# Patient Record
Sex: MALE | Race: WHITE | NOT HISPANIC OR LATINO | URBAN - METROPOLITAN AREA
[De-identification: names, ages, dates, MRNs, and addresses within clinical notes are randomized per-mention and may not be internally consistent; named-entity substitution may affect disease eponyms.]

---

## 2018-02-14 ENCOUNTER — EMERGENCY (EMERGENCY)
Facility: HOSPITAL | Age: 58
LOS: 1 days | Discharge: HOME | End: 2018-02-14
Attending: EMERGENCY MEDICINE
Payer: COMMERCIAL

## 2018-02-14 VITALS
OXYGEN SATURATION: 95 % | RESPIRATION RATE: 18 BRPM | TEMPERATURE: 99 F | SYSTOLIC BLOOD PRESSURE: 158 MMHG | DIASTOLIC BLOOD PRESSURE: 89 MMHG | HEART RATE: 99 BPM

## 2018-02-14 VITALS
TEMPERATURE: 98 F | OXYGEN SATURATION: 98 % | HEART RATE: 92 BPM | DIASTOLIC BLOOD PRESSURE: 83 MMHG | RESPIRATION RATE: 18 BRPM | SYSTOLIC BLOOD PRESSURE: 134 MMHG

## 2018-02-14 PROCEDURE — 99284 EMERGENCY DEPT VISIT MOD MDM: CPT | Mod: 25

## 2018-02-14 PROCEDURE — 73620 X-RAY EXAM OF FOOT: CPT | Mod: 26,RT

## 2018-02-14 PROCEDURE — 73610 X-RAY EXAM OF ANKLE: CPT

## 2018-02-14 PROCEDURE — 73610 X-RAY EXAM OF ANKLE: CPT | Mod: 26,RT

## 2018-02-14 PROCEDURE — 99284 EMERGENCY DEPT VISIT MOD MDM: CPT

## 2018-02-14 PROCEDURE — 73620 X-RAY EXAM OF FOOT: CPT

## 2018-02-14 RX ORDER — OXYCODONE HYDROCHLORIDE 5 MG/1
5 TABLET ORAL ONCE
Qty: 0 | Refills: 0 | Status: DISCONTINUED | OUTPATIENT
Start: 2018-02-14 | End: 2018-02-14

## 2018-02-14 RX ORDER — OXYCODONE HYDROCHLORIDE 5 MG/1
1 TABLET ORAL
Qty: 6 | Refills: 0 | OUTPATIENT
Start: 2018-02-14 | End: 2018-02-16

## 2018-02-14 RX ORDER — IBUPROFEN 200 MG
600 TABLET ORAL ONCE
Qty: 0 | Refills: 0 | Status: COMPLETED | OUTPATIENT
Start: 2018-02-14 | End: 2018-02-14

## 2018-02-14 RX ORDER — DIAZEPAM 5 MG
5 TABLET ORAL ONCE
Qty: 0 | Refills: 0 | Status: DISCONTINUED | OUTPATIENT
Start: 2018-02-14 | End: 2018-02-14

## 2018-02-14 RX ADMIN — Medication 600 MILLIGRAM(S): at 14:21

## 2018-02-14 RX ADMIN — Medication 5 MILLIGRAM(S): at 14:20

## 2018-02-14 RX ADMIN — Medication 600 MILLIGRAM(S): at 14:28

## 2018-02-14 RX ADMIN — OXYCODONE HYDROCHLORIDE 5 MILLIGRAM(S): 5 TABLET ORAL at 15:03

## 2018-02-14 RX ADMIN — Medication 20 MILLIGRAM(S): at 14:21

## 2018-02-14 RX ADMIN — OXYCODONE HYDROCHLORIDE 5 MILLIGRAM(S): 5 TABLET ORAL at 14:58

## 2018-02-14 NOTE — ED ADULT TRIAGE NOTE - CHIEF COMPLAINT QUOTE
while at work yesterday lifted a manhole cover and hurt back, lower back radiating to right leg, right foot pain, denies numbness/ tingling, took Aleve at 07am

## 2018-02-14 NOTE — ED PROVIDER NOTE - MEDICAL DECISION MAKING DETAILS
Xrays rule out right foot fx vs. cuboid subluxation as differential.  Right low back pain without sciatic symptoms, paraspinal, likely musculoskeletal Xrays noted. To treat for 5th metatarsal avulsion fx vs. cuboid subluxation as differential.  Cross wrap and post op shoe.  Podiatry referral. pain control. Right low back pain without sciatic symptoms, paraspinal, likely musculoskeletal. Xrays noted. To treat for 5th metatarsal avulsion fx vs. cuboid subluxation as differential.  Cross wrap and post op shoe.  Podiatry referral. pain control. Right low back pain without sciatic symptoms, paraspinal, likely musculoskeletal.       Attending note.  Right lower back pain acute on chronic, right foot pain possible cuboid syndrome. X-ray of the foot, muscle relaxants and analgesia. Followup with Ortho or  spine center.

## 2018-02-14 NOTE — ED ADULT NURSE NOTE - OBJECTIVE STATEMENT
57 yr old male with h/o back pain report to the ER for right lower back pain radiating down to the right foot. Pt reports that he was pulling up a man hole cover  when felt a pull in the right lower back yesterday. Pt report pain level of 8/10 on pain scale and aching in quality. Pt reports that he took Aleve 2 tabs which provided some relieve. Pt also report that he iced his back. Pt also report foot swelling. Pt is able to bear partial weight on the right foot. Denies any bladder or bowel issues.

## 2018-02-14 NOTE — ED PROVIDER NOTE - PHYSICAL EXAMINATION
Attending note. The patient is alert and in moderate distress. Examination of the back reveals no skin lesions or rashes. Patient has tenderness in the right lumbar and right SI joint. Straight leg raise is negative. There is no CVA tenderness. Abdomen is soft and nontender. Straight leg raise is negative. Examination of the patient's right foot reveals some swelling on the dorsum of the right foot. There is tenderness over the cuboid and mid foot. Sensation is intact and normal. Skin is otherwise normal. Patient has good pulses. Ankle is nontender.

## 2018-02-14 NOTE — ED PROVIDER NOTE - OBJECTIVE STATEMENT
57 year old male with hx of right sciatic pain who states that he was lifting a manhole cover yesterday evening and noticed a sharp right pain of his foot and his low back.  States that he went for a chiropractice manipulation this morning and notes no significant improvement.  Pain is with ambulation. Took aleve in the morning. 57 year old male with hx of right sciatic pain who states that he was lifting a manhole cover yesterday evening and noticed a sharp right pain of his foot and his low back.  States that he went for a chiropractice manipulation this morning and notes no significant improvement.  Pain is with ambulation. Took aleve in the morning.       Attending note. Patient was seen in fast track room #5. Patient is complaining of pain in the right lower back as well as pain in the dorsal lateral right foot.  Patient has a 10 year history of lower back pain in the right side but has not received MRI or other imaging. Patient was moving a manhole cover yesterday and felt acute pain in the right lower back as well as some pain in the foot. Patient states the pain he right foot is much more severe today and is exacerbated by weightbearing. Patient denies any numbness or paresthesia. Patient has no bowel or bladder dysfunction. Patient is a subtle anesthesia. Patient has no fever. Patient took Aleve this morning without relief 57 year old male with hx of right sciatic pain who states that he was lifting a manhole cover yesterday evening and noticed a sharp right pain of his foot and his low back.  States that he went for a chiropractic manipulation this morning and notes no significant improvement.  Pain is with ambulation. Took aleve in the morning.       Attending note. Patient was seen in fast track room #5. Patient is complaining of pain in the right lower back as well as pain in the dorsal lateral right foot.  Patient has a 10 year history of lower back pain in the right side but has not received MRI or other imaging. Patient was moving a manhole cover yesterday and felt acute pain in the right lower back as well as some pain in the foot. Patient states the pain he right foot is much more severe today and is exacerbated by weightbearing. Patient denies any numbness or paresthesia. Patient has no bowel or bladder dysfunction. Patient is a subtle anesthesia. Patient has no fever. Patient took Aleve this morning without relief

## 2018-02-21 ENCOUNTER — INPATIENT (INPATIENT)
Facility: HOSPITAL | Age: 58
LOS: 22 days | Discharge: HOME | DRG: 853 | End: 2018-03-16
Attending: INTERNAL MEDICINE | Admitting: HOSPITALIST
Payer: COMMERCIAL

## 2018-02-21 VITALS
SYSTOLIC BLOOD PRESSURE: 116 MMHG | HEART RATE: 122 BPM | DIASTOLIC BLOOD PRESSURE: 77 MMHG | OXYGEN SATURATION: 96 % | RESPIRATION RATE: 22 BRPM

## 2018-02-21 LAB
ALBUMIN SERPL ELPH-MCNC: 3.3 G/DL — SIGNIFICANT CHANGE UP (ref 3.3–5)
ALP SERPL-CCNC: 107 U/L — SIGNIFICANT CHANGE UP (ref 40–120)
ALT FLD-CCNC: 143 U/L RC — HIGH (ref 10–45)
ANION GAP SERPL CALC-SCNC: 15 MMOL/L — SIGNIFICANT CHANGE UP (ref 5–17)
APTT BLD: 31.6 SEC — SIGNIFICANT CHANGE UP (ref 27.5–37.4)
AST SERPL-CCNC: 156 U/L — HIGH (ref 10–40)
BASE EXCESS BLDV CALC-SCNC: 3.3 MMOL/L — HIGH (ref -2–2)
BILIRUB SERPL-MCNC: 0.5 MG/DL — SIGNIFICANT CHANGE UP (ref 0.2–1.2)
BUN SERPL-MCNC: 33 MG/DL — HIGH (ref 7–23)
CA-I SERPL-SCNC: 1.24 MMOL/L — SIGNIFICANT CHANGE UP (ref 1.12–1.3)
CALCIUM SERPL-MCNC: 9.8 MG/DL — SIGNIFICANT CHANGE UP (ref 8.4–10.5)
CHLORIDE BLDV-SCNC: 99 MMOL/L — SIGNIFICANT CHANGE UP (ref 96–108)
CHLORIDE SERPL-SCNC: 95 MMOL/L — LOW (ref 96–108)
CK MB BLD-MCNC: 1.2 % — SIGNIFICANT CHANGE UP (ref 0–3.5)
CK MB CFR SERPL CALC: 5.5 NG/ML — SIGNIFICANT CHANGE UP (ref 0–6.7)
CK SERPL-CCNC: 460 U/L — HIGH (ref 30–200)
CO2 BLDV-SCNC: 31 MMOL/L — HIGH (ref 22–30)
CO2 SERPL-SCNC: 28 MMOL/L — SIGNIFICANT CHANGE UP (ref 22–31)
CREAT SERPL-MCNC: 1.4 MG/DL — HIGH (ref 0.5–1.3)
ETHANOL SERPL-MCNC: SIGNIFICANT CHANGE UP MG/DL (ref 0–10)
GAS PNL BLDV: 136 MMOL/L — SIGNIFICANT CHANGE UP (ref 136–145)
GAS PNL BLDV: SIGNIFICANT CHANGE UP
GLUCOSE BLDV-MCNC: 143 MG/DL — HIGH (ref 70–99)
GLUCOSE SERPL-MCNC: 141 MG/DL — HIGH (ref 70–99)
HCO3 BLDV-SCNC: 29 MMOL/L — SIGNIFICANT CHANGE UP (ref 21–29)
HCT VFR BLD CALC: 40.8 % — SIGNIFICANT CHANGE UP (ref 39–50)
HCT VFR BLDA CALC: 43 % — SIGNIFICANT CHANGE UP (ref 39–50)
HGB BLD CALC-MCNC: 14.2 G/DL — SIGNIFICANT CHANGE UP (ref 13–17)
HGB BLD-MCNC: 13.9 G/DL — SIGNIFICANT CHANGE UP (ref 13–17)
INR BLD: 1.48 RATIO — HIGH (ref 0.88–1.16)
LACTATE BLDV-MCNC: 2.2 MMOL/L — HIGH (ref 0.7–2)
MCHC RBC-ENTMCNC: 34 GM/DL — SIGNIFICANT CHANGE UP (ref 32–36)
MCHC RBC-ENTMCNC: 34.2 PG — HIGH (ref 27–34)
MCV RBC AUTO: 101 FL — HIGH (ref 80–100)
PCO2 BLDV: 53 MMHG — HIGH (ref 35–50)
PH BLDV: 7.36 — SIGNIFICANT CHANGE UP (ref 7.35–7.45)
PLATELET # BLD AUTO: 280 K/UL — SIGNIFICANT CHANGE UP (ref 150–400)
PO2 BLDV: 76 MMHG — HIGH (ref 25–45)
POTASSIUM BLDV-SCNC: 3.8 MMOL/L — SIGNIFICANT CHANGE UP (ref 3.5–5)
POTASSIUM SERPL-MCNC: 3.9 MMOL/L — SIGNIFICANT CHANGE UP (ref 3.5–5.3)
POTASSIUM SERPL-SCNC: 3.9 MMOL/L — SIGNIFICANT CHANGE UP (ref 3.5–5.3)
PROT SERPL-MCNC: 7.6 G/DL — SIGNIFICANT CHANGE UP (ref 6–8.3)
PROTHROM AB SERPL-ACNC: 16.1 SEC — HIGH (ref 9.8–12.7)
RBC # BLD: 4.06 M/UL — LOW (ref 4.2–5.8)
RBC # FLD: 12.3 % — SIGNIFICANT CHANGE UP (ref 10.3–14.5)
SAO2 % BLDV: 94 % — HIGH (ref 67–88)
SODIUM SERPL-SCNC: 138 MMOL/L — SIGNIFICANT CHANGE UP (ref 135–145)
TROPONIN T SERPL-MCNC: <0.01 NG/ML — SIGNIFICANT CHANGE UP (ref 0–0.06)
WBC # BLD: 16.6 K/UL — HIGH (ref 3.8–10.5)
WBC # FLD AUTO: 16.6 K/UL — HIGH (ref 3.8–10.5)

## 2018-02-21 PROCEDURE — 93010 ELECTROCARDIOGRAM REPORT: CPT

## 2018-02-21 PROCEDURE — 99285 EMERGENCY DEPT VISIT HI MDM: CPT | Mod: 25

## 2018-02-21 RX ORDER — SODIUM CHLORIDE 9 MG/ML
1000 INJECTION INTRAMUSCULAR; INTRAVENOUS; SUBCUTANEOUS ONCE
Qty: 0 | Refills: 0 | Status: COMPLETED | OUTPATIENT
Start: 2018-02-21 | End: 2018-02-21

## 2018-02-21 RX ORDER — ASPIRIN/CALCIUM CARB/MAGNESIUM 324 MG
324 TABLET ORAL ONCE
Qty: 0 | Refills: 0 | Status: COMPLETED | OUTPATIENT
Start: 2018-02-21 | End: 2018-02-21

## 2018-02-21 RX ADMIN — Medication 324 MILLIGRAM(S): at 23:14

## 2018-02-21 RX ADMIN — SODIUM CHLORIDE 2000 MILLILITER(S): 9 INJECTION INTRAMUSCULAR; INTRAVENOUS; SUBCUTANEOUS at 23:38

## 2018-02-21 NOTE — ED PROVIDER NOTE - ATTENDING CONTRIBUTION TO CARE
****ATTENDING**** 56yo male hx HTN BIB family for chest pain. As per wife pt had a recent ankle sprain on pain meds that are not helping, has not had a drink in 10 days and normally drinks few drinks everyday, pw weakness, diaphoresis and chest pain.  Pt states he did not have cp, but wife felt he was short of breath at the time.   On exam, Patient is awake,alert,oriented x 3. diaphoretic. Patient's chest cta b/l, +s1s2 tachycardic. Abdomen is soft nd/nt +BS. RLE + swelling/ecchymosis, 2+DP, nml sensation.   EKG reviewed. Check Labs, CTA chest and Head.   Eval for PE, ACS, withdrawal.

## 2018-02-21 NOTE — ED ADULT NURSE NOTE - OBJECTIVE STATEMENT
Pt BIBA form home with familles at the bedside for diaphoresis and  confusion Pt on percocet for the right foot injury work related Pt has boot on and is swollen and bruised.  Pt toon one 10mg percocet at 1945 and one a t 2005.  Pt pupils are pinpoint at this time and placed on oxygen 2Lnc.  IVL placed and bloods sent as ordered.  Pt has ot had a drink since last Monday.  As per family pt c.o of right shoulder pain but resolved now as per pt with lower extremity swollen and will be ruled of for  PE pt is tachycardic 122 on arrival and 110 hunter [place]

## 2018-02-22 DIAGNOSIS — G93.40 ENCEPHALOPATHY, UNSPECIFIED: ICD-10-CM

## 2018-02-22 DIAGNOSIS — N17.9 ACUTE KIDNEY FAILURE, UNSPECIFIED: ICD-10-CM

## 2018-02-22 DIAGNOSIS — R91.8 OTHER NONSPECIFIC ABNORMAL FINDING OF LUNG FIELD: ICD-10-CM

## 2018-02-22 DIAGNOSIS — N28.9 DISORDER OF KIDNEY AND URETER, UNSPECIFIED: ICD-10-CM

## 2018-02-22 DIAGNOSIS — F10.239 ALCOHOL DEPENDENCE WITH WITHDRAWAL, UNSPECIFIED: ICD-10-CM

## 2018-02-22 DIAGNOSIS — J96.01 ACUTE RESPIRATORY FAILURE WITH HYPOXIA: ICD-10-CM

## 2018-02-22 DIAGNOSIS — R74.0 NONSPECIFIC ELEVATION OF LEVELS OF TRANSAMINASE AND LACTIC ACID DEHYDROGENASE [LDH]: ICD-10-CM

## 2018-02-22 DIAGNOSIS — R07.9 CHEST PAIN, UNSPECIFIED: ICD-10-CM

## 2018-02-22 DIAGNOSIS — Z29.9 ENCOUNTER FOR PROPHYLACTIC MEASURES, UNSPECIFIED: ICD-10-CM

## 2018-02-22 DIAGNOSIS — I10 ESSENTIAL (PRIMARY) HYPERTENSION: ICD-10-CM

## 2018-02-22 DIAGNOSIS — L03.115 CELLULITIS OF RIGHT LOWER LIMB: ICD-10-CM

## 2018-02-22 DIAGNOSIS — R07.1 CHEST PAIN ON BREATHING: ICD-10-CM

## 2018-02-22 LAB
ALBUMIN SERPL ELPH-MCNC: 3 G/DL — LOW (ref 3.3–5)
ALP SERPL-CCNC: 110 U/L — SIGNIFICANT CHANGE UP (ref 40–120)
ALT FLD-CCNC: 127 U/L — HIGH (ref 10–45)
ANION GAP SERPL CALC-SCNC: 12 MMOL/L — SIGNIFICANT CHANGE UP (ref 5–17)
ANION GAP SERPL CALC-SCNC: 13 MMOL/L — SIGNIFICANT CHANGE UP (ref 5–17)
APTT BLD: 29.6 SEC — SIGNIFICANT CHANGE UP (ref 27.5–37.4)
AST SERPL-CCNC: 103 U/L — HIGH (ref 10–40)
BASOPHILS # BLD AUTO: 0 K/UL — SIGNIFICANT CHANGE UP (ref 0–0.2)
BASOPHILS # BLD AUTO: 0.01 K/UL — SIGNIFICANT CHANGE UP (ref 0–0.2)
BASOPHILS NFR BLD AUTO: 0 % — SIGNIFICANT CHANGE UP (ref 0–2)
BASOPHILS NFR BLD AUTO: 0.1 % — SIGNIFICANT CHANGE UP (ref 0–2)
BILIRUB SERPL-MCNC: 0.4 MG/DL — SIGNIFICANT CHANGE UP (ref 0.2–1.2)
BUN SERPL-MCNC: 26 MG/DL — HIGH (ref 7–23)
BUN SERPL-MCNC: 27 MG/DL — HIGH (ref 7–23)
CALCIUM SERPL-MCNC: 9 MG/DL — SIGNIFICANT CHANGE UP (ref 8.4–10.5)
CALCIUM SERPL-MCNC: 9.4 MG/DL — SIGNIFICANT CHANGE UP (ref 8.4–10.5)
CHLORIDE SERPL-SCNC: 95 MMOL/L — LOW (ref 96–108)
CHLORIDE SERPL-SCNC: 96 MMOL/L — SIGNIFICANT CHANGE UP (ref 96–108)
CHOLEST SERPL-MCNC: 86 MG/DL — SIGNIFICANT CHANGE UP (ref 10–199)
CK MB BLD-MCNC: 1.6 % — SIGNIFICANT CHANGE UP (ref 0–3.5)
CK MB CFR SERPL CALC: 5.5 NG/ML — SIGNIFICANT CHANGE UP (ref 0–6.7)
CK MB CFR SERPL CALC: 5.7 NG/ML — SIGNIFICANT CHANGE UP (ref 0–6.7)
CK SERPL-CCNC: 362 U/L — HIGH (ref 30–200)
CK SERPL-CCNC: 366 U/L — HIGH (ref 30–200)
CO2 SERPL-SCNC: 28 MMOL/L — SIGNIFICANT CHANGE UP (ref 22–31)
CO2 SERPL-SCNC: 29 MMOL/L — SIGNIFICANT CHANGE UP (ref 22–31)
CREAT SERPL-MCNC: 1.07 MG/DL — SIGNIFICANT CHANGE UP (ref 0.5–1.3)
CREAT SERPL-MCNC: 1.14 MG/DL — SIGNIFICANT CHANGE UP (ref 0.5–1.3)
EOSINOPHIL # BLD AUTO: 0 K/UL — SIGNIFICANT CHANGE UP (ref 0–0.5)
EOSINOPHIL # BLD AUTO: 0 K/UL — SIGNIFICANT CHANGE UP (ref 0–0.5)
EOSINOPHIL NFR BLD AUTO: 0 % — SIGNIFICANT CHANGE UP (ref 0–6)
EOSINOPHIL NFR BLD AUTO: 0 % — SIGNIFICANT CHANGE UP (ref 0–6)
GLUCOSE SERPL-MCNC: 152 MG/DL — HIGH (ref 70–99)
GLUCOSE SERPL-MCNC: 156 MG/DL — HIGH (ref 70–99)
GRAM STN FLD: SIGNIFICANT CHANGE UP
HAV IGM SER-ACNC: SIGNIFICANT CHANGE UP
HBA1C BLD-MCNC: 5.5 % — SIGNIFICANT CHANGE UP (ref 4–5.6)
HBV CORE IGM SER-ACNC: SIGNIFICANT CHANGE UP
HBV SURFACE AG SER-ACNC: SIGNIFICANT CHANGE UP
HCT VFR BLD CALC: 38.1 % — LOW (ref 39–50)
HCT VFR BLD CALC: 38.8 % — LOW (ref 39–50)
HCV AB S/CO SERPL IA: 0.09 S/CO — SIGNIFICANT CHANGE UP
HCV AB SERPL-IMP: SIGNIFICANT CHANGE UP
HDLC SERPL-MCNC: 37 MG/DL — LOW (ref 40–125)
HGB BLD-MCNC: 12.9 G/DL — LOW (ref 13–17)
HGB BLD-MCNC: 13.1 G/DL — SIGNIFICANT CHANGE UP (ref 13–17)
HIV 1+2 AB+HIV1 P24 AG SERPL QL IA: SIGNIFICANT CHANGE UP
IMM GRANULOCYTES NFR BLD AUTO: 0.4 % — SIGNIFICANT CHANGE UP (ref 0–1.5)
LIPID PNL WITH DIRECT LDL SERPL: 32 MG/DL — SIGNIFICANT CHANGE UP
LYMPHOCYTES # BLD AUTO: 0.4 K/UL — LOW (ref 1–3.3)
LYMPHOCYTES # BLD AUTO: 0.51 K/UL — LOW (ref 1–3.3)
LYMPHOCYTES # BLD AUTO: 3.3 % — LOW (ref 13–44)
LYMPHOCYTES # BLD AUTO: 4 % — LOW (ref 13–44)
MCHC RBC-ENTMCNC: 32.1 PG — SIGNIFICANT CHANGE UP (ref 27–34)
MCHC RBC-ENTMCNC: 32.8 PG — SIGNIFICANT CHANGE UP (ref 27–34)
MCHC RBC-ENTMCNC: 33.2 GM/DL — SIGNIFICANT CHANGE UP (ref 32–36)
MCHC RBC-ENTMCNC: 34.4 GM/DL — SIGNIFICANT CHANGE UP (ref 32–36)
MCV RBC AUTO: 95.3 FL — SIGNIFICANT CHANGE UP (ref 80–100)
MCV RBC AUTO: 96.5 FL — SIGNIFICANT CHANGE UP (ref 80–100)
MONOCYTES # BLD AUTO: 1 K/UL — HIGH (ref 0–0.9)
MONOCYTES # BLD AUTO: 1.11 K/UL — HIGH (ref 0–0.9)
MONOCYTES NFR BLD AUTO: 5 % — SIGNIFICANT CHANGE UP (ref 2–14)
MONOCYTES NFR BLD AUTO: 7.1 % — SIGNIFICANT CHANGE UP (ref 2–14)
NEUTROPHILS # BLD AUTO: 13.84 K/UL — HIGH (ref 1.8–7.4)
NEUTROPHILS # BLD AUTO: 15.2 K/UL — HIGH (ref 1.8–7.4)
NEUTROPHILS NFR BLD AUTO: 87 % — HIGH (ref 43–77)
NEUTROPHILS NFR BLD AUTO: 89.1 % — HIGH (ref 43–77)
NEUTS BAND # BLD: 4 % — SIGNIFICANT CHANGE UP (ref 0–8)
PLAT MORPH BLD: NORMAL — SIGNIFICANT CHANGE UP
PLATELET # BLD AUTO: 239 K/UL — SIGNIFICANT CHANGE UP (ref 150–400)
PLATELET # BLD AUTO: 255 K/UL — SIGNIFICANT CHANGE UP (ref 150–400)
POTASSIUM SERPL-MCNC: 4.2 MMOL/L — SIGNIFICANT CHANGE UP (ref 3.5–5.3)
POTASSIUM SERPL-MCNC: 4.2 MMOL/L — SIGNIFICANT CHANGE UP (ref 3.5–5.3)
POTASSIUM SERPL-SCNC: 4.2 MMOL/L — SIGNIFICANT CHANGE UP (ref 3.5–5.3)
POTASSIUM SERPL-SCNC: 4.2 MMOL/L — SIGNIFICANT CHANGE UP (ref 3.5–5.3)
PROT SERPL-MCNC: 7.1 G/DL — SIGNIFICANT CHANGE UP (ref 6–8.3)
RBC # BLD: 4 M/UL — LOW (ref 4.2–5.8)
RBC # BLD: 4.02 M/UL — LOW (ref 4.2–5.8)
RBC # FLD: 13.6 % — SIGNIFICANT CHANGE UP (ref 10.3–14.5)
RBC # FLD: 13.7 % — SIGNIFICANT CHANGE UP (ref 10.3–14.5)
RBC BLD AUTO: SIGNIFICANT CHANGE UP
SODIUM SERPL-SCNC: 136 MMOL/L — SIGNIFICANT CHANGE UP (ref 135–145)
SODIUM SERPL-SCNC: 137 MMOL/L — SIGNIFICANT CHANGE UP (ref 135–145)
SPECIMEN SOURCE: SIGNIFICANT CHANGE UP
TOTAL CHOLESTEROL/HDL RATIO MEASUREMENT: 2.3 RATIO — LOW (ref 3.4–9.6)
TRIGL SERPL-MCNC: 87 MG/DL — SIGNIFICANT CHANGE UP (ref 10–149)
TROPONIN T SERPL-MCNC: <0.01 NG/ML — SIGNIFICANT CHANGE UP (ref 0–0.06)
TROPONIN T SERPL-MCNC: <0.01 NG/ML — SIGNIFICANT CHANGE UP (ref 0–0.06)
TSH SERPL-MCNC: 0.37 UIU/ML — SIGNIFICANT CHANGE UP (ref 0.27–4.2)
WBC # BLD: 15.53 K/UL — HIGH (ref 3.8–10.5)
WBC # BLD: 16.13 K/UL — HIGH (ref 3.8–10.5)
WBC # FLD AUTO: 15.53 K/UL — HIGH (ref 3.8–10.5)
WBC # FLD AUTO: 16.13 K/UL — HIGH (ref 3.8–10.5)

## 2018-02-22 PROCEDURE — 99223 1ST HOSP IP/OBS HIGH 75: CPT | Mod: AI

## 2018-02-22 PROCEDURE — 93970 EXTREMITY STUDY: CPT | Mod: 26

## 2018-02-22 PROCEDURE — 12345: CPT | Mod: GC,NC

## 2018-02-22 PROCEDURE — 93306 TTE W/DOPPLER COMPLETE: CPT | Mod: 26

## 2018-02-22 PROCEDURE — 99252 IP/OBS CONSLTJ NEW/EST SF 35: CPT

## 2018-02-22 PROCEDURE — 70450 CT HEAD/BRAIN W/O DYE: CPT | Mod: 26

## 2018-02-22 PROCEDURE — 73701 CT LOWER EXTREMITY W/DYE: CPT | Mod: 26,RT

## 2018-02-22 PROCEDURE — 71275 CT ANGIOGRAPHY CHEST: CPT | Mod: 26

## 2018-02-22 PROCEDURE — 74177 CT ABD & PELVIS W/CONTRAST: CPT | Mod: 26

## 2018-02-22 PROCEDURE — 99255 IP/OBS CONSLTJ NEW/EST HI 80: CPT | Mod: GC

## 2018-02-22 RX ORDER — LACTOBACILLUS ACIDOPHILUS 100MM CELL
1 CAPSULE ORAL
Qty: 0 | Refills: 0 | Status: DISCONTINUED | OUTPATIENT
Start: 2018-02-22 | End: 2018-02-25

## 2018-02-22 RX ORDER — PIPERACILLIN AND TAZOBACTAM 4; .5 G/20ML; G/20ML
3.38 INJECTION, POWDER, LYOPHILIZED, FOR SOLUTION INTRAVENOUS EVERY 8 HOURS
Qty: 0 | Refills: 0 | Status: DISCONTINUED | OUTPATIENT
Start: 2018-02-22 | End: 2018-02-23

## 2018-02-22 RX ORDER — KETOROLAC TROMETHAMINE 30 MG/ML
15 SYRINGE (ML) INJECTION EVERY 8 HOURS
Qty: 0 | Refills: 0 | Status: DISCONTINUED | OUTPATIENT
Start: 2018-02-22 | End: 2018-02-22

## 2018-02-22 RX ORDER — SODIUM CHLORIDE 9 MG/ML
1000 INJECTION INTRAMUSCULAR; INTRAVENOUS; SUBCUTANEOUS
Qty: 0 | Refills: 0 | Status: DISCONTINUED | OUTPATIENT
Start: 2018-02-22 | End: 2018-02-23

## 2018-02-22 RX ORDER — HEPARIN SODIUM 5000 [USP'U]/ML
INJECTION INTRAVENOUS; SUBCUTANEOUS
Qty: 25000 | Refills: 0 | Status: DISCONTINUED | OUTPATIENT
Start: 2018-02-22 | End: 2018-02-25

## 2018-02-22 RX ORDER — HEPARIN SODIUM 5000 [USP'U]/ML
5000 INJECTION INTRAVENOUS; SUBCUTANEOUS EVERY 8 HOURS
Qty: 0 | Refills: 0 | Status: DISCONTINUED | OUTPATIENT
Start: 2018-02-22 | End: 2018-02-22

## 2018-02-22 RX ORDER — KETOROLAC TROMETHAMINE 30 MG/ML
30 SYRINGE (ML) INJECTION ONCE
Qty: 0 | Refills: 0 | Status: DISCONTINUED | OUTPATIENT
Start: 2018-02-22 | End: 2018-02-22

## 2018-02-22 RX ORDER — ACETAMINOPHEN 500 MG
650 TABLET ORAL EVERY 8 HOURS
Qty: 0 | Refills: 0 | Status: DISCONTINUED | OUTPATIENT
Start: 2018-02-22 | End: 2018-02-24

## 2018-02-22 RX ORDER — THIAMINE MONONITRATE (VIT B1) 100 MG
100 TABLET ORAL DAILY
Qty: 0 | Refills: 0 | Status: DISCONTINUED | OUTPATIENT
Start: 2018-02-22 | End: 2018-02-25

## 2018-02-22 RX ORDER — VANCOMYCIN HCL 1 G
1000 VIAL (EA) INTRAVENOUS EVERY 12 HOURS
Qty: 0 | Refills: 0 | Status: DISCONTINUED | OUTPATIENT
Start: 2018-02-22 | End: 2018-02-23

## 2018-02-22 RX ORDER — PIPERACILLIN AND TAZOBACTAM 4; .5 G/20ML; G/20ML
3.38 INJECTION, POWDER, LYOPHILIZED, FOR SOLUTION INTRAVENOUS EVERY 8 HOURS
Qty: 0 | Refills: 0 | Status: DISCONTINUED | OUTPATIENT
Start: 2018-02-22 | End: 2018-02-22

## 2018-02-22 RX ORDER — ACETAMINOPHEN 500 MG
650 TABLET ORAL EVERY 6 HOURS
Qty: 0 | Refills: 0 | Status: DISCONTINUED | OUTPATIENT
Start: 2018-02-22 | End: 2018-02-25

## 2018-02-22 RX ORDER — FOLIC ACID 0.8 MG
1 TABLET ORAL DAILY
Qty: 0 | Refills: 0 | Status: DISCONTINUED | OUTPATIENT
Start: 2018-02-22 | End: 2018-02-25

## 2018-02-22 RX ORDER — HEPARIN SODIUM 5000 [USP'U]/ML
10000 INJECTION INTRAVENOUS; SUBCUTANEOUS EVERY 6 HOURS
Qty: 0 | Refills: 0 | Status: DISCONTINUED | OUTPATIENT
Start: 2018-02-22 | End: 2018-02-25

## 2018-02-22 RX ORDER — LIDOCAINE 4 G/100G
1 CREAM TOPICAL DAILY
Qty: 0 | Refills: 0 | Status: DISCONTINUED | OUTPATIENT
Start: 2018-02-22 | End: 2018-02-25

## 2018-02-22 RX ORDER — HEPARIN SODIUM 5000 [USP'U]/ML
10000 INJECTION INTRAVENOUS; SUBCUTANEOUS ONCE
Qty: 0 | Refills: 0 | Status: COMPLETED | OUTPATIENT
Start: 2018-02-22 | End: 2018-02-22

## 2018-02-22 RX ORDER — HEPARIN SODIUM 5000 [USP'U]/ML
5000 INJECTION INTRAVENOUS; SUBCUTANEOUS EVERY 6 HOURS
Qty: 0 | Refills: 0 | Status: DISCONTINUED | OUTPATIENT
Start: 2018-02-22 | End: 2018-02-25

## 2018-02-22 RX ORDER — LOSARTAN POTASSIUM 100 MG/1
0 TABLET, FILM COATED ORAL
Qty: 0 | Refills: 0 | COMMUNITY

## 2018-02-22 RX ADMIN — Medication 250 MILLIGRAM(S): at 10:44

## 2018-02-22 RX ADMIN — Medication 650 MILLIGRAM(S): at 20:11

## 2018-02-22 RX ADMIN — Medication 650 MILLIGRAM(S): at 21:30

## 2018-02-22 RX ADMIN — Medication 1 TABLET(S): at 12:43

## 2018-02-22 RX ADMIN — Medication 100 MILLIGRAM(S): at 12:43

## 2018-02-22 RX ADMIN — Medication 100 MILLIGRAM(S): at 22:08

## 2018-02-22 RX ADMIN — HEPARIN SODIUM 2200 UNIT(S)/HR: 5000 INJECTION INTRAVENOUS; SUBCUTANEOUS at 20:48

## 2018-02-22 RX ADMIN — Medication 100 MILLIGRAM(S): at 19:50

## 2018-02-22 RX ADMIN — Medication 250 MILLIGRAM(S): at 18:32

## 2018-02-22 RX ADMIN — Medication 30 MILLIGRAM(S): at 09:57

## 2018-02-22 RX ADMIN — SODIUM CHLORIDE 100 MILLILITER(S): 9 INJECTION INTRAMUSCULAR; INTRAVENOUS; SUBCUTANEOUS at 10:51

## 2018-02-22 RX ADMIN — HEPARIN SODIUM 5000 UNIT(S): 5000 INJECTION INTRAVENOUS; SUBCUTANEOUS at 05:28

## 2018-02-22 RX ADMIN — Medication 650 MILLIGRAM(S): at 10:52

## 2018-02-22 RX ADMIN — PIPERACILLIN AND TAZOBACTAM 25 GRAM(S): 4; .5 INJECTION, POWDER, LYOPHILIZED, FOR SOLUTION INTRAVENOUS at 22:08

## 2018-02-22 RX ADMIN — HEPARIN SODIUM 10000 UNIT(S): 5000 INJECTION INTRAVENOUS; SUBCUTANEOUS at 21:09

## 2018-02-22 RX ADMIN — Medication 30 MILLIGRAM(S): at 10:15

## 2018-02-22 RX ADMIN — LIDOCAINE 1 PATCH: 4 CREAM TOPICAL at 21:09

## 2018-02-22 RX ADMIN — Medication 1 MILLIGRAM(S): at 12:43

## 2018-02-22 NOTE — H&P ADULT - HISTORY OF PRESENT ILLNESS
56yo male hx HTN BIB family for chest pain. As per wife pt had a recent ankle sprain on pain meds that are not helping, has not had a drink in 10 days and normally drinks few drinks everyday, pw weakness, diaphoresis and chest pain.  Pt states he did not have cp, but wife felt he was short of breath at the time 57 M Hx HTN, daily EtOH use for many years BIB family for chest pain and confusion.  Patient injured his R ankle at work on 2/13/18, saw his chiropractor the next dayAs per wife pt had a recent ankle sprain on pain meds that are not helping, has not had a drink in 10 days and normally drinks few drinks everyday, pw weakness, diaphoresis and chest pain.  Pt states he did not have cp, but wife felt he was short of breath at the time 57 M Hx HTN, daily EtOH use for many years BIB family for chest pain and confusion.  Patient injured his R ankle at work on 2/13/18, saw his chiropractor the next day who told him to go to ED due to extensive swelling.  He was seen in ED, Xrays done, d/c home with ankle sprain on pain meds (oxycodone) and ordered ankle brace which he received recently.  Patient usually drinks 4-20 beers/day or 5-6 shots of Vodka/day "when I tried to loose weight", and last drink was 12/13/18 when his ankle was injured.  Wife noticed the past 3-4 days, patient was not being himself "mentation cloudy" per patient, "very disoriented" per daughter, hallucination (visual) associated with worsening R ankle redness/brusing->tracking redness from ankle to below R knee, "worsening pain" per patient, and wife noted diaphoresis, labored breathing and chest pain (which he did not remember he had it).  Currently no complaint except "cloudy", but when asked to breath deeply, reproduced R lateral and midsternum chest pain upon deep inspiration, O2 sat 88-89% RA at rest, 's.

## 2018-02-22 NOTE — DIETITIAN INITIAL EVALUATION ADULT. - OTHER INFO
Pt seen for BMI >40kg/m2 on 6TOW. Current BMI = 46.1kg/m2. Pt ordered for DASH diet. Pt states he has no appetite and has consumed 0% po intake of meals. Wife states pt has been drinking fluids such as water and milk. Pt denies difficulty chewing/swallowing, N+V, diarrhea, constipation. Last BM 2/14 per pt. Pt states he feels a BM coming on- RN aware and brought bed pan.

## 2018-02-22 NOTE — H&P ADULT - PROBLEM SELECTOR PLAN 1
likely multifactorial - active infection/celllulitis, possible alcohol withdraw delirium (although >7d from last drink) and hypoxia with lung masses  - unclear onset of AMS/confusion/disorientation/"cloudy" due to being on Oxycodone since R ankle injury  - will monitor clinically and treat underlying causes  - will hold Oxycodone  - will check HIV, acute hep panel

## 2018-02-22 NOTE — CONSULT NOTE ADULT - ATTENDING COMMENTS
Pt. was seen and examined. Agree with above. Plan d/w the team.  R peroneal DVT, would anticoagulate for 3m if no contraindication   no signs of compartment syndrome  Abx for cellulitis

## 2018-02-22 NOTE — DIETITIAN INITIAL EVALUATION ADULT. - ENERGY NEEDS
ht = 64 inches, wt = 267 pounds, BMI = 46.1kg/m2, IBW = 130 pounds, 205%IBW    Other Pertinent Information: Per chart, this is a 56 Y/O male with daily EtOH use, HTN, recent R ankle injury p/w acute worsening confusion/disorientation associated with pleuritic chest pain, diaphoresis and worsening redness/pain/swelling of R ankle a/w acute hypoxic respiratory failure, b/l lung masses on CTA chest, RLE cellulitis and metabolic encephalopathy suspects multifactorial.

## 2018-02-22 NOTE — CONSULT NOTE ADULT - ASSESSMENT
Pt is a 57M with PMHx daily EtOH use and recent ankle sprain 2/13 presenting from home 2/22 for chest pain and AMS x3-4 days with worsening right ankle swelling and redness admitted for sepsis 2/2 cellulitis and then found to have CT Chest findings concerning for b/l diffuse GGO concerning for infectious process vs. embolic event, less likely 2/2 malignancy.  -Would add GNR coverage  -Check TTE and f/u blood cultures  -Check sputum cx  -Will continue to follow

## 2018-02-22 NOTE — CONSULT NOTE ADULT - ATTENDING COMMENTS
Mr. Echavarria is a 57 year old male who presents with RLE cellulitis and CT Chest showing multiple bilateral nodular opacities consistent with septic emboli.  He is experiencing pleuritic chest pain as many of these nodules abut the pleura.  He is hemodynamically stable, afebrile, awake with mild confusion.  Doppler today shows peroneal clot in cellulitic leg and leg is increasingly tense.  Vascular surgery has been consulted to rule out a compartment syndrome and necrotizing fasciitis.  Differential for pulmonary process is septic emboli from infectious source, fungal infection and malignant process less likely.  Await culture results.  Would obtain echocardiogram. Continue antibiotics as per ID.  Would add bronchodilators for wheezing.  Will follow with you.  Will need repeat CT Scan in 4-6 weeks.

## 2018-02-22 NOTE — PROVIDER CONTACT NOTE (MEDICATION) - ACTION/TREATMENT ORDERED:
NP contacted and aware. Hold heparin gtt for now; awaiting results of CT abdomen and pelvis. NP to f/u; will continue to monitor.

## 2018-02-22 NOTE — DIETITIAN INITIAL EVALUATION ADULT. - PHYSICAL APPEARANCE
obese/BMI = 46.1kg/m2, 1+ edema right leg; 3+ edema right foot obese/BMI = 46.1kg/m2, 1+ edema right leg; 3+ edema right foot; NFPE deferred at this time as patient states he does not feel well; currently with fever. Pt, however, does not show visual signs of muscle/fat loss at this time.

## 2018-02-22 NOTE — PROGRESS NOTE ADULT - PROBLEM SELECTOR PLAN 10
- Heparin sc  Plan discussed with daughter Thu and wife Tabitha at bedside.  All questions answered and emotional support given. - Heparin sc  Plan discussed with wife Tabitha at bedside.  All questions answered and emotional support given.

## 2018-02-22 NOTE — H&P ADULT - ADDITIONAL PE
/87, HR , Tm 37.2, RR 20, O2 sat 98% 2L NC BP 99//87, HR , Tm 37.2, RR 20-22, O2 sat 88% RA, 98% 2L NC

## 2018-02-22 NOTE — CONSULT NOTE ADULT - SUBJECTIVE AND OBJECTIVE BOX
CHIEF COMPLAINT: AMS     HPI:  Pt is a 57M with PMHx daily EtOH use and recent ankle sprain 2/13 presenting from home 2/22 for chest pain and AMS x3-4 days with worsening right ankle swelling and redness.    VS on initial evaluation 98.4 (24 hr Tmax 101F), 122, 116/77, 22, 96%. Pt admitted for RLE cellulitis and multifactorial metabolic encephalopathy. He was started on Vancomycin IV and NS IVF as well as Ketorolac for pain prn. Pulmonary consulted for evaluation of pleuritic chest pain and abnormal CT Chest findings.     PAST MEDICAL & SURGICAL HISTORY:  Essential hypertension  No significant past surgical history      FAMILY HISTORY:  Family history of cirrhosis of liver (Father): Father  Family history of prostate cancer (Father): Father  Family history of hypertension (Father): Mother      SOCIAL HISTORY:  Smoking: [ ] Never Smoked [x ] Former Smoker (20 pack years) [ ] Current Smoker  (__ packs x ___ years)  Substance Use: [ ] Never Used [ ] Used ____  EtOH Use: Daily  Marital Status: [ ] Single [x ]  [ ]  [ ]   Sexual History: n/a  Occupation: [employer] worker  Recent Travel:  Country of Birth: [place]  Advance Directives:    Allergies    No Known Allergies    Intolerances        HOME MEDICATIONS:    REVIEW OF SYSTEMS:  Constitutional: [ ] negative [ ] fevers [ ] chills [ ] weight loss [ ] weight gain  HEENT: [ ] negative [ ] dry eyes [ ] eye irritation [ ] postnasal drip [ ] nasal congestion  CV: [ ] negative  [ ] chest pain [ ] orthopnea [ ] palpitations [ ] murmur  Resp: [ ] negative [ ] cough [ ] shortness of breath [ ] dyspnea [ ] wheezing [ ] sputum [ ] hemoptysis  GI: [ ] negative [ ] nausea [ ] vomiting [ ] diarrhea [ ] constipation [ ] abd pain [ ] dysphagia   : [ ] negative [ ] dysuria [ ] nocturia [ ] hematuria [ ] increased urinary frequency  Musculoskeletal: [ ] negative [ ] back pain [ ] myalgias [ ] arthralgias [ ] fracture  Skin: [ ] negative [ ] rash [ ] itch  Neurological: [ ] negative [ ] headache [ ] dizziness [ ] syncope [ ] weakness [ ] numbness  Psychiatric: [ ] negative [ ] anxiety [ ] depression  Endocrine: [ ] negative [ ] diabetes [ ] thyroid problem  Hematologic/Lymphatic: [ ] negative [ ] anemia [ ] bleeding problem  Allergic/Immunologic: [ ] negative [ ] itchy eyes [ ] nasal discharge [ ] hives [ ] angioedema  [ ] All other systems negative  [ ] Unable to assess ROS because ________    OBJECTIVE:  ICU Vital Signs Last 24 Hrs  T(C): 37.3 (22 Feb 2018 12:01), Max: 38.3 (22 Feb 2018 10:43)  T(F): 99.1 (22 Feb 2018 12:01), Max: 101 (22 Feb 2018 10:43)  HR: 113 (22 Feb 2018 12:01) (80 - 122)  BP: 142/82 (22 Feb 2018 12:01) (99/74 - 142/82)  BP(mean): --  ABP: --  ABP(mean): --  RR: 18 (22 Feb 2018 12:01) (18 - 22)  SpO2: 94% (22 Feb 2018 12:01) (93% - 98%)        02-21 @ 07:01  -  02-22 @ 07:00  --------------------------------------------------------  IN: 480 mL / OUT: 550 mL / NET: -70 mL      CAPILLARY BLOOD GLUCOSE      POCT Blood Glucose.: 126 mg/dL (21 Feb 2018 22:09)      PHYSICAL EXAM:  General:   HEENT:   Lymph Nodes:  Neck:   Respiratory:   Cardiovascular:   Abdomen:   Extremities:   Skin:   Neurological:  Psychiatry:    HOSPITAL MEDICATIONS:  heparin  Injectable 5000 Unit(s) SubCutaneous every 8 hours    vancomycin  IVPB 1000 milliGRAM(s) IV Intermittent every 12 hours          acetaminophen   Tablet 650 milliGRAM(s) Oral every 6 hours PRN  acetaminophen   Tablet. 650 milliGRAM(s) Oral every 8 hours PRN  ketorolac   Injectable 15 milliGRAM(s) IV Push every 8 hours PRN          folic acid 1 milliGRAM(s) Oral daily  multivitamin 1 Tablet(s) Oral daily  sodium chloride 0.9%. 1000 milliLiter(s) IV Continuous <Continuous>  thiamine 100 milliGRAM(s) Oral daily            LABS:                        12.9   16.13 )-----------( 239      ( 22 Feb 2018 07:32 )             38.8     Hgb Trend: 12.9<--, 13.9<--  02-22    137  |  96  |  26<H>  ----------------------------<  152<H>  4.2   |  28  |  1.14    Ca    9.0      22 Feb 2018 07:32    TPro  7.1  /  Alb  3.0<L>  /  TBili  0.4  /  DBili  x   /  AST  103<H>  /  ALT  127<H>  /  AlkPhos  110  02-22    Creatinine Trend: 1.14<--, 1.40<--  PT/INR - ( 21 Feb 2018 22:23 )   PT: 16.1 sec;   INR: 1.48 ratio         PTT - ( 21 Feb 2018 22:23 )  PTT:31.6 sec      Venous Blood Gas:  02-21 @ 22:23  7.36/53/76/29/94  VBG Lactate: 2.2      MICROBIOLOGY:     RADIOLOGY:  [x ] Reviewed and interpreted by me    EXAM: CT ANGIO CHEST (W)AW IC       PROCEDURE DATE: 02/22/2018           INTERPRETATION: CTA CHEST WITH CONTRAST (CT PULMONARY EMBOLUS)     INDICATION: Chest pain for 2 weeks. Tachycardia.     TECHNIQUE: Contrast-enhanced helical images were obtained of the chest.   Coronal and sagittal images were reconstructed. Images were obtained after   the uneventful administration of 90 cc of nonionic intravenous contrast   (Omnipaque 350). 10 cc of nonionic intravenous contrast was discarded.   Maximum intensity projection images were generated.     COMPARISON: None.     FINDINGS:     Pulmonary Artery: The main pulmonary artery measures is enlarged which can   occur in the clinical setting of pulmonary arterial hypertension. There is   no main, central, or lobar, pulmonary embolus. The segmental and   subsegmental vessels are not well evaluated secondary to artifact.     Tubes/Lines: None.     Lungs And Airways: Multiple bilateral groundglass and nodular opacities   measuring up to 2 cm with a peripheral and slightly lower lobe   distribution. There are groundglass opacities surrounding several of the   nodules. The airways are unremarkable.     Pleura: No pneumothorax. No pleural effusion.     Mediastinum: There are no enlarged chest lymph nodes. The visualized portion   of the thyroid gland is unremarkable. The esophagus is unremarkable.     Heart and Vasculature: The heart is normal in size. There are no   atherosclerotic calcifications of the aorta. There is no pericardial   effusion.     Upper Abdomen: Small calcified granuloma in the spleen.     Bones And Soft Tissues: Degenerative change of the spine.     IMPRESSION:     1. No main, or lobar pulmonary embolus. Evaluation of the segmental and   subsegmental branches is limited secondary to artifact.   2. Multiple groundglass and nodular opacities with a peripheral and lower   lobe distribution could be secondary to infection (? Either septic emboli or   fungal infection rather than a community acquired pneumonia) rather than   malignancy.                 RADHA ESCOBAR M.D., RADIOLOGY RESIDENT   This document has been electronically signed.   MARCIE MELENDEZ M.D., ATTENDING RADIOLOGIST   This document has been electronically signed. Feb 22 2018 10:47AM         PULMONARY FUNCTION TESTS: None    EKG: Reviewed

## 2018-02-22 NOTE — H&P ADULT - NSHPLABSRESULTS_GEN_ALL_CORE
Labs personally reviewed  CXR film personally reviewed  EKG tracing personally reviewed Labs personally reviewed  CTH and CTA (prelim) reviewed - b/l lung mass  EKG tracing personally reviewed - ST at 115

## 2018-02-22 NOTE — H&P ADULT - PROBLEM SELECTOR PLAN 3
- CTA prelim neg for PE, but b/l lung masses  - will f/u final CTA report and may need tissue biopsy r/o malignancy with Hx smoking  - will treat with O2 supplementation

## 2018-02-22 NOTE — H&P ADULT - PROBLEM SELECTOR PLAN 2
- recently tracking up from R ankle to calf in the past 2-3d, with diaphoresis, unclear fever because never measured temp at [facility], currently afebrile  - will treat with Vanco IV for now with monitoring of Trough leval and will adjust as needed   - will monitor clinical response

## 2018-02-22 NOTE — H&P ADULT - PROBLEM SELECTOR PLAN 10
- Heparin sc  Plan discussed with daughter Thu and wife Katherine at bedside.  All questions answered and emotional support given. - Heparin sc  Plan discussed with daughter Thu and wife Tabitha at bedside.  All questions answered and emotional support given.

## 2018-02-22 NOTE — CONSULT NOTE ADULT - ATTENDING COMMENTS
57 year old male with HTN and daily alcohol use presenting with worsening chest pain and confusion.     Patient sustained an injury to his right foot at work 2/13/18 and went to the ED - discharged home with pain meds. While home, developed worsening SOB, confusion and right foot pain over 2 weeks.     CT chest concerning for septic emboli. Right leg on exam with impressive swelling concerning for nec fasc vs compartment syndrome. Also, found to have a DVT.    Febrile to 101F today, leukocytosis with neutrophilic predominance, elevated transaminase improving. ADA improved.    Recommend:  -Continue vancomycin and zosyn.  -Monitor vanco trough prior to 4th dose.  -Add clindamycin 900 mg IV q 8 hours.  F/U blood cxs.  -Check CT RLE  -Surgical evaluation for concern for nec fasc vs compartment syndrome  -Will follow along with you.    Corey Salmeron MD  Pager [phone]  After 5pm/weekends call [phone] 57 year old male with HTN and daily alcohol use presenting with worsening SOB, chest pain, confusion and worsening right lower extremity pain and swelling and redness.    Patient sustained an injury to his right foot at work 2/13/18 and went to the ED - discharged home with pain meds. While home, developed worsening SOB, confusion and right foot pain over 2 weeks.     CT chest concerning for septic emboli. Right leg on exam with impressive swelling concerning for nec fasc vs compartment syndrome. Also, found to have a DVT.    Febrile to 101F today, leukocytosis with neutrophilic predominance, elevated transaminase improving. ADA improved.    Recommend:  -Continue vancomycin and zosyn.  -Monitor vanco trough prior to 4th dose.  -Add clindamycin 900 mg IV q 8 hours.  F/U blood cxs.  -Check CT RLE  -Surgical evaluation for concern for nec fasc vs compartment syndrome  -Will follow along with you.    Corey Salmeron MD  Pager [phone]  After 5pm/weekends call [phone]

## 2018-02-22 NOTE — H&P ADULT - PROBLEM SELECTOR PLAN 7
- unclear baseline, suspected ADA  - will monitor closely last stresst 1.5 yrs ago, routinely done w/o symptoms, reported negative  - will monitor serial cardiac enzymes r/o ACS  - possibly due to lung masses  - will check TTE to eval wall motion

## 2018-02-22 NOTE — DIETITIAN INITIAL EVALUATION ADULT. - NS AS NUTRI INTERV ED CONTENT
Provided pt with DASH/TLC medical nutrition therapy handout. Reviewed low sodium/low fat/low cholesterol food choices, foods high in sodium, fat, and cholesterol to avoid. Reviewed ways to reduce sodium intake, meal and snack options, tips for dining out. Pt verbalized understanding of nutrition education and accepted DASH/TLC medical nutrition therapy handout. RD remains available for diet education review and per follow-up protocol. Vidhi Maldonado MS, RDN, CDN Pager # [phone]

## 2018-02-22 NOTE — PROGRESS NOTE ADULT - PROBLEM SELECTOR PLAN 4
- will f/u final CTA report and may need tissue biopsy r/o malignancy with Hx smoking, O2 supplementation -  Multiple groundglass and nodular opacities with a peripheral and   lower lobe distribution could be secondary to infection  -Pulm and ID consulted, recs appreciated will monitor on CIWA, but no Ativan prn since last drink 9 days ago  may use Ativan if agitation to prevent harm

## 2018-02-22 NOTE — DIETITIAN INITIAL EVALUATION ADULT. - PROBLEM SELECTOR PLAN 2
- recently tracking up from R ankle to calf in the past 2-3d, with diaphoresis, unclear fever because never measured temp at [facility], currently afebrile  - will treat with Vanco IV for now with monitoring of Trough leval and will adjust as needed   - will monitor clinical response

## 2018-02-22 NOTE — DIETITIAN INITIAL EVALUATION ADULT. - PROBLEM SELECTOR PLAN 7
last stresst 1.5 yrs ago, routinely done w/o symptoms, reported negative  - will monitor serial cardiac enzymes r/o ACS  - possibly due to lung masses  - will check TTE to eval wall motion

## 2018-02-22 NOTE — CHART NOTE - NSCHARTNOTEFT_GEN_A_CORE
Pt is a 57M with PMHx daily EtOH use and recent ankle sprain 2/13 presenting from home 2/22 for chest pain and AMS x3-4 days with worsening right ankle swelling and redness admitted for sepsis 2/2 cellulitis and then found to have CT Chest findings concerning for b/l diffuse GGO concerning for infectious process vs. embolic event, less likely 2/2 malignancy.  /91  RR 20 Temp 98.4 F 02 Sat 96%     Problem/Plan - 1:  ·  Problem: Cellulitis of right leg ( right leg:  redness now to just below the knee, edematous and firm to touch, mild pain, pulses present)    > ID consult: continue Vancomycin and Zosyn     > added Clindamycin     > CT of right LE to r/o necrotising fascitis     > Surgical consult     > follow cultures         Problem/Plan - 2  DVT   >  Start Heparin drip    >  Seen by house vascular - (hold Heparin drip until results of CT Scan r/e possible OR)   >  Heparin Drip started at 2030     Problem/Plan - 3:  ·  Problem: Acute respiratory failure with hypoxia.     CTA neg for PE,  positive for multiple ground glass and nodular opacities with a peripheral and lower lobe distribution could be secondary to infection  > Pulmonary consult appreciated   > Sputum Cx   > follow up Blood cultures  > follow up echocardiogram:  Result EF 54%, Normal LV     All procedures and treatment was explained to family at length.  A CT of abdomen and pelvis was also done this pm   currently patient is afebrile, NPO x meds for now pending results of CT of right LE for possible surgical intervention.     Will continue to monitor   Report given to oncoming team    Deborah Crawford DNP- C  [phone]

## 2018-02-22 NOTE — PROGRESS NOTE ADULT - PROBLEM SELECTOR PLAN 5
- will monitor on CIWA, but no Ativan prn since last drink 9 days ago  - may use Ativan if agitation to prevent harm likely alcohol use, but will f/u HIV, acute hepatitis panel

## 2018-02-22 NOTE — DIETITIAN INITIAL EVALUATION ADULT. - PROBLEM SELECTOR PLAN 5
- will monitor on CIWA, but no Ativan prn since last drink 9 days ago  - may use Ativan if agitation to prevent harm

## 2018-02-22 NOTE — H&P ADULT - PROBLEM SELECTOR PLAN 9
- Heparin sc  Plan discussed with daughter Thu and wife Katherine at bedside.  All questions answered and emotional support given. - will hold ARB ab HCTZ due to ADA

## 2018-02-22 NOTE — PROGRESS NOTE ADULT - PROBLEM SELECTOR PLAN 8
- unclear baseline, suspected ADA  - will monitor closely - unclear baseline, suspected ADA, creatinine improved to 1.14  - will monitor closely -BP controlled, will hold ARB ab HCTZ due to ADA

## 2018-02-22 NOTE — CHART NOTE - NSCHARTNOTEFT_GEN_A_CORE
Upon Nutritional Assessment by the Registered Dietitian your patient was determined to meet criteria / has evidence of the following diagnosis/diagnoses:          [ ]  Mild Protein Calorie Malnutrition        [ ]  Moderate Protein Calorie Malnutrition        [X] Severe Protein Calorie Malnutrition        [ ] Unspecified Protein Calorie Malnutrition        [ ] Underweight / BMI <19        [X] Morbid Obesity / BMI > 40      Findings as based on:  [X] Comprehensive nutrition assessment   [ ] Nutrition Focused Physical Exam: deferred at this time as patient does not feel well; fever at present time  [X] Other: <50% nutrition needs >/= 5 days, 1+ and 3+ edema      Nutrition Plan/Recommendations:    1. Continue DASH/TLC diet as medically feasible  2. Recommend 3 Ensure Enlive to increase po intake (provides additional 1,050 calories, 60 grams protein).   3. Recommend MVI, Thiamine, and Folic acid due to ETOH abuse.  4. Provided heart healthy nutrition education        PROVIDER Section:     By signing this assessment you are acknowledging and agree with the diagnosis/diagnoses assigned by the Registered Dietitian    Comments: Upon Nutritional Assessment by the Registered Dietitian your patient was determined to meet criteria / has evidence of the following diagnosis/diagnoses:          [ ]  Mild Protein Calorie Malnutrition        [ ]  Moderate Protein Calorie Malnutrition        [X] Severe Protein Calorie Malnutrition        [ ] Unspecified Protein Calorie Malnutrition        [ ] Underweight / BMI <19        [X] Morbid Obesity / BMI > 40      Findings as based on:  [X] Comprehensive nutrition assessment   [ ] Nutrition Focused Physical Exam: deferred at this time as patient does not feel well; fever at present time  [X] Other: <50% nutrition needs >/= 5 days, 1+ and 3+ edema      Nutrition Plan/Recommendations:    1. Continue DASH/TLC diet as medically feasible  2. Recommend 3 Ensure Enlive to increase po intake (provides additional 1,050 calories, 60 grams protein).   3. Recommend MVI, Thiamine, and Folic acid due to ETOH abuse.  4. Provided heart healthy nutrition education  5. Consider bowel regimen as patient's last BM was on 2/14.      PROVIDER Section:     By signing this assessment you are acknowledging and agree with the diagnosis/diagnoses assigned by the Registered Dietitian    Comments:

## 2018-02-22 NOTE — CONSULT NOTE ADULT - ASSESSMENT
Assessment/Plan: 57y Male with RLE swelling and cellulitis after a sprained ankle, found to have a right peroneal DVT.     - Can continue heparin gtt. Repeat Duplex in 1 week.  - No clinical or radiologic evidence of necrotizing fasciitis (LRINEC score of 2). Extensive cellulitis on CT.   - No clinical evidence of compartment syndrome  - Continue with antibiotics per ID recommendations    Discussed with Vascular fellow, Dr. Gil  Pager [phone] Assessment/Plan: 57y Male with RLE swelling and cellulitis after a sprained ankle, found to have a right peroneal DVT.     - Can start new oral anticoagulants for DVT. No need for repeat Duplex  - No clinical or radiologic evidence of necrotizing fasciitis (LRINEC score of 2). Extensive cellulitis seen on CT.   - No clinical evidence of compartment syndrome  - Continue with antibiotics per ID recommendations    Discussed with Dr. Soto  Pager [phone]

## 2018-02-22 NOTE — CONSULT NOTE ADULT - SUBJECTIVE AND OBJECTIVE BOX
Patient is a 57y old  Male who presents with a chief complaint of Chest pain, confusion (22 Feb 2018 03:00)    HPI:  57 M Hx HTN, daily EtOH use for many years BIB family for chest pain and confusion.  Patient injured his R ankle at work on 2/13/18, saw his chiropractor the next day who told him to go to ED due to extensive swelling.  He was seen in ED, Xrays done, d/c home with ankle sprain on pain meds (oxycodone) and ordered ankle brace which he received recently.  Patient usually drinks 4-20 beers/day or 5-6 shots of Vodka/day "when I tried to loose weight", and last drink was 12/13/18 when his ankle was injured.  Wife noticed the past 3-4 days, patient was not being himself "mentation cloudy" per patient, "very disoriented" per daughter, hallucination (visual) associated with worsening R ankle redness/brusing->tracking redness from ankle to below R knee, "worsening pain" per patient, and wife noted diaphoresis, labored breathing and chest pain (which he did not remember he had it).  Currently no complaint except "cloudy", but when asked to breath deeply, reproduced R lateral and midsternum chest pain upon deep inspiration, O2 sat 88-89% RA at rest, 's. (22 Feb 2018 03:00)      PAST MEDICAL & SURGICAL HISTORY:  Essential hypertension  No significant past surgical history      Social history:  Alcohol abuse      FAMILY HISTORY:  Family history of cirrhosis of liver (Father): Father  Family history of prostate cancer (Father): Father  Family history of hypertension (Father): Mother    Allergies    No Known Allergies    Intolerances        Antimicrobials:    piperacillin/tazobactam IVPB. 3.375 Gram(s) IV Intermittent every 8 hours  vancomycin  IVPB 1000 milliGRAM(s) IV Intermittent every 12 hours        Vital Signs Last 24 Hrs  T(C): 37.3 (22 Feb 2018 12:01), Max: 38.3 (22 Feb 2018 10:43)  T(F): 99.1 (22 Feb 2018 12:01), Max: 101 (22 Feb 2018 10:43)  HR: 113 (22 Feb 2018 12:01) (80 - 122)  BP: 142/82 (22 Feb 2018 12:01) (99/74 - 142/82)  BP(mean): --  RR: 18 (22 Feb 2018 12:01) (18 - 22)  SpO2: 94% (22 Feb 2018 12:01) (93% - 98%)                              12.9   16.13 )-----------( 239      ( 22 Feb 2018 07:32 )             38.8         02-22    137  |  96  |  26<H>  ----------------------------<  152<H>  4.2   |  28  |  1.14    Ca    9.0      22 Feb 2018 07:32    TPro  7.1  /  Alb  3.0<L>  /  TBili  0.4  /  DBili  x   /  AST  103<H>  /  ALT  127<H>  /  AlkPhos  110  02-22      RECENT CULTURES:  Blood culture--IN lab    RADIOLOGY  CT Angio Chest w/ IV Cont (02.22.18 @ 00:25) >    1.  No main, or lobar pulmonary embolus. Evaluation of the segmental and   subsegmental branches is limited secondary to artifact.  2.  Multiple groundglass and nodular opacities with a peripheral and   lower lobe distribution could be secondary to infection (? Either septic   emboli or fungal infection rather than a community acquired pneumonia)   rather than malignancy. Patient is a 57y old  Male who presents with a chief complaint of Chest pain, confusion (22 Feb 2018 03:00)    HPI:  57 M Hx HTN, daily EtOH use for many years BIB family for chest pain and confusion.  Patient injured his R ankle at work on 2/13/18, saw his chiropractor the next day who told him to go to ED due to extensive swelling.  He was seen in ED, Xrays done, d/c home with ankle sprain on pain meds (oxycodone) and ordered ankle brace which he received recently.  Patient usually drinks 4-20 beers/day or 5-6 shots of Vodka/day "when I tried to loose weight", and last drink was 12/13/18 when his ankle was injured.  Wife noticed the past 3-4 days, patient was not being himself "mentation cloudy" per patient, "very disoriented" per daughter, hallucination (visual) associated with worsening R ankle redness/brusing->tracking redness from ankle to below R knee, "worsening pain" per patient, and wife noted diaphoresis, labored breathing and chest pain (which he did not remember he had it).  Currently no complaint except "cloudy", but when asked to breath deeply, reproduced R lateral and midsternum chest pain upon deep inspiration, O2 sat 88-89% RA at rest, 's. (22 Feb 2018 03:00)    ID--        PAST MEDICAL & SURGICAL HISTORY:  Essential hypertension  No significant past surgical history      Social history:  Alcohol abuse  Smoking+      FAMILY HISTORY:  Family history of cirrhosis of liver (Father): Father  Family history of prostate cancer (Father): Father  Family history of hypertension (Father): Mother    Allergies    No Known Allergies    Intolerances        Antimicrobials:    piperacillin/tazobactam IVPB. 3.375 Gram(s) IV Intermittent every 8 hours  vancomycin  IVPB 1000 milliGRAM(s) IV Intermittent every 12 hours        Vital Signs Last 24 Hrs  T(C): 37.3 (22 Feb 2018 12:01), Max: 38.3 (22 Feb 2018 10:43)  T(F): 99.1 (22 Feb 2018 12:01), Max: 101 (22 Feb 2018 10:43)  HR: 113 (22 Feb 2018 12:01) (80 - 122)  BP: 142/82 (22 Feb 2018 12:01) (99/74 - 142/82)  BP(mean): --  RR: 18 (22 Feb 2018 12:01) (18 - 22)  SpO2: 94% (22 Feb 2018 12:01) (93% - 98%)                              12.9   16.13 )-----------( 239      ( 22 Feb 2018 07:32 )             38.8         02-22    137  |  96  |  26<H>  ----------------------------<  152<H>  4.2   |  28  |  1.14    Ca    9.0      22 Feb 2018 07:32    TPro  7.1  /  Alb  3.0<L>  /  TBili  0.4  /  DBili  x   /  AST  103<H>  /  ALT  127<H>  /  AlkPhos  110  02-22      RECENT CULTURES:  Blood culture--IN lab    RADIOLOGY  CT Angio Chest w/ IV Cont (02.22.18 @ 00:25) >    1.  No main, or lobar pulmonary embolus. Evaluation of the segmental and   subsegmental branches is limited secondary to artifact.  2.  Multiple groundglass and nodular opacities with a peripheral and   lower lobe distribution could be secondary to infection (? Either septic   emboli or fungal infection rather than a community acquired pneumonia)   rather than malignancy. Patient is a 57y old  Male who presents with a chief complaint of Chest pain, confusion (22 Feb 2018 03:00)    HPI:  57 M Hx HTN, daily EtOH use for many years BIB family for chest pain and confusion.  Patient injured his R ankle at work on 2/13/18, saw his chiropractor the next day who told him to go to ED due to extensive swelling.  He was seen in ED, Xrays done, d/c home with ankle sprain on pain meds (oxycodone) and ordered ankle brace which he received recently.  Patient usually drinks 4-20 beers/day or 5-6 shots of Vodka/day "when I tried to loose weight", and last drink was 12/13/18 when his ankle was injured.  Wife noticed the past 3-4 days, patient was not being himself "mentation cloudy" per patient, "very disoriented" per daughter, hallucination (visual) associated with worsening R ankle redness/brusing->tracking redness from ankle to below R knee, "worsening pain" per patient, and wife noted diaphoresis, labored breathing and chest pain (which he did not remember he had it).  Currently no complaint except "cloudy", but when asked to breath deeply, reproduced R lateral and midsternum chest pain upon deep inspiration, O2 sat 88-89% RA at rest, 's. (22 Feb 2018 03:00)    ID--  Patient presented with worsening R foot pain, swelling and erythema after a fall on 2/13/18. family members noted that he was getting more confused. On admission had mild resp distress requiring O2, tachycardic and had leukocytosis but afebrile. Ct chest/abd/pelvis was done, which revealed multiple nodular opacities ? septic emboli.  Xray of the RLE was non focal, no evidence of gas. Getting evalauted by pulmonary, and vascular surgery with RLE with acute DVT and ? compartment syndrome vs nec fasc.        PAST MEDICAL & SURGICAL HISTORY:  Essential hypertension  No significant past surgical history      Social history:  Alcohol abuse  Smoking+ in past  Works as       FAMILY HISTORY:  Family history of cirrhosis of liver (Father): Father  Family history of prostate cancer (Father): Father  Family history of hypertension (Father): Mother    Allergies    No Known Allergies    Intolerances        Antimicrobials:    piperacillin/tazobactam IVPB. 3.375 Gram(s) IV Intermittent every 8 hours  vancomycin  IVPB 1000 milliGRAM(s) IV Intermittent every 12 hours        Vital Signs Last 24 Hrs  T(C): 37.3 (22 Feb 2018 12:01), Max: 38.3 (22 Feb 2018 10:43)  T(F): 99.1 (22 Feb 2018 12:01), Max: 101 (22 Feb 2018 10:43)  HR: 113 (22 Feb 2018 12:01) (80 - 122)  BP: 142/82 (22 Feb 2018 12:01) (99/74 - 142/82)  BP(mean): --  RR: 18 (22 Feb 2018 12:01) (18 - 22)  SpO2: 94% (22 Feb 2018 12:01) (93% - 98%)                              12.9   16.13 )-----------( 239      ( 22 Feb 2018 07:32 )             38.8         02-22    137  |  96  |  26<H>  ----------------------------<  152<H>  4.2   |  28  |  1.14    Ca    9.0      22 Feb 2018 07:32    TPro  7.1  /  Alb  3.0<L>  /  TBili  0.4  /  DBili  x   /  AST  103<H>  /  ALT  127<H>  /  AlkPhos  110  02-22      RECENT CULTURES:  Blood culture--IN lab    RADIOLOGY  CT Angio Chest w/ IV Cont (02.22.18 @ 00:25) >    1.  No main, or lobar pulmonary embolus. Evaluation of the segmental and   subsegmental branches is limited secondary to artifact.  2.  Multiple groundglass and nodular opacities with a peripheral and   lower lobe distribution could be secondary to infection (? Either septic   emboli or fungal infection rather than a community acquired pneumonia)   rather than malignancy.      Duplex RLE--with DVT Patient is a 57y old  Male who presents with a chief complaint of Chest pain, confusion (22 Feb 2018 03:00)    HPI:  57 M Hx HTN, daily EtOH use for many years BIB family for chest pain and confusion.  Patient injured his R ankle at work on 2/13/18, saw his chiropractor the next day who told him to go to ED due to extensive swelling.  He was seen in ED, Xrays done, d/c home with ankle sprain on pain meds (oxycodone) and ordered ankle brace which he received recently.  Patient usually drinks 4-20 beers/day or 5-6 shots of Vodka/day "when I tried to loose weight", and last drink was 12/13/18 when his ankle was injured.  Wife noticed the past 3-4 days, patient was not being himself "mentation cloudy" per patient, "very disoriented" per daughter, hallucination (visual) associated with worsening R ankle redness/brusing->tracking redness from ankle to below R knee, "worsening pain" per patient, and wife noted diaphoresis, labored breathing and chest pain (which he did not remember he had it).  Currently no complaint except "cloudy", but when asked to breath deeply, reproduced R lateral and midsternum chest pain upon deep inspiration, O2 sat 88-89% RA at rest, 's. (22 Feb 2018 03:00)    ID--  Patient presented with worsening R foot pain, swelling and erythema after a fall on 2/13/18. family members noted that he was getting more confused. On admission had mild resp distress requiring O2, tachycardic and had leukocytosis but afebrile. Ct chest/abd/pelvis was done, which revealed multiple nodular opacities ? septic emboli.  Xray of the RLE was non focal, no evidence of gas. Getting evalauted by pulmonary, and vascular surgery with RLE with acute DVT and ? compartment syndrome vs nec fasc.        PAST MEDICAL & SURGICAL HISTORY:  Essential hypertension  No significant past surgical history      Social history:  Alcohol abuse  Smoking+ in past  Works as       FAMILY HISTORY:  Family history of cirrhosis of liver (Father): Father  Family history of prostate cancer (Father): Father  Family history of hypertension (Father): Mother    Allergies    No Known Allergies    Intolerances        Antimicrobials:    piperacillin/tazobactam IVPB. 3.375 Gram(s) IV Intermittent every 8 hours  vancomycin  IVPB 1000 milliGRAM(s) IV Intermittent every 12 hours        Vital Signs Last 24 Hrs  T(C): 37.3 (22 Feb 2018 12:01), Max: 38.3 (22 Feb 2018 10:43)  T(F): 99.1 (22 Feb 2018 12:01), Max: 101 (22 Feb 2018 10:43)  HR: 113 (22 Feb 2018 12:01) (80 - 122)  BP: 142/82 (22 Feb 2018 12:01) (99/74 - 142/82)  BP(mean): --  RR: 18 (22 Feb 2018 12:01) (18 - 22)  SpO2: 94% (22 Feb 2018 12:01) (93% - 98%)                              12.9   16.13 )-----------( 239      ( 22 Feb 2018 07:32 )             38.8         02-22    137  |  96  |  26<H>  ----------------------------<  152<H>  4.2   |  28  |  1.14    Ca    9.0      22 Feb 2018 07:32    TPro  7.1  /  Alb  3.0<L>  /  TBili  0.4  /  DBili  x   /  AST  103<H>  /  ALT  127<H>  /  AlkPhos  110  02-22      RECENT CULTURES:  Blood culture--IN lab    RADIOLOGY  CT Angio Chest w/ IV Cont (02.22.18 @ 00:25) >    1.  No main, or lobar pulmonary embolus. Evaluation of the segmental and   subsegmental branches is limited secondary to artifact.  2.  Multiple groundglass and nodular opacities with a peripheral and   lower lobe distribution could be secondary to infection (? Either septic   emboli or fungal infection rather than a community acquired pneumonia)   rather than malignancy.      Duplex RLE--with DVT  IMPRESSION: Positive deep venous thrombosis involving the right calf at   the right peroneal vein. No evidence of left lower extremity deep venous thrombosis.

## 2018-02-22 NOTE — DIETITIAN INITIAL EVALUATION ADULT. - ORAL INTAKE PTA
Pt noted with ETOH abuse. Wife reports pt with poor po intake x 5 days. Diet Recall: sandwich, water, soup; NKFA. Pt takes Triflex (supplement for joint health) and MVI PTA.

## 2018-02-22 NOTE — PROGRESS NOTE ADULT - PROBLEM SELECTOR PLAN 3
- CTA prelim neg for PE, but b/l lung masses  - will f/u final CTA report and may need tissue biopsy r/o malignancy with Hx smoking  - will treat with O2 supplementation - CTA neg for PE,  positive for multiple groundglass and nodular opacities with a peripheral and lower lobe distribution could be secondary to infection  -Pulm and ID consulted  - will treat with O2 supplementation CTA neg for PE,  positive for multiple groundglass and nodular opacities with a peripheral and lower lobe distribution could be secondary to infection  Former smoker, works as /costruction   C/w O2 supplementation   Pulm follow up

## 2018-02-22 NOTE — H&P ADULT - PROBLEM SELECTOR PLAN 4
- will f/u final CTA report and may need tissue biopsy r/o malignancy with Hx smoking, O2 supplementation

## 2018-02-22 NOTE — DIETITIAN INITIAL EVALUATION ADULT. - PROBLEM SELECTOR PLAN 6
likely alcohol use, but will check HIV, acute hepatitis panel  - will check abd sono to eval cirrhosis or ascites

## 2018-02-22 NOTE — H&P ADULT - FAMILY HISTORY
Father  Still living? Unknown  Family history of hypertension, Age at diagnosis: Age Unknown  Family history of prostate cancer, Age at diagnosis: Age Unknown  Family history of cirrhosis of liver, Age at diagnosis: Age Unknown

## 2018-02-22 NOTE — H&P ADULT - PROBLEM SELECTOR PLAN 5
- will monitor on CIWA, but no Ativan prn since last drink 9 days ago  - may use Ativan if agitation to prevent harm  - transaminitis - likely alcohol use, but will check HIV, acute hepatitis panel - will monitor on CIWA, but no Ativan prn since last drink 9 days ago  - may use Ativan if agitation to prevent harm

## 2018-02-22 NOTE — PROGRESS NOTE ADULT - SUBJECTIVE AND OBJECTIVE BOX
JUWAN DOMÍNGUEZ  MRN-[id number]    Chief Complaint: Patient is a 57y old  Male who presents with a chief complaint of Chest pain, confusion (22 Feb 2018 03:00)      SUBJECTIVE / OVERNIGHT EVENTS: He c/o of rib pain in his back, back pain. Says he sees "lines" in his visual field, c/o SOB. Fever spike of 101 this morning.    Review of Systems:   14 point ROS negative in detail except for the above.    MEDICATIONS  (STANDING):  folic acid 1 milliGRAM(s) Oral daily  heparin  Injectable 5000 Unit(s) SubCutaneous every 8 hours  multivitamin 1 Tablet(s) Oral daily  sodium chloride 0.9%. 1000 milliLiter(s) (100 mL/Hr) IV Continuous <Continuous>  thiamine 100 milliGRAM(s) Oral daily  vancomycin  IVPB 1000 milliGRAM(s) IV Intermittent every 12 hours    MEDICATIONS  (PRN):  acetaminophen   Tablet 650 milliGRAM(s) Oral every 6 hours PRN For Temp greater than 38 C (100.4 F)  acetaminophen   Tablet. 650 milliGRAM(s) Oral every 8 hours PRN Moderate Pain (4 - 6)  ketorolac   Injectable 15 milliGRAM(s) IV Push every 8 hours PRN Severe Pain (7 - 10)      T(C): 38.3 (02-22-18 @ 10:43), Max: 38.3 (02-22-18 @ 10:43)  HR: 110 (02-22-18 @ 09:21) (80 - 122)  BP: 134/83 (02-22-18 @ 09:21) (99/74 - 135/85)  RR: 19 (02-22-18 @ 09:21) (18 - 22)  SpO2: 93% (02-22-18 @ 09:21) (93% - 98%)    T(C): 38.3 (02-22-18 @ 10:43), Max: 38.3 (02-22-18 @ 10:43)  HR: 110 (02-22-18 @ 09:21) (80 - 122)  BP: 134/83 (02-22-18 @ 09:21) (99/74 - 135/85)  RR: 19 (02-22-18 @ 09:21) (18 - 22)  SpO2: 93% (02-22-18 @ 09:21) (93% - 98%)    I&O's Summary    21 Feb 2018 07:01  -  22 Feb 2018 07:00  --------------------------------------------------------  IN: 480 mL / OUT: 550 mL / NET: -70 mL    Allergies    No Known Allergies    Intolerances    PHYSICAL EXAM:  GENERAL: Not in distress, well-developed, morbid obesity  HEAD:  Atraumatic, Normocephalic  EYES: EOMI, PERRLA, conjunctiva and sclera clear  NECK: Supple, No JVD  CHEST/LUNG: Slight expiratory wheeze b/l  HEART: Tachycardic and regular rhythm; No murmurs, rubs.  ABDOMEN: Obese, soft, Nontender, Nondistended; Bowel sounds present  EXTREMITIES: No joint effusions, good muscle tone and bulk. Right leg erythema from toes to just below the knee, non-tender to palpation, +1 pitting edema  PSYCH: Normal mood and affect, alert and oriented  NEUROLOGY: Grossly intact   SKIN: Erythema of right foot to upper lower leg. ecchymosis of dorsal aspect of right foot.    LABS:                        12.9   16.13 )-----------( 239      ( 22 Feb 2018 07:32 )             38.8     02-22    137  |  96  |  26<H>  ----------------------------<  152<H>  4.2   |  28  |  1.14    Ca    9.0      22 Feb 2018 07:32    TPro  7.1  /  Alb  3.0<L>  /  TBili  0.4  /  DBili  x   /  AST  103<H>  /  ALT  127<H>  /  AlkPhos  110  02-22    LIVER FUNCTIONS - ( 22 Feb 2018 07:32 )  Alb: 3.0 g/dL / Pro: 7.1 g/dL / ALK PHOS: 110 U/L / ALT: 127 U/L / AST: 103 U/L / GGT: x           PT/INR - ( 21 Feb 2018 22:23 )   PT: 16.1 sec;   INR: 1.48 ratio         PTT - ( 21 Feb 2018 22:23 )  PTT:31.6 sec  CARDIAC MARKERS ( 22 Feb 2018 07:32 )  x     / x     / 362 U/L / x     / x      CARDIAC MARKERS ( 22 Feb 2018 05:26 )  x     / <0.01 ng/mL / 366 U/L / x     / 5.5 ng/mL  CARDIAC MARKERS ( 21 Feb 2018 22:23 )  x     / <0.01 ng/mL / 460 U/L / x     / 5.5 ng/mL    Blood Cultures  Pending    RADIOLOGY & ADDITIONAL TESTS:    Imaging:  < from: CT Angio Chest w/ IV Cont (02.22.18 @ 00:25) >  IMPRESSION:     1.  No main, or lobar pulmonary embolus. Evaluation of the segmental and   subsegmental branches is limited secondary to artifact.  2.  Multiple groundglass and nodular opacities with a peripheral and   lower lobe distribution could be secondary to infection (? Either septic   emboli or fungal infection rather than a community acquired pneumonia)   rather than malignancy.    < from: CT Head No Cont (02.22.18 @ 00:24) >  IMPRESSION:  Normal non-contrast CT of the brain. Left parietal extra   calvarial soft tissue swelling. No acute stroke    EKG/Telemetry:    Consultant(s) Notes Reviewed:  Pulmonary and ID    Care Discussed with Consultants/Other Providers: Pulmonary and ID    The above recommendations were discussed with the patient/family. The patient/family had all questions answered and expressed understanding of the plan. JUWAN DOMÍNGUEZ  MRN-[id number]    Chief Complaint: Patient is a 57y old  Male who presents with a chief complaint of Chest pain, confusion (22 Feb 2018 03:00)      SUBJECTIVE / OVERNIGHT EVENTS: He c/o of rib pain in his back, back pain. Says he sees "lines" in his visual field, c/o SOB. Fever spike of 101 this morning.    Review of Systems:   14 point ROS negative in detail except for the above.    MEDICATIONS  (STANDING):  folic acid 1 milliGRAM(s) Oral daily  heparin  Injectable 5000 Unit(s) SubCutaneous every 8 hours  multivitamin 1 Tablet(s) Oral daily  sodium chloride 0.9%. 1000 milliLiter(s) (100 mL/Hr) IV Continuous <Continuous>  thiamine 100 milliGRAM(s) Oral daily  vancomycin  IVPB 1000 milliGRAM(s) IV Intermittent every 12 hours    MEDICATIONS  (PRN):  acetaminophen   Tablet 650 milliGRAM(s) Oral every 6 hours PRN For Temp greater than 38 C (100.4 F)  acetaminophen   Tablet. 650 milliGRAM(s) Oral every 8 hours PRN Moderate Pain (4 - 6)  ketorolac   Injectable 15 milliGRAM(s) IV Push every 8 hours PRN Severe Pain (7 - 10)      T(C): 38.3 (02-22-18 @ 10:43), Max: 38.3 (02-22-18 @ 10:43)  HR: 110 (02-22-18 @ 09:21) (80 - 122)  BP: 134/83 (02-22-18 @ 09:21) (99/74 - 135/85)  RR: 19 (02-22-18 @ 09:21) (18 - 22)  SpO2: 93% (02-22-18 @ 09:21) (93% - 98%)    T(C): 38.3 (02-22-18 @ 10:43), Max: 38.3 (02-22-18 @ 10:43)  HR: 110 (02-22-18 @ 09:21) (80 - 122)  BP: 134/83 (02-22-18 @ 09:21) (99/74 - 135/85)  RR: 19 (02-22-18 @ 09:21) (18 - 22)  SpO2: 93% (02-22-18 @ 09:21) (93% - 98%)    I&O's Summary    21 Feb 2018 07:01  -  22 Feb 2018 07:00  --------------------------------------------------------  IN: 480 mL / OUT: 550 mL / NET: -70 mL    Allergies    No Known Allergies    Intolerances    PHYSICAL EXAM:  GENERAL: Not in distress, well-developed, morbid obesity  HEAD:  Atraumatic, Normocephalic  EYES: EOMI, PERRLA, conjunctiva and sclera clear  NECK: Supple, No JVD  CHEST/LUNG: Slight expiratory wheeze b/l  HEART: Tachycardic and regular rhythm; No murmurs, rubs.  ABDOMEN: Obese, soft, Nontender, Nondistended; Bowel sounds present  EXTREMITIES: No joint effusions, good muscle tone and bulk. Right leg erythema from toes to just below the knee, non-tender to palpation, +1 pitting edema  PSYCH: Normal mood and affect, alert and oriented  NEUROLOGY: Grossly intact   SKIN: Erythema of right foot to upper lower leg. ecchymosis of dorsal aspect of right foot.    LABS:                        12.9   16.13 )-----------( 239      ( 22 Feb 2018 07:32 )             38.8     02-22    137  |  96  |  26<H>  ----------------------------<  152<H>  4.2   |  28  |  1.14    Ca    9.0      22 Feb 2018 07:32    TPro  7.1  /  Alb  3.0<L>  /  TBili  0.4  /  DBili  x   /  AST  103<H>  /  ALT  127<H>  /  AlkPhos  110  02-22    LIVER FUNCTIONS - ( 22 Feb 2018 07:32 )  Alb: 3.0 g/dL / Pro: 7.1 g/dL / ALK PHOS: 110 U/L / ALT: 127 U/L / AST: 103 U/L / GGT: x           PT/INR - ( 21 Feb 2018 22:23 )   PT: 16.1 sec;   INR: 1.48 ratio         PTT - ( 21 Feb 2018 22:23 )  PTT:31.6 sec  CARDIAC MARKERS ( 22 Feb 2018 07:32 )  x     / x     / 362 U/L / x     / x      CARDIAC MARKERS ( 22 Feb 2018 05:26 )  x     / <0.01 ng/mL / 366 U/L / x     / 5.5 ng/mL  CARDIAC MARKERS ( 21 Feb 2018 22:23 )  x     / <0.01 ng/mL / 460 U/L / x     / 5.5 ng/mL    Blood Cultures  Pending    RADIOLOGY & ADDITIONAL TESTS:    Imaging:  < from: CT Angio Chest w/ IV Cont (02.22.18 @ 00:25) >  IMPRESSION:     1.  No main, or lobar pulmonary embolus. Evaluation of the segmental and   subsegmental branches is limited secondary to artifact.  2.  Multiple groundglass and nodular opacities with a peripheral and   lower lobe distribution could be secondary to infection (? Either septic   emboli or fungal infection rather than a community acquired pneumonia)   rather than malignancy.    < from: CT Head No Cont (02.22.18 @ 00:24) >  IMPRESSION:  Normal non-contrast CT of the brain. Left parietal extra   calvarial soft tissue swelling. No acute stroke    EKG/Telemetry:    Consultant(s) Notes Reviewed:  Pulmonary and ID      The above recommendations were discussed with the patient/family. The patient/family had all questions answered and expressed understanding of the plan.

## 2018-02-22 NOTE — H&P ADULT - ASSESSMENT
57 M EtOH use, HTN p/w chest pain and confusion a/w 57 M daily EtOH use, HTN, recent R ankle injury p/w acute worsening confusion/disorientation associated with pleuritic chest pain, diaphoresis and worsening redness/pain/swelling of R ankle a/w acute hypoxic respiratory failure, b/l lung masses on CTA chest, RLE cellulitis and metabolic encephalopathy suspects multifactorial.

## 2018-02-22 NOTE — PROGRESS NOTE ADULT - ASSESSMENT
57 M daily EtOH use, HTN, recent R ankle injury p/w acute worsening confusion/disorientation associated with pleuritic chest pain, diaphoresis and worsening redness/pain/swelling of R ankle a/w acute hypoxic respiratory failure, b/l lung masses on CTA chest, RLE cellulitis and metabolic encephalopathy suspects multifactorial.

## 2018-02-22 NOTE — H&P ADULT - ATTENDING COMMENTS
addendum: severe pleuritic chest pain with shallow breathing, tachycardia and tachypnea - risks and benefits of Tylenol, NSAID (repeat LFT and renal function still pending) and opioids explained.  Patient and family understood, willing to use opioids for pain with monitoring of mental status. addendum: severe pleuritic chest pain with shallow breathing, tachycardia and tachypnea - risks and benefits of Tylenol, NSAID (repeat LFT and renal function still pending) and opioids explained.  Patient and family understood, willing to use opioids for pain with monitoring of mental status.    Addendum:  Repeat creatinine 1.1, improved from 1.4.  will give NSAID for pleuritic chest pain

## 2018-02-22 NOTE — PROGRESS NOTE ADULT - PROBLEM SELECTOR PLAN 6
likely alcohol use, but will check HIV, acute hepatitis panel  - will check abd sono to eval cirrhosis or ascites likely alcohol use, but will f/u HIV, acute hepatitis panel  - will obtain CT abd/pelvis Unclear baseline, c/w IVF  Monitor cr levels

## 2018-02-22 NOTE — DIETITIAN INITIAL EVALUATION ADULT. - NS AS NUTRI INTERV MEDICAL AND FOOD SUPPLEMENTS
Recommend 3 Ensure Enlive to increase po intake (provides additional 1,050 calories, 60 grams protein). Discussed with NP.

## 2018-02-22 NOTE — DIETITIAN INITIAL EVALUATION ADULT. - PROBLEM SELECTOR PLAN 10
- Heparin sc  Plan discussed with daughter Thu and wife Tabitha at bedside.  All questions answered and emotional support given.

## 2018-02-22 NOTE — H&P ADULT - PROBLEM SELECTOR PLAN 6
last stresst 1.5 yrs ago, routinely done w/o symptoms, reported negative  - will monitor serial cardiac enzymes r/o ACS  - possibly due to lung masses  - will check TTE to eval wall motion likely alcohol use, but will check HIV, acute hepatitis panel  - will check abd sono to eval cirrhosis or ascites

## 2018-02-22 NOTE — CONSULT NOTE ADULT - SUBJECTIVE AND OBJECTIVE BOX
Surgery Consult Note  Pager     HPI:  57 M Hx HTN, daily EtOH use for many years BIB family for chest pain and confusion.  Patient injured his R ankle at work on 2/13/18, saw his chiropractor the next day who told him to go to ED due to extensive swelling.  He was seen in ED, Xrays done, d/c home with ankle sprain on pain meds (oxycodone) and ordered ankle brace which he received recently.  Patient usually drinks 4-20 beers/day or 5-6 shots of Vodka/day "when I tried to loose weight", and last drink was 12/13/18 when his ankle was injured.  Wife noticed the past 3-4 days, patient was not being himself "mentation cloudy" per patient, "very disoriented" per daughter, hallucination (visual) associated with worsening R ankle redness/brusing->tracking redness from ankle to below R knee, "worsening pain" per patient, and wife noted diaphoresis, labored breathing and chest pain (which he did not remember he had it).  Currently no complaint except "cloudy", but when asked to breath deeply, reproduced R lateral and midsternum chest pain upon deep inspiration, O2 sat 88-89% RA at rest, 's. (22 Feb 2018 03:00)      PAST MEDICAL & SURGICAL HISTORY:  Sciatica of right side  Essential hypertension  No significant past surgical history      ALLERGIES:    HOME MEDICATIONS:    MEDICATIONS  (STANDING):  clindamycin IVPB      clindamycin IVPB 900 milliGRAM(s) IV Intermittent every 8 hours  folic acid 1 milliGRAM(s) Oral daily  heparin  Infusion.  Unit(s)/Hr (22 mL/Hr) IV Continuous <Continuous>  lactobacillus acidophilus 1 Tablet(s) Oral three times a day with meals  lidocaine   Patch 1 Patch Transdermal daily  multivitamin 1 Tablet(s) Oral daily  piperacillin/tazobactam IVPB. 3.375 Gram(s) IV Intermittent every 8 hours  sodium chloride 0.9%. 1000 milliLiter(s) (100 mL/Hr) IV Continuous <Continuous>  thiamine 100 milliGRAM(s) Oral daily  vancomycin  IVPB 1000 milliGRAM(s) IV Intermittent every 12 hours      SOCIAL HISTORY:    FAMILY HISTORY:    ___________________________________________  REVIEW OF SYSTEMS:    ___________________________________________  PHYSICAL EXAM:  Vital Signs Last 24 Hrs  T(C): 36.9 (23 Feb 2018 00:53), Max: 38.3 (22 Feb 2018 10:43)  T(F): 98.5 (23 Feb 2018 00:53), Max: 101 (22 Feb 2018 10:43)  HR: 105 (23 Feb 2018 00:53) (105 - 118)  BP: 150/87 (23 Feb 2018 00:53) (129/71 - 150/94)  BP(mean): --  RR: 18 (23 Feb 2018 00:53) (18 - 20)  SpO2: 95% (23 Feb 2018 00:53) (93% - 96%)CAPILLARY BLOOD GLUCOSE      POCT Blood Glucose.: 126 mg/dL (21 Feb 2018 22:09)    I&O's Detail    21 Feb 2018 07:01  -  22 Feb 2018 07:00  --------------------------------------------------------  IN:    Oral Fluid: 480 mL  Total IN: 480 mL    OUT:    Voided: 550 mL  Total OUT: 550 mL    Total NET: -70 mL      22 Feb 2018 07:01  -  23 Feb 2018 01:05  --------------------------------------------------------  IN:    Oral Fluid: 240 mL    sodium chloride 0.9%.: 800 mL    Solution: 500 mL  Total IN: 1540 mL    OUT:    Voided: 1200 mL  Total OUT: 1200 mL    Total NET: 340 mL          General: A&O x3, NAD  Neuro: CN II-XII intact, motor and sensory grossly intact with no focal deficits.  HEENT: Normocephalic, atraumatic, EOMI, anicteric sclerae.  Respiratory: Clear to auscultation bilaterally, breathing non-labored.  CVS: Regular rate and rhythm  Abdomen: Soft, nondistended, nontender. No rebound, no guarding, No palpable organomegaly or masses.  Extremities: Warm bilaterally with palpable pulses.  MSK: Intact ROM.  ____________________________________________  LABS:  CBC Full  -  ( 22 Feb 2018 07:32 )  WBC Count : 16.13 K/uL  Hemoglobin : 12.9 g/dL  Hematocrit : 38.8 %  Platelet Count - Automated : 239 K/uL  Mean Cell Volume : 96.5 fl  Mean Cell Hemoglobin : 32.1 pg  Mean Cell Hemoglobin Concentration : 33.2 gm/dL  Auto Neutrophil # : 13.84 K/uL  Auto Lymphocyte # : 0.51 K/uL  Auto Monocyte # : 1.11 K/uL  Auto Eosinophil # : 0.00 K/uL  Auto Basophil # : 0.01 K/uL  Auto Neutrophil % : 89.1 %  Auto Lymphocyte % : 3.3 %  Auto Monocyte % : 7.1 %  Auto Eosinophil % : 0.0 %  Auto Basophil % : 0.1 %    02-22    137  |  96  |  26<H>  ----------------------------<  152<H>  4.2   |  28  |  1.14    Ca    9.0      22 Feb 2018 07:32    TPro  7.1  /  Alb  3.0<L>  /  TBili  0.4  /  DBili  x   /  AST  103<H>  /  ALT  127<H>  /  AlkPhos  110  02-22    LIVER FUNCTIONS - ( 22 Feb 2018 07:32 )  Alb: 3.0 g/dL / Pro: 7.1 g/dL / ALK PHOS: 110 U/L / ALT: 127 U/L / AST: 103 U/L / GGT: x           PT/INR - ( 21 Feb 2018 22:23 )   PT: 16.1 sec;   INR: 1.48 ratio         PTT - ( 22 Feb 2018 16:57 )  PTT:29.6 sec    CARDIAC MARKERS ( 22 Feb 2018 15:02 )  x     / <0.01 ng/mL / 254 U/L / x     / 3.3 ng/mL  CARDIAC MARKERS ( 22 Feb 2018 07:32 )  x     / x     / 362 U/L / x     / x      CARDIAC MARKERS ( 22 Feb 2018 05:26 )  x     / <0.01 ng/mL / 366 U/L / x     / 5.5 ng/mL  CARDIAC MARKERS ( 21 Feb 2018 22:23 )  x     / <0.01 ng/mL / 460 U/L / x     / 5.5 ng/mL      ____________________________________________  RADIOLOGY: Vascular Surgery Consult Note  Pager [phone]    HPI:  57 M Hx HTN, daily EtOH use for many years BIB family for chest pain and confusion.  Vascular surgery consulted for his RLE swelling with concerns of compartment syndrome, and a new peroneal DVT. Patient injured his R ankle at work on 2/13/18, saw his chiropractor the next day who told him to go to ED due to extensive swelling.  He was seen in ED, Xrays done, d/c home with ankle sprain on pain meds (oxycodone) and ordered ankle brace which he received recently. Since his ankle sprain, he has had worsening erythema of the right leg extending from the foot to mid calf. Recent fever of 38.1 while inpatient. Denies loss of sensation in the right leg. Has difficulty ambulating due to the pain and swelling. At baseline, no rest pain or claudication.    Patient usually drinks 4-20 beers/day or 5-6 shots of Vodka/day "when I tried to lose weight", and last drink was 12/13/18 when his ankle was injured.  Wife noticed the past 3-4 days, patient was not being himself "mentation cloudy" per patient, "very disoriented" per daughter, hallucination (visual).      PAST MEDICAL & SURGICAL HISTORY:  Sciatica of right side  Essential hypertension  No significant past surgical history      ALLERGIES: NKA	    HOME MEDICATIONS:  Home Medications:  losartan-hydrochlorothiazide 100mg-12.5mg oral tablet: 1 tab(s) orally once a day (22 Feb 2018 07:03)  potassium chloride 10 mEq oral capsule, extended release: 1 cap(s) orally once a day (22 Feb 2018 07:04)    MEDICATIONS  (STANDING):  clindamycin IVPB      clindamycin IVPB 900 milliGRAM(s) IV Intermittent every 8 hours  folic acid 1 milliGRAM(s) Oral daily  heparin  Infusion.  Unit(s)/Hr (22 mL/Hr) IV Continuous <Continuous>  lactobacillus acidophilus 1 Tablet(s) Oral three times a day with meals  lidocaine   Patch 1 Patch Transdermal daily  multivitamin 1 Tablet(s) Oral daily  piperacillin/tazobactam IVPB. 3.375 Gram(s) IV Intermittent every 8 hours  sodium chloride 0.9%. 1000 milliLiter(s) (100 mL/Hr) IV Continuous <Continuous>  thiamine 100 milliGRAM(s) Oral daily  vancomycin  IVPB 1000 milliGRAM(s) IV Intermittent every 12 hours      SOCIAL HISTORY: ETOH use specified in HPI.	    FAMILY HISTORY: No pertinent history in first degree relatives.    ___________________________________________  REVIEW OF SYSTEMS:  Constitutional: No fevers, chills, no recent weight loss  ENMT: No changes in hearing, no changes in vision, no sore throat, no cough  Respiratory: No shortness of breath  Cardiovascular: No chest pain, palpitations  Gastrointestinal: No abdominal pain, no diarrhea/constipation  Genitourinary: No dysuria, frequency, or urgency    Extremities: See HPI  Neurological: No paresthesia  Skin: No rashes  ___________________________________________  PHYSICAL EXAM:  Vital Signs Last 24 Hrs  T(C): 36.9 (23 Feb 2018 00:53), Max: 38.3 (22 Feb 2018 10:43)  T(F): 98.5 (23 Feb 2018 00:53), Max: 101 (22 Feb 2018 10:43)  HR: 105 (23 Feb 2018 00:53) (105 - 118)  BP: 150/87 (23 Feb 2018 00:53) (129/71 - 150/94)  BP(mean): --  RR: 18 (23 Feb 2018 00:53) (18 - 20)  SpO2: 95% (23 Feb 2018 00:53) (93% - 96%)CAPILLARY BLOOD GLUCOSE      POCT Blood Glucose.: 126 mg/dL (21 Feb 2018 22:09)    I&O's Detail    21 Feb 2018 07:01  -  22 Feb 2018 07:00  --------------------------------------------------------  IN:    Oral Fluid: 480 mL  Total IN: 480 mL    OUT:    Voided: 550 mL  Total OUT: 550 mL    Total NET: -70 mL      22 Feb 2018 07:01  -  23 Feb 2018 01:05  --------------------------------------------------------  IN:    Oral Fluid: 240 mL    sodium chloride 0.9%.: 800 mL    Solution: 500 mL  Total IN: 1540 mL    OUT:    Voided: 1200 mL  Total OUT: 1200 mL    Total NET: 340 mL          General: A&O x3, NAD  Neuro: CN II-XII intact, motor and sensory grossly intact with no focal deficits.  HEENT: Normocephalic, atraumatic, EOMI, anicteric sclerae.  Respiratory: Clear to auscultation bilaterally, breathing non-labored.  CVS: Regular rate and rhythm  Abdomen: Soft, nondistended, nontender. No rebound, no guarding, No palpable organomegaly or masses.  Extremities: RLE - erythema and swelling extend from foot to mid calf. Large blister of lateral dorsum of foot. No crepitus. Nontender. Compartments soft. No palpable pulses due to swelling. But motor and sensation equal compared to non-affected foot. LLE - Palpable PT  MSK: Intact ROM.  ____________________________________________  LABS:  CBC Full  -  ( 22 Feb 2018 07:32 )  WBC Count : 16.13 K/uL  Hemoglobin : 12.9 g/dL  Hematocrit : 38.8 %  Platelet Count - Automated : 239 K/uL  Mean Cell Volume : 96.5 fl  Mean Cell Hemoglobin : 32.1 pg  Mean Cell Hemoglobin Concentration : 33.2 gm/dL  Auto Neutrophil # : 13.84 K/uL  Auto Lymphocyte # : 0.51 K/uL  Auto Monocyte # : 1.11 K/uL  Auto Eosinophil # : 0.00 K/uL  Auto Basophil # : 0.01 K/uL  Auto Neutrophil % : 89.1 %  Auto Lymphocyte % : 3.3 %  Auto Monocyte % : 7.1 %  Auto Eosinophil % : 0.0 %  Auto Basophil % : 0.1 %    02-22    137  |  96  |  26<H>  ----------------------------<  152<H>  4.2   |  28  |  1.14    Ca    9.0      22 Feb 2018 07:32    TPro  7.1  /  Alb  3.0<L>  /  TBili  0.4  /  DBili  x   /  AST  103<H>  /  ALT  127<H>  /  AlkPhos  110  02-22    LIVER FUNCTIONS - ( 22 Feb 2018 07:32 )  Alb: 3.0 g/dL / Pro: 7.1 g/dL / ALK PHOS: 110 U/L / ALT: 127 U/L / AST: 103 U/L / GGT: x           PT/INR - ( 21 Feb 2018 22:23 )   PT: 16.1 sec;   INR: 1.48 ratio         PTT - ( 22 Feb 2018 16:57 )  PTT:29.6 sec    CARDIAC MARKERS ( 22 Feb 2018 15:02 )  x     / <0.01 ng/mL / 254 U/L / x     / 3.3 ng/mL  CARDIAC MARKERS ( 22 Feb 2018 07:32 )  x     / x     / 362 U/L / x     / x      CARDIAC MARKERS ( 22 Feb 2018 05:26 )  x     / <0.01 ng/mL / 366 U/L / x     / 5.5 ng/mL  CARDIAC MARKERS ( 21 Feb 2018 22:23 )  x     / <0.01 ng/mL / 460 U/L / x     / 5.5 ng/mL      ____________________________________________  RADIOLOGY:  VA Duplex Lower Ext Vein Scan, Bilat (02.22.18 @ 16:01)   There is normal compressibility of the bilateral common femoral, femoral   and popliteal veins.     There is thrombus within the right peroneal veins. These vessels are   noncompressible. The left peroneal vein is patent. The right and left   posterior tibial, soleal and gastrocnemius veins are patent and   compressible.    Doppler examination shows normal spontaneous and phasic flow in the   patent venous segments.    IMPRESSION: Positive deep venous thrombosis involving the right calf at   the right peroneal vein.    No evidence of left lower extremity deep venous thrombosis.        CT Lower Extremity w/ IV Cont, Right (02.22.18 @ 21:56)   INTERPRETATION:  No subcutaneous gas to indicate presence of necrotizing   fasciitis. Vascular Surgery Consult Note  Pager [phone]    HPI:  57 M Hx HTN, daily EtOH use for many years BIB family for chest pain and confusion.  Vascular surgery consulted for his RLE swelling with concerns of compartment syndrome, and a new peroneal DVT. Patient injured his R ankle at work on 2/13/18, saw his chiropractor the next day who told him to go to ED due to extensive swelling.  He was seen in ED, Xrays done, d/c home with ankle sprain on pain meds (oxycodone) and ordered ankle brace which he received recently. Since his ankle sprain, he has had worsening erythema of the right leg extending from the foot to mid calf. Recent fever of 38.1 while inpatient. Denies loss of sensation in the right leg. Has difficulty ambulating due to the pain and swelling. At baseline, no rest pain or claudication.    Patient usually drinks 4-20 beers/day or 5-6 shots of Vodka/day "when I tried to lose weight", and last drink was 12/13/18 when his ankle was injured.  Wife noticed the past 3-4 days, patient was not being himself "mentation cloudy" per patient, "very disoriented" per daughter, hallucination (visual).      PAST MEDICAL & SURGICAL HISTORY:  Sciatica of right side  Essential hypertension  No significant past surgical history      ALLERGIES: NKA	    HOME MEDICATIONS:  Home Medications:  losartan-hydrochlorothiazide 100mg-12.5mg oral tablet: 1 tab(s) orally once a day (22 Feb 2018 07:03)  potassium chloride 10 mEq oral capsule, extended release: 1 cap(s) orally once a day (22 Feb 2018 07:04)    MEDICATIONS  (STANDING):  clindamycin IVPB      clindamycin IVPB 900 milliGRAM(s) IV Intermittent every 8 hours  folic acid 1 milliGRAM(s) Oral daily  heparin  Infusion.  Unit(s)/Hr (22 mL/Hr) IV Continuous <Continuous>  lactobacillus acidophilus 1 Tablet(s) Oral three times a day with meals  lidocaine   Patch 1 Patch Transdermal daily  multivitamin 1 Tablet(s) Oral daily  piperacillin/tazobactam IVPB. 3.375 Gram(s) IV Intermittent every 8 hours  sodium chloride 0.9%. 1000 milliLiter(s) (100 mL/Hr) IV Continuous <Continuous>  thiamine 100 milliGRAM(s) Oral daily  vancomycin  IVPB 1000 milliGRAM(s) IV Intermittent every 12 hours    REVIEW OF SYSTEMS:  CONSTITUTIONAL: No weakness, fevers or chills  EYES/ENT: No visual changes;  No vertigo or throat pain   NECK: No pain or stiffness  RESPIRATORY: no shortness of breath  CARDIOVASCULAR: No chest pain or palpitations at present  GASTROINTESTINAL: No abdominal or epigastric pain. No nausea, vomiting, or hematemesis; No diarrhea or constipation. No melena or hematochezia.  GENITOURINARY: No dysuria, frequency, foamy urine, urinary urgency, incontinence or hematuria  NEUROLOGICAL: No numbness or weakness  SKIN: see HPI  All other review of systems is negative unless indicated above.    SOCIAL HISTORY: ETOH use specified in HPI.	    FAMILY HISTORY: No pertinent history in first degree relatives.    ___________________________________________  REVIEW OF SYSTEMS:  Constitutional: No fevers, chills, no recent weight loss  ENMT: No changes in hearing, no changes in vision, no sore throat, no cough  Respiratory: No shortness of breath  Cardiovascular: No chest pain, palpitations  Gastrointestinal: No abdominal pain, no diarrhea/constipation  Genitourinary: No dysuria, frequency, or urgency    Extremities: See HPI  Neurological: No paresthesia  Skin: No rashes  ___________________________________________  PHYSICAL EXAM:  Vital Signs Last 24 Hrs  T(C): 36.9 (23 Feb 2018 00:53), Max: 38.3 (22 Feb 2018 10:43)  T(F): 98.5 (23 Feb 2018 00:53), Max: 101 (22 Feb 2018 10:43)  HR: 105 (23 Feb 2018 00:53) (105 - 118)  BP: 150/87 (23 Feb 2018 00:53) (129/71 - 150/94)  BP(mean): --  RR: 18 (23 Feb 2018 00:53) (18 - 20)  SpO2: 95% (23 Feb 2018 00:53) (93% - 96%)CAPILLARY BLOOD GLUCOSE      POCT Blood Glucose.: 126 mg/dL (21 Feb 2018 22:09)    I&O's Detail    21 Feb 2018 07:01  -  22 Feb 2018 07:00  --------------------------------------------------------  IN:    Oral Fluid: 480 mL  Total IN: 480 mL    OUT:    Voided: 550 mL  Total OUT: 550 mL    Total NET: -70 mL      22 Feb 2018 07:01  -  23 Feb 2018 01:05  --------------------------------------------------------  IN:    Oral Fluid: 240 mL    sodium chloride 0.9%.: 800 mL    Solution: 500 mL  Total IN: 1540 mL    OUT:    Voided: 1200 mL  Total OUT: 1200 mL    Total NET: 340 mL          General: A&O x3, NAD  Neuro: CN II-XII intact, motor and sensory grossly intact with no focal deficits.  HEENT: Normocephalic, atraumatic, EOMI, anicteric sclerae.  Respiratory: Clear to auscultation bilaterally, breathing non-labored.  CVS: Regular rate and rhythm  Abdomen: Soft, nondistended, nontender. No rebound, no guarding, No palpable organomegaly or masses.  Extremities: RLE - erythema and swelling extend from foot to mid calf. Large blister of lateral dorsum of foot. No crepitus. Nontender. Compartments soft. No palpable pulses due to swelling. But motor and sensation equal compared to non-affected foot. LLE - Palpable PT  MSK: Intact ROM.  ____________________________________________  LABS:  CBC Full  -  ( 22 Feb 2018 07:32 )  WBC Count : 16.13 K/uL  Hemoglobin : 12.9 g/dL  Hematocrit : 38.8 %  Platelet Count - Automated : 239 K/uL  Mean Cell Volume : 96.5 fl  Mean Cell Hemoglobin : 32.1 pg  Mean Cell Hemoglobin Concentration : 33.2 gm/dL  Auto Neutrophil # : 13.84 K/uL  Auto Lymphocyte # : 0.51 K/uL  Auto Monocyte # : 1.11 K/uL  Auto Eosinophil # : 0.00 K/uL  Auto Basophil # : 0.01 K/uL  Auto Neutrophil % : 89.1 %  Auto Lymphocyte % : 3.3 %  Auto Monocyte % : 7.1 %  Auto Eosinophil % : 0.0 %  Auto Basophil % : 0.1 %    02-22    137  |  96  |  26<H>  ----------------------------<  152<H>  4.2   |  28  |  1.14    Ca    9.0      22 Feb 2018 07:32    TPro  7.1  /  Alb  3.0<L>  /  TBili  0.4  /  DBili  x   /  AST  103<H>  /  ALT  127<H>  /  AlkPhos  110  02-22    LIVER FUNCTIONS - ( 22 Feb 2018 07:32 )  Alb: 3.0 g/dL / Pro: 7.1 g/dL / ALK PHOS: 110 U/L / ALT: 127 U/L / AST: 103 U/L / GGT: x           PT/INR - ( 21 Feb 2018 22:23 )   PT: 16.1 sec;   INR: 1.48 ratio         PTT - ( 22 Feb 2018 16:57 )  PTT:29.6 sec    CARDIAC MARKERS ( 22 Feb 2018 15:02 )  x     / <0.01 ng/mL / 254 U/L / x     / 3.3 ng/mL  CARDIAC MARKERS ( 22 Feb 2018 07:32 )  x     / x     / 362 U/L / x     / x      CARDIAC MARKERS ( 22 Feb 2018 05:26 )  x     / <0.01 ng/mL / 366 U/L / x     / 5.5 ng/mL  CARDIAC MARKERS ( 21 Feb 2018 22:23 )  x     / <0.01 ng/mL / 460 U/L / x     / 5.5 ng/mL      ____________________________________________  RADIOLOGY:  VA Duplex Lower Ext Vein Scan, Bilat (02.22.18 @ 16:01)   There is normal compressibility of the bilateral common femoral, femoral   and popliteal veins.     There is thrombus within the right peroneal veins. These vessels are   noncompressible. The left peroneal vein is patent. The right and left   posterior tibial, soleal and gastrocnemius veins are patent and   compressible.    Doppler examination shows normal spontaneous and phasic flow in the   patent venous segments.    IMPRESSION: Positive deep venous thrombosis involving the right calf at   the right peroneal vein.    No evidence of left lower extremity deep venous thrombosis.        CT Lower Extremity w/ IV Cont, Right (02.22.18 @ 21:56)   INTERPRETATION:  No subcutaneous gas to indicate presence of necrotizing   fasciitis.

## 2018-02-22 NOTE — CONSULT NOTE ADULT - ASSESSMENT
57 M Hx HTN, daily EtOH use for many years BIB family for chest pain, sob, confusion and worsening R foot pain, swelling and erythema after a fall on 2/13/18. Patient found to have tachycardia, tachypnea along with RLE cellulitis on initial assessment. Ct chest was suggestive of multiple nodular opacities concerning for septic pulmonary emboli. Duplex of RLE extremity with acute DVT, getting anticoagulation and vascular eval.   --Fever, leukocytosis and Ct chest with nodular opacities ? septic pulmonary emboli  --RLE cellulitis vs compartment syndrome vs nec fasc in the setting of acute DVT  --Encephalopathy--resolving, likely multifactorial( septic, metabolic, alcohol withdrawal)    Plan  --Fu blood cultures  --2D ECHO  --Duplex results--pending  --Obtain CT RLE  --Getting surgical eval for concerns of nec fasc vs compartment syndrome in the setting of acute DVT  --Continue with Iv Vancomycin 1g q12 and Zosyn 3.375 q8  --Monitor Vanco trough  --Add Iv Clindamycin 900 q6 57 M Hx HTN, daily EtOH use for many years BIB family for chest pain, sob, confusion and worsening R foot pain, swelling and erythema after a fall on 2/13/18. Patient found to have tachycardia, tachypnea along with RLE cellulitis on initial assessment. Ct chest was suggestive of multiple nodular opacities concerning for septic pulmonary emboli. Duplex of RLE extremity with acute DVT, getting anticoagulation and vascular eval.   --Fever, leukocytosis and Ct chest with nodular opacities ? septic pulmonary emboli  --RLE cellulitis vs compartment syndrome vs nec fasc in the setting of acute DVT  --Encephalopathy--resolving, likely multifactorial( septic, metabolic, alcohol withdrawal)    Plan  --Fu blood cultures  --2D ECHO  --Obtain CT RLE  --Getting surgical eval for concerns of nec fasc vs compartment syndrome in the setting of acute DVT  --Continue with Iv Vancomycin 1g q12 and Zosyn 3.375 q8  --Monitor Vanco trough  --Add Iv Clindamycin 900 q6

## 2018-02-22 NOTE — PROGRESS NOTE ADULT - PROBLEM SELECTOR PLAN 2
- recently tracking up from R ankle to calf in the past 2-3d, with diaphoresis, unclear fever because never measured temp at [facility], currently afebrile  - will treat with Vanco IV for now with monitoring of Trough leval and will adjust as needed   - will monitor clinical response - recently tracking up from R ankle to calf  -Febrile this morning  -ID consulted  - will treat with Vanco IV for now with monitoring of Trough leval and will adjust as needed   - will monitor clinical response Continue with Vanco IV   Monitor vanco trough   follow up blood cultures  trend wbc and temp curve Continue with Vanco IV   Monitor vanco trough   follow up blood cultures  trend wbc and temp curve  check dopplers r/o dvt

## 2018-02-22 NOTE — DIETITIAN INITIAL EVALUATION ADULT. - ADHERENCE
Pt reports following a regular diet at home with no dietary restrictions. Pt recently began following a low CHO diet to lose weight.

## 2018-02-22 NOTE — PROGRESS NOTE ADULT - PROBLEM SELECTOR PLAN 1
likely multifactorial - active infection/celllulitis, less likely alcohol withdraw delirium (although >7d from last drink) and hypoxia with lung masses  - Possibly secondary to being on Oxycodone since R ankle injury  - will hold Oxycodone  - will f/u HIV, acute hep panel  -ID consul Likely multifactorial - active infection/celllulitis, less likely alcohol withdraw delirium (although >7d from last drink) and hypoxia with lung masses  Possibly secondary to being on Oxycodone since R ankle injury  will hold Oxycodone  will f/u HIV, acute hep panel

## 2018-02-22 NOTE — DIETITIAN INITIAL EVALUATION ADULT. - NUTRITION INTERVENTION
Meals and Snack/Vitamin/Medical Food Supplements/Nutrition Education/Collaboration and Referral of Nutrition Care

## 2018-02-22 NOTE — PROGRESS NOTE ADULT - PROBLEM SELECTOR PLAN 7
last stresst 1.5 yrs ago, routinely done w/o symptoms, reported negative  - will monitor serial cardiac enzymes r/o ACS  - possibly due to lung masses  - will check TTE to eval wall motion last stresst 1.5 yrs ago, routinely done w/o symptoms, reported negative  -cardiac enzymes negative  - will check TTE to eval wall motion and EF last stresst 1.5 yrs ago, routinely done w/o symptoms, reported negative  cardiac enzymes negative  will check TTE to eval wall motion and EF

## 2018-02-23 DIAGNOSIS — R07.81 PLEURODYNIA: ICD-10-CM

## 2018-02-23 DIAGNOSIS — A41.9 SEPSIS, UNSPECIFIED ORGANISM: ICD-10-CM

## 2018-02-23 DIAGNOSIS — I82.431 ACUTE EMBOLISM AND THROMBOSIS OF RIGHT POPLITEAL VEIN: ICD-10-CM

## 2018-02-23 LAB
ALBUMIN SERPL ELPH-MCNC: 2.4 G/DL — LOW (ref 3.3–5)
ALP SERPL-CCNC: 96 U/L — SIGNIFICANT CHANGE UP (ref 40–120)
ALT FLD-CCNC: 84 U/L RC — HIGH (ref 10–45)
ANION GAP SERPL CALC-SCNC: 13 MMOL/L — SIGNIFICANT CHANGE UP (ref 5–17)
APTT BLD: 38.6 SEC — HIGH (ref 27.5–37.4)
APTT BLD: 42 SEC — HIGH (ref 27.5–37.4)
APTT BLD: 45.8 SEC — HIGH (ref 27.5–37.4)
APTT BLD: 46.1 SEC — HIGH (ref 27.5–37.4)
AST SERPL-CCNC: 66 U/L — HIGH (ref 10–40)
BILIRUB SERPL-MCNC: 0.7 MG/DL — SIGNIFICANT CHANGE UP (ref 0.2–1.2)
BUN SERPL-MCNC: 22 MG/DL — SIGNIFICANT CHANGE UP (ref 7–23)
CALCIUM SERPL-MCNC: 8.8 MG/DL — SIGNIFICANT CHANGE UP (ref 8.4–10.5)
CHLORIDE SERPL-SCNC: 99 MMOL/L — SIGNIFICANT CHANGE UP (ref 96–108)
CK SERPL-CCNC: 176 U/L — SIGNIFICANT CHANGE UP (ref 30–200)
CO2 SERPL-SCNC: 26 MMOL/L — SIGNIFICANT CHANGE UP (ref 22–31)
CREAT SERPL-MCNC: 1.03 MG/DL — SIGNIFICANT CHANGE UP (ref 0.5–1.3)
GAS PNL BLDA: SIGNIFICANT CHANGE UP
GLUCOSE SERPL-MCNC: 132 MG/DL — HIGH (ref 70–99)
GRAM STN FLD: SIGNIFICANT CHANGE UP
HCT VFR BLD CALC: 38.9 % — LOW (ref 39–50)
HCT VFR BLD CALC: 39.7 % — SIGNIFICANT CHANGE UP (ref 39–50)
HGB BLD-MCNC: 13.1 G/DL — SIGNIFICANT CHANGE UP (ref 13–17)
HGB BLD-MCNC: 13.2 G/DL — SIGNIFICANT CHANGE UP (ref 13–17)
MAGNESIUM SERPL-MCNC: 2.6 MG/DL — SIGNIFICANT CHANGE UP (ref 1.6–2.6)
MCHC RBC-ENTMCNC: 33.2 GM/DL — SIGNIFICANT CHANGE UP (ref 32–36)
MCHC RBC-ENTMCNC: 33.4 PG — SIGNIFICANT CHANGE UP (ref 27–34)
MCHC RBC-ENTMCNC: 33.7 GM/DL — SIGNIFICANT CHANGE UP (ref 32–36)
MCHC RBC-ENTMCNC: 34.2 PG — HIGH (ref 27–34)
MCV RBC AUTO: 101 FL — HIGH (ref 80–100)
MCV RBC AUTO: 101 FL — HIGH (ref 80–100)
METHOD TYPE: SIGNIFICANT CHANGE UP
MSSA DNA SPEC QL NAA+PROBE: SIGNIFICANT CHANGE UP
PHOSPHATE SERPL-MCNC: 3.4 MG/DL — SIGNIFICANT CHANGE UP (ref 2.5–4.5)
PLATELET # BLD AUTO: 260 K/UL — SIGNIFICANT CHANGE UP (ref 150–400)
PLATELET # BLD AUTO: 281 K/UL — SIGNIFICANT CHANGE UP (ref 150–400)
POTASSIUM SERPL-MCNC: 4.2 MMOL/L — SIGNIFICANT CHANGE UP (ref 3.5–5.3)
POTASSIUM SERPL-SCNC: 4.2 MMOL/L — SIGNIFICANT CHANGE UP (ref 3.5–5.3)
PROT SERPL-MCNC: 6.6 G/DL — SIGNIFICANT CHANGE UP (ref 6–8.3)
RAPID RVP RESULT: SIGNIFICANT CHANGE UP
RBC # BLD: 3.84 M/UL — LOW (ref 4.2–5.8)
RBC # BLD: 3.94 M/UL — LOW (ref 4.2–5.8)
RBC # FLD: 12.5 % — SIGNIFICANT CHANGE UP (ref 10.3–14.5)
RBC # FLD: 12.6 % — SIGNIFICANT CHANGE UP (ref 10.3–14.5)
SODIUM SERPL-SCNC: 138 MMOL/L — SIGNIFICANT CHANGE UP (ref 135–145)
SPECIMEN SOURCE: SIGNIFICANT CHANGE UP
TSH SERPL-MCNC: 0.28 UIU/ML — SIGNIFICANT CHANGE UP (ref 0.27–4.2)
WBC # BLD: 17.4 K/UL — HIGH (ref 3.8–10.5)
WBC # BLD: 17.8 K/UL — HIGH (ref 3.8–10.5)
WBC # FLD AUTO: 17.4 K/UL — HIGH (ref 3.8–10.5)
WBC # FLD AUTO: 17.8 K/UL — HIGH (ref 3.8–10.5)

## 2018-02-23 PROCEDURE — 99233 SBSQ HOSP IP/OBS HIGH 50: CPT

## 2018-02-23 PROCEDURE — 99233 SBSQ HOSP IP/OBS HIGH 50: CPT | Mod: GC

## 2018-02-23 RX ORDER — LOSARTAN POTASSIUM 100 MG/1
50 TABLET, FILM COATED ORAL DAILY
Qty: 0 | Refills: 0 | Status: DISCONTINUED | OUTPATIENT
Start: 2018-02-23 | End: 2018-02-25

## 2018-02-23 RX ORDER — VANCOMYCIN HCL 1 G
1000 VIAL (EA) INTRAVENOUS EVERY 12 HOURS
Qty: 0 | Refills: 0 | Status: DISCONTINUED | OUTPATIENT
Start: 2018-02-23 | End: 2018-02-24

## 2018-02-23 RX ORDER — CEFAZOLIN SODIUM 1 G
2000 VIAL (EA) INJECTION ONCE
Qty: 0 | Refills: 0 | Status: COMPLETED | OUTPATIENT
Start: 2018-02-23 | End: 2018-02-23

## 2018-02-23 RX ORDER — CEFAZOLIN SODIUM 1 G
2000 VIAL (EA) INJECTION EVERY 8 HOURS
Qty: 0 | Refills: 0 | Status: DISCONTINUED | OUTPATIENT
Start: 2018-02-23 | End: 2018-02-25

## 2018-02-23 RX ORDER — ALBUTEROL 90 UG/1
1 AEROSOL, METERED ORAL EVERY 4 HOURS
Qty: 0 | Refills: 0 | Status: DISCONTINUED | OUTPATIENT
Start: 2018-02-23 | End: 2018-02-23

## 2018-02-23 RX ORDER — LEVALBUTEROL 1.25 MG/.5ML
0.63 SOLUTION, CONCENTRATE RESPIRATORY (INHALATION) EVERY 6 HOURS
Qty: 0 | Refills: 0 | Status: DISCONTINUED | OUTPATIENT
Start: 2018-02-23 | End: 2018-02-25

## 2018-02-23 RX ORDER — CEFAZOLIN SODIUM 1 G
VIAL (EA) INJECTION
Qty: 0 | Refills: 0 | Status: DISCONTINUED | OUTPATIENT
Start: 2018-02-23 | End: 2018-02-25

## 2018-02-23 RX ORDER — IPRATROPIUM/ALBUTEROL SULFATE 18-103MCG
3 AEROSOL WITH ADAPTER (GRAM) INHALATION EVERY 6 HOURS
Qty: 0 | Refills: 0 | Status: DISCONTINUED | OUTPATIENT
Start: 2018-02-23 | End: 2018-02-23

## 2018-02-23 RX ORDER — KETOROLAC TROMETHAMINE 30 MG/ML
15 SYRINGE (ML) INJECTION ONCE
Qty: 0 | Refills: 0 | Status: DISCONTINUED | OUTPATIENT
Start: 2018-02-23 | End: 2018-02-23

## 2018-02-23 RX ORDER — TIOTROPIUM BROMIDE 18 UG/1
1 CAPSULE ORAL; RESPIRATORY (INHALATION) DAILY
Qty: 0 | Refills: 0 | Status: DISCONTINUED | OUTPATIENT
Start: 2018-02-23 | End: 2018-02-23

## 2018-02-23 RX ADMIN — Medication 3 MILLILITER(S): at 11:11

## 2018-02-23 RX ADMIN — Medication 650 MILLIGRAM(S): at 18:39

## 2018-02-23 RX ADMIN — HEPARIN SODIUM 5000 UNIT(S): 5000 INJECTION INTRAVENOUS; SUBCUTANEOUS at 14:08

## 2018-02-23 RX ADMIN — Medication 15 MILLIGRAM(S): at 17:18

## 2018-02-23 RX ADMIN — Medication 650 MILLIGRAM(S): at 09:41

## 2018-02-23 RX ADMIN — HEPARIN SODIUM 5000 UNIT(S): 5000 INJECTION INTRAVENOUS; SUBCUTANEOUS at 20:49

## 2018-02-23 RX ADMIN — Medication 1 TABLET(S): at 13:31

## 2018-02-23 RX ADMIN — Medication 1 MILLIGRAM(S): at 13:31

## 2018-02-23 RX ADMIN — PIPERACILLIN AND TAZOBACTAM 25 GRAM(S): 4; .5 INJECTION, POWDER, LYOPHILIZED, FOR SOLUTION INTRAVENOUS at 06:44

## 2018-02-23 RX ADMIN — HEPARIN SODIUM 5000 UNIT(S): 5000 INJECTION INTRAVENOUS; SUBCUTANEOUS at 03:56

## 2018-02-23 RX ADMIN — HEPARIN SODIUM 2500 UNIT(S)/HR: 5000 INJECTION INTRAVENOUS; SUBCUTANEOUS at 03:55

## 2018-02-23 RX ADMIN — HEPARIN SODIUM 3100 UNIT(S)/HR: 5000 INJECTION INTRAVENOUS; SUBCUTANEOUS at 20:47

## 2018-02-23 RX ADMIN — Medication 250 MILLIGRAM(S): at 05:24

## 2018-02-23 RX ADMIN — Medication 15 MILLIGRAM(S): at 17:02

## 2018-02-23 RX ADMIN — Medication 250 MILLIGRAM(S): at 21:33

## 2018-02-23 RX ADMIN — Medication 1 TABLET(S): at 09:40

## 2018-02-23 RX ADMIN — LIDOCAINE 1 PATCH: 4 CREAM TOPICAL at 13:30

## 2018-02-23 RX ADMIN — Medication 100 MILLIGRAM(S): at 22:47

## 2018-02-23 RX ADMIN — HEPARIN SODIUM 2800 UNIT(S)/HR: 5000 INJECTION INTRAVENOUS; SUBCUTANEOUS at 14:07

## 2018-02-23 RX ADMIN — Medication 100 MILLIGRAM(S): at 05:24

## 2018-02-23 RX ADMIN — LEVALBUTEROL 0.63 MILLIGRAM(S): 1.25 SOLUTION, CONCENTRATE RESPIRATORY (INHALATION) at 13:53

## 2018-02-23 RX ADMIN — Medication 100 MILLIGRAM(S): at 13:32

## 2018-02-23 RX ADMIN — Medication 100 MILLIGRAM(S): at 13:31

## 2018-02-23 RX ADMIN — LEVALBUTEROL 0.63 MILLIGRAM(S): 1.25 SOLUTION, CONCENTRATE RESPIRATORY (INHALATION) at 17:05

## 2018-02-23 NOTE — PROGRESS NOTE ADULT - PROBLEM SELECTOR PLAN 10
-Dupplex positive for right popliteal DVT.  -Con't with heparin drip and monitor apTT -Dupplex positive for right popliteal DVT.  -Con't with heparin drip and monitor apTT  -Plan of care discussed with family who understood and is in agreement. - Heparin drip

## 2018-02-23 NOTE — PROGRESS NOTE ADULT - PROBLEM SELECTOR PLAN 1
Likely multifactorial - active infection/celllulitis, less likely alcohol withdraw delirium (although >7d from last drink) and hypoxia with lung lesions  -HIV, acute hep panel negative  -Obtain ABG Likely multifactorial in the setting of sepsis, mssa bactermia, celllulitis, less likely alcohol withdraw delirium (although >7d from last drink) and hypoxia with lung lesions  HIV, acute hep panel negative  CT head without acute pathology  Check abg, ammonia level   Avoid sedatives, opiates

## 2018-02-23 NOTE — PROGRESS NOTE ADULT - PROBLEM SELECTOR PLAN 2
-Fever 101.3 this morning.  -CT scan of RLE negative for necrotizing fascitis or osteomyelitis, + cellulitis  -C/w Vanco, zosyn and clindamycin per ID  -Monitor vanco trough   -Prelim blood cultures growing gram positive cocci in clusters  -trend wbc and temp curve -Fever 101.3 this morning.  -CT scan of RLE negative for necrotizing fascitis or osteomyelitis, + cellulitis  -C/w Vanco, zosyn and clindamycin per ID  -Monitor vanco trough   -Prelim blood cultures growing gram positive cocci in clusters  -trend wbc and temp curve  -Plan of care discussed with family who understood and is in agreement. CT scan of RLE negative for necrotizing fascitis or osteomyelitis  Pt found to have mssa bacteremia  Continue with IV ancef  Follow up blood cultures  Trend wbc and temp curve  Appreciate ID recs

## 2018-02-23 NOTE — PHYSICAL THERAPY INITIAL EVALUATION ADULT - PRECAUTIONS/LIMITATIONS, REHAB EVAL
CT LE: Diffuse cellulitis throughout the right lower leg as outlined above with cutaneous bleb along the dorsal lateral aspect of the foot. There is no subcutaneous air to suggest necrotizing fasciitis.Ill-defined fluid attenuation within the region of the extensor digitorum brevis musculature and within the region of the abductor hallucis muscle which may be related to muscle strain or myositis. Developing fluid collections are also a consideration in the appropriate clinical context. No peripherally enhancing fluid collection is noted on the current examination.Intramuscular fullness and edema within the myofascial planes about the calf musculature likely related to patient's known right peroneal deep vein thrombosis.No CT evidence of osteomyelitis.Osteochondral lesion along the medial aspect of the talar dome.CT ABDOMEN/PELVIS: Redemonstration of bilateral pulmonary nodules/masses in the visualized lung bases. Small right pleural effusion with adjacent passive atelectasis.Right renal cysts. Otherwise, no acute intra-abdominal or intrapelvic process.CTH: Normal non-contrast CT of the brain. Left parietal extra calvarial soft tissue swelling. No acute stroke CT LE: Diffuse cellulitis throughout the right lower leg as outlined above with cutaneous bleb along the dorsal lateral aspect of the foot. There is no subcutaneous air to suggest necrotizing fasciitis.Ill-defined fluid attenuation within the region of the extensor digitorum brevis musculature and within the region of the abductor hallucis muscle which may be related to muscle strain or myositis. Developing fluid collections are also a consideration in the appropriate clinical context. No peripherally enhancing fluid collection is noted on the current examination.Intramuscular fullness and edema within the myofascial planes about the calf musculature likely related to patient's known right peroneal deep vein thrombosis.No CT evidence of osteomyelitis.Osteochondral lesion along the medial aspect of the talar dome.CT ABDOMEN/PELVIS: Redemonstration of bilateral pulmonary nodules/masses in the visualized lung bases. Small right pleural effusion with adjacent passive atelectasis.Right renal cysts. Otherwise, no acute intra-abdominal or intrapelvic process.CTH: Normal non-contrast CT of the brain. Left parietal extra calvarial soft tissue swelling. No acute stroke. MRI revealed OM right foot/necrotizing fasciitis

## 2018-02-23 NOTE — PROGRESS NOTE ADULT - SUBJECTIVE AND OBJECTIVE BOX
CHIEF COMPLAINT:    Interval Events:    REVIEW OF SYSTEMS:  Constitutional: [ ] negative [ ] fevers [ ] chills [ ] weight loss [ ] weight gain  HEENT: [ ] negative [ ] dry eyes [ ] eye irritation [ ] postnasal drip [ ] nasal congestion  CV: [ ] negative  [ ] chest pain [ ] orthopnea [ ] palpitations [ ] murmur  Resp: [ ] negative [ ] cough [ ] shortness of breath [ ] dyspnea [ ] wheezing [ ] sputum [ ] hemoptysis  GI: [ ] negative [ ] nausea [ ] vomiting [ ] diarrhea [ ] constipation [ ] abd pain [ ] dysphagia   : [ ] negative [ ] dysuria [ ] nocturia [ ] hematuria [ ] increased urinary frequency  Musculoskeletal: [ ] negative [ ] back pain [ ] myalgias [ ] arthralgias [ ] fracture  Skin: [ ] negative [ ] rash [ ] itch  Neurological: [ ] negative [ ] headache [ ] dizziness [ ] syncope [ ] weakness [ ] numbness  Psychiatric: [ ] negative [ ] anxiety [ ] depression  Endocrine: [ ] negative [ ] diabetes [ ] thyroid problem  Hematologic/Lymphatic: [ ] negative [ ] anemia [ ] bleeding problem  Allergic/Immunologic: [ ] negative [ ] itchy eyes [ ] nasal discharge [ ] hives [ ] angioedema  [ ] All other systems negative  [ ] Unable to assess ROS because ________    OBJECTIVE:  ICU Vital Signs Last 24 Hrs  T(C): 36.8 (23 Feb 2018 12:06), Max: 38.5 (23 Feb 2018 09:35)  T(F): 98.3 (23 Feb 2018 12:06), Max: 101.3 (23 Feb 2018 09:35)  HR: 94 (23 Feb 2018 12:06) (94 - 118)  BP: 118/79 (23 Feb 2018 12:06) (118/79 - 163/87)  BP(mean): --  ABP: --  ABP(mean): --  RR: 20 (23 Feb 2018 12:06) (18 - 20)  SpO2: 97% (23 Feb 2018 12:06) (94% - 97%)        02-22 @ 07:01  -  02-23 @ 07:00  --------------------------------------------------------  IN: 2090 mL / OUT: 1200 mL / NET: 890 mL    02-23 @ 07:01  - 02-23 @ 12:16  --------------------------------------------------------  IN: 245 mL / OUT: 200 mL / NET: 45 mL      CAPILLARY BLOOD GLUCOSE      POCT Blood Glucose.: 126 mg/dL (21 Feb 2018 22:09)      PHYSICAL EXAM:  General:   HEENT:   Lymph Nodes:  Neck:   Respiratory:   Cardiovascular:   Abdomen:   Extremities:   Skin:   Neurological:  Psychiatry:    HOSPITAL MEDICATIONS:  MEDICATIONS  (STANDING):  clindamycin IVPB      clindamycin IVPB 900 milliGRAM(s) IV Intermittent every 8 hours  folic acid 1 milliGRAM(s) Oral daily  heparin  Infusion.  Unit(s)/Hr (22 mL/Hr) IV Continuous <Continuous>  lactobacillus acidophilus 1 Tablet(s) Oral three times a day with meals  levalbuterol Inhalation 0.63 milliGRAM(s) Inhalation every 6 hours  lidocaine   Patch 1 Patch Transdermal daily  losartan 50 milliGRAM(s) Oral daily  multivitamin 1 Tablet(s) Oral daily  piperacillin/tazobactam IVPB. 3.375 Gram(s) IV Intermittent every 8 hours  thiamine 100 milliGRAM(s) Oral daily  vancomycin  IVPB 1000 milliGRAM(s) IV Intermittent every 12 hours    MEDICATIONS  (PRN):  acetaminophen   Tablet 650 milliGRAM(s) Oral every 6 hours PRN For Temp greater than 38 C (100.4 F)  acetaminophen   Tablet. 650 milliGRAM(s) Oral every 8 hours PRN Moderate Pain (4 - 6)  heparin  Injectable 24858 Unit(s) IV Push every 6 hours PRN For aPTT less than 40  heparin  Injectable 5000 Unit(s) IV Push every 6 hours PRN For aPTT between 40 - 57      LABS:                        13.2   17.8  )-----------( 281      ( 23 Feb 2018 03:34 )             39.7     Hgb Trend: 13.2<--, 13.1<--, 12.9<--, 13.9<--  02-23    138  |  99  |  22  ----------------------------<  132<H>  4.2   |  26  |  1.03    Ca    8.8      23 Feb 2018 02:38  Phos  3.4     02-23  Mg     2.6     02-23    TPro  6.6  /  Alb  2.4<L>  /  TBili  0.7  /  DBili  x   /  AST  66<H>  /  ALT  84<H>  /  AlkPhos  96  02-23    Creatinine Trend: 1.03<--, 1.14<--, 1.40<--  PT/INR - ( 21 Feb 2018 22:23 )   PT: 16.1 sec;   INR: 1.48 ratio         PTT - ( 23 Feb 2018 10:37 )  PTT:38.6 sec      Venous Blood Gas:  02-21 @ 22:23  7.36/53/76/29/94  VBG Lactate: 2.2      MICROBIOLOGY:     Culture - Blood (collected 22 Feb 2018 12:56)  Source: .Blood Blood-Peripheral  Gram Stain (23 Feb 2018 03:04):    Growth in aerobic bottle:    Gram Positive Cocci in Clusters    Growth in anaerobic bottle:    Gram Positive Cocci in Clusters  Preliminary Report (23 Feb 2018 03:04):    Growth in aerobic bottle:    Gram Positive Cocci in Clusters    Growth in anaerobic bottle:    Gram Positive Cocci in Clusters    Culture - Blood (collected 22 Feb 2018 12:56)  Source: .Blood Blood-Peripheral  Gram Stain (23 Feb 2018 01:57):    Growth in aerobic bottle: Gram Positive Cocci in Clusters    Growth in anaerobic bottle:    Gram Positive Cocci in Clusters  Preliminary Report (23 Feb 2018 01:58):    Growth in aerobic bottle: Gram Positive Cocci in Clusters    "Due to technical problems, Proteus sp. will Not be reported as part of    the BCID panel until further notice"    ***Blood Panel PCR results on this specimen are available    approximately 3 hours after the Gram stain result.***    Gram stain, PCR, and/or culture results may not always    correspond due to difference in methodologies.    ************************************************************    This PCR assay was performed using [employer].    The following targets are tested for: Enterococcus,    vancomycin resistant enterococci, Listeria monocytogenes,    coagulase negative staphylococci, S. aureus,    methicillin resistant S. aureus, Streptococcus agalactiae    (Group B), S. pneumoniae, S.pyogenes (Group A),    Acinetobacter baumannii, Enterobacter cloacae, E. coli,    Klebsiella oxytoca, K. pneumoniae, Proteus sp.,    Serratia marcescens, Haemophilus influenzae,    Neisseria meningitidis, Pseudomonas aeruginosa, Candida    albicans, C. glabrata, C krusei, C parapsilosis,    C. tropicalis and the KPC resistance gene.    Growth in anaerobic bottle:    Gram Positive Cocci in Clusters  Organism: Blood Culture PCR (23 Feb 2018 01:08)  Organism: Blood Culture PCR (23 Feb 2018 01:08)        RADIOLOGY:  [ ] Reviewed and interpreted by me    PULMONARY FUNCTION TESTS:    EKG: CHIEF COMPLAINT:    Interval Events:    REVIEW OF SYSTEMS:  Constitutional: [ ] negative [x ] fevers [ ] chills [ ] weight loss [ ] weight gain  HEENT: [ x] negative [ ] dry eyes [ ] eye irritation [ ] postnasal drip [ ] nasal congestion  CV: [ ] negative  [ ] chest pain [ ] orthopnea [ ] palpitations [ ] murmur  Resp: [ ] negative [x ] cough [ ] shortness of breath [x ] dyspnea [ x] wheezing [x ] sputum [ ] hemoptysis  GI: [x ] negative [ ] nausea [ ] vomiting [ ] diarrhea [ ] constipation [ ] abd pain [ ] dysphagia   : [x ] negative [ ] dysuria [ ] nocturia [ ] hematuria [ ] increased urinary frequency  Musculoskeletal: [ ] negative [x ] back pain [ ] myalgias [ ] arthralgias [ ] fracture  Skin: [x ] negative [ ] rash [ ] itch  Neurological: [x ] negative [ ] headache [ ] dizziness [ ] syncope [ ] weakness [ ] numbness  Psychiatric: [x ] negative [ ] anxiety [ ] depression  Endocrine: x ] negative [ ] diabetes [ ] thyroid problem  Hematologic/Lymphatic: [x ] negative [ ] anemia [ ] bleeding problem  Allergic/Immunologic: [x ] negative [ ] itchy eyes [ ] nasal discharge [ ] hives [ ] angioedema  [ ] All other systems negative  [ ] Unable to assess ROS because ________    OBJECTIVE:  ICU Vital Signs Last 24 Hrs  T(C): 36.8 (23 Feb 2018 12:06), Max: 38.5 (23 Feb 2018 09:35)  T(F): 98.3 (23 Feb 2018 12:06), Max: 101.3 (23 Feb 2018 09:35)  HR: 94 (23 Feb 2018 12:06) (94 - 118)  BP: 118/79 (23 Feb 2018 12:06) (118/79 - 163/87)  BP(mean): --  ABP: --  ABP(mean): --  RR: 20 (23 Feb 2018 12:06) (18 - 20)  SpO2: 97% (23 Feb 2018 12:06) (94% - 97%)        02-22 @ 07:01  -  02-23 @ 07:00  --------------------------------------------------------  IN: 2090 mL / OUT: 1200 mL / NET: 890 mL    02-23 @ 07:01  - 02-23 @ 12:16  --------------------------------------------------------  IN: 245 mL / OUT: 200 mL / NET: 45 mL      CAPILLARY BLOOD GLUCOSE      POCT Blood Glucose.: 126 mg/dL (21 Feb 2018 22:09)      PHYSICAL EXAM:  General: NAD   HEENT: NCAT, MOM/ ERIN/EOMI   Neck: Supple, large  Respiratory: Diffuse expiratory wheezing b/l throughout  Cardiovascular: RRR, Sinus tachycardic, (-) m/g/r  Abdomen: NT/ND  Extremities: RLE cellulitis appears unchanged or slightly improved from yesterday. Also with area of small fluctuance over dorsum of foot  Skin: As above  Neurological: Nonfocal  Psychiatry: Appropriate    HOSPITAL MEDICATIONS:  MEDICATIONS  (STANDING):  clindamycin IVPB      clindamycin IVPB 900 milliGRAM(s) IV Intermittent every 8 hours  folic acid 1 milliGRAM(s) Oral daily  heparin  Infusion.  Unit(s)/Hr (22 mL/Hr) IV Continuous <Continuous>  lactobacillus acidophilus 1 Tablet(s) Oral three times a day with meals  levalbuterol Inhalation 0.63 milliGRAM(s) Inhalation every 6 hours  lidocaine   Patch 1 Patch Transdermal daily  losartan 50 milliGRAM(s) Oral daily  multivitamin 1 Tablet(s) Oral daily  piperacillin/tazobactam IVPB. 3.375 Gram(s) IV Intermittent every 8 hours  thiamine 100 milliGRAM(s) Oral daily  vancomycin  IVPB 1000 milliGRAM(s) IV Intermittent every 12 hours    MEDICATIONS  (PRN):  acetaminophen   Tablet 650 milliGRAM(s) Oral every 6 hours PRN For Temp greater than 38 C (100.4 F)  acetaminophen   Tablet. 650 milliGRAM(s) Oral every 8 hours PRN Moderate Pain (4 - 6)  heparin  Injectable 12826 Unit(s) IV Push every 6 hours PRN For aPTT less than 40  heparin  Injectable 5000 Unit(s) IV Push every 6 hours PRN For aPTT between 40 - 57      LABS:                        13.2   17.8  )-----------( 281      ( 23 Feb 2018 03:34 )             39.7     Hgb Trend: 13.2<--, 13.1<--, 12.9<--, 13.9<--  02-23    138  |  99  |  22  ----------------------------<  132<H>  4.2   |  26  |  1.03    Ca    8.8      23 Feb 2018 02:38  Phos  3.4     02-23  Mg     2.6     02-23    TPro  6.6  /  Alb  2.4<L>  /  TBili  0.7  /  DBili  x   /  AST  66<H>  /  ALT  84<H>  /  AlkPhos  96  02-23    Creatinine Trend: 1.03<--, 1.14<--, 1.40<--  PT/INR - ( 21 Feb 2018 22:23 )   PT: 16.1 sec;   INR: 1.48 ratio         PTT - ( 23 Feb 2018 10:37 )  PTT:38.6 sec      Venous Blood Gas:  02-21 @ 22:23  7.36/53/76/29/94  VBG Lactate: 2.2      MICROBIOLOGY:     Culture - Blood (collected 22 Feb 2018 12:56)  Source: .Blood Blood-Peripheral  Gram Stain (23 Feb 2018 03:04):    Growth in aerobic bottle:    Gram Positive Cocci in Clusters    Growth in anaerobic bottle:    Gram Positive Cocci in Clusters  Preliminary Report (23 Feb 2018 03:04):    Growth in aerobic bottle:    Gram Positive Cocci in Clusters    Growth in anaerobic bottle:    Gram Positive Cocci in Clusters    Culture - Blood (collected 22 Feb 2018 12:56)  Source: .Blood Blood-Peripheral  Gram Stain (23 Feb 2018 01:57):    Growth in aerobic bottle: Gram Positive Cocci in Clusters    Growth in anaerobic bottle:    Gram Positive Cocci in Clusters  Preliminary Report (23 Feb 2018 01:58):    Growth in aerobic bottle: Gram Positive Cocci in Clusters    "Due to technical problems, Proteus sp. will Not be reported as part of    the BCID panel until further notice"    ***Blood Panel PCR results on this specimen are available    approximately 3 hours after the Gram stain result.***    Gram stain, PCR, and/or culture results may not always    correspond due to difference in methodologies.    ************************************************************    This PCR assay was performed using [employer].    The following targets are tested for: Enterococcus,    vancomycin resistant enterococci, Listeria monocytogenes,    coagulase negative staphylococci, S. aureus,    methicillin resistant S. aureus, Streptococcus agalactiae    (Group B), S. pneumoniae, S.pyogenes (Group A),    Acinetobacter baumannii, Enterobacter cloacae, E. coli,    Klebsiella oxytoca, K. pneumoniae, Proteus sp.,    Serratia marcescens, Haemophilus influenzae,    Neisseria meningitidis, Pseudomonas aeruginosa, Candida    albicans, C. glabrata, C krusei, C parapsilosis,    C. tropicalis and the KPC resistance gene.    Growth in anaerobic bottle:    Gram Positive Cocci in Clusters  Organism: Blood Culture PCR (23 Feb 2018 01:08)  Organism: Blood Culture PCR (23 Feb 2018 01:08)        RADIOLOGY:  [x ] Reviewed and interpreted by me    PULMONARY FUNCTION TESTS:    EKG: CHIEF COMPLAINT: AMS    Interval Events: Pt seen and examined at bedside. No acute events overnight. Spiked temperature this morning, has increased dyspnea and wheezing. Pain persistent in RLE, as well as left should and right side of his back. Overall pt feels a little better. As per family he is somewhat more alert and "himself" today.    REVIEW OF SYSTEMS:  Constitutional: [ ] negative [x ] fevers [ ] chills [ ] weight loss [ ] weight gain  HEENT: [ x] negative [ ] dry eyes [ ] eye irritation [ ] postnasal drip [ ] nasal congestion  CV: [ ] negative  [ ] chest pain [ ] orthopnea [ ] palpitations [ ] murmur  Resp: [ ] negative [x ] cough [ ] shortness of breath [x ] dyspnea [ x] wheezing [x ] sputum [ ] hemoptysis  GI: [x ] negative [ ] nausea [ ] vomiting [ ] diarrhea [ ] constipation [ ] abd pain [ ] dysphagia   : [x ] negative [ ] dysuria [ ] nocturia [ ] hematuria [ ] increased urinary frequency  Musculoskeletal: [ ] negative [x ] back pain [ ] myalgias [ ] arthralgias [ ] fracture  Skin: [x ] negative [ ] rash [ ] itch  Neurological: [x ] negative [ ] headache [ ] dizziness [ ] syncope [ ] weakness [ ] numbness  Psychiatric: [x ] negative [ ] anxiety [ ] depression  Endocrine: x ] negative [ ] diabetes [ ] thyroid problem  Hematologic/Lymphatic: [x ] negative [ ] anemia [ ] bleeding problem  Allergic/Immunologic: [x ] negative [ ] itchy eyes [ ] nasal discharge [ ] hives [ ] angioedema  [ ] All other systems negative  [ ] Unable to assess ROS because ________    OBJECTIVE:  ICU Vital Signs Last 24 Hrs  T(C): 36.8 (23 Feb 2018 12:06), Max: 38.5 (23 Feb 2018 09:35)  T(F): 98.3 (23 Feb 2018 12:06), Max: 101.3 (23 Feb 2018 09:35)  HR: 94 (23 Feb 2018 12:06) (94 - 118)  BP: 118/79 (23 Feb 2018 12:06) (118/79 - 163/87)  BP(mean): --  ABP: --  ABP(mean): --  RR: 20 (23 Feb 2018 12:06) (18 - 20)  SpO2: 97% (23 Feb 2018 12:06) (94% - 97%)        02-22 @ 07:01 - 02-23 @ 07:00  --------------------------------------------------------  IN: 2090 mL / OUT: 1200 mL / NET: 890 mL    02-23 @ 07:01 - 02-23 @ 12:16  --------------------------------------------------------  IN: 245 mL / OUT: 200 mL / NET: 45 mL      CAPILLARY BLOOD GLUCOSE      POCT Blood Glucose.: 126 mg/dL (21 Feb 2018 22:09)      PHYSICAL EXAM:  General: NAD   HEENT: NCAT, MOM/ ERIN/EOMI   Neck: Supple, large  Respiratory: Diffuse expiratory wheezing b/l throughout  Cardiovascular: RRR, Sinus tachycardic, (-) m/g/r  Abdomen: NT/ND  Extremities: RLE cellulitis appears unchanged or slightly improved from yesterday. Also with area of small fluctuance over dorsum of foot  Skin: As above  Neurological: Nonfocal  Psychiatry: Appropriate    HOSPITAL MEDICATIONS:  MEDICATIONS  (STANDING):  clindamycin IVPB      clindamycin IVPB 900 milliGRAM(s) IV Intermittent every 8 hours  folic acid 1 milliGRAM(s) Oral daily  heparin  Infusion.  Unit(s)/Hr (22 mL/Hr) IV Continuous <Continuous>  lactobacillus acidophilus 1 Tablet(s) Oral three times a day with meals  levalbuterol Inhalation 0.63 milliGRAM(s) Inhalation every 6 hours  lidocaine   Patch 1 Patch Transdermal daily  losartan 50 milliGRAM(s) Oral daily  multivitamin 1 Tablet(s) Oral daily  piperacillin/tazobactam IVPB. 3.375 Gram(s) IV Intermittent every 8 hours  thiamine 100 milliGRAM(s) Oral daily  vancomycin  IVPB 1000 milliGRAM(s) IV Intermittent every 12 hours    MEDICATIONS  (PRN):  acetaminophen   Tablet 650 milliGRAM(s) Oral every 6 hours PRN For Temp greater than 38 C (100.4 F)  acetaminophen   Tablet. 650 milliGRAM(s) Oral every 8 hours PRN Moderate Pain (4 - 6)  heparin  Injectable 21792 Unit(s) IV Push every 6 hours PRN For aPTT less than 40  heparin  Injectable 5000 Unit(s) IV Push every 6 hours PRN For aPTT between 40 - 57      LABS:                        13.2   17.8  )-----------( 281      ( 23 Feb 2018 03:34 )             39.7     Hgb Trend: 13.2<--, 13.1<--, 12.9<--, 13.9<--  02-23    138  |  99  |  22  ----------------------------<  132<H>  4.2   |  26  |  1.03    Ca    8.8      23 Feb 2018 02:38  Phos  3.4     02-23  Mg     2.6     02-23    TPro  6.6  /  Alb  2.4<L>  /  TBili  0.7  /  DBili  x   /  AST  66<H>  /  ALT  84<H>  /  AlkPhos  96  02-23    Creatinine Trend: 1.03<--, 1.14<--, 1.40<--  PT/INR - ( 21 Feb 2018 22:23 )   PT: 16.1 sec;   INR: 1.48 ratio         PTT - ( 23 Feb 2018 10:37 )  PTT:38.6 sec      Venous Blood Gas:  02-21 @ 22:23  7.36/53/76/29/94  VBG Lactate: 2.2      MICROBIOLOGY:     Culture - Blood (collected 22 Feb 2018 12:56)  Source: .Blood Blood-Peripheral  Gram Stain (23 Feb 2018 03:04):    Growth in aerobic bottle:    Gram Positive Cocci in Clusters    Growth in anaerobic bottle:    Gram Positive Cocci in Clusters  Preliminary Report (23 Feb 2018 03:04):    Growth in aerobic bottle:    Gram Positive Cocci in Clusters    Growth in anaerobic bottle:    Gram Positive Cocci in Clusters    Culture - Blood (collected 22 Feb 2018 12:56)  Source: .Blood Blood-Peripheral  Gram Stain (23 Feb 2018 01:57):    Growth in aerobic bottle: Gram Positive Cocci in Clusters    Growth in anaerobic bottle:    Gram Positive Cocci in Clusters  Preliminary Report (23 Feb 2018 01:58):    Growth in aerobic bottle: Gram Positive Cocci in Clusters    "Due to technical problems, Proteus sp. will Not be reported as part of    the BCID panel until further notice"    ***Blood Panel PCR results on this specimen are available    approximately 3 hours after the Gram stain result.***    Gram stain, PCR, and/or culture results may not always    correspond due to difference in methodologies.    ************************************************************    This PCR assay was performed using [employer].    The following targets are tested for: Enterococcus,    vancomycin resistant enterococci, Listeria monocytogenes,    coagulase negative staphylococci, S. aureus,    methicillin resistant S. aureus, Streptococcus agalactiae    (Group B), S. pneumoniae, S.pyogenes (Group A),    Acinetobacter baumannii, Enterobacter cloacae, E. coli,    Klebsiella oxytoca, K. pneumoniae, Proteus sp.,    Serratia marcescens, Haemophilus influenzae,    Neisseria meningitidis, Pseudomonas aeruginosa, Candida    albicans, C. glabrata, C krusei, C parapsilosis,    C. tropicalis and the KPC resistance gene.    Growth in anaerobic bottle:    Gram Positive Cocci in Clusters  Organism: Blood Culture PCR (23 Feb 2018 01:08)  Organism: Blood Culture PCR (23 Feb 2018 01:08)        RADIOLOGY:  [x ] Reviewed and interpreted by me    PULMONARY FUNCTION TESTS:    EKG:

## 2018-02-23 NOTE — PROGRESS NOTE ADULT - ASSESSMENT
57 year old male with HTN and daily alcohol use presenting with worsening SOB, chest pain, confusion and worsening right lower extremity pain and swelling and redness.    Patient sustained an injury to his right foot at work 2/13/18 and went to the ED - discharged home with pain meds. While home, developed worsening SOB, confusion and right foot pain over 2 weeks.     CT chest concerning for septic emboli. Right leg on exam with impressive swelling concerning for nec fasc vs compartment syndrome. Also, found to have a DVT.    Febrile to 101.3F today, remains with leukocytosis, ADA improved.  CT RLE no air to suggest nec fasc.  Blood cx with MSSA.    Recommend:  -Discontinue clinda, vanco, and zosyn.  -Start cefazolin 2 grams IV q 8 hours.  -Repeat blood cxs x 2 sets.  -Will need KELVIN to r/o endocarditis  -Will follow along with you.    ID service available on the weekend. For questions, please call [phone].      Corey Salmeron MD  Pager [phone]  After 5pm/weekends call [phone]

## 2018-02-23 NOTE — PROGRESS NOTE ADULT - PROBLEM SELECTOR PLAN 8
-BP controlled, c/w losartan Down trending, likely 2/2 sepsis vs etoh use  HIV, acute hepatitis panel negative

## 2018-02-23 NOTE — PROGRESS NOTE ADULT - ASSESSMENT
Pt is a 57M with PMHx daily EtOH use and recent ankle sprain 2/13 presenting from home 2/22 for chest pain and AMS x3-4 days with worsening right ankle swelling and redness admitted for sepsis 2/2 cellulitis and then found to have CT Chest findings concerning for b/l diffuse GGO concerning for infectious process vs. embolic event, less likely 2/2 malignancy and blood cx today (+) Staph aureus bacteremia likely 2/2 RLE cellulitis with embolization to lung.  -c/w Vanc+Zosyn+Clindamycin. Appreciate ID recs.   -TTE performed. Pt will eventually need KELVIN, but is not optimized for procedure yet  -Check sputum cx  -Start DuoNebs q6hr prn for wheezing  -Pt has hx of SHIELA with CPAP at home, but is not compliant. Will need O/P sleep study with titration after hospital discharge

## 2018-02-23 NOTE — PROGRESS NOTE ADULT - PROBLEM SELECTOR PROBLEM 5
After Visit Summary   2/13/2017    Enoch Delgado    MRN: [id number]           Patient Information     Date Of Birth          2002        Visit Information        Provider Department      2/13/2017 9:30 AM Vivek Manrique MD [facility]        Today's Diagnoses     Discoid lateral meniscus, left    -  1       Follow-ups after your visit        Your next 10 appointments already scheduled     Feb 20, 2017 12:00 PM CST   Return Visit with Eduarda Johnson MD   Peds Hematology Oncology ([facility])    [facility]  9th Floor  [address] [id number] [phone]              Who to contact     Please call your clinic at [phone] to:    Ask questions about your health    Make or cancel appointments    Discuss your medicines    Learn about your test results    Speak to your doctor   If you have compliments or concerns about an experience at your clinic, or if you wish to file a complaint, please contact [facility] Patient Relations at [phone] or email us at [email or web address]         Additional Information About Your Visit        MyChart Information     [employer] gives you secure access to your electronic health record. If you see a primary care provider, you can also send messages to your care team and make appointments. If you have questions, please call your primary care clinic.  If you do not have a primary care provider, please call [phone] and they will assist you.      [employer] is an electronic gateway that provides easy, online access to your medical records. With [employer], you can request a clinic appointment, read your test results, renew a prescription or communicate with your care team.     To access your existing account, please contact your [facility] or call [phone] for assistance.        Care EveryWhere ID     This is  your Care EveryWhere ID. This could be used by other organizations to access your [facility] medical records  [id number]        Your Vitals Were     Last Period                   01/24/2017 (Exact Date)            Blood Pressure from Last 3 Encounters:   02/03/17 113/59   01/26/17 123/73   12/26/16 112/74    Weight from Last 3 Encounters:   02/03/17 83.4 kg (183 lb 13.8 oz) (97 %)*   01/26/17 83.4 kg (183 lb 13.8 oz) (97 %)*   12/26/16 84.4 kg (186 lb 1.1 oz) (97 %)*     * Growth percentiles are based on [facility] 2-20 Years data.              Today, you had the following     No orders found for display       Primary Care Provider Office Phone # Fax #    Jasiel Dutton PA-C [phone]       [facility] [address]  [facility] [address]        Thank you!     Thank you for choosing [facility]  for your care. Our goal is always to provide you with excellent care. Hearing back from our patients is one way we can continue to improve our services. Please take a few minutes to complete the written survey that you may receive in the mail after your visit with us. Thank you!             Your Updated Medication List - Protect others around you: Learn how to safely use, store and throw away your medicines at www.disposemymeds.org.          This list is accurate as of: 2/13/17 11:59 PM.  Always use your most recent med list.                   Brand Name Dispense Instructions for use    cetirizine 10 MG tablet    zyrTEC     Take 10 mg by mouth daily as needed for allergies       hydrOXYzine 25 MG tablet    ATARAX    30 tablet    Take 1 tablet (25 mg) by mouth every 6 hours as needed for itching (and nausea)       levonorgestrel-ethinyl estradiol 0.1-20 MG-MCG per tablet    BEAN ZHU LESSINA     Take 1 tablet by mouth daily       LEXAPRO PO      Take 10 mg by mouth daily       loratadine 10 MG tablet    CLARITIN    60 tablet    Take 1 tablet (10 mg) by mouth daily          Transaminitis Acute deep vein thrombosis (DVT) of popliteal vein of right lower extremity

## 2018-02-23 NOTE — PROGRESS NOTE ADULT - PROBLEM SELECTOR PLAN 7
last stresst 1.5 yrs ago, routinely done w/o symptoms, reported negative  cardiac enzymes negative  -TTE technically difficult but normal  -C/w lidocaine patch last stresst 1.5 yrs ago, routinely done w/o symptoms, reported negative  cardiac enzymes negative  -TTE technically difficult but otherwise normal  -C/w lidocaine patch Troponins negative  Echo results noted  C/w tele monitoring  Resolved today

## 2018-02-23 NOTE — PROGRESS NOTE ADULT - PROBLEM SELECTOR PLAN 3
CTA neg for PE,  positive for multiple groundglass and nodular opacities with a peripheral and lower lobe distribution could be secondary to infection  Former smoker, works as /construction, h/o sleep apnea   C/w O2 supplementation   Pulm follow up  -Obtain ABG, lactate  -Check RVP  -Start xopenex CTA neg for PE,  positive for multiple ground-glass and nodular opacities with a peripheral and lower lobe distribution could be secondary to infection  Former smoker, works as /construction, h/o sleep apnea   C/w O2 supplementation   Pulm follow up  -Obtain ABG, lactate  -Check RVP  -Start xopenex In the setting of RLE cellulitis, mssa bacteremia, septic embolic   Echo results notes  Check gauri r/o endocarditis

## 2018-02-23 NOTE — CHART NOTE - NSCHARTNOTEFT_GEN_A_CORE
Malnutrition Follow-up   Chart reviewed, events noted.   Source: Patient [x]    Family [x]     other [x] Comprehensive review of medical records     Diet : DASH, Ensure Enlive x3/day      No N+V. patient reports having a BM today. Pt reports improving po intake. Pt reports having 2 Ensure so far today. RD observed minimal intake of lunch. RD obtained pt's food preferences for dinner and ordered for pt and also requested Chocolate Ensure for pt to try as he has been drinking Vanilla. Also requested an additional menu for tomorrow as pt and wife were unaware of what happened to his menu.     PO intake:  [x] < 50%         Enteral /Parenteral Nutrition: n/a       Current Weight: Weight (kg): 122.1 (02-22 @ 02:35)= 269.1 pounds   % Weight Change    Pertinent Medications: MEDICATIONS  (STANDING):  ceFAZolin   IVPB      ceFAZolin   IVPB 2000 milliGRAM(s) IV Intermittent every 8 hours  folic acid 1 milliGRAM(s) Oral daily  heparin  Infusion.  Unit(s)/Hr (22 mL/Hr) IV Continuous <Continuous>  lactobacillus acidophilus 1 Tablet(s) Oral three times a day with meals  levalbuterol Inhalation 0.63 milliGRAM(s) Inhalation every 6 hours  lidocaine   Patch 1 Patch Transdermal daily  losartan 50 milliGRAM(s) Oral daily  multivitamin 1 Tablet(s) Oral daily  thiamine 100 milliGRAM(s) Oral daily    MEDICATIONS  (PRN):  acetaminophen   Tablet 650 milliGRAM(s) Oral every 6 hours PRN For Temp greater than 38 C (100.4 F)  acetaminophen   Tablet. 650 milliGRAM(s) Oral every 8 hours PRN Moderate Pain (4 - 6)  heparin  Injectable 25346 Unit(s) IV Push every 6 hours PRN For aPTT less than 40  heparin  Injectable 5000 Unit(s) IV Push every 6 hours PRN For aPTT between 40 - 57    Pertinent Labs:  02-23 Na138 mmol/L Glu 132 mg/dL<H> K+ 4.2 mmol/L Cr  1.03 mg/dL BUN 22 mg/dL Phos 3.4 mg/dL Alb 2.4 g/dL<L> PAB n/a         Skin: 1+ right leg, 3+ right foot     Estimated Needs:   [x] no change since previous assessment         Previous Nutrition Diagnosis:      [x] Malnutrition (severe)          Nutrition Diagnosis is [x] ongoing- being addressed with Ensure Enlive x3/day, RD also obtained pt's food preferences and dinner for this evening.        New Nutrition Diagnosis: [x] not applicable        Interventions: Continue Ensure Enlive x3/day. Continue MVI, Thiamine and folic acid for hx of ETOH abuse. Food preferences obtained. Encouraged pt to consume small frequent meals and nutrient rich foods.       Recommend  1) Interventions as listed.   2) Encourage po intake with nutrient dense foods.   3) Provide food preferences as able.   4) Monitor weight, lab values, skin, po intake and GI tolerance.        Monitoring and Evaluation:   follow up per protocol    RD to remain available for further nutritional interventions as indicated.   Yenni Phillip, MS, RD, CDN #[phone]

## 2018-02-23 NOTE — PROGRESS NOTE ADULT - PROBLEM SELECTOR PLAN 5
-Down trending, likely 2/2 alcohol use,   -HIV, acute hepatitis panel negative Dupplex positive for right popliteal DVT.  Con't with heparin drip and monitor apTT

## 2018-02-23 NOTE — PROGRESS NOTE ADULT - SUBJECTIVE AND OBJECTIVE BOX
JUWAN DOMÍNGUEZ  MRN-[id number]    Chief Complaint: Patient is a 57y old  Male who presents with a chief complaint of Chest pain, confusion (22 Feb 2018 03:00)      SUBJECTIVE / OVERNIGHT EVENTS: Fever of 101 this morning. C/o left shoulder pain, right leg pain and posterior rib pain b/l. C/o SOB. Lethargic and fading in and out to sleep during interview. Son Juwan at bedside says patient still not back to baseline mental status.     Review of Systems:   Unable to evaluate ROS due to: cognitive deficits / altered mental status    MEDICATIONS  (STANDING):  ALBUTerol    90 MICROgram(s) HFA Inhaler 1 Puff(s) Inhalation every 4 hours  clindamycin IVPB      clindamycin IVPB 900 milliGRAM(s) IV Intermittent every 8 hours  folic acid 1 milliGRAM(s) Oral daily  heparin  Infusion.  Unit(s)/Hr (22 mL/Hr) IV Continuous <Continuous>  lactobacillus acidophilus 1 Tablet(s) Oral three times a day with meals  lidocaine   Patch 1 Patch Transdermal daily  multivitamin 1 Tablet(s) Oral daily  piperacillin/tazobactam IVPB. 3.375 Gram(s) IV Intermittent every 8 hours  sodium chloride 0.9%. 1000 milliLiter(s) (100 mL/Hr) IV Continuous <Continuous>  thiamine 100 milliGRAM(s) Oral daily  tiotropium 18 MICROgram(s) Capsule 1 Capsule(s) Inhalation daily  vancomycin  IVPB 1000 milliGRAM(s) IV Intermittent every 12 hours    MEDICATIONS  (PRN):  acetaminophen   Tablet 650 milliGRAM(s) Oral every 6 hours PRN For Temp greater than 38 C (100.4 F)  acetaminophen   Tablet. 650 milliGRAM(s) Oral every 8 hours PRN Moderate Pain (4 - 6)  heparin  Injectable 80504 Unit(s) IV Push every 6 hours PRN For aPTT less than 40  heparin  Injectable 5000 Unit(s) IV Push every 6 hours PRN For aPTT between 40 - 57      T(C): 38.5 (02-23-18 @ 09:35), Max: 38.5 (02-23-18 @ 09:35)  HR: 108 (02-23-18 @ 09:35) (105 - 118)  BP: 127/80 (02-23-18 @ 09:35) (127/80 - 163/87)  RR: 20 (02-23-18 @ 09:35) (18 - 20)  SpO2: 95% (02-23-18 @ 09:35) (94% - 96%)    T(C): 38.5 (02-23-18 @ 09:35), Max: 38.5 (02-23-18 @ 09:35)  HR: 108 (02-23-18 @ 09:35) (105 - 118)  BP: 127/80 (02-23-18 @ 09:35) (127/80 - 163/87)  RR: 20 (02-23-18 @ 09:35) (18 - 20)  SpO2: 95% (02-23-18 @ 09:35) (94% - 96%)    I&O's Summary    22 Feb 2018 07:01  -  23 Feb 2018 07:00  --------------------------------------------------------  IN: 2090 mL / OUT: 1200 mL / NET: 890 mL    23 Feb 2018 07:01  -  23 Feb 2018 11:27  --------------------------------------------------------  IN: 245 mL / OUT: 200 mL / NET: 45 mL    Allergies    No Known Allergies    Intolerances    PHYSICAL EXAM:  GENERAL: Not in distress, well-developed, morbidly obese, lethargic but arousable to verbal and tactile stimuli  HEAD:  Atraumatic, Normocephalic  EYES: EOMI, PERRLA, conjunctiva and sclera clear  NECK: Supple, No JVD  CHEST/LUNG: Expiratory wheezes b/l  HEART: Regular rhythm, tachycardic; No murmurs, rubs, or edema  ABDOMEN: Soft, Nontender, Nondistended; Bowel sounds present  EXTREMITIES: No joint effusions, good muscle tone and bulk, 1+ pitting edema of right leg and foot  PSYCH: Normal mood and affect, alert and oriented  NEUROLOGY: Grossly intact   SKIN: Right leg is warm/tender to touch, erythema from toes to just below the knee, blister on dorsal lateral aspect of foot with ecchymosis.    LABS:                        13.2   17.8  )-----------( 281      ( 23 Feb 2018 03:34 )             39.7     02-23    138  |  99  |  22  ----------------------------<  132<H>  4.2   |  26  |  1.03    Ca    8.8      23 Feb 2018 02:38  Phos  3.4     02-23  Mg     2.6     02-23    TPro  6.6  /  Alb  2.4<L>  /  TBili  0.7  /  DBili  x   /  AST  66<H>  /  ALT  84<H>  /  AlkPhos  96  02-23    LIVER FUNCTIONS - ( 23 Feb 2018 02:38 )  Alb: 2.4 g/dL / Pro: 6.6 g/dL / ALK PHOS: 96 U/L / ALT: 84 U/L RC / AST: 66 U/L / GGT: x           PT/INR - ( 21 Feb 2018 22:23 )   PT: 16.1 sec;   INR: 1.48 ratio         PTT - ( 23 Feb 2018 10:37 )  PTT:38.6 sec  CARDIAC MARKERS ( 23 Feb 2018 02:38 )  x     / x     / 176 U/L / x     / x      CARDIAC MARKERS ( 22 Feb 2018 15:02 )  x     / <0.01 ng/mL / 254 U/L / x     / 3.3 ng/mL  CARDIAC MARKERS ( 22 Feb 2018 07:32 )  x     / x     / 362 U/L / x     / x      CARDIAC MARKERS ( 22 Feb 2018 05:26 )  x     / <0.01 ng/mL / 366 U/L / x     / 5.5 ng/mL  CARDIAC MARKERS ( 21 Feb 2018 22:23 )  x     / <0.01 ng/mL / 460 U/L / x     / 5.5 ng/mL      Blood Cultures    Blood Culture--   02-22 @ 12:56    Results  Growth in aerobic bottle: Gram Positive Cocci in Clusters  "Due to technical problems, Proteus sp. will Not be reported as part of  the BCID panel until further notice"  ***Blood Panel PCR results on this specimen are available  approximately 3 hours after the Gram stain result.***  Gram stain, PCR, and/or culture results may not always  correspond due to difference in methodologies.  ************************************************************  This PCR assay was performed using [employer].  The following targets are tested for: Enterococcus,  vancomycin resistant enterococci, Listeria monocytogenes,  coagulase negative staphylococci, S. aureus,  methicillin resistant S. aureus, Streptococcus agalactiae  (Group B), S. pneumoniae, S.pyogenes (Group A),  Acinetobacter baumannii, Enterobacter cloacae, E. coli,  Klebsiella oxytoca, K. pneumoniae, Proteus sp.,  Serratia marcescens, Haemophilus influenzae,  Neisseria meningitidis, Pseudomonas aeruginosa, Candida  albicans, C. glabrata, C krusei, C parapsilosis,  C. tropicalis and the KPC resistance gene.  Growth in anaerobic bottle:  Gram Positive Cocci in Clusters    OrganismBlood Culture PCR    Organism IDBlood Culture PCR    Urine Culture   02-22 @ 12:56    --       Results  Growth in aerobic bottle: Gram Positive Cocci in Clusters  "Due to technical problems, Proteus sp. will Not be reported as part of  the BCID panel until further notice"  ***Blood Panel PCR results on this specimen are available  approximately 3 hours after the Gram stain result.***  Gram stain, PCR, and/or culture results may not always  correspond due to difference in methodologies.  ************************************************************  This PCR assay was performed using [employer].  The following targets are tested for: Enterococcus,  vancomycin resistant enterococci, Listeria monocytogenes,  coagulase negative staphylococci, S. aureus,  methicillin resistant S. aureus, Streptococcus agalactiae  (Group B), S. pneumoniae, S.pyogenes (Group A),  Acinetobacter baumannii, Enterobacter cloacae, E. coli,  Klebsiella oxytoca, K. pneumoniae, Proteus sp.,  Serratia marcescens, Haemophilus influenzae,  Neisseria meningitidis, Pseudomonas aeruginosa, Candida  albicans, C. glabrata, C krusei, C parapsilosis,  C. tropicalis and the KPC resistance gene.  Growth in anaerobic bottle:  Gram Positive Cocci in Clusters    OrganismBlood Culture PCR    Organism IDBlood Culture PCR      RADIOLOGY & ADDITIONAL TESTS:    Imaging:  < from: CT Lower Extremity w/ IV Cont, Right (02.22.18 @ 21:56) >  Impression:    Diffuse cellulitis throughout the right lower leg as outlined above with   cutaneous bleb along the dorsal lateral aspect of the foot. There is no   subcutaneous air to suggest necrotizing fasciitis.    Ill-defined fluid attenuation within the region of the extensor digitorum   brevis musculature and within the region of the abductor hallucis muscle   which may be related to muscle strain or myositis. Developing fluid   collections are also a consideration in the appropriate clinical context.   No peripherally enhancing fluid collection is noted on the current   examination.    Intramuscular fullness and edema within the myofascial planes about the   calf musculature likely related to patient's known right peronealdeep   vein thrombosis.    No CT evidence of osteomyelitis.    Osteochondral lesion along the medial aspect of the talar dome.    < from: CT Angio Chest w/ IV Cont (02.22.18 @ 00:25) >  IMPRESSION:     1.  No main, or lobar pulmonary embolus. Evaluation of the segmental and   subsegmental branches is limited secondary to artifact.  2.  Multiple groundglass and nodular opacities with a peripheral and   lower lobe distribution could be secondary to infection (? Either septic   emboli or fungal infection rather than a community acquired pneumonia)   rather than malignancy.    < from: CT Abdomen and Pelvis w/ IV Cont (02.22.18 @ 19:01) >  IMPRESSION:     No abdominal or pelvic mass.    Bilateral groundglass and more solid pulmonary nodules again seen.    < from: [facility] Duplex Lower Ext Vein Scan, Bilat (02.22.18 @ 16:01) >  IMPRESSION: Positive deep venous thrombosis involving the right calf at   the right peroneal vein.    No evidence of left lower extremity deep venous thrombosis.    < from: TTE with Doppler (w/Cont) (02.22.18 @ 17:08) >  Conclusions:  1. Normal left ventricular internal dimensions and wall  thicknesses.  2. Endocardial visualization enhanced with intravenous  injection of echo contrast (Definity). Endocardium not well  visualized despite use of echo contrast. Grossly normal  left ventricular function.  3. Normal diastolic function  4. The right ventricle is not well visualized; grossly  normal right ventricular systolic function.    EKG/Telemetry:  Sinus tachy 100-120's    Consultant(s) Notes Reviewed:  ID, Pulm, vascular surgery    Care Discussed with Consultants/Other Providers: ID, Pulm, vascular surgery    The above recommendations were discussed with the patient/family. The patient/family had all questions answered and expressed understanding of the plan. JUWAN DOMÍNGUEZ  MRN-[id number]    Chief Complaint: Patient is a 57y old  Male who presents with a chief complaint of Chest pain, confusion (22 Feb 2018 03:00)      SUBJECTIVE / OVERNIGHT EVENTS: Fever of 101 this morning. C/o left shoulder pain, right leg pain and posterior rib pain b/l. C/o SOB. Sinus 100-120on tele  Review of Systems:   Unable to evaluate ROS due to: cognitive deficits / altered mental status    MEDICATIONS  (STANDING):  ALBUTerol    90 MICROgram(s) HFA Inhaler 1 Puff(s) Inhalation every 4 hours  clindamycin IVPB      clindamycin IVPB 900 milliGRAM(s) IV Intermittent every 8 hours  folic acid 1 milliGRAM(s) Oral daily  heparin  Infusion.  Unit(s)/Hr (22 mL/Hr) IV Continuous <Continuous>  lactobacillus acidophilus 1 Tablet(s) Oral three times a day with meals  lidocaine   Patch 1 Patch Transdermal daily  multivitamin 1 Tablet(s) Oral daily  piperacillin/tazobactam IVPB. 3.375 Gram(s) IV Intermittent every 8 hours  sodium chloride 0.9%. 1000 milliLiter(s) (100 mL/Hr) IV Continuous <Continuous>  thiamine 100 milliGRAM(s) Oral daily  tiotropium 18 MICROgram(s) Capsule 1 Capsule(s) Inhalation daily  vancomycin  IVPB 1000 milliGRAM(s) IV Intermittent every 12 hours    MEDICATIONS  (PRN):  acetaminophen   Tablet 650 milliGRAM(s) Oral every 6 hours PRN For Temp greater than 38 C (100.4 F)  acetaminophen   Tablet. 650 milliGRAM(s) Oral every 8 hours PRN Moderate Pain (4 - 6)  heparin  Injectable 10060 Unit(s) IV Push every 6 hours PRN For aPTT less than 40  heparin  Injectable 5000 Unit(s) IV Push every 6 hours PRN For aPTT between 40 - 57      T(C): 38.5 (02-23-18 @ 09:35), Max: 38.5 (02-23-18 @ 09:35)  HR: 108 (02-23-18 @ 09:35) (105 - 118)  BP: 127/80 (02-23-18 @ 09:35) (127/80 - 163/87)  RR: 20 (02-23-18 @ 09:35) (18 - 20)  SpO2: 95% (02-23-18 @ 09:35) (94% - 96%)    T(C): 38.5 (02-23-18 @ 09:35), Max: 38.5 (02-23-18 @ 09:35)  HR: 108 (02-23-18 @ 09:35) (105 - 118)  BP: 127/80 (02-23-18 @ 09:35) (127/80 - 163/87)  RR: 20 (02-23-18 @ 09:35) (18 - 20)  SpO2: 95% (02-23-18 @ 09:35) (94% - 96%)    I&O's Summary    22 Feb 2018 07:01  -  23 Feb 2018 07:00  --------------------------------------------------------  IN: 2090 mL / OUT: 1200 mL / NET: 890 mL    23 Feb 2018 07:01  -  23 Feb 2018 11:27  --------------------------------------------------------  IN: 245 mL / OUT: 200 mL / NET: 45 mL    Allergies    No Known Allergies    Intolerances    PHYSICAL EXAM:  GENERAL: Not in distress, well-developed, morbidly obese, lethargic but arousable to verbal and tactile stimuli  HEAD:  Atraumatic, Normocephalic  EYES: EOMI, PERRLA, conjunctiva and sclera clear  NECK: Supple, No JVD  CHEST/LUNG: Expiratory wheezes b/l  HEART: Regular rhythm, tachycardic; No murmurs, rubs, or edema  ABDOMEN: Soft, Nontender, Nondistended; Bowel sounds present  EXTREMITIES: 1+ pitting edema of right leg and foot  PSYCH: Normal mood and affect, alert and oriented  NEUROLOGY: Grossly intact   SKIN: Right leg is warm/tender to touch, erythema from toes to just below the knee, blister on dorsal lateral aspect of foot with ecchymosis.    LABS:                        13.2   17.8  )-----------( 281      ( 23 Feb 2018 03:34 )             39.7     02-23    138  |  99  |  22  ----------------------------<  132<H>  4.2   |  26  |  1.03    Ca    8.8      23 Feb 2018 02:38  Phos  3.4     02-23  Mg     2.6     02-23    TPro  6.6  /  Alb  2.4<L>  /  TBili  0.7  /  DBili  x   /  AST  66<H>  /  ALT  84<H>  /  AlkPhos  96  02-23    LIVER FUNCTIONS - ( 23 Feb 2018 02:38 )  Alb: 2.4 g/dL / Pro: 6.6 g/dL / ALK PHOS: 96 U/L / ALT: 84 U/L RC / AST: 66 U/L / GGT: x           PT/INR - ( 21 Feb 2018 22:23 )   PT: 16.1 sec;   INR: 1.48 ratio         PTT - ( 23 Feb 2018 10:37 )  PTT:38.6 sec  CARDIAC MARKERS ( 23 Feb 2018 02:38 )  x     / x     / 176 U/L / x     / x      CARDIAC MARKERS ( 22 Feb 2018 15:02 )  x     / <0.01 ng/mL / 254 U/L / x     / 3.3 ng/mL  CARDIAC MARKERS ( 22 Feb 2018 07:32 )  x     / x     / 362 U/L / x     / x      CARDIAC MARKERS ( 22 Feb 2018 05:26 )  x     / <0.01 ng/mL / 366 U/L / x     / 5.5 ng/mL  CARDIAC MARKERS ( 21 Feb 2018 22:23 )  x     / <0.01 ng/mL / 460 U/L / x     / 5.5 ng/mL      Blood Cultures    Blood Culture--   02-22 @ 12:56    Results  Growth in aerobic bottle: Gram Positive Cocci in Clusters  "Due to technical problems, Proteus sp. will Not be reported as part of  the BCID panel until further notice"  ***Blood Panel PCR results on this specimen are available  approximately 3 hours after the Gram stain result.***  Gram stain, PCR, and/or culture results may not always  correspond due to difference in methodologies.  ************************************************************  This PCR assay was performed using [employer].  The following targets are tested for: Enterococcus,  vancomycin resistant enterococci, Listeria monocytogenes,  coagulase negative staphylococci, S. aureus,  methicillin resistant S. aureus, Streptococcus agalactiae  (Group B), S. pneumoniae, S.pyogenes (Group A),  Acinetobacter baumannii, Enterobacter cloacae, E. coli,  Klebsiella oxytoca, K. pneumoniae, Proteus sp.,  Serratia marcescens, Haemophilus influenzae,  Neisseria meningitidis, Pseudomonas aeruginosa, Candida  albicans, C. glabrata, C krusei, C parapsilosis,  C. tropicalis and the KPC resistance gene.  Growth in anaerobic bottle:  Gram Positive Cocci in Clusters    OrganismBlood Culture PCR    Organism IDBlood Culture PCR    Urine Culture   02-22 @ 12:56    --       Results  Growth in aerobic bottle: Gram Positive Cocci in Clusters  "Due to technical problems, Proteus sp. will Not be reported as part of  the BCID panel until further notice"  ***Blood Panel PCR results on this specimen are available  approximately 3 hours after the Gram stain result.***  Gram stain, PCR, and/or culture results may not always  correspond due to difference in methodologies.  ************************************************************  This PCR assay was performed using [employer].  The following targets are tested for: Enterococcus,  vancomycin resistant enterococci, Listeria monocytogenes,  coagulase negative staphylococci, S. aureus,  methicillin resistant S. aureus, Streptococcus agalactiae  (Group B), S. pneumoniae, S.pyogenes (Group A),  Acinetobacter baumannii, Enterobacter cloacae, E. coli,  Klebsiella oxytoca, K. pneumoniae, Proteus sp.,  Serratia marcescens, Haemophilus influenzae,  Neisseria meningitidis, Pseudomonas aeruginosa, Candida  albicans, C. glabrata, C krusei, C parapsilosis,  C. tropicalis and the KPC resistance gene.  Growth in anaerobic bottle:  Gram Positive Cocci in Clusters    OrganismBlood Culture PCR    Organism IDBlood Culture PCR      RADIOLOGY & ADDITIONAL TESTS:    Imaging:  < from: CT Lower Extremity w/ IV Cont, Right (02.22.18 @ 21:56) >  Impression:    Diffuse cellulitis throughout the right lower leg as outlined above with   cutaneous bleb along the dorsal lateral aspect of the foot. There is no   subcutaneous air to suggest necrotizing fasciitis.    Ill-defined fluid attenuation within the region of the extensor digitorum   brevis musculature and within the region of the abductor hallucis muscle   which may be related to muscle strain or myositis. Developing fluid   collections are also a consideration in the appropriate clinical context.   No peripherally enhancing fluid collection is noted on the current   examination.    Intramuscular fullness and edema within the myofascial planes about the   calf musculature likely related to patient's known right peronealdeep   vein thrombosis.    No CT evidence of osteomyelitis.    Osteochondral lesion along the medial aspect of the talar dome.    < from: CT Angio Chest w/ IV Cont (02.22.18 @ 00:25) >  IMPRESSION:     1.  No main, or lobar pulmonary embolus. Evaluation of the segmental and   subsegmental branches is limited secondary to artifact.  2.  Multiple groundglass and nodular opacities with a peripheral and   lower lobe distribution could be secondary to infection (? Either septic   emboli or fungal infection rather than a community acquired pneumonia)   rather than malignancy.    < from: CT Abdomen and Pelvis w/ IV Cont (02.22.18 @ 19:01) >  IMPRESSION:     No abdominal or pelvic mass.    Bilateral groundglass and more solid pulmonary nodules again seen.    < from: [facility] Duplex Lower Ext Vein Scan, Bilat (02.22.18 @ 16:01) >  IMPRESSION: Positive deep venous thrombosis involving the right calf at   the right peroneal vein.    No evidence of left lower extremity deep venous thrombosis.    < from: TTE with Doppler (w/Cont) (02.22.18 @ 17:08) >  Conclusions:  1. Normal left ventricular internal dimensions and wall  thicknesses.  2. Endocardial visualization enhanced with intravenous  injection of echo contrast (Definity). Endocardium not well  visualized despite use of echo contrast. Grossly normal  left ventricular function.  3. Normal diastolic function  4. The right ventricle is not well visualized; grossly  normal right ventricular systolic function.    EKG/Telemetry:  Sinus tachy 100-120's    Consultant(s) Notes Reviewed:  ID, Pulm, vascular surgery      The above recommendations were discussed with the patient/family. The patient/family had all questions answered and expressed understanding of the plan.

## 2018-02-23 NOTE — PHYSICAL THERAPY INITIAL EVALUATION ADULT - PERTINENT HX OF CURRENT PROBLEM, REHAB EVAL
Pt is 57M admitted 2/22/18 PMHx HTN, daily EtOH xmany years BIB family for CP & confusion. Pt injured R ankle at work on 2/13/18, saw chiropractor the next day who told him to go to ED due to extensive swelling. Pt seen in ED, Xrays done, d/c home w/ankle sprain on pain meds (oxycodone) & ordered ankle brace. 57M with PMHx daily EtOH use, HTN, and recent ankle sprain 2/13 (sent home with pain meds and ankle brace) presenting from home 2/22 for chest pain and AMS x3-4 days with worsening right ankle swelling and redness admitted for sepsis 2/2 MSSA bacteremia/ cellulitis and then found to have CT Chest findings concerning for embolization to lungs. Also found to have acute DVT RLE.

## 2018-02-23 NOTE — PROGRESS NOTE ADULT - ASSESSMENT
57 M daily EtOH use, HTN, recent R ankle injury p/w acute worsening confusion/disorientation associated with pleuritic chest pain, diaphoresis and worsening redness/pain/swelling of R ankle a/w acute hypoxic respiratory failure, b/l lung masses on CTA chest, RLE cellulitis and metabolic encephalopathy suspects multifactorial. 57M with PMHx daily EtOH use, HTN, and recent ankle sprain 2/13 presenting from home 2/22 for chest pain and AMS x3-4 days with worsening right ankle swelling and redness admitted for sepsis 2/2 MSSA bacteremia/ cellulitis and then found to have CT Chest findings concerning for embolization to lungs. Also found to have acute DVT RLE

## 2018-02-23 NOTE — PROGRESS NOTE ADULT - SUBJECTIVE AND OBJECTIVE BOX
CC: F/U RLE cellulitis, bacteremia    Interval History/ROS: Patient states feeling SOB, fevers, diaphoretic. Has pain in RLE.     Allergies  No Known Allergies    ANTIMICROBIALS:  clindamycin IVPB    clindamycin IVPB 900 every 8 hours  piperacillin/tazobactam IVPB. 3.375 every 8 hours  vancomycin  IVPB 1000 every 12 hours    PE:    Vital Signs Last 24 Hrs  T(C): 36.8 (23 Feb 2018 12:06), Max: 38.5 (23 Feb 2018 09:35)  T(F): 98.3 (23 Feb 2018 12:06), Max: 101.3 (23 Feb 2018 09:35)  HR: 94 (23 Feb 2018 12:06) (94 - 118)  BP: 118/79 (23 Feb 2018 12:06) (118/79 - 163/87)  BP(mean): --  RR: 20 (23 Feb 2018 12:06) (18 - 20)  SpO2: 97% (23 Feb 2018 12:06) (94% - 97%)    Gen: AOx3, NAD, diaphoretic  CV: S1+S2 normal, no murmurs  Resp: Expiratory wheezing  Abd: Soft, nontender, +BS  Ext: Right lower extremity swelling, warmth, erythema  : No Sosa  IV/Skin: No thrombophlebitis  Neuro: no focal deficits    LABS:                          13.2   17.8  )-----------( 281      ( 23 Feb 2018 03:34 )             39.7       02-23    138  |  99  |  22  ----------------------------<  132<H>  4.2   |  26  |  1.03    Ca    8.8      23 Feb 2018 02:38  Phos  3.4     02-23  Mg     2.6     02-23    TPro  6.6  /  Alb  2.4<L>  /  TBili  0.7  /  DBili  x   /  AST  66<H>  /  ALT  84<H>  /  AlkPhos  96  02-23      MICROBIOLOGY:  v  .Blood Blood-Peripheral  02-22-18   Growth in aerobic bottle: Gram Positive Cocci in Clusters  "Due to technical problems, Proteus sp. will Not be reported as part of  the BCID panel until further notice"  ***Blood Panel PCR results on this specimen are available  approximately 3 hours after the Gram stain result.***  Gram stain, PCR, and/or culture results may not always  correspond due to difference in methodologies.  ************************************************************  This PCR assay was performed using [employer].  The following targets are tested for: Enterococcus,  vancomycin resistant enterococci, Listeria monocytogenes,  coagulase negative staphylococci, S. aureus,  methicillin resistant S. aureus, Streptococcus agalactiae  (Group B), S. pneumoniae, S.pyogenes (Group A),  Acinetobacter baumannii, Enterobacter cloacae, E. coli,  Klebsiella oxytoca, K. pneumoniae, Proteus sp.,  Serratia marcescens, Haemophilus influenzae,  Neisseria meningitidis, Pseudomonas aeruginosa, Candida  albicans, C. glabrata, C krusei, C parapsilosis,  C. tropicalis and the KPC resistance gene.  Growth in anaerobic bottle:  Gram Positive Cocci in Clusters  --  Blood Culture PCR    RADIOLOGY:    < from: CT Lower Extremity w/ IV Cont, Right (02.22.18 @ 21:56) >  Impression:    Diffuse cellulitis throughout the right lower leg as outlined above with   cutaneous bleb along the dorsal lateral aspect of the foot. There is no   subcutaneous air to suggest necrotizing fasciitis.    Ill-defined fluid attenuation within the region of the extensor digitorum   brevis musculature and within the region of the abductor hallucis muscle   which may be related to muscle strain or myositis. Developing fluid   collections are also a consideration in the appropriate clinical context.   No peripherally enhancing fluid collection is noted on the current   examination.    Intramuscular fullness and edema within the myofascial planes about the   calf musculature likely related to patient's known right peronealdeep   vein thrombosis.    No CT evidence of osteomyelitis.    Osteochondral lesion along the medial aspect of the talar dome.    < end of copied text >

## 2018-02-23 NOTE — PROGRESS NOTE ADULT - PROBLEM SELECTOR PLAN 4
Unlikely. Will monitor on CIWA, but no Ativan needed since last drink 10 days ago  may use Ativan if agitation occurs CTA neg for PE,  positive for multiple ground-glass and nodular opacities with a peripheral and lower lobe distribution could be secondary to infection  Former smoker, works as /construction, h/o sleep apnea   C/w O2 supplementation   Check abg  Check rvp  Start xopenex  Pulm follow up

## 2018-02-23 NOTE — PROGRESS NOTE ADULT - PROBLEM SELECTOR PLAN 6
-Creatinine normalized to 1.03 today  -Monitor chemistry Creatinine normalized to 1.03 today  Monitor bmp

## 2018-02-23 NOTE — PHYSICAL THERAPY INITIAL EVALUATION ADULT - DISCHARGE DISPOSITION, PT EVAL
TBD pending further functional evaluation TBD pending further functional evaluation. Pt planned for RTOR 2/29/18

## 2018-02-23 NOTE — PROVIDER CONTACT NOTE (OTHER) - SITUATION
PTT PTT 38.6 Patient on heparin gtt infusing at 25ml/hour. Heparin gtt was increased to 25ml/hour at 3 PTT PTT 38.6 Patient on heparin gtt infusing at 25ml/hour. Heparin gtt was increased to 25ml/hour at 3:55am. Previous PTT was 45.8

## 2018-02-24 LAB
-  AMPICILLIN/SULBACTAM: SIGNIFICANT CHANGE UP
-  CEFAZOLIN: SIGNIFICANT CHANGE UP
-  CIPROFLOXACIN: SIGNIFICANT CHANGE UP
-  CLINDAMYCIN: SIGNIFICANT CHANGE UP
-  ERYTHROMYCIN: SIGNIFICANT CHANGE UP
-  GENTAMICIN: SIGNIFICANT CHANGE UP
-  LEVOFLOXACIN: SIGNIFICANT CHANGE UP
-  MOXIFLOXACIN(AEROBIC): SIGNIFICANT CHANGE UP
-  OXACILLIN: SIGNIFICANT CHANGE UP
-  PENICILLIN: SIGNIFICANT CHANGE UP
-  RIFAMPIN: SIGNIFICANT CHANGE UP
-  TETRACYCLINE: SIGNIFICANT CHANGE UP
-  TRIMETHOPRIM/SULFAMETHOXAZOLE: SIGNIFICANT CHANGE UP
-  VANCOMYCIN: SIGNIFICANT CHANGE UP
ALBUMIN SERPL ELPH-MCNC: 2.3 G/DL — LOW (ref 3.3–5)
ALP SERPL-CCNC: 106 U/L — SIGNIFICANT CHANGE UP (ref 40–120)
ALT FLD-CCNC: 91 U/L RC — HIGH (ref 10–45)
ANION GAP SERPL CALC-SCNC: 13 MMOL/L — SIGNIFICANT CHANGE UP (ref 5–17)
APTT BLD: 53.1 SEC — HIGH (ref 27.5–37.4)
APTT BLD: 56.1 SEC — HIGH (ref 27.5–37.4)
APTT BLD: 68.6 SEC — HIGH (ref 27.5–37.4)
AST SERPL-CCNC: 90 U/L — HIGH (ref 10–40)
BILIRUB SERPL-MCNC: 0.5 MG/DL — SIGNIFICANT CHANGE UP (ref 0.2–1.2)
BLD GP AB SCN SERPL QL: NEGATIVE — SIGNIFICANT CHANGE UP
BUN SERPL-MCNC: 23 MG/DL — SIGNIFICANT CHANGE UP (ref 7–23)
CALCIUM SERPL-MCNC: 8.6 MG/DL — SIGNIFICANT CHANGE UP (ref 8.4–10.5)
CHLORIDE SERPL-SCNC: 96 MMOL/L — SIGNIFICANT CHANGE UP (ref 96–108)
CO2 SERPL-SCNC: 28 MMOL/L — SIGNIFICANT CHANGE UP (ref 22–31)
CREAT SERPL-MCNC: 1.09 MG/DL — SIGNIFICANT CHANGE UP (ref 0.5–1.3)
CULTURE RESULTS: SIGNIFICANT CHANGE UP
CULTURE RESULTS: SIGNIFICANT CHANGE UP
GLUCOSE SERPL-MCNC: 137 MG/DL — HIGH (ref 70–99)
GRAM STN FLD: SIGNIFICANT CHANGE UP
HCT VFR BLD CALC: 39.1 % — SIGNIFICANT CHANGE UP (ref 39–50)
HCT VFR BLD CALC: 40.6 % — SIGNIFICANT CHANGE UP (ref 39–50)
HGB BLD-MCNC: 12.8 G/DL — LOW (ref 13–17)
HGB BLD-MCNC: 13 G/DL — SIGNIFICANT CHANGE UP (ref 13–17)
INR BLD: 1.58 RATIO — HIGH (ref 0.88–1.16)
MAGNESIUM SERPL-MCNC: 2.8 MG/DL — HIGH (ref 1.6–2.6)
MCHC RBC-ENTMCNC: 32 GM/DL — SIGNIFICANT CHANGE UP (ref 32–36)
MCHC RBC-ENTMCNC: 32.4 PG — SIGNIFICANT CHANGE UP (ref 27–34)
MCHC RBC-ENTMCNC: 32.6 GM/DL — SIGNIFICANT CHANGE UP (ref 32–36)
MCHC RBC-ENTMCNC: 32.9 PG — SIGNIFICANT CHANGE UP (ref 27–34)
MCV RBC AUTO: 101 FL — HIGH (ref 80–100)
MCV RBC AUTO: 101 FL — HIGH (ref 80–100)
METHOD TYPE: SIGNIFICANT CHANGE UP
ORGANISM # SPEC MICROSCOPIC CNT: SIGNIFICANT CHANGE UP
PHOSPHATE SERPL-MCNC: 3.2 MG/DL — SIGNIFICANT CHANGE UP (ref 2.5–4.5)
PLATELET # BLD AUTO: 300 K/UL — SIGNIFICANT CHANGE UP (ref 150–400)
PLATELET # BLD AUTO: 300 K/UL — SIGNIFICANT CHANGE UP (ref 150–400)
POTASSIUM SERPL-MCNC: 3.8 MMOL/L — SIGNIFICANT CHANGE UP (ref 3.5–5.3)
POTASSIUM SERPL-SCNC: 3.8 MMOL/L — SIGNIFICANT CHANGE UP (ref 3.5–5.3)
PROT SERPL-MCNC: 6.6 G/DL — SIGNIFICANT CHANGE UP (ref 6–8.3)
PROTHROM AB SERPL-ACNC: 17.2 SEC — HIGH (ref 9.8–12.7)
RBC # BLD: 3.88 M/UL — LOW (ref 4.2–5.8)
RBC # BLD: 4.01 M/UL — LOW (ref 4.2–5.8)
RBC # FLD: 12.9 % — SIGNIFICANT CHANGE UP (ref 10.3–14.5)
RBC # FLD: 12.9 % — SIGNIFICANT CHANGE UP (ref 10.3–14.5)
RH IG SCN BLD-IMP: POSITIVE — SIGNIFICANT CHANGE UP
SODIUM SERPL-SCNC: 137 MMOL/L — SIGNIFICANT CHANGE UP (ref 135–145)
SPECIMEN SOURCE: SIGNIFICANT CHANGE UP
VANCOMYCIN TROUGH SERPL-MCNC: <4 UG/ML — LOW (ref 10–20)
WBC # BLD: 18.6 K/UL — HIGH (ref 3.8–10.5)
WBC # BLD: 18.9 K/UL — HIGH (ref 3.8–10.5)
WBC # FLD AUTO: 18.6 K/UL — HIGH (ref 3.8–10.5)
WBC # FLD AUTO: 18.9 K/UL — HIGH (ref 3.8–10.5)

## 2018-02-24 PROCEDURE — 99232 SBSQ HOSP IP/OBS MODERATE 35: CPT

## 2018-02-24 PROCEDURE — 99233 SBSQ HOSP IP/OBS HIGH 50: CPT | Mod: GC

## 2018-02-24 PROCEDURE — 71045 X-RAY EXAM CHEST 1 VIEW: CPT | Mod: 26

## 2018-02-24 RX ORDER — LEVALBUTEROL 1.25 MG/.5ML
0.63 SOLUTION, CONCENTRATE RESPIRATORY (INHALATION) ONCE
Qty: 0 | Refills: 0 | Status: COMPLETED | OUTPATIENT
Start: 2018-02-24 | End: 2018-02-24

## 2018-02-24 RX ORDER — FUROSEMIDE 40 MG
20 TABLET ORAL DAILY
Qty: 0 | Refills: 0 | Status: DISCONTINUED | OUTPATIENT
Start: 2018-02-25 | End: 2018-02-25

## 2018-02-24 RX ORDER — VANCOMYCIN HCL 1 G
1000 VIAL (EA) INTRAVENOUS EVERY 12 HOURS
Qty: 0 | Refills: 0 | Status: DISCONTINUED | OUTPATIENT
Start: 2018-02-24 | End: 2018-02-24

## 2018-02-24 RX ORDER — FUROSEMIDE 40 MG
40 TABLET ORAL ONCE
Qty: 0 | Refills: 0 | Status: COMPLETED | OUTPATIENT
Start: 2018-02-24 | End: 2018-02-24

## 2018-02-24 RX ORDER — HYDROMORPHONE HYDROCHLORIDE 2 MG/ML
0.5 INJECTION INTRAMUSCULAR; INTRAVENOUS; SUBCUTANEOUS ONCE
Qty: 0 | Refills: 0 | Status: DISCONTINUED | OUTPATIENT
Start: 2018-02-24 | End: 2018-02-24

## 2018-02-24 RX ADMIN — Medication 1 MILLIGRAM(S): at 11:47

## 2018-02-24 RX ADMIN — Medication 100 MILLIGRAM(S): at 05:35

## 2018-02-24 RX ADMIN — Medication 1 TABLET(S): at 11:47

## 2018-02-24 RX ADMIN — LEVALBUTEROL 0.63 MILLIGRAM(S): 1.25 SOLUTION, CONCENTRATE RESPIRATORY (INHALATION) at 00:22

## 2018-02-24 RX ADMIN — HEPARIN SODIUM 5000 UNIT(S): 5000 INJECTION INTRAVENOUS; SUBCUTANEOUS at 03:31

## 2018-02-24 RX ADMIN — Medication 1 TABLET(S): at 09:24

## 2018-02-24 RX ADMIN — HEPARIN SODIUM 3700 UNIT(S)/HR: 5000 INJECTION INTRAVENOUS; SUBCUTANEOUS at 18:38

## 2018-02-24 RX ADMIN — LIDOCAINE 1 PATCH: 4 CREAM TOPICAL at 00:25

## 2018-02-24 RX ADMIN — HEPARIN SODIUM 3400 UNIT(S)/HR: 5000 INJECTION INTRAVENOUS; SUBCUTANEOUS at 03:29

## 2018-02-24 RX ADMIN — LIDOCAINE 1 PATCH: 4 CREAM TOPICAL at 22:50

## 2018-02-24 RX ADMIN — Medication 1 TABLET(S): at 18:33

## 2018-02-24 RX ADMIN — Medication 650 MILLIGRAM(S): at 11:46

## 2018-02-24 RX ADMIN — LEVALBUTEROL 0.63 MILLIGRAM(S): 1.25 SOLUTION, CONCENTRATE RESPIRATORY (INHALATION) at 11:46

## 2018-02-24 RX ADMIN — LIDOCAINE 1 PATCH: 4 CREAM TOPICAL at 11:46

## 2018-02-24 RX ADMIN — Medication 100 MILLIGRAM(S): at 22:51

## 2018-02-24 RX ADMIN — LEVALBUTEROL 0.63 MILLIGRAM(S): 1.25 SOLUTION, CONCENTRATE RESPIRATORY (INHALATION) at 22:51

## 2018-02-24 RX ADMIN — LOSARTAN POTASSIUM 50 MILLIGRAM(S): 100 TABLET, FILM COATED ORAL at 05:36

## 2018-02-24 RX ADMIN — LEVALBUTEROL 0.63 MILLIGRAM(S): 1.25 SOLUTION, CONCENTRATE RESPIRATORY (INHALATION) at 05:36

## 2018-02-24 RX ADMIN — Medication 250 MILLIGRAM(S): at 12:11

## 2018-02-24 RX ADMIN — Medication 40 MILLIGRAM(S): at 14:32

## 2018-02-24 RX ADMIN — LEVALBUTEROL 0.63 MILLIGRAM(S): 1.25 SOLUTION, CONCENTRATE RESPIRATORY (INHALATION) at 18:33

## 2018-02-24 RX ADMIN — LEVALBUTEROL 0.63 MILLIGRAM(S): 1.25 SOLUTION, CONCENTRATE RESPIRATORY (INHALATION) at 13:50

## 2018-02-24 RX ADMIN — HEPARIN SODIUM 3700 UNIT(S)/HR: 5000 INJECTION INTRAVENOUS; SUBCUTANEOUS at 11:47

## 2018-02-24 RX ADMIN — Medication 100 MILLIGRAM(S): at 14:36

## 2018-02-24 RX ADMIN — Medication 100 MILLIGRAM(S): at 11:47

## 2018-02-24 NOTE — PROGRESS NOTE ADULT - SUBJECTIVE AND OBJECTIVE BOX
MEDICINE, PROGRESS NOTE [phone]    JUWAN DOMÍNGUEZ 57y [id number]    Patient seen and examined.  Patient is a 57y old  Male who presents with a chief complaint of Chest pain, confusion (22 Feb 2018 03:00)  Pt feels better today, pt alert and oriented. Pt c/o diarrhea for several days, pt c/o occasional hemoptysis    PAST MEDICAL & SURGICAL HISTORY:  Sciatica of right side  Essential hypertension  No significant past surgical history    MEDICATIONS  (STANDING):  ceFAZolin   IVPB      ceFAZolin   IVPB 2000 milliGRAM(s) IV Intermittent every 8 hours  folic acid 1 milliGRAM(s) Oral daily  heparin  Infusion.  Unit(s)/Hr (22 mL/Hr) IV Continuous <Continuous>  lactobacillus acidophilus 1 Tablet(s) Oral three times a day with meals  levalbuterol Inhalation 0.63 milliGRAM(s) Inhalation every 6 hours  lidocaine   Patch 1 Patch Transdermal daily  losartan 50 milliGRAM(s) Oral daily  multivitamin 1 Tablet(s) Oral daily  thiamine 100 milliGRAM(s) Oral daily    MEDICATIONS  (PRN):  acetaminophen   Tablet 650 milliGRAM(s) Oral every 6 hours PRN For Temp greater than 38 C (100.4 F)  acetaminophen   Tablet. 650 milliGRAM(s) Oral every 8 hours PRN Moderate Pain (4 - 6)  heparin  Injectable 85331 Unit(s) IV Push every 6 hours PRN For aPTT less than 40  heparin  Injectable 5000 Unit(s) IV Push every 6 hours PRN For aPTT between 40 - 57    Allergies    No Known Allergies    Intolerances        PHYSICAL EXAM:  Constitutional: NAD  HEENT: Normocephalic, EOMI  Neck:  No JVD  Respiratory: CTA B/L, No wheezes, dec bs at bases  Cardiovascular: S1, S2, tachy, + systolic murmur  Gastrointestinal: BS+, soft, NT/ND, obese  Extremities: dec peripheral edema right leg - calf is compressible and foot has ulcerated but is still   Neurological: AAOX3, no focal deficits  Psychiatric: Normal mood, normal affect  : No Sosa    Vital Signs Last 24 Hrs  T(C): 37.6 (24 Feb 2018 12:10), Max: 38.3 (23 Feb 2018 17:49)  T(F): 99.6 (24 Feb 2018 12:10), Max: 100.9 (23 Feb 2018 17:49)  HR: 102 (24 Feb 2018 12:10) (102 - 110)  BP: 157/89 (24 Feb 2018 12:10) (116/78 - 157/89)  BP(mean): --  RR: 20 (24 Feb 2018 12:10) (18 - 20)  SpO2: 98% (24 Feb 2018 12:10) (93% - 98%)  I&O's Summary    23 Feb 2018 07:01  -  24 Feb 2018 07:00  --------------------------------------------------------  IN: 1787 mL / OUT: 700 mL / NET: 1087 mL    24 Feb 2018 07:01  -  24 Feb 2018 15:47  --------------------------------------------------------  IN: 600 mL / OUT: 1000 mL / NET: -400 mL        LABS:                        12.8   18.9  )-----------( 300      ( 24 Feb 2018 12:39 )             39.1     02-24    137  |  96  |  23  ----------------------------<  137<H>  3.8   |  28  |  1.09    Ca    8.6      24 Feb 2018 03:02  Phos  3.2     02-24  Mg     2.8     02-24    TPro  6.6  /  Alb  2.3<L>  /  TBili  0.5  /  DBili  x   /  AST  90<H>  /  ALT  91<H>  /  AlkPhos  106  02-24    CARDIAC MARKERS ( 23 Feb 2018 02:38 )  x     / x     / 176 U/L / x     / x          Magnesium, Serum: 2.8 mg/dL (02-24 @ 03:02)

## 2018-02-24 NOTE — PROVIDER CONTACT NOTE (OTHER) - SITUATION
Elevated BP. 167/96 electronically, 154/80 manually. Pt BP trending up throughout day. Pt has no BP medications until 6am tomorrow

## 2018-02-24 NOTE — PROGRESS NOTE ADULT - SUBJECTIVE AND OBJECTIVE BOX
CC: F/U MSSA Bacteremia    Saw/spoke to patient. Still febrile within past 24 hours. Patient appears short of breath, but non-toxic. Slightly uncomfortable appearing.    Allergies  No Known Allergies    ANTIMICROBIALS:  ceFAZolin   IVPB    ceFAZolin   IVPB 2000 every 8 hours  vancomycin  IVPB 1000 every 12 hours    PE:    Vital Signs Last 24 Hrs  T(C): 37.4 (24 Feb 2018 08:12), Max: 38.3 (23 Feb 2018 17:49)  T(F): 99.3 (24 Feb 2018 08:12), Max: 100.9 (23 Feb 2018 17:49)  HR: 108 (24 Feb 2018 04:39) (94 - 110)  BP: 147/88 (24 Feb 2018 04:39) (116/78 - 147/88)  BP(mean): --  RR: 18 (24 Feb 2018 04:39) (18 - 20)  SpO2: 95% (24 Feb 2018 04:39) (93% - 97%)    Gen: AOx3, NAD, anxious  CV: S1+S2 normal, no murmurs,tachycardic  Resp: Clear bilat, no resp distress, no crackles/wheezes  Abd: Soft, nontender, +BS  Ext: RLE swelling area of cellulitis--improving based on past descriptions    LABS:                        13.0   18.6  )-----------( 300      ( 24 Feb 2018 03:02 )             40.6     02-24    137  |  96  |  23  ----------------------------<  137<H>  3.8   |  28  |  1.09    Ca    8.6      24 Feb 2018 03:02  Phos  3.2     02-24  Mg     2.8     02-24    TPro  6.6  /  Alb  2.3<L>  /  TBili  0.5  /  DBili  x   /  AST  90<H>  /  ALT  91<H>  /  AlkPhos  106  02-24    MICROBIOLOGY:    .Blood Blood-Peripheral  02-22-18   Growth in aerobic and anaerobic bottles: Staphylococcus aureus--PCR MSSA    Rapid RVP Result: Zena (02-23 @ 13:23)    Repeat BCXs pending    RADIOLOGY:    2/22 TTE:  Conclusions:  1. Normal left ventricular internal dimensions and wall  thicknesses.  2. Endocardial visualization enhanced with intravenous  injection of echo contrast (Definity). Endocardium not well  visualized despite use of echo contrast. Grossly normal  left ventricular function.  3. Normal diastolic function  4. The right ventricle is not well visualized; grossly  normal right ventricular systolic function.  *** No previous Echo exam.    2/22 CT RLE:    Impression:    Diffuse cellulitis throughout the right lower leg as outlined above with   cutaneous bleb along the dorsal lateral aspect of the foot. There is no   subcutaneous air to suggest necrotizing fasciitis.    Ill-defined fluid attenuation within the region of the extensor digitorum   brevis musculature and within the region of the abductor hallucis muscle   which may be related to muscle strain or myositis. Developing fluid   collections are also a consideration in the appropriate clinical context.   No peripherally enhancing fluid collection is noted on the current   examination.    Intramuscular fullness and edema within the myofascial planes about the   calf musculature likely related to patient's known right peroneal deep   vein thrombosis.    No CT evidence of osteomyelitis.    Osteochondral lesion along the medial aspect of the talar dome.

## 2018-02-24 NOTE — PROGRESS NOTE ADULT - ASSESSMENT
Pt is a 57M with PMHx daily EtOH use and recent ankle sprain 2/13 presenting from home 2/22 for chest pain and AMS x3-4 days with worsening right ankle swelling and redness admitted for sepsis 2/2 cellulitis and then found to have CT Chest findings concerning for b/l diffuse GGO concerning for infectious process vs. embolic event, less likely 2/2 malignancy and blood cx today (+) Staph aureus bacteremia likely 2/2 RLE cellulitis with embolization to lung.   -c/w  Ancef IV. Appreciate ID recs.   -Would give pt BIPAP for now for tachypnea, rosa when sleeping. 12/6 at 40% FiO2.  -Would give trial of Lasix IV x1  -Given pt's persistent and increased back pain, would recommend MRI of spine to r/o collection. Pt's neurological exam was nonfocal but exam limited 2/2 body habitus and weakness   -TTE performed. Pt will eventually need KELVIN, but is not optimized for procedure yet  -c/w DuoNebs q6hr prn for wheezing  -Pt has hx of SHIELA with CPAP at home, but is not compliant. Will need O/P sleep study with titration after hospital discharge  -Will follow closely over weekend. If pt decompensates would not hesitate to call ICU consult

## 2018-02-24 NOTE — PROGRESS NOTE ADULT - ASSESSMENT
recent ankle sprain which got infected resulting in MSSA sepsis/cellulitis with lumg embolization  hemoptysis - minimal - possibly secondary to being on hep drip with pulm septic emboli  ADA - resolved  Transaminitis - non specific  Coagulopathy on admission   Right peroneal vein dvt  Pulm artery enlargement  Morbid obesity  SHIELA  Diarrhea - r/o c diff    lasix given, pt placed on bipap  monitor labs daily  f/u final culture results  d/c vancomycin  heme eval  Podiatry eval  await KELVIN on monday  check fungitell  duonebs as needed  daily lasix  non-weight bearing right leg until podiatry sees  bedside camode  RLE leg elevation  check c diff pcr  discussed with pt and family at bedside per pts request and via phone with pts wife per her request, answered all questions to the best of my ability.

## 2018-02-24 NOTE — PROGRESS NOTE ADULT - ASSESSMENT
57 year old male with HTN and daily alcohol use presenting with worsening SOB, chest pain, confusion and worsening right lower extremity pain and swelling and redness. Patient with RLE cellulitis, with MSSA in blood (by PCR). Repeat BCX pending. Leukocytosis, fever. CT Chest with ? septic emboli. CTH no evidence of stroke. Patient appears short of breath, somewhat ill appearing. Vancomycin readded this AM by primary team. Overall, MSSA bacteremia, septic emboli, RLE cellulitis.  - Cefazolin 2g q 8  - Would DC vancomycin  - F/U repeat BCXs  - KELVIN on Monday if BCX remain positive  - Monitor for new symptoms for new foci infection (presently no back pain/abd pain)  - Low threshold for MICU considering tachypnea    Lee Engel MD  Pager [phone]  After 5pm and on weekends call [phone]

## 2018-02-24 NOTE — PROGRESS NOTE ADULT - SUBJECTIVE AND OBJECTIVE BOX
CHIEF COMPLAINT: AMS     Interval Events: Pt seen and examined at bedside. No acute events overnight, still febrile. Pt says he feels a little better. Family continues to express concern about his abdominal breathing, especially with sleeping, but say that his mental status is somewhat improved. Pt continues to have back pain, now b/l and left shoulder pain.     REVIEW OF SYSTEMS:  Constitutional: [ ] negative [x ] fevers [ ] chills [ ] weight loss [ ] weight gain  HEENT: [ x] negative [ ] dry eyes [ ] eye irritation [ ] postnasal drip [ ] nasal congestion  CV: [ ] negative  [ ] chest pain [ ] orthopnea [ ] palpitations [ ] murmur  Resp: [ ] negative [x ] cough [ ] shortness of breath [x ] dyspnea [ x] wheezing [x ] sputum [ ] hemoptysis  GI: [x ] negative [ ] nausea [ ] vomiting [ ] diarrhea [ ] constipation [ ] abd pain [ ] dysphagia   : [x ] negative [ ] dysuria [ ] nocturia [ ] hematuria [ ] increased urinary frequency  Musculoskeletal: [ ] negative [x ] back pain [ ] myalgias [ ] arthralgias [ ] fracture  Skin: [x ] negative [ ] rash [ ] itch  Neurological: [x ] negative [ ] headache [ ] dizziness [ ] syncope [ ] weakness [ ] numbness  Psychiatric: [x ] negative [ ] anxiety [ ] depression  Endocrine: x ] negative [ ] diabetes [ ] thyroid problem  Hematologic/Lymphatic: [x ] negative [ ] anemia [ ] bleeding problem  Allergic/Immunologic: [x ] negative [ ] itchy eyes [ ] nasal discharge [ ] hives [ ] angioedema  [ ] All other systems negative  [ ] Unable to assess ROS because ________      OBJECTIVE:  ICU Vital Signs Last 24 Hrs  T(C): 37.6 (24 Feb 2018 12:10), Max: 38.3 (23 Feb 2018 17:49)  T(F): 99.6 (24 Feb 2018 12:10), Max: 100.9 (23 Feb 2018 17:49)  HR: 102 (24 Feb 2018 12:10) (102 - 110)  BP: 157/89 (24 Feb 2018 12:10) (116/78 - 157/89)  BP(mean): --  ABP: --  ABP(mean): --  RR: 20 (24 Feb 2018 12:10) (18 - 20)  SpO2: 98% (24 Feb 2018 12:10) (93% - 98%)        02-23 @ 07:01 - 02-24 @ 07:00  --------------------------------------------------------  IN: 1787 mL / OUT: 700 mL / NET: 1087 mL    02-24 @ 07:01 - 02-24 @ 14:55  --------------------------------------------------------  IN: 600 mL / OUT: 1000 mL / NET: -400 mL      CAPILLARY BLOOD GLUCOSE      PHYSICAL EXAM:  General: NAD   HEENT: NCAT, MOM/ ERIN/EOMI   Neck: Supple, large  Respiratory: Diffuse expiratory wheezing b/l throughout improved  Cardiovascular: RRR, Sinus tachycardic, (-) m/g/r  Abdomen: NT/ND  Extremities: RLE cellulitis appears slightly improved from yesterday. Also with area of small fluctuance over dorsum of foot  Skin: As above  Neurological: Nonfocal  Psychiatry: Appropriate    HOSPITAL MEDICATIONS:  MEDICATIONS  (STANDING):  ceFAZolin   IVPB      ceFAZolin   IVPB 2000 milliGRAM(s) IV Intermittent every 8 hours  folic acid 1 milliGRAM(s) Oral daily  heparin  Infusion.  Unit(s)/Hr (22 mL/Hr) IV Continuous <Continuous>  lactobacillus acidophilus 1 Tablet(s) Oral three times a day with meals  levalbuterol Inhalation 0.63 milliGRAM(s) Inhalation every 6 hours  lidocaine   Patch 1 Patch Transdermal daily  losartan 50 milliGRAM(s) Oral daily  multivitamin 1 Tablet(s) Oral daily  thiamine 100 milliGRAM(s) Oral daily    MEDICATIONS  (PRN):  acetaminophen   Tablet 650 milliGRAM(s) Oral every 6 hours PRN For Temp greater than 38 C (100.4 F)  acetaminophen   Tablet. 650 milliGRAM(s) Oral every 8 hours PRN Moderate Pain (4 - 6)  heparin  Injectable 32674 Unit(s) IV Push every 6 hours PRN For aPTT less than 40  heparin  Injectable 5000 Unit(s) IV Push every 6 hours PRN For aPTT between 40 - 57      LABS:                        12.8   18.9  )-----------( 300      ( 24 Feb 2018 12:39 )             39.1     Hgb Trend: 12.8<--, 13.0<--, 13.2<--, 13.1<--, 12.9<--  02-24    137  |  96  |  23  ----------------------------<  137<H>  3.8   |  28  |  1.09    Ca    8.6      24 Feb 2018 03:02  Phos  3.2     02-24  Mg     2.8     02-24    TPro  6.6  /  Alb  2.3<L>  /  TBili  0.5  /  DBili  x   /  AST  90<H>  /  ALT  91<H>  /  AlkPhos  106  02-24    Creatinine Trend: 1.09<--, 1.03<--, 1.14<--, 1.40<--  PT/INR - ( 24 Feb 2018 03:02 )   PT: 17.2 sec;   INR: 1.58 ratio         PTT - ( 24 Feb 2018 09:58 )  PTT:53.1 sec    Arterial Blood Gas:  02-23 @ 14:05  7.45/41/81/28/96/4.0  ABG lactate: --        MICROBIOLOGY:     Culture - Blood (collected 22 Feb 2018 12:56)  Source: .Blood Blood-Peripheral  Gram Stain (23 Feb 2018 03:04):    Growth in aerobic bottle:    Gram Positive Cocci in Clusters    Growth in anaerobic bottle:    Gram Positive Cocci in Clusters  Preliminary Report (23 Feb 2018 18:01):    Growth in aerobic and anaerobic bottles: Staphylococcus aureus    See previous culture  # [id number]    Culture - Blood (collected 22 Feb 2018 12:56)  Source: .Blood Blood-Peripheral  Gram Stain (23 Feb 2018 01:57):    Growth in aerobic bottle: Gram Positive Cocci in Clusters    Growth in anaerobic bottle:    Gram Positive Cocci in Clusters  Preliminary Report (23 Feb 2018 17:56):    Growth in aerobic and anaerobic bottles: Staphylococcus aureus    Susceptibility to follow.    "Due to technical problems, Proteus sp. will Not be reported as part of    the BCID panel until further notice"    ***Blood Panel PCR results on this specimen are available    approximately 3 hours after the Gram stain result.***    Gram stain, PCR, and/or culture results may not always    correspond due to difference in methodologies.    ************************************************************    This PCR assay was performed using [employer].    The following targets are tested for: Enterococcus,    vancomycin resistant enterococci, Listeria monocytogenes,    coagulase negative staphylococci, S. aureus,    methicillin resistant S. aureus, Streptococcus agalactiae    (Group B), S. pneumoniae, S. pyogenes (Group A),    Acinetobacter baumannii, Enterobacter cloacae, E. coli,    Klebsiella oxytoca, K. pneumoniae, Proteus sp.,    Serratia marcescens, Haemophilus influenzae,    Neisseria meningitidis, Pseudomonas aeruginosa, Candida    albicans, C. glabrata, C krusei, C parapsilosis,    C. tropicalis and the KPC resistance gene.  Organism: Blood Culture PCR (23 Feb 2018 01:08)  Organism: Blood Culture PCR (23 Feb 2018 01:08)        RADIOLOGY:  [x ] Reviewed and interpreted by me    PULMONARY FUNCTION TESTS:    EKG:

## 2018-02-25 LAB
ALBUMIN SERPL ELPH-MCNC: 2.5 G/DL — LOW (ref 3.3–5)
ALP SERPL-CCNC: 132 U/L — HIGH (ref 40–120)
ALT FLD-CCNC: 81 U/L RC — HIGH (ref 10–45)
ANION GAP SERPL CALC-SCNC: 14 MMOL/L — SIGNIFICANT CHANGE UP (ref 5–17)
APTT BLD: 30.3 SEC — SIGNIFICANT CHANGE UP (ref 27.5–37.4)
APTT BLD: 82 SEC — HIGH (ref 27.5–37.4)
AST SERPL-CCNC: 104 U/L — HIGH (ref 10–40)
BILIRUB DIRECT SERPL-MCNC: 0.1 MG/DL — SIGNIFICANT CHANGE UP (ref 0–0.2)
BILIRUB INDIRECT FLD-MCNC: 0.4 MG/DL — SIGNIFICANT CHANGE UP (ref 0.2–1)
BILIRUB SERPL-MCNC: 0.5 MG/DL — SIGNIFICANT CHANGE UP (ref 0.2–1.2)
BLD GP AB SCN SERPL QL: NEGATIVE — SIGNIFICANT CHANGE UP
BUN SERPL-MCNC: 24 MG/DL — HIGH (ref 7–23)
C DIFF GDH STL QL: NEGATIVE — SIGNIFICANT CHANGE UP
C DIFF GDH STL QL: SIGNIFICANT CHANGE UP
CALCIUM SERPL-MCNC: 8.4 MG/DL — SIGNIFICANT CHANGE UP (ref 8.4–10.5)
CHLORIDE SERPL-SCNC: 95 MMOL/L — LOW (ref 96–108)
CO2 SERPL-SCNC: 30 MMOL/L — SIGNIFICANT CHANGE UP (ref 22–31)
CREAT SERPL-MCNC: 1.11 MG/DL — SIGNIFICANT CHANGE UP (ref 0.5–1.3)
CULTURE RESULTS: SIGNIFICANT CHANGE UP
GLUCOSE BLDC GLUCOMTR-MCNC: 113 MG/DL — HIGH (ref 70–99)
GLUCOSE BLDC GLUCOMTR-MCNC: 137 MG/DL — HIGH (ref 70–99)
GLUCOSE SERPL-MCNC: 138 MG/DL — HIGH (ref 70–99)
GRAM STN FLD: SIGNIFICANT CHANGE UP
HBA1C BLD-MCNC: 5.6 % — SIGNIFICANT CHANGE UP (ref 4–5.6)
HCT VFR BLD CALC: 39.9 % — SIGNIFICANT CHANGE UP (ref 39–50)
HGB BLD-MCNC: 13.4 G/DL — SIGNIFICANT CHANGE UP (ref 13–17)
INR BLD: 1.66 RATIO — HIGH (ref 0.88–1.16)
MCHC RBC-ENTMCNC: 33.4 PG — SIGNIFICANT CHANGE UP (ref 27–34)
MCHC RBC-ENTMCNC: 33.5 GM/DL — SIGNIFICANT CHANGE UP (ref 32–36)
MCV RBC AUTO: 99.7 FL — SIGNIFICANT CHANGE UP (ref 80–100)
OB PNL STL: POSITIVE
PLATELET # BLD AUTO: 341 K/UL — SIGNIFICANT CHANGE UP (ref 150–400)
POTASSIUM SERPL-MCNC: 3.9 MMOL/L — SIGNIFICANT CHANGE UP (ref 3.5–5.3)
POTASSIUM SERPL-SCNC: 3.9 MMOL/L — SIGNIFICANT CHANGE UP (ref 3.5–5.3)
PROT SERPL-MCNC: 7 G/DL — SIGNIFICANT CHANGE UP (ref 6–8.3)
PROTHROM AB SERPL-ACNC: 18.1 SEC — HIGH (ref 9.8–12.7)
RBC # BLD: 4.01 M/UL — LOW (ref 4.2–5.8)
RBC # FLD: 12.8 % — SIGNIFICANT CHANGE UP (ref 10.3–14.5)
RH IG SCN BLD-IMP: POSITIVE — SIGNIFICANT CHANGE UP
SODIUM SERPL-SCNC: 139 MMOL/L — SIGNIFICANT CHANGE UP (ref 135–145)
SPECIMEN SOURCE: SIGNIFICANT CHANGE UP
WBC # BLD: 19 K/UL — HIGH (ref 3.8–10.5)
WBC # FLD AUTO: 19 K/UL — HIGH (ref 3.8–10.5)

## 2018-02-25 PROCEDURE — 99233 SBSQ HOSP IP/OBS HIGH 50: CPT | Mod: GC

## 2018-02-25 PROCEDURE — 73630 X-RAY EXAM OF FOOT: CPT | Mod: 26,RT

## 2018-02-25 PROCEDURE — 99233 SBSQ HOSP IP/OBS HIGH 50: CPT

## 2018-02-25 RX ORDER — LIDOCAINE HCL 20 MG/ML
10 VIAL (ML) INJECTION ONCE
Qty: 0 | Refills: 0 | Status: DISCONTINUED | OUTPATIENT
Start: 2018-02-25 | End: 2018-02-25

## 2018-02-25 RX ORDER — LIDOCAINE 4 G/100G
1 CREAM TOPICAL DAILY
Qty: 0 | Refills: 0 | Status: DISCONTINUED | OUTPATIENT
Start: 2018-02-25 | End: 2018-03-01

## 2018-02-25 RX ORDER — FUROSEMIDE 40 MG
40 TABLET ORAL ONCE
Qty: 0 | Refills: 0 | Status: COMPLETED | OUTPATIENT
Start: 2018-02-25 | End: 2018-02-25

## 2018-02-25 RX ORDER — HYDROMORPHONE HYDROCHLORIDE 2 MG/ML
0.5 INJECTION INTRAMUSCULAR; INTRAVENOUS; SUBCUTANEOUS ONCE
Qty: 0 | Refills: 0 | Status: DISCONTINUED | OUTPATIENT
Start: 2018-02-25 | End: 2018-02-25

## 2018-02-25 RX ORDER — IPRATROPIUM/ALBUTEROL SULFATE 18-103MCG
3 AEROSOL WITH ADAPTER (GRAM) INHALATION EVERY 6 HOURS
Qty: 0 | Refills: 0 | Status: DISCONTINUED | OUTPATIENT
Start: 2018-02-25 | End: 2018-03-01

## 2018-02-25 RX ORDER — OXYCODONE AND ACETAMINOPHEN 5; 325 MG/1; MG/1
1 TABLET ORAL EVERY 4 HOURS
Qty: 0 | Refills: 0 | Status: DISCONTINUED | OUTPATIENT
Start: 2018-02-25 | End: 2018-03-01

## 2018-02-25 RX ORDER — ACETAMINOPHEN 500 MG
650 TABLET ORAL EVERY 6 HOURS
Qty: 0 | Refills: 0 | Status: DISCONTINUED | OUTPATIENT
Start: 2018-02-25 | End: 2018-03-01

## 2018-02-25 RX ORDER — FUROSEMIDE 40 MG
20 TABLET ORAL DAILY
Qty: 0 | Refills: 0 | Status: DISCONTINUED | OUTPATIENT
Start: 2018-02-25 | End: 2018-03-01

## 2018-02-25 RX ORDER — LIDOCAINE HCL 20 MG/ML
VIAL (ML) INJECTION
Qty: 0 | Refills: 0 | Status: DISCONTINUED | OUTPATIENT
Start: 2018-02-25 | End: 2018-02-25

## 2018-02-25 RX ORDER — ACETAMINOPHEN 500 MG
650 TABLET ORAL EVERY 6 HOURS
Qty: 0 | Refills: 0 | Status: DISCONTINUED | OUTPATIENT
Start: 2018-02-25 | End: 2018-02-25

## 2018-02-25 RX ORDER — ONDANSETRON 8 MG/1
4 TABLET, FILM COATED ORAL ONCE
Qty: 0 | Refills: 0 | Status: DISCONTINUED | OUTPATIENT
Start: 2018-02-25 | End: 2018-02-25

## 2018-02-25 RX ORDER — THIAMINE MONONITRATE (VIT B1) 100 MG
100 TABLET ORAL DAILY
Qty: 0 | Refills: 0 | Status: DISCONTINUED | OUTPATIENT
Start: 2018-02-25 | End: 2018-03-01

## 2018-02-25 RX ORDER — CEFAZOLIN SODIUM 1 G
2000 VIAL (EA) INJECTION EVERY 8 HOURS
Qty: 0 | Refills: 0 | Status: DISCONTINUED | OUTPATIENT
Start: 2018-02-25 | End: 2018-03-01

## 2018-02-25 RX ORDER — FUROSEMIDE 40 MG
20 TABLET ORAL ONCE
Qty: 0 | Refills: 0 | Status: COMPLETED | OUTPATIENT
Start: 2018-02-25 | End: 2018-02-25

## 2018-02-25 RX ORDER — LACTOBACILLUS ACIDOPHILUS 100MM CELL
1 CAPSULE ORAL
Qty: 0 | Refills: 0 | Status: DISCONTINUED | OUTPATIENT
Start: 2018-02-25 | End: 2018-03-01

## 2018-02-25 RX ORDER — HYDROMORPHONE HYDROCHLORIDE 2 MG/ML
0.5 INJECTION INTRAMUSCULAR; INTRAVENOUS; SUBCUTANEOUS AT BEDTIME
Qty: 0 | Refills: 0 | Status: DISCONTINUED | OUTPATIENT
Start: 2018-02-25 | End: 2018-02-25

## 2018-02-25 RX ORDER — LOSARTAN POTASSIUM 100 MG/1
50 TABLET, FILM COATED ORAL DAILY
Qty: 0 | Refills: 0 | Status: DISCONTINUED | OUTPATIENT
Start: 2018-02-25 | End: 2018-03-01

## 2018-02-25 RX ORDER — FOLIC ACID 0.8 MG
1 TABLET ORAL DAILY
Qty: 0 | Refills: 0 | Status: DISCONTINUED | OUTPATIENT
Start: 2018-02-25 | End: 2018-03-01

## 2018-02-25 RX ORDER — HYDROMORPHONE HYDROCHLORIDE 2 MG/ML
1 INJECTION INTRAMUSCULAR; INTRAVENOUS; SUBCUTANEOUS EVERY 6 HOURS
Qty: 0 | Refills: 0 | Status: DISCONTINUED | OUTPATIENT
Start: 2018-02-25 | End: 2018-03-01

## 2018-02-25 RX ADMIN — Medication 20 MILLIGRAM(S): at 04:48

## 2018-02-25 RX ADMIN — HYDROMORPHONE HYDROCHLORIDE 0.5 MILLIGRAM(S): 2 INJECTION INTRAMUSCULAR; INTRAVENOUS; SUBCUTANEOUS at 00:07

## 2018-02-25 RX ADMIN — LOSARTAN POTASSIUM 50 MILLIGRAM(S): 100 TABLET, FILM COATED ORAL at 04:49

## 2018-02-25 RX ADMIN — HYDROMORPHONE HYDROCHLORIDE 0.5 MILLIGRAM(S): 2 INJECTION INTRAMUSCULAR; INTRAVENOUS; SUBCUTANEOUS at 09:05

## 2018-02-25 RX ADMIN — Medication 1 MILLIGRAM(S): at 13:40

## 2018-02-25 RX ADMIN — Medication 1 TABLET(S): at 13:40

## 2018-02-25 RX ADMIN — LOSARTAN POTASSIUM 50 MILLIGRAM(S): 100 TABLET, FILM COATED ORAL at 13:40

## 2018-02-25 RX ADMIN — Medication 1 TABLET(S): at 17:27

## 2018-02-25 RX ADMIN — Medication 100 MILLIGRAM(S): at 18:00

## 2018-02-25 RX ADMIN — HYDROMORPHONE HYDROCHLORIDE 1 MILLIGRAM(S): 2 INJECTION INTRAMUSCULAR; INTRAVENOUS; SUBCUTANEOUS at 14:10

## 2018-02-25 RX ADMIN — Medication 100 MILLIGRAM(S): at 10:30

## 2018-02-25 RX ADMIN — HYDROMORPHONE HYDROCHLORIDE 0.5 MILLIGRAM(S): 2 INJECTION INTRAMUSCULAR; INTRAVENOUS; SUBCUTANEOUS at 07:50

## 2018-02-25 RX ADMIN — HYDROMORPHONE HYDROCHLORIDE 1 MILLIGRAM(S): 2 INJECTION INTRAMUSCULAR; INTRAVENOUS; SUBCUTANEOUS at 23:00

## 2018-02-25 RX ADMIN — Medication 100 MILLIGRAM(S): at 09:30

## 2018-02-25 RX ADMIN — LEVALBUTEROL 0.63 MILLIGRAM(S): 1.25 SOLUTION, CONCENTRATE RESPIRATORY (INHALATION) at 04:48

## 2018-02-25 RX ADMIN — Medication 100 MILLIGRAM(S): at 04:48

## 2018-02-25 RX ADMIN — Medication 100 MILLIGRAM(S): at 17:27

## 2018-02-25 RX ADMIN — Medication 20 MILLIGRAM(S): at 13:38

## 2018-02-25 RX ADMIN — HEPARIN SODIUM 3700 UNIT(S)/HR: 5000 INJECTION INTRAVENOUS; SUBCUTANEOUS at 01:29

## 2018-02-25 RX ADMIN — HYDROMORPHONE HYDROCHLORIDE 0.5 MILLIGRAM(S): 2 INJECTION INTRAMUSCULAR; INTRAVENOUS; SUBCUTANEOUS at 09:15

## 2018-02-25 RX ADMIN — Medication 3 MILLILITER(S): at 17:26

## 2018-02-25 RX ADMIN — HYDROMORPHONE HYDROCHLORIDE 1 MILLIGRAM(S): 2 INJECTION INTRAMUSCULAR; INTRAVENOUS; SUBCUTANEOUS at 21:18

## 2018-02-25 RX ADMIN — HYDROMORPHONE HYDROCHLORIDE 1 MILLIGRAM(S): 2 INJECTION INTRAMUSCULAR; INTRAVENOUS; SUBCUTANEOUS at 13:38

## 2018-02-25 RX ADMIN — HYDROMORPHONE HYDROCHLORIDE 0.5 MILLIGRAM(S): 2 INJECTION INTRAMUSCULAR; INTRAVENOUS; SUBCUTANEOUS at 07:53

## 2018-02-25 RX ADMIN — Medication 40 MILLIGRAM(S): at 09:53

## 2018-02-25 RX ADMIN — HYDROMORPHONE HYDROCHLORIDE 0.5 MILLIGRAM(S): 2 INJECTION INTRAMUSCULAR; INTRAVENOUS; SUBCUTANEOUS at 02:13

## 2018-02-25 RX ADMIN — LIDOCAINE 1 PATCH: 4 CREAM TOPICAL at 13:39

## 2018-02-25 NOTE — CONSULT NOTE ADULT - ASSESSMENT
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 Miranda and Dr. Westbrook who are in agreement with the plan for bedside I&D as source control before OR tomorrow.  - Consent obtained and an 18 gauge needled was used to attempt to drain the intact blisters with very little success   - Decision was made to perform a bedside incision and drainage, and pt agreed. 10 cc of 1% lidocaine plain used in an ankle block. Using a sterile, disposable #15 blade incisions were made on the dorsal 4th metatarsal and 2nd metatarsal to the level of subcutaneous tissue, manual compression was used and serosangunous fluid was expressed from the foot. No purulence, no malodor noted.   - Wound culture taken of dorsal foot  - Incision flushed w/ sterile saline  - Bedside procedure performed initially for source control because pt had eaten dinner around 6:30 pm, and had been drinking water up until an hour before bedside procedure  - Wounds packed w/ 1/4" iodoform packing, and dry sterile dressing  - Pt added on to the OR schedule for right foot incision and drainage of gas gangrene for sherita morning 2/25.  - D/w attending

## 2018-02-25 NOTE — CHART NOTE - NSCHARTNOTEFT_GEN_A_CORE
Per Podiatry resident patient for OR in AM, for I&D of blister of LE. heparin gtt to be held 4-6 hours prior to procedure.     - Planned for 7AM procedure, per Podiatry resident hold heparin gtt at 3AM.         Romie CONDON  Department of Medicine Per Podiatry resident patient for OR in AM, for I&D of LE with presumed gas gangrene of right foot. heparin gtt to be held 4-6 hours prior to procedure.     - Planned for 7AM procedure, per Podiatry resident hold heparin gtt at 3AM.   - Attending aware   - will continue to monitor         Romie CONDON  Department of Medicine

## 2018-02-25 NOTE — PROGRESS NOTE ADULT - SUBJECTIVE AND OBJECTIVE BOX
MEDICINE, PROGRESS NOTE [phone]    JUWAN DOMÍNGUEZ 57y [id number]    Patient seen and examined.  Patient is a 57y old  Male who presents with a chief complaint of Chest pain, confusion (22 Feb 2018 03:00)  Pt has a lot of pain in right foot area.    PAST MEDICAL & SURGICAL HISTORY:  Sciatica of right side  Essential hypertension  No significant past surgical history    MEDICATIONS  (STANDING):  ALBUTerol/ipratropium for Nebulization 3 milliLiter(s) Nebulizer every 6 hours  ceFAZolin   IVPB 2000 milliGRAM(s) IV Intermittent every 8 hours  clindamycin IVPB      clindamycin IVPB 900 milliGRAM(s) IV Intermittent every 8 hours  folic acid 1 milliGRAM(s) Oral daily  furosemide   Injectable 20 milliGRAM(s) IV Push daily  furosemide   Injectable 20 milliGRAM(s) IV Push once  lactobacillus acidophilus 1 Tablet(s) Oral three times a day with meals  lidocaine   Patch 1 Patch Transdermal daily  losartan 50 milliGRAM(s) Oral daily  multivitamin 1 Tablet(s) Oral daily  thiamine 100 milliGRAM(s) Oral daily    MEDICATIONS  (PRN):  acetaminophen   Tablet 650 milliGRAM(s) Oral every 6 hours PRN For Temp greater than 38 C (100.4 F)  HYDROmorphone  Injectable 1 milliGRAM(s) IV Push every 6 hours PRN Moderate Pain (4 - 6)  oxyCODONE    5 mG/acetaminophen 325 mG 1 Tablet(s) Oral every 4 hours PRN Severe Pain (7 - 10)    Allergies    No Known Allergies    Intolerances        PHYSICAL EXAM:  Constitutional: NAD  HEENT: Normocephalic, EOMI  Neck:  No JVD  Respiratory: CTA B/L, No wheezes  Cardiovascular: S1, S2, RRR, + systolic murmur  Gastrointestinal: BS+, soft, NT/ND  Extremities: dec edema in rle, podiatry dressing in place - c/d/i  Neurological: AAOX3, no focal deficits  Psychiatric: Normal mood, normal affect  : No Sosa    Vital Signs Last 24 Hrs  T(C): 36.6 (25 Feb 2018 12:20), Max: 37.6 (24 Feb 2018 20:27)  T(F): 97.9 (25 Feb 2018 12:20), Max: 99.7 (24 Feb 2018 20:27)  HR: 92 (25 Feb 2018 12:20) (78 - 112)  BP: 132/79 (25 Feb 2018 12:20) (114/71 - 177/84)  BP(mean): 119 (25 Feb 2018 09:00) (86 - 119)  RR: 22 (25 Feb 2018 12:20) (14 - 26)  SpO2: 97% (25 Feb 2018 12:20) (93% - 100%)  I&O's Summary    24 Feb 2018 07:01  -  25 Feb 2018 07:00  --------------------------------------------------------  IN: 1180 mL / OUT: 2430 mL / NET: -1250 mL    25 Feb 2018 07:01  -  25 Feb 2018 13:29  --------------------------------------------------------  IN: 0 mL / OUT: 550 mL / NET: -550 mL        LABS:                        13.4   19.0  )-----------( 341      ( 25 Feb 2018 10:35 )             39.9     02-25    139  |  95<L>  |  24<H>  ----------------------------<  138<H>  3.9   |  30  |  1.11    Ca    8.4      25 Feb 2018 10:35  Phos  3.2     02-24  Mg     2.8     02-24    TPro  6.6  /  Alb  2.3<L>  /  TBili  0.5  /  DBili  x   /  AST  90<H>  /  ALT  91<H>  /  AlkPhos  106  02-24    FOBT - positive

## 2018-02-25 NOTE — PROGRESS NOTE ADULT - SUBJECTIVE AND OBJECTIVE BOX
CC: F/U for MSSA    Saw/spoke to patient. Patient in PACU s/p OR. Patient with pain in RLE. Yesterday had podiatry eval with concern for ? gas gangrene. Now taken to OR for debridement and podiatry had high concern for Nec Fasc/necrosis.    Allergies  No Known Allergies    ANTIMICROBIALS:  ceFAZolin   IVPB 2000 every 8 hours  clindamycin IVPB 900 once  clindamycin IVPB    clindamycin IVPB 900 every 8 hours    PE:    Vital Signs Last 24 Hrs  T(C): 37.1 (25 Feb 2018 08:30), Max: 37.9 (24 Feb 2018 10:21)  T(F): 98.8 (25 Feb 2018 08:30), Max: 100.3 (24 Feb 2018 10:21)  HR: 95 (25 Feb 2018 09:00) (78 - 105)  BP: 159/90 (25 Feb 2018 09:00) (114/71 - 167/96)  BP(mean): 119 (25 Feb 2018 09:00) (86 - 119)  RR: 16 (25 Feb 2018 09:00) (14 - 20)  SpO2: 97% (25 Feb 2018 09:00) (93% - 100%)    Gen: AOx3, NAD, non-toxic, pleasant  CV: S1+S2 normal, no murmurs, nontachycardic  Resp: Clear bilat, no resp distress, no crackles/wheezes  Abd: Soft, nontender, +BS  Ext: RLE bandaged wound s/p debridement    LABS:                        12.8   18.9  )-----------( 300      ( 24 Feb 2018 12:39 )             39.1     02-24    137  |  96  |  23  ----------------------------<  137<H>  3.8   |  28  |  1.09    Ca    8.6      24 Feb 2018 03:02  Phos  3.2     02-24  Mg     2.8     02-24    TPro  6.6  /  Alb  2.3<L>  /  TBili  0.5  /  DBili  x   /  AST  90<H>  /  ALT  91<H>  /  AlkPhos  106  02-24    MICROBIOLOGY:    .Blood Blood  02-24-18   No growth to date.      .Blood Blood  02-24-18   Growth in aerobic bottle: Gram Positive Cocci in Clusters   Growth in aerobic bottle: Gram Positive Cocci in Clusters    .Blood Blood-Peripheral  02-22-18   Growth in aerobic and anaerobic bottles: Staphylococcus aureus    Rapid RVP Result: NotDetec (02-23 @ 13:23)    Clostridium difficile GDH Toxins A&amp;B, EIA:   Negative (02-24-18 @ 22:41)    RADIOLOGY:    No new available

## 2018-02-25 NOTE — CONSULT NOTE ADULT - SUBJECTIVE AND OBJECTIVE BOX
57 white male seen in consult poli prolonged/elevated inr admitted 2/21/18 for confusion/chest pain    HPI--Patient seen in PACU AFTER debridement of RLE/GANGRENE(2/25/18)           patient is poor historian           Denies prior h/o clotting or bleeding dyscrasia-except mild epistaxis since childhood           specifically  no h/o dvt/PE           NO PRIOR SURGERIES            never had a transfusion            Hospital course  CT RLE  DIFFUSE CELLULITIS/NO OSTEO/NO SC AIR                                    CT abdomen-neg except B ground glass opacities/some solid                                       doppler  right calf/peroneal dvt--heparin                                     CTA chest-multiple opacities in both lungs--favor infection over malignancy//no effusions or adenopathy                                     CT head-neg                                      ECHO-NORMAL           PMH-Essential HTN         psh-neg           NKDA          meds reviewed          SH--ALCOHOL ABUSE--last drink several days prior to admit              two PPD CIGARRETTES--stopped 14 years ago           FH-PROSTATE CANCER// unaware of any clotting/bleeding dyscrasias           ROS-never had EGD/COLONOSCOPY                   MORBID OBESITY           lABS 2/21/18  INR 1.48/ptt-31.6                                wbc-16.6/hgb-13.9/plt-280                                 CMP-  creatinine-1.4/bun33/sgot-156/sgpt-143/normal bili                                 Hepatitis-neg                                 C. diff-neg                                blood cultures + MSSA                                STOOL OCCULT BLOOD +        Impression/plan:  elevation in INR only od admit--I spoke with Dr. Miranda yesterday/ WOULD NOT GIVE vITAMIN k //FFP ONLY IF SIGNIFICANT BLEEDING                                 do not need to do 50:50 mix// just order factor seven level                                 elevation could be dietary in nature giVen his substance abuse                                dvt--would repeat dopplers//? gauri                                 chest ct will eventually need to be repeated secondary to smoking history.                                 continue present management of RLE

## 2018-02-25 NOTE — PROGRESS NOTE ADULT - ASSESSMENT
57 year old male with HTN and daily alcohol use presenting with worsening SOB, chest pain, confusion and worsening right lower extremity pain and swelling and redness. Patient with RLE cellulitis, with MSSA in blood (by PCR). Repeat BCX pending. Leukocytosis, fever. CT Chest with ? septic emboli. CTH no evidence of stroke. Taken to OR for concern of gas gangrene/nec fasc. On discussion with podiatry concern for necrosis. Based on BCXs would expect this is a monomicrobial (Type 2) Nec Fasc; agree with clindamycin for present time. Appreciate podiatry input. Overall, new diagnosis Nec Fasc, MSSA bacteremia, fever, leukocytosis, sepsis.  - Cefazolin 2g q 8  - Clindamycin 900 q 8  - KELVIN on Monday if BCX remain positive and patient stable  - Further debridement as per podiatry  - If any signs worsening or decompensation, consider change Cefazolin to Vanco/Imipenem (but appears less likely this would be a polymicrobial process with GNs/anaerobes)  - F/U BCXs    Lee Engel MD  Pager [phone]  After 5pm and on weekends call [phone]

## 2018-02-25 NOTE — CONSULT NOTE ADULT - SUBJECTIVE AND OBJECTIVE BOX
Patient is a 57y old  Male who presents with a chief complaint of Chest pain, confusion (22 Feb 2018 03:00)      HPI:   57 M Hx HTN, daily EtOH use for many years BIB family for chest pain and confusion.  Patient injured his R ankle at work on 2/13/18, saw his chiropractor the next day who told him to go to ED due to extensive swelling.  Pt was seen in the ED given a CAM boot and sent home with pain medication. Pt relates that his foot and ankle has been swollen since the ankle sprain, but the redness began a couple of days before coming the ED for the 2nd time. Pt relates that he is unsure when the blisters started but believes it has been over the past couple of days. Pt, wife and daughter all agree the blisters have been present for a couple of days, but they are unable to specify how many days more clearly.       PAST MEDICAL & SURGICAL HISTORY:  Sciatica of right side  Essential hypertension  No significant past surgical history      MEDICATIONS  (STANDING):  ceFAZolin   IVPB      ceFAZolin   IVPB 2000 milliGRAM(s) IV Intermittent every 8 hours  folic acid 1 milliGRAM(s) Oral daily  furosemide   Injectable 20 milliGRAM(s) IV Push daily  heparin  Infusion.  Unit(s)/Hr (22 mL/Hr) IV Continuous <Continuous>  lactobacillus acidophilus 1 Tablet(s) Oral three times a day with meals  levalbuterol Inhalation 0.63 milliGRAM(s) Inhalation every 6 hours  lidocaine   Patch 1 Patch Transdermal daily  lidocaine 1% (Preservative-free) Injectable 10 milliLiter(s) Local Injection once  lidocaine 1% (Preservative-free) Injectable 10 milliLiter(s) Local Injection once  lidocaine 1% (Preservative-free) Injectable      losartan 50 milliGRAM(s) Oral daily  multivitamin 1 Tablet(s) Oral daily  thiamine 100 milliGRAM(s) Oral daily    MEDICATIONS  (PRN):  acetaminophen   Tablet 650 milliGRAM(s) Oral every 6 hours PRN For Temp greater than 38 C (100.4 F)  heparin  Injectable 51563 Unit(s) IV Push every 6 hours PRN For aPTT less than 40  heparin  Injectable 5000 Unit(s) IV Push every 6 hours PRN For aPTT between 40 - 57      Allergies    No Known Allergies    Intolerances        VITALS:    Vital Signs Last 24 Hrs  T(C): 37.6 (24 Feb 2018 20:27), Max: 37.9 (24 Feb 2018 10:21)  T(F): 99.7 (24 Feb 2018 20:27), Max: 100.3 (24 Feb 2018 10:21)  HR: 102 (25 Feb 2018 00:18) (78 - 108)  BP: 150/87 (25 Feb 2018 00:18) (147/88 - 167/96)  BP(mean): --  RR: 19 (24 Feb 2018 20:27) (18 - 20)  SpO2: 95% (24 Feb 2018 23:18) (93% - 100%)    LABS:                          12.8   18.9  )-----------( 300      ( 24 Feb 2018 12:39 )             39.1       02-24    137  |  96  |  23  ----------------------------<  137<H>  3.8   |  28  |  1.09    Ca    8.6      24 Feb 2018 03:02  Phos  3.2     02-24  Mg     2.8     02-24    TPro  6.6  /  Alb  2.3<L>  /  TBili  0.5  /  DBili  x   /  AST  90<H>  /  ALT  91<H>  /  AlkPhos  106  02-24      CAPILLARY BLOOD GLUCOSE          PT/INR - ( 24 Feb 2018 03:02 )   PT: 17.2 sec;   INR: 1.58 ratio         PTT - ( 24 Feb 2018 17:52 )  PTT:68.6 sec    LOWER EXTREMITY PHYSICAL EXAM:    Vascular: DP/PT _/4, B/L, CFT <_ seconds B/L, Temperature gradient _, B/L.   Neuro: Epicritic sensation _ to the level of _, B/L.  Musculoskeletal/Ortho:  Skin:  Wound #1:   Location:  Size:  Depth:  Wound bed:   Drainage:   Odor:   Periwound:  Etiology:     RADIOLOGY & ADDITIONAL STUDIES: Patient is a 57y old  Male who presents with a chief complaint of Chest pain, confusion (22 Feb 2018 03:00)      HPI:   57 M Hx HTN, daily EtOH use for many years BIB family for chest pain and confusion.  Patient injured his R ankle at work on 2/13/18, saw his chiropractor the next day who told him to go to ED due to extensive swelling.  Pt was seen in the ED given a CAM boot and sent home with pain medication. Pt relates that his foot and ankle has been swollen since the ankle sprain, but the redness began a couple of days before coming the ED for the 2nd time. Pt relates that he is unsure when the blisters started but believes it has been over the past couple of days. Pt, wife and daughter all agree the blisters have been present for a couple of days, but they are unable to specify how many days. Pt reports 9/10 pain to right foot, and has been unable to take pain medication due to liver function issues.  Pt states that he last ate around 6 pm, but has been drinking water all evening up until about 11 pm. Pt denies nausea, vomitting, or chills, but has been feverish.       PAST MEDICAL & SURGICAL HISTORY:  Sciatica of right side  Essential hypertension  No significant past surgical history      MEDICATIONS  (STANDING):  ceFAZolin   IVPB      ceFAZolin   IVPB 2000 milliGRAM(s) IV Intermittent every 8 hours  folic acid 1 milliGRAM(s) Oral daily  furosemide   Injectable 20 milliGRAM(s) IV Push daily  heparin  Infusion.  Unit(s)/Hr (22 mL/Hr) IV Continuous <Continuous>  lactobacillus acidophilus 1 Tablet(s) Oral three times a day with meals  levalbuterol Inhalation 0.63 milliGRAM(s) Inhalation every 6 hours  lidocaine   Patch 1 Patch Transdermal daily  lidocaine 1% (Preservative-free) Injectable 10 milliLiter(s) Local Injection once  lidocaine 1% (Preservative-free) Injectable 10 milliLiter(s) Local Injection once  lidocaine 1% (Preservative-free) Injectable      losartan 50 milliGRAM(s) Oral daily  multivitamin 1 Tablet(s) Oral daily  thiamine 100 milliGRAM(s) Oral daily    MEDICATIONS  (PRN):  acetaminophen   Tablet 650 milliGRAM(s) Oral every 6 hours PRN For Temp greater than 38 C (100.4 F)  heparin  Injectable 13369 Unit(s) IV Push every 6 hours PRN For aPTT less than 40  heparin  Injectable 5000 Unit(s) IV Push every 6 hours PRN For aPTT between 40 - 57      Allergies    No Known Allergies    Intolerances        VITALS:    Vital Signs Last 24 Hrs  T(C): 37.6 (24 Feb 2018 20:27), Max: 37.9 (24 Feb 2018 10:21)  T(F): 99.7 (24 Feb 2018 20:27), Max: 100.3 (24 Feb 2018 10:21)  HR: 102 (25 Feb 2018 00:18) (78 - 108)  BP: 150/87 (25 Feb 2018 00:18) (147/88 - 167/96)  BP(mean): --  RR: 19 (24 Feb 2018 20:27) (18 - 20)  SpO2: 95% (24 Feb 2018 23:18) (93% - 100%)    LABS:                          12.8   18.9  )-----------( 300      ( 24 Feb 2018 12:39 )             39.1       02-24    137  |  96  |  23  ----------------------------<  137<H>  3.8   |  28  |  1.09    Ca    8.6      24 Feb 2018 03:02  Phos  3.2     02-24  Mg     2.8     02-24    TPro  6.6  /  Alb  2.3<L>  /  TBili  0.5  /  DBili  x   /  AST  90<H>  /  ALT  91<H>  /  AlkPhos  106  02-24      CAPILLARY BLOOD GLUCOSE          PT/INR - ( 24 Feb 2018 03:02 )   PT: 17.2 sec;   INR: 1.58 ratio         PTT - ( 24 Feb 2018 17:52 )  PTT:68.6 sec    LOWER EXTREMITY PHYSICAL EXAM:    Vascular: DP/PT 2/4 LF, DP/PT 1/4 right foot, secondary to swelling, B/L, CFT <3 seconds B/L, Temperature gradient R hot, L warm. +2 non pitting edema of right foot, +1 pitting edema of right lower leg, erythema appears to have receded from previously outlined borders  Neuro: Epicritic sensation intact to b/l feet  Musculoskeletal/Ortho: No gross deformities  Skin: right foot dorsal skin is taut w/ loss of skin lines and bullae formation along the dorsolateral foot, and along the dorsal metatarsal heads. some bullae are ruptured, with excess skin present. some serous drainage noted from the ruptured bullae, dorsal skin of foot is mottled, and ecchymotic w/ blanchable erythema extending from the base of digits 1-5 up to the lower leg

## 2018-02-25 NOTE — PROGRESS NOTE ADULT - ASSESSMENT
recent ankle sprain which got infected resulting in MSSA sepsis/cellulitis with lung embolization  hemoptysis - minimal - possibly secondary to being on hep drip with pulm septic emboli  ADA - resolved  Transaminitis - non specific  Coagulopathy on admission   Right peroneal vein dvt  Pulm artery enlargement  Morbid obesity  SHIELA  Diarrhea   respiratory distress once on floor from PACU  LBP - resolved    continue current care  daily weights  strict I/o's  stop heparin drip for now given hemoptysis/FOBT positive/recent OR  check urgent LE doppler to reassess dvt  eventually will need repeat imaging of lungs and right foot  Duo nebs q 6  Tylenol only for fevers  dilaudid iv for pain  if has recurrent back pain with check MRI LS spine  appreciate podiatry/ heme input recent ankle sprain which got infected resulting in MSSA sepsis/cellulitis with lung embolization  hemoptysis - minimal - possibly secondary to being on hep drip with pulm septic emboli  ADA - resolved  Transaminitis - non specific  Coagulopathy on admission   Right peroneal vein dvt  Pulm artery enlargement  Morbid obesity  SHIELA  Diarrhea   respiratory distress once on floor from PACU  LBP - resolved    continue current care  daily weights  strict I/o's  stop heparin drip for now given hemoptysis/FOBT positive/recent OR  check urgent LE doppler to reassess dvt  eventually will need repeat imaging of lungs and right foot  Duo nebs q 6  Tylenol only for fevers  dilaudid iv for pain  if has recurrent back pain with check MRI LS spine  appreciate podiatry/ heme input  GI eval

## 2018-02-25 NOTE — CHART NOTE - NSCHARTNOTEFT_GEN_A_CORE
MEDICINE NP     JUWAN DOMÍNGUEZ  Patient is a 57y old  Male who presents with a chief complaint of Chest pain, confusion (22 Feb 2018 03:00)       Event Summary:  Received report for Podiatry  Team, patient s/p debridement, of right ankle wound with finding of a large amount of pus drainage, wound was irrigated by pulsatile lavage, until clean with a dressing in place.  Dressing to be  removed by Podiatry in the am.   Patient returned to the unit from PACU at 10:30a . Call by RN that patient is wheezing and is SOB.  Patient seen and examined at the bedside               Vital Signs Last 24 Hrs  T(C): 37.1 (25 Feb 2018 08:30), Max: 37.6 (24 Feb 2018 12:10)  T(F): 98.8 (25 Feb 2018 08:30), Max: 99.7 (24 Feb 2018 20:27)  HR: 95 (25 Feb 2018 09:00) (78 - 105)  BP: 159/90 (25 Feb 2018 09:00) (114/71 - 167/96)  BP(mean): 119 (25 Feb 2018 09:00) (86 - 119)  RR: 16 (25 Feb 2018 09:00) (14 - 20)  SpO2: 97% (25 Feb 2018 09:00) (93% - 100%)        Plan:              Deborah Crawford DNP-C  Medicine Department MEDICINE NP     JUWAN DOMÍNGUEZ  Patient is a 57y old  Male who presents with a chief complaint of Chest pain, confusion (22 Feb 2018 03:00)       Event Summary:  Received report for Podiatry  Team, patient s/p debridement, of right ankle wound with finding of a large amount of pus drainage, wound was irrigated by pulsatile lavage, until clean with a dressing in place.  Dressing to be removed by Podiatry in the am.   Patient returned to the unit from PACU at 10:30a . Call by RN that patient is wheezing and is SOB.  Patient seen and examined at the bedside  found to be in respiratory distress   /84  RR 26 02 sat 94%   Pulmonary :  rale 1/4 bilateral  CV  S1S2 2+PP    GI  soft, NT, ND + BS  Extremities:  2+ edema to left LE, right LE with DSD in place with ACE wrap, toes mobile nail bed with good capillary refill    Plan:  > Lasix 40 mg IVP x1  > Stat Duoneb treatment x 1  > place patient on BiPAP    Discuss with attending, Dr. Miranda patient's current status instructed to start Cleocin 900 mg IVPB q 6 hours   ID team also called     Deborah Crawford DNP-C  Medicine Department

## 2018-02-25 NOTE — PROGRESS NOTE ADULT - SUBJECTIVE AND OBJECTIVE BOX
CHIEF COMPLAINT:    Interval Events:     REVIEW OF SYSTEMS:  Constitutional: [ ] negative [x ] fevers [ ] chills [ ] weight loss [ ] weight gain  HEENT: [ x] negative [ ] dry eyes [ ] eye irritation [ ] postnasal drip [ ] nasal congestion  CV: [ ] negative  [ ] chest pain [ ] orthopnea [ ] palpitations [ ] murmur  Resp: [ ] negative [x ] cough [ ] shortness of breath [x ] dyspnea [ x] wheezing [x ] sputum [ ] hemoptysis  GI: [x ] negative [ ] nausea [ ] vomiting [ ] diarrhea [ ] constipation [ ] abd pain [ ] dysphagia   : [x ] negative [ ] dysuria [ ] nocturia [ ] hematuria [ ] increased urinary frequency  Musculoskeletal: [ ] negative [x ] back pain [ ] myalgias [ ] arthralgias [ ] fracture  Skin: [x ] negative [ ] rash [ ] itch  Neurological: [x ] negative [ ] headache [ ] dizziness [ ] syncope [ ] weakness [ ] numbness  Psychiatric: [x ] negative [ ] anxiety [ ] depression  Endocrine: x ] negative [ ] diabetes [ ] thyroid problem  Hematologic/Lymphatic: [x ] negative [ ] anemia [ ] bleeding problem  Allergic/Immunologic: [x ] negative [ ] itchy eyes [ ] nasal discharge [ ] hives [ ] angioedema  [ ] All other systems negative  [ ] Unable to assess ROS because ________      OBJECTIVE:  ICU Vital Signs Last 24 Hrs  T(C): 37.1 (25 Feb 2018 08:30), Max: 37.6 (24 Feb 2018 12:10)  T(F): 98.8 (25 Feb 2018 08:30), Max: 99.7 (24 Feb 2018 20:27)  HR: 95 (25 Feb 2018 09:00) (78 - 105)  BP: 159/90 (25 Feb 2018 09:00) (114/71 - 167/96)  BP(mean): 119 (25 Feb 2018 09:00) (86 - 119)  ABP: --  ABP(mean): --  RR: 16 (25 Feb 2018 09:00) (14 - 20)  SpO2: 97% (25 Feb 2018 09:00) (93% - 100%)        02-24 @ 07:01  -  02-25 @ 07:00  --------------------------------------------------------  IN: 1180 mL / OUT: 2430 mL / NET: -1250 mL      CAPILLARY BLOOD GLUCOSE      POCT Blood Glucose.: 113 mg/dL (25 Feb 2018 09:38)      PHYSICAL EXAM:  General: NAD   HEENT: NCAT, MOM/ ERIN/EOMI   Neck: Supple, large  Respiratory: Diffuse expiratory wheezing b/l throughout improved  Cardiovascular: RRR, Sinus tachycardic, (-) m/g/r  Abdomen: NT/ND  Extremities: RLE cellulitis appears slightly improved from yesterday. Also with area of small fluctuance over dorsum of foot  Skin: As above  Neurological: Nonfocal  Psychiatry: Appropriate    HOSPITAL MEDICATIONS:  MEDICATIONS  (STANDING):  ceFAZolin   IVPB 2000 milliGRAM(s) IV Intermittent every 8 hours  clindamycin IVPB      clindamycin IVPB 900 milliGRAM(s) IV Intermittent every 8 hours  folic acid 1 milliGRAM(s) Oral daily  furosemide   Injectable 20 milliGRAM(s) IV Push daily  lactobacillus acidophilus 1 Tablet(s) Oral three times a day with meals  lidocaine   Patch 1 Patch Transdermal daily  losartan 50 milliGRAM(s) Oral daily  multivitamin 1 Tablet(s) Oral daily  thiamine 100 milliGRAM(s) Oral daily    MEDICATIONS  (PRN):  acetaminophen   Tablet 650 milliGRAM(s) Oral every 6 hours PRN For Temp greater than 38 C (100.4 F)  acetaminophen   Tablet. 650 milliGRAM(s) Oral every 6 hours PRN Moderate Pain (4 - 6)  oxyCODONE    5 mG/acetaminophen 325 mG 1 Tablet(s) Oral every 4 hours PRN Severe Pain (7 - 10)      LABS:                        13.4   19.0  )-----------( 341      ( 25 Feb 2018 10:35 )             39.9     Hgb Trend: 13.4<--, 12.8<--, 13.0<--, 13.2<--, 13.1<--  02-25    139  |  95<L>  |  24<H>  ----------------------------<  138<H>  3.9   |  30  |  1.11    Ca    8.4      25 Feb 2018 10:35  Phos  3.2     02-24  Mg     2.8     02-24    TPro  6.6  /  Alb  2.3<L>  /  TBili  0.5  /  DBili  x   /  AST  90<H>  /  ALT  91<H>  /  AlkPhos  106  02-24    Creatinine Trend: 1.11<--, 1.09<--, 1.03<--, 1.14<--, 1.40<--  PT/INR - ( 25 Feb 2018 10:35 )   PT: 18.1 sec;   INR: 1.66 ratio         PTT - ( 25 Feb 2018 10:35 )  PTT:30.3 sec    Arterial Blood Gas:  02-23 @ 14:05  7.45/41/81/28/96/4.0  ABG lactate: --        MICROBIOLOGY:     Culture - Blood (collected 24 Feb 2018 05:15)  Source: .Blood Blood  Preliminary Report (25 Feb 2018 06:01):    No growth to date.    Culture - Blood (collected 24 Feb 2018 00:59)  Source: .Blood Blood  Gram Stain (25 Feb 2018 02:08):    Growth in anaerobic bottle: Gram Positive Cocci in Clusters    Growth in aerobic bottle: Gram Positive Cocci in Clusters  Preliminary Report (25 Feb 2018 02:08):    Growth in anaerobic bottle: Gram Positive Cocci in Clusters    Growth in aerobic bottle: Gram Positive Cocci in Clusters    Culture - Blood (collected 24 Feb 2018 00:59)  Source: .Blood Blood  Gram Stain (25 Feb 2018 10:47):    Growth in aerobic bottle: Gram Positive Cocci in Clusters    Growth in anaerobic bottle: Gram Positive Cocci in Clusters  Preliminary Report (25 Feb 2018 10:47):    Growth in aerobic bottle: Gram Positive Cocci in Clusters    Growth in anaerobic bottle: Gram Positive Cocci in Clusters    Culture - Blood (collected 22 Feb 2018 12:56)  Source: .Blood Blood-Peripheral  Gram Stain (23 Feb 2018 03:04):    Growth in aerobic bottle:    Gram Positive Cocci in Clusters    Growth in anaerobic bottle:    Gram Positive Cocci in Clusters  Final Report (24 Feb 2018 19:22):    Growth in aerobic and anaerobic bottles: Staphylococcus aureus    See previous culture  # [id number]    Culture - Blood (collected 22 Feb 2018 12:56)  Source: .Blood Blood-Peripheral  Gram Stain (23 Feb 2018 01:57):    Growth in aerobic bottle: Gram Positive Cocci in Clusters    Growth in anaerobic bottle:    Gram Positive Cocci in Clusters  Final Report (24 Feb 2018 19:21):    Growth in aerobic and anaerobic bottles: Staphylococcus aureus    "Due to technical problems, Proteus sp. will Not be reported as part of    the BCID panel until further notice"    ***Blood Panel PCR results on this specimen are available    approximately 3 hours after the Gram stain result.***    Gram stain, PCR, and/or culture results may not always    correspond due to difference in methodologies.    ************************************************************    This PCR assay was performed using [employer].    The following targets are tested for: Enterococcus,    vancomycin resistant enterococci, Listeria monocytogenes,    coagulase negative staphylococci, S. aureus,    methicillin resistant S. aureus, Streptococcus agalactiae    (Group B), S. pneumoniae, S. pyogenes (Group A),    Acinetobacter baumannii, Enterobacter cloacae, E. coli,    Klebsiella oxytoca, K. pneumoniae, Proteus sp.,    Serratia marcescens, Haemophilus influenzae,    Neisseria meningitidis, Pseudomonas aeruginosa, Candida    albicans, C. glabrata, C krusei, C parapsilosis,    C. tropicalis and the KPC resistance gene.  Organism: Blood Culture PCR  Staphylococcus aureus (24 Feb 2018 19:21)  Organism: Staphylococcus aureus (24 Feb 2018 19:21)  Organism: Blood Culture PCR (24 Feb 2018 19:21)        RADIOLOGY:  [ ] Reviewed and interpreted by me    PULMONARY FUNCTION TESTS:    EKG: CHIEF COMPLAINT: AMS    Interval Events: Pt seen and examined at bedside. Pt s/p surgical debridement of right foot wound. Now feeling well, breathing is much more comfortable. Pt's family says he is cognitively much improved.     REVIEW OF SYSTEMS:  Constitutional: [ ] negative [x ] fevers [ ] chills [ ] weight loss [ ] weight gain  HEENT: [ x] negative [ ] dry eyes [ ] eye irritation [ ] postnasal drip [ ] nasal congestion  CV: [ ] negative  [ ] chest pain [ ] orthopnea [ ] palpitations [ ] murmur  Resp: [ ] negative [x ] cough [ ] shortness of breath [x ] dyspnea [ x] wheezing [x ] sputum [ ] hemoptysis  GI: [x ] negative [ ] nausea [ ] vomiting [ ] diarrhea [ ] constipation [ ] abd pain [ ] dysphagia   : [x ] negative [ ] dysuria [ ] nocturia [ ] hematuria [ ] increased urinary frequency  Musculoskeletal: [ ] negative [x ] back pain [ ] myalgias [ ] arthralgias [ ] fracture  Skin: [x ] negative [ ] rash [ ] itch  Neurological: [x ] negative [ ] headache [ ] dizziness [ ] syncope [ ] weakness [ ] numbness  Psychiatric: [x ] negative [ ] anxiety [ ] depression  Endocrine: x ] negative [ ] diabetes [ ] thyroid problem  Hematologic/Lymphatic: [x ] negative [ ] anemia [ ] bleeding problem  Allergic/Immunologic: [x ] negative [ ] itchy eyes [ ] nasal discharge [ ] hives [ ] angioedema  [ ] All other systems negative  [ ] Unable to assess ROS because ________      OBJECTIVE:  ICU Vital Signs Last 24 Hrs  T(C): 37.1 (25 Feb 2018 08:30), Max: 37.6 (24 Feb 2018 12:10)  T(F): 98.8 (25 Feb 2018 08:30), Max: 99.7 (24 Feb 2018 20:27)  HR: 95 (25 Feb 2018 09:00) (78 - 105)  BP: 159/90 (25 Feb 2018 09:00) (114/71 - 167/96)  BP(mean): 119 (25 Feb 2018 09:00) (86 - 119)  ABP: --  ABP(mean): --  RR: 16 (25 Feb 2018 09:00) (14 - 20)  SpO2: 97% (25 Feb 2018 09:00) (93% - 100%)        02-24 @ 07:01  -  02-25 @ 07:00  --------------------------------------------------------  IN: 1180 mL / OUT: 2430 mL / NET: -1250 mL      CAPILLARY BLOOD GLUCOSE      POCT Blood Glucose.: 113 mg/dL (25 Feb 2018 09:38)      PHYSICAL EXAM:  General: NAD   HEENT: NCAT, MOM/ ERIN/EOMI   Neck: Supple, large  Respiratory: Diffuse expiratory wheezing b/l throughout improved  Cardiovascular: RRR, Sinus tachycardic, (-) m/g/r  Abdomen: NT/ND  Extremities: RLE in cast, raised on pillows  Skin: As above  Neurological: Nonfocal  Psychiatry: Appropriate    HOSPITAL MEDICATIONS:  MEDICATIONS  (STANDING):  ceFAZolin   IVPB 2000 milliGRAM(s) IV Intermittent every 8 hours  clindamycin IVPB      clindamycin IVPB 900 milliGRAM(s) IV Intermittent every 8 hours  folic acid 1 milliGRAM(s) Oral daily  furosemide   Injectable 20 milliGRAM(s) IV Push daily  lactobacillus acidophilus 1 Tablet(s) Oral three times a day with meals  lidocaine   Patch 1 Patch Transdermal daily  losartan 50 milliGRAM(s) Oral daily  multivitamin 1 Tablet(s) Oral daily  thiamine 100 milliGRAM(s) Oral daily    MEDICATIONS  (PRN):  acetaminophen   Tablet 650 milliGRAM(s) Oral every 6 hours PRN For Temp greater than 38 C (100.4 F)  acetaminophen   Tablet. 650 milliGRAM(s) Oral every 6 hours PRN Moderate Pain (4 - 6)  oxyCODONE    5 mG/acetaminophen 325 mG 1 Tablet(s) Oral every 4 hours PRN Severe Pain (7 - 10)      LABS:                        13.4   19.0  )-----------( 341      ( 25 Feb 2018 10:35 )             39.9     Hgb Trend: 13.4<--, 12.8<--, 13.0<--, 13.2<--, 13.1<--  02-25    139  |  95<L>  |  24<H>  ----------------------------<  138<H>  3.9   |  30  |  1.11    Ca    8.4      25 Feb 2018 10:35  Phos  3.2     02-24  Mg     2.8     02-24    TPro  6.6  /  Alb  2.3<L>  /  TBili  0.5  /  DBili  x   /  AST  90<H>  /  ALT  91<H>  /  AlkPhos  106  02-24    Creatinine Trend: 1.11<--, 1.09<--, 1.03<--, 1.14<--, 1.40<--  PT/INR - ( 25 Feb 2018 10:35 )   PT: 18.1 sec;   INR: 1.66 ratio         PTT - ( 25 Feb 2018 10:35 )  PTT:30.3 sec    Arterial Blood Gas:  02-23 @ 14:05  7.45/41/81/28/96/4.0  ABG lactate: --        MICROBIOLOGY:     Culture - Blood (collected 24 Feb 2018 05:15)  Source: .Blood Blood  Preliminary Report (25 Feb 2018 06:01):    No growth to date.    Culture - Blood (collected 24 Feb 2018 00:59)  Source: .Blood Blood  Gram Stain (25 Feb 2018 02:08):    Growth in anaerobic bottle: Gram Positive Cocci in Clusters    Growth in aerobic bottle: Gram Positive Cocci in Clusters  Preliminary Report (25 Feb 2018 02:08):    Growth in anaerobic bottle: Gram Positive Cocci in Clusters    Growth in aerobic bottle: Gram Positive Cocci in Clusters    Culture - Blood (collected 24 Feb 2018 00:59)  Source: .Blood Blood  Gram Stain (25 Feb 2018 10:47):    Growth in aerobic bottle: Gram Positive Cocci in Clusters    Growth in anaerobic bottle: Gram Positive Cocci in Clusters  Preliminary Report (25 Feb 2018 10:47):    Growth in aerobic bottle: Gram Positive Cocci in Clusters    Growth in anaerobic bottle: Gram Positive Cocci in Clusters    Culture - Blood (collected 22 Feb 2018 12:56)  Source: .Blood Blood-Peripheral  Gram Stain (23 Feb 2018 03:04):    Growth in aerobic bottle:    Gram Positive Cocci in Clusters    Growth in anaerobic bottle:    Gram Positive Cocci in Clusters  Final Report (24 Feb 2018 19:22):    Growth in aerobic and anaerobic bottles: Staphylococcus aureus    See previous culture  # [id number]    Culture - Blood (collected 22 Feb 2018 12:56)  Source: .Blood Blood-Peripheral  Gram Stain (23 Feb 2018 01:57):    Growth in aerobic bottle: Gram Positive Cocci in Clusters    Growth in anaerobic bottle:    Gram Positive Cocci in Clusters  Final Report (24 Feb 2018 19:21):    Growth in aerobic and anaerobic bottles: Staphylococcus aureus    "Due to technical problems, Proteus sp. will Not be reported as part of    the BCID panel until further notice"    ***Blood Panel PCR results on this specimen are available    approximately 3 hours after the Gram stain result.***    Gram stain, PCR, and/or culture results may not always    correspond due to difference in methodologies.    ************************************************************    This PCR assay was performed using [employer].    The following targets are tested for: Enterococcus,    vancomycin resistant enterococci, Listeria monocytogenes,    coagulase negative staphylococci, S. aureus,    methicillin resistant S. aureus, Streptococcus agalactiae    (Group B), S. pneumoniae, S. pyogenes (Group A),    Acinetobacter baumannii, Enterobacter cloacae, E. coli,    Klebsiella oxytoca, K. pneumoniae, Proteus sp.,    Serratia marcescens, Haemophilus influenzae,    Neisseria meningitidis, Pseudomonas aeruginosa, Candida    albicans, C. glabrata, C krusei, C parapsilosis,    C. tropicalis and the KPC resistance gene.  Organism: Blood Culture PCR  Staphylococcus aureus (24 Feb 2018 19:21)  Organism: Staphylococcus aureus (24 Feb 2018 19:21)  Organism: Blood Culture PCR (24 Feb 2018 19:21)        RADIOLOGY:  [ x] Reviewed and interpreted by me    PULMONARY FUNCTION TESTS: None    EKG: Reviewed

## 2018-02-25 NOTE — PROGRESS NOTE ADULT - ASSESSMENT
Pt is a 57M with PMHx daily EtOH use and recent ankle sprain 2/13 presenting from home 2/22 for chest pain and AMS x3-4 days with worsening right ankle swelling and redness admitted for sepsis 2/2 cellulitis and then found to have CT Chest findings concerning for b/l diffuse GGO concerning for infectious process vs. embolic event, less likely 2/2 malignancy and blood cx today (+) Staph aureus bacteremia likely 2/2 RLE cellulitis with embolization to lung. s/p surgical debridement today by podiatry with soft tissue necrosis visualized.    -Abx broadended as per ID recs.   -Would give pt BIPAP prn tachypnea, rosa when sleeping. 12/6 at 40% FiO2.  -Given pt's persistent and increased back pain, would recommend MRI of spine to r/o collection. Pt's neurological exam was nonfocal but exam limited 2/2 body habitus and weakness   -TTE performed. Pt will eventually need KELVIN, hopefully tomorrow   -c/w DuoNebs q6hr prn for wheezing  -Pt has hx of SHIELA with CPAP at home, but is not compliant. Will need O/P sleep study with titration after hospital discharge  -Will follow closely over weekend. If pt decompensates would not hesitate to call ICU consult Pt is a 57M with PMHx daily EtOH use and recent ankle sprain 2/13 presenting from home 2/22 for chest pain and AMS x3-4 days with worsening right ankle swelling and redness admitted for sepsis 2/2 cellulitis and then found to have CT Chest findings concerning for b/l diffuse GGO concerning for infectious process vs. embolic event, less likely 2/2 malignancy and blood cx today (+) Staph aureus bacteremia likely 2/2 RLE cellulitis with embolization to lung. s/p surgical debridement today by podiatry with soft tissue necrosis visualized.    -Abx broadened as per ID recs.   -Would give pt BIPAP prn tachypnea, rosa when sleeping. 12/6 at 40% FiO2.  -TTE performed. Pt will eventually need KELVIN, hopefully tomorrow   -c/w DuoNebs q6hr prn for wheezing  -Pt has hx of SHIELA with CPAP at home, but is not compliant. Will need O/P sleep study with titration after hospital discharge  -Will continue to follow

## 2018-02-26 ENCOUNTER — TRANSCRIPTION ENCOUNTER (OUTPATIENT)
Age: 58
End: 2018-02-26

## 2018-02-26 LAB
ALBUMIN SERPL ELPH-MCNC: 2.4 G/DL — LOW (ref 3.3–5)
ALP SERPL-CCNC: 151 U/L — HIGH (ref 40–120)
ALT FLD-CCNC: 81 U/L — HIGH (ref 10–45)
ANION GAP SERPL CALC-SCNC: 15 MMOL/L — SIGNIFICANT CHANGE UP (ref 5–17)
APTT BLD: 30.5 SEC — SIGNIFICANT CHANGE UP (ref 27.5–37.4)
AST SERPL-CCNC: 99 U/L — HIGH (ref 10–40)
BASOPHILS # BLD AUTO: 0 K/UL — SIGNIFICANT CHANGE UP (ref 0–0.2)
BASOPHILS NFR BLD AUTO: 0 % — SIGNIFICANT CHANGE UP (ref 0–2)
BILIRUB DIRECT SERPL-MCNC: 0.2 MG/DL — SIGNIFICANT CHANGE UP (ref 0–0.2)
BILIRUB INDIRECT FLD-MCNC: 0.3 MG/DL — SIGNIFICANT CHANGE UP (ref 0.2–1)
BILIRUB SERPL-MCNC: 0.5 MG/DL — SIGNIFICANT CHANGE UP (ref 0.2–1.2)
BUN SERPL-MCNC: 24 MG/DL — HIGH (ref 7–23)
CALCIUM SERPL-MCNC: 8.7 MG/DL — SIGNIFICANT CHANGE UP (ref 8.4–10.5)
CHLORIDE SERPL-SCNC: 95 MMOL/L — LOW (ref 96–108)
CO2 SERPL-SCNC: 30 MMOL/L — SIGNIFICANT CHANGE UP (ref 22–31)
CREAT SERPL-MCNC: 1.04 MG/DL — SIGNIFICANT CHANGE UP (ref 0.5–1.3)
CULTURE RESULTS: SIGNIFICANT CHANGE UP
EOSINOPHIL # BLD AUTO: 0.2 K/UL — SIGNIFICANT CHANGE UP (ref 0–0.5)
EOSINOPHIL NFR BLD AUTO: 1 % — SIGNIFICANT CHANGE UP (ref 0–6)
FACT VII ACT/NOR PPP: 40 % — LOW (ref 50–165)
GAS PNL BLDA: SIGNIFICANT CHANGE UP
GLUCOSE SERPL-MCNC: 113 MG/DL — HIGH (ref 70–99)
GRAM STN FLD: SIGNIFICANT CHANGE UP
GRAM STN FLD: SIGNIFICANT CHANGE UP
HCT VFR BLD CALC: 36.5 % — LOW (ref 39–50)
HCT VFR BLD CALC: 38.1 % — LOW (ref 39–50)
HGB BLD-MCNC: 12.1 G/DL — LOW (ref 13–17)
HGB BLD-MCNC: 12.4 G/DL — LOW (ref 13–17)
LYMPHOCYTES # BLD AUTO: 10 % — LOW (ref 13–44)
LYMPHOCYTES # BLD AUTO: 2.02 K/UL — SIGNIFICANT CHANGE UP (ref 1–3.3)
MAGNESIUM SERPL-MCNC: 2.9 MG/DL — HIGH (ref 1.6–2.6)
MANUAL SMEAR VERIFICATION: SIGNIFICANT CHANGE UP
MCHC RBC-ENTMCNC: 32 PG — SIGNIFICANT CHANGE UP (ref 27–34)
MCHC RBC-ENTMCNC: 32.1 PG — SIGNIFICANT CHANGE UP (ref 27–34)
MCHC RBC-ENTMCNC: 32.5 GM/DL — SIGNIFICANT CHANGE UP (ref 32–36)
MCHC RBC-ENTMCNC: 33.2 GM/DL — SIGNIFICANT CHANGE UP (ref 32–36)
MCV RBC AUTO: 96.8 FL — SIGNIFICANT CHANGE UP (ref 80–100)
MCV RBC AUTO: 98.4 FL — SIGNIFICANT CHANGE UP (ref 80–100)
MONOCYTES # BLD AUTO: 0.81 K/UL — SIGNIFICANT CHANGE UP (ref 0–0.9)
MONOCYTES NFR BLD AUTO: 4 % — SIGNIFICANT CHANGE UP (ref 2–14)
MYELOCYTES NFR BLD: 2 — SIGNIFICANT CHANGE UP
NEUTROPHILS # BLD AUTO: 16.6 K/UL — HIGH (ref 1.8–7.4)
NEUTROPHILS NFR BLD AUTO: 82 % — HIGH (ref 43–77)
PHOSPHATE SERPL-MCNC: 3.5 MG/DL — SIGNIFICANT CHANGE UP (ref 2.5–4.5)
PLAT MORPH BLD: NORMAL — SIGNIFICANT CHANGE UP
PLATELET # BLD AUTO: 318 K/UL — SIGNIFICANT CHANGE UP (ref 150–400)
PLATELET # BLD AUTO: 365 K/UL — SIGNIFICANT CHANGE UP (ref 150–400)
PLATELET CLUMP BLD QL SMEAR: SLIGHT
POTASSIUM SERPL-MCNC: 4.5 MMOL/L — SIGNIFICANT CHANGE UP (ref 3.5–5.3)
POTASSIUM SERPL-SCNC: 4.5 MMOL/L — SIGNIFICANT CHANGE UP (ref 3.5–5.3)
PROT SERPL-MCNC: 6.7 G/DL — SIGNIFICANT CHANGE UP (ref 6–8.3)
RBC # BLD: 3.77 M/UL — LOW (ref 4.2–5.8)
RBC # BLD: 3.87 M/UL — LOW (ref 4.2–5.8)
RBC # FLD: 14.2 % — SIGNIFICANT CHANGE UP (ref 10.3–14.5)
RBC # FLD: 14.2 % — SIGNIFICANT CHANGE UP (ref 10.3–14.5)
RBC BLD AUTO: NORMAL — SIGNIFICANT CHANGE UP
SODIUM SERPL-SCNC: 140 MMOL/L — SIGNIFICANT CHANGE UP (ref 135–145)
SPECIMEN SOURCE: SIGNIFICANT CHANGE UP
TOXIC GRANULES BLD QL SMEAR: PRESENT — SIGNIFICANT CHANGE UP
VARIANT LYMPHS # BLD: 1 % — SIGNIFICANT CHANGE UP (ref 0–6)
WBC # BLD: 19.6 K/UL — HIGH (ref 3.8–10.5)
WBC # BLD: 20.24 K/UL — HIGH (ref 3.8–10.5)
WBC # FLD AUTO: 19.6 K/UL — HIGH (ref 3.8–10.5)
WBC # FLD AUTO: 20.24 K/UL — HIGH (ref 3.8–10.5)

## 2018-02-26 PROCEDURE — 99233 SBSQ HOSP IP/OBS HIGH 50: CPT

## 2018-02-26 PROCEDURE — 93320 DOPPLER ECHO COMPLETE: CPT | Mod: 26

## 2018-02-26 PROCEDURE — 93325 DOPPLER ECHO COLOR FLOW MAPG: CPT | Mod: 26

## 2018-02-26 PROCEDURE — 93312 ECHO TRANSESOPHAGEAL: CPT | Mod: 26

## 2018-02-26 RX ORDER — HYDROMORPHONE HYDROCHLORIDE 2 MG/ML
1 INJECTION INTRAMUSCULAR; INTRAVENOUS; SUBCUTANEOUS ONCE
Qty: 0 | Refills: 0 | Status: DISCONTINUED | OUTPATIENT
Start: 2018-02-26 | End: 2018-02-26

## 2018-02-26 RX ADMIN — Medication 100 MILLIGRAM(S): at 20:21

## 2018-02-26 RX ADMIN — HYDROMORPHONE HYDROCHLORIDE 1 MILLIGRAM(S): 2 INJECTION INTRAMUSCULAR; INTRAVENOUS; SUBCUTANEOUS at 09:05

## 2018-02-26 RX ADMIN — Medication 3 MILLILITER(S): at 12:03

## 2018-02-26 RX ADMIN — Medication 100 MILLIGRAM(S): at 01:43

## 2018-02-26 RX ADMIN — Medication 100 MILLIGRAM(S): at 10:41

## 2018-02-26 RX ADMIN — Medication 3 MILLILITER(S): at 05:16

## 2018-02-26 RX ADMIN — Medication 100 MILLIGRAM(S): at 12:01

## 2018-02-26 RX ADMIN — Medication 1 MILLIGRAM(S): at 12:01

## 2018-02-26 RX ADMIN — Medication 100 MILLIGRAM(S): at 11:54

## 2018-02-26 RX ADMIN — Medication 1 TABLET(S): at 12:01

## 2018-02-26 RX ADMIN — Medication 3 MILLILITER(S): at 00:20

## 2018-02-26 RX ADMIN — LOSARTAN POTASSIUM 50 MILLIGRAM(S): 100 TABLET, FILM COATED ORAL at 06:44

## 2018-02-26 RX ADMIN — Medication 100 MILLIGRAM(S): at 22:00

## 2018-02-26 RX ADMIN — HYDROMORPHONE HYDROCHLORIDE 1 MILLIGRAM(S): 2 INJECTION INTRAMUSCULAR; INTRAVENOUS; SUBCUTANEOUS at 14:15

## 2018-02-26 RX ADMIN — Medication 3 MILLILITER(S): at 23:52

## 2018-02-26 RX ADMIN — HYDROMORPHONE HYDROCHLORIDE 1 MILLIGRAM(S): 2 INJECTION INTRAMUSCULAR; INTRAVENOUS; SUBCUTANEOUS at 06:44

## 2018-02-26 RX ADMIN — Medication 20 MILLIGRAM(S): at 05:16

## 2018-02-26 RX ADMIN — LIDOCAINE 1 PATCH: 4 CREAM TOPICAL at 12:01

## 2018-02-26 RX ADMIN — HYDROMORPHONE HYDROCHLORIDE 1 MILLIGRAM(S): 2 INJECTION INTRAMUSCULAR; INTRAVENOUS; SUBCUTANEOUS at 13:56

## 2018-02-26 RX ADMIN — Medication 100 MILLIGRAM(S): at 00:20

## 2018-02-26 RX ADMIN — LIDOCAINE 1 PATCH: 4 CREAM TOPICAL at 00:26

## 2018-02-26 RX ADMIN — HYDROMORPHONE HYDROCHLORIDE 1 MILLIGRAM(S): 2 INJECTION INTRAMUSCULAR; INTRAVENOUS; SUBCUTANEOUS at 08:49

## 2018-02-26 NOTE — PROGRESS NOTE ADULT - SUBJECTIVE AND OBJECTIVE BOX
Chief Complaint: fu    History of Present Illness: feels much improved; no pain, no cp/dyspnea, sl cough, no n/v/abd pain, no bleeding      MEDICATIONS  (STANDING):  ALBUTerol/ipratropium for Nebulization 3 milliLiter(s) Nebulizer every 6 hours  ceFAZolin   IVPB 2000 milliGRAM(s) IV Intermittent every 8 hours  clindamycin IVPB      clindamycin IVPB 900 milliGRAM(s) IV Intermittent every 8 hours  folic acid 1 milliGRAM(s) Oral daily  furosemide   Injectable 20 milliGRAM(s) IV Push daily  lactobacillus acidophilus 1 Tablet(s) Oral three times a day with meals  lidocaine   Patch 1 Patch Transdermal daily  losartan 50 milliGRAM(s) Oral daily  multivitamin 1 Tablet(s) Oral daily  thiamine 100 milliGRAM(s) Oral daily    MEDICATIONS  (PRN):  acetaminophen   Tablet 650 milliGRAM(s) Oral every 6 hours PRN For Temp greater than 38 C (100.4 F)  HYDROmorphone  Injectable 1 milliGRAM(s) IV Push every 6 hours PRN Moderate Pain (4 - 6)  oxyCODONE    5 mG/acetaminophen 325 mG 1 Tablet(s) Oral every 4 hours PRN Severe Pain (7 - 10)      Allergies    No Known Allergies    Intolerances        Vital Signs Last 24 Hrs  T(C): 36.7 (26 Feb 2018 12:11), Max: 37.5 (26 Feb 2018 04:30)  T(F): 98.1 (26 Feb 2018 12:11), Max: 99.5 (26 Feb 2018 04:30)  HR: 90 (26 Feb 2018 13:53) (82 - 95)  BP: 125/79 (26 Feb 2018 13:53) (120/79 - 136/78)  BP(mean): --  RR: 118 (26 Feb 2018 13:53) (18 - 118)  SpO2: 95% (26 Feb 2018 13:53) (92% - 96%)    PHYSICAL EXAM  General: adult in NAD  HEENT: clear oropharynx, anicteric sclera, pink conjunctiva  Neck: supple  CV: normal S1/S2 with no murmur rubs or gallops  Lungs: clear to auscultation, no wheezes, no rales  Abdomen: soft, obese, non-tender non-distended, positive bowel sounds  Ext: R foot bandaged    LABS:                          12.1   20.24 )-----------( 318      ( 26 Feb 2018 07:20 )             36.5         Mean Cell Volume : 96.8 fl  Mean Cell Hemoglobin : 32.1 pg  Mean Cell Hemoglobin Concentration : 33.2 gm/dL  Auto Neutrophil # : 16.60 K/uL  Auto Lymphocyte # : 2.02 K/uL  Auto Monocyte # : 0.81 K/uL  Auto Eosinophil # : 0.20 K/uL  Auto Basophil # : 0.00 K/uL  Auto Neutrophil % : 82.0 %  Auto Lymphocyte % : 10.0 %  Auto Monocyte % : 4.0 %  Auto Eosinophil % : 1.0 %  Auto Basophil % : 0.0 %      Serial CBC's  02-26 @ 07:20  Hct-36.5 / Hgb-12.1 / Plat-318 / RBC-3.77 / WBC-20.24  Serial CBC's  02-25 @ 10:35  Hct-39.9 / Hgb-13.4 / Plat-341 / RBC-4.01 / WBC-19.0  Serial CBC's  02-24 @ 12:39  Hct-39.1 / Hgb-12.8 / Plat-300 / RBC-3.88 / WBC-18.9  Serial CBC's  02-24 @ 03:02  Hct-40.6 / Hgb-13.0 / Plat-300 / RBC-4.01 / WBC-18.6  Serial CBC's  02-23 @ 03:34  Hct-39.7 / Hgb-13.2 / Plat-281 / RBC-3.94 / WBC-17.8  Serial CBC's  02-23 @ 02:33  Hct-38.9 / Hgb-13.1 / Plat-260 / RBC-3.84 / WBC-17.4      02-26    140  |  95<L>  |  24<H>  ----------------------------<  113<H>  4.5   |  30  |  1.04    Ca    8.7      26 Feb 2018 09:24  Phos  3.5     02-26  Mg     2.9     02-26    TPro  6.7  /  Alb  2.4<L>  /  TBili  0.5  /  DBili  0.2  /  AST  99<H>  /  ALT  81<H>  /  AlkPhos  151<H>  02-26      PT/INR - ( 25 Feb 2018 10:35 )   PT: 18.1 sec;   INR: 1.66 ratio         PTT - ( 26 Feb 2018 09:09 )  PTT:30.5 sec          Radiology:        < from: [facility] Duplex Lower Ext Vein Scan, Bilat (02.22.18 @ 16:01) >  IMPRESSION: Positive deep venous thrombosis involving the right calf at   the right peroneal vein.    No evidence of left lower extremity deep venous thrombosis.

## 2018-02-26 NOTE — PROGRESS NOTE ADULT - SUBJECTIVE AND OBJECTIVE BOX
MEDICINE, PROGRESS NOTE [phone]    LEE DOMÍNGUEZ 57y [id number]    Patient seen and examined.  Patient is a 57y old  Male who presents with a chief complaint of Chest pain, confusion (22 Feb 2018 03:00)  Pt feels better today.    PAST MEDICAL & SURGICAL HISTORY:  Sciatica of right side  Essential hypertension  No significant past surgical history    MEDICATIONS  (STANDING):  ALBUTerol/ipratropium for Nebulization 3 milliLiter(s) Nebulizer every 6 hours  ceFAZolin   IVPB 2000 milliGRAM(s) IV Intermittent every 8 hours  clindamycin IVPB      clindamycin IVPB 900 milliGRAM(s) IV Intermittent every 8 hours  folic acid 1 milliGRAM(s) Oral daily  furosemide   Injectable 20 milliGRAM(s) IV Push daily  lactobacillus acidophilus 1 Tablet(s) Oral three times a day with meals  lidocaine   Patch 1 Patch Transdermal daily  losartan 50 milliGRAM(s) Oral daily  multivitamin 1 Tablet(s) Oral daily  thiamine 100 milliGRAM(s) Oral daily    MEDICATIONS  (PRN):  acetaminophen   Tablet 650 milliGRAM(s) Oral every 6 hours PRN For Temp greater than 38 C (100.4 F)  HYDROmorphone  Injectable 1 milliGRAM(s) IV Push every 6 hours PRN Moderate Pain (4 - 6)  oxyCODONE    5 mG/acetaminophen 325 mG 1 Tablet(s) Oral every 4 hours PRN Severe Pain (7 - 10)    Allergies    No Known Allergies    Intolerances        PHYSICAL EXAM:  Constitutional: NAD  HEENT: Normocephalic, EOMI  Neck:  No JVD  Respiratory: CTA B/L, No wheezes  Cardiovascular: S1, S2, RRR, + systolic murmur  Gastrointestinal: BS+, soft, NT/ND  Extremities: dec peripheral edema RLE, dressing - c/d/i  Neurological: AAOX3, no focal deficits  Psychiatric: Normal mood, normal affect  : No Sosa    Vital Signs Last 24 Hrs  T(C): 37.3 (26 Feb 2018 19:45), Max: 37.5 (26 Feb 2018 04:30)  T(F): 99.2 (26 Feb 2018 19:45), Max: 99.5 (26 Feb 2018 04:30)  HR: 91 (26 Feb 2018 19:45) (82 - 95)  BP: 135/82 (26 Feb 2018 19:45) (120/79 - 136/78)  BP(mean): --  RR: 18 (26 Feb 2018 19:45) (18 - 118)  SpO2: 92% (26 Feb 2018 19:45) (92% - 96%)  I&O's Summary    25 Feb 2018 07:01  -  26 Feb 2018 07:00  --------------------------------------------------------  IN: 780 mL / OUT: 2530 mL / NET: -1750 mL    26 Feb 2018 07:01  -  26 Feb 2018 20:12  --------------------------------------------------------  IN: 100 mL / OUT: 800 mL / NET: -700 mL        LABS:                        12.1   20.24 )-----------( 318      ( 26 Feb 2018 07:20 )             36.5     02-26    140  |  95<L>  |  24<H>  ----------------------------<  113<H>  4.5   |  30  |  1.04    Ca    8.7      26 Feb 2018 09:24  Phos  3.5     02-26  Mg     2.9     02-26    TPro  6.7  /  Alb  2.4<L>  /  TBili  0.5  /  DBili  0.2  /  AST  99<H>  /  ALT  81<H>  /  AlkPhos  151<H>  02-26  Magnesium, Serum: 2.9 mg/dL (02-26 @ 09:24)    < from: Transesophageal Echocardiogram w/o TTE (02.26.18 @ 18:04) >  Patient name: LEE DOMÍNGUEZ  YOB: 1960   Age: 57 (M)   MR#: [id number]  Study Date: 2/26/2018  Location: [address] [place], [place] [id number]: Lee Licona M.D.  Study quality: Technically good  Referring Physician: Naomy Martino MD  Blood Pressure: 133/75 mmHg  Height: 163 cm  Weight: 122 kg  BSA: 2.2 m2  Heart Rate: 92 mmHg  ------------------------------------------------------------------------  PROCEDURE: Transesophageal (KELVIN) was performed.  Informed  consent was first obtained for KELVIN. The patient was sedated  - see anesthesia record.  The procedure was monitored with  automatic blood pressure monitoring, ECG tracings and pulse  oximetry. The transesophageal probe was placed in the  esophagus posterior to the heart without complications.  Patient was injected with 10 cc's of aerosolized saline.  Patient was injected with 10 cc's of aerosolized saline.  INDICATION: Bacteremia (R78.81)  ------------------------------------------------------------------------  Dimensions:    Normal Values:  LA:            2.0 - 4.0 cm  Ao:     3.7    2.0 - 3.8 cm  SEPTUM:        0.6 - 1.2 cm  PWT:           0.6 - 1.1 cm  LVIDd:         3.0 - 5.6 cm  LVIDs:         1.8 - 4.0 cm  ------------------------------------------------------------------------  Observations:  Mitral Valve: Normal mitral valve. Minimal mitral  regurgitation.  Aortic Valve/Aorta: Mildly thickened aortic valve with  nromal opening. Mild aortic regurgitation.  Normal size aortic root, arch, and descending thoracic  aorta. Mild atheroma.  Left Atrium: No left atrial or left atrial appendage  thrombus. Normal left atrial appendage function  (velocity>40 cm/s).  Left Ventricle: Normal left ventricular systolic function.  Grossly normal left ventricular size.  Right Heart: Normal right atrium. Normal right ventricular  size and function. Normal tricuspid valve. Mild tricuspid  regurgitation. Normal pulmonic valve.  Pericardium/Pleura: Normal pericardium with no pericardial  effusion.  Hemodynamic: Mobile interatrial septum. Agitated saline  injection and color flow doppler demonstrate no evidence of  a patent foramen ovale.  ------------------------------------------------------------------------  Conclusions:  1. Normal mitral valve. Minimal mitral regurgitation.  2. Mildly thickened aortic valve with nromal opening. Mild  aortic regurgitation.  3. No left atrial or left atrial appendage thrombus. Normal  left atrial appendage function (velocity>40 cm/s).  4. Normal left ventricular systolic function.  5. Normal right ventricular size and function.  6. Mobile interatrial septum. Agitated saline injection and  color flow doppler demonstrate no evidence of a patent  foramen ovale.  No vegetations seen. If clinical suspicion for endocardits  remains high,consider interval follow-up KELVIN at a later  date.  ------------------------------------------------------------------------  Confirmed on  2/26/2018 - 18:59:55 by Mary Lawson M.D.  ------------------------------------------------------------------------

## 2018-02-26 NOTE — PROGRESS NOTE ADULT - ASSESSMENT
57 year old male with HTN and daily alcohol use presenting with worsening SOB, chest pain, confusion and worsening right lower extremity pain and swelling and redness.    Patient sustained an injury to his right foot at work 2/13/18 and went to the ED - discharged home with pain meds. While home, developed worsening SOB, confusion and right foot pain over 2 weeks.     CT chest concerning for septic emboli. Found to have a DVT. Patient taken to OR yesterday for I&D for necrosis of right foot.     Now afebrile, remains with leukocytosis, ADA improved.  Blood cx with MSSA.  Wound cx with S. aureus  Clinically looks better today    Recommend:  -Continue cefazolin 2 grams IV q 8 hours and clindamycin 900 mg IV q 8 hours.  -Repeat blood cxs x 2 sets (ordered)  -Will need KELVIN to r/o endocarditis  -F/U Podiatry   -Will follow along with you.    Corey Salmeron MD  Pager [phone]  After 5pm/weekends call [phone]

## 2018-02-26 NOTE — PROGRESS NOTE ADULT - SUBJECTIVE AND OBJECTIVE BOX
CHIEF COMPLAINT:    Interval Events:    Pt seen and examined at bedside. Planned for possible KELVIN today.    REVIEW OF SYSTEMS:  Constitutional: [ ] negative [x ] fevers [ ] chills [ ] weight loss [ ] weight gain  HEENT: [ x] negative [ ] dry eyes [ ] eye irritation [ ] postnasal drip [ ] nasal congestion  CV: [ ] negative  [ ] chest pain [ ] orthopnea [ ] palpitations [ ] murmur  Resp: [ ] negative [x ] cough [ ] shortness of breath [x ] dyspnea [ x] wheezing [x ] sputum [ ] hemoptysis  GI: [x ] negative [ ] nausea [ ] vomiting [ ] diarrhea [ ] constipation [ ] abd pain [ ] dysphagia   : [x ] negative [ ] dysuria [ ] nocturia [ ] hematuria [ ] increased urinary frequency  Musculoskeletal: [ ] negative [x ] back pain [ ] myalgias [ ] arthralgias [ ] fracture  Skin: [x ] negative [ ] rash [ ] itch  Neurological: [x ] negative [ ] headache [ ] dizziness [ ] syncope [ ] weakness [ ] numbness  Psychiatric: [x ] negative [ ] anxiety [ ] depression  Endocrine: x ] negative [ ] diabetes [ ] thyroid problem  Hematologic/Lymphatic: [x ] negative [ ] anemia [ ] bleeding problem  Allergic/Immunologic: [x ] negative [ ] itchy eyes [ ] nasal discharge [ ] hives [ ] angioedema  [ ] All other systems negative  [ ] Unable to assess ROS because ________      OBJECTIVE:  ICU Vital Signs Last 24 Hrs  T(C): 37.3 (26 Feb 2018 08:31), Max: 37.5 (26 Feb 2018 04:30)  T(F): 99.1 (26 Feb 2018 08:31), Max: 99.5 (26 Feb 2018 04:30)  HR: 95 (26 Feb 2018 09:45) (82 - 101)  BP: 130/83 (26 Feb 2018 08:31) (120/79 - 150/82)  BP(mean): --  ABP: --  ABP(mean): --  RR: 21 (26 Feb 2018 09:45) (18 - 22)  SpO2: 95% (26 Feb 2018 09:45) (92% - 97%)        02-25 @ 07:01  -  02-26 @ 07:00  --------------------------------------------------------  IN: 780 mL / OUT: 2530 mL / NET: -1750 mL    02-26 @ 07:01  - 02-26 @ 10:02  --------------------------------------------------------  IN: 0 mL / OUT: 300 mL / NET: -300 mL      CAPILLARY BLOOD GLUCOSE      POCT Blood Glucose.: 137 mg/dL (25 Feb 2018 11:41)      PHYSICAL EXAM:  General: NAD   HEENT: NCAT, MOM/ ERIN/EOMI   Neck: Supple, large  Respiratory: Diffuse expiratory wheezing b/l throughout improved  Cardiovascular: RRR, Sinus tachycardic, (-) m/g/r  Abdomen: NT/ND  Extremities: RLE in cast, raised on pillows  Skin: As above  Neurological: Nonfocal  Psychiatry: Appropriate      HOSPITAL MEDICATIONS:  MEDICATIONS  (STANDING):  ALBUTerol/ipratropium for Nebulization 3 milliLiter(s) Nebulizer every 6 hours  ceFAZolin   IVPB 2000 milliGRAM(s) IV Intermittent every 8 hours  clindamycin IVPB      clindamycin IVPB 900 milliGRAM(s) IV Intermittent every 8 hours  folic acid 1 milliGRAM(s) Oral daily  furosemide   Injectable 20 milliGRAM(s) IV Push daily  lactobacillus acidophilus 1 Tablet(s) Oral three times a day with meals  lidocaine   Patch 1 Patch Transdermal daily  losartan 50 milliGRAM(s) Oral daily  multivitamin 1 Tablet(s) Oral daily  thiamine 100 milliGRAM(s) Oral daily    MEDICATIONS  (PRN):  acetaminophen   Tablet 650 milliGRAM(s) Oral every 6 hours PRN For Temp greater than 38 C (100.4 F)  HYDROmorphone  Injectable 1 milliGRAM(s) IV Push every 6 hours PRN Moderate Pain (4 - 6)  oxyCODONE    5 mG/acetaminophen 325 mG 1 Tablet(s) Oral every 4 hours PRN Severe Pain (7 - 10)      LABS:                        12.1   20.24 )-----------( 318      ( 26 Feb 2018 07:20 )             36.5     Hgb Trend: 12.1<--, 13.4<--, 12.8<--, 13.0<--, 13.2<--  02-25    139  |  95<L>  |  24<H>  ----------------------------<  138<H>  3.9   |  30  |  1.11    Ca    8.4      25 Feb 2018 10:35    TPro  7.0  /  Alb  2.5<L>  /  TBili  0.5  /  DBili  0.1  /  AST  104<H>  /  ALT  81<H>  /  AlkPhos  132<H>  02-25    Creatinine Trend: 1.11<--, 1.09<--, 1.03<--, 1.14<--, 1.40<--  PT/INR - ( 25 Feb 2018 10:35 )   PT: 18.1 sec;   INR: 1.66 ratio         PTT - ( 25 Feb 2018 10:35 )  PTT:30.3 sec          MICROBIOLOGY:     Culture - Abscess with Gram Stain (collected 25 Feb 2018 08:39)  Source: .Abscess right foot  Preliminary Report (26 Feb 2018 08:50):    Few Staphylococcus aureus    Culture - Stool (collected 25 Feb 2018 02:18)  Source: .Stool Feces  Preliminary Report (25 Feb 2018 20:54):    No enteric pathogens to date: Final culture pending    Culture - Blood (collected 24 Feb 2018 12:54)  Source: .Blood Blood  Gram Stain (26 Feb 2018 01:16):    Growth in anaerobic bottle: Gram Positive Cocci in Clusters    Growth in aerobic bottle:    Gram Positive Cocci in Clusters  Preliminary Report (26 Feb 2018 01:16):    Growth in anaerobic bottle: Gram Positive Cocci in Clusters    Growth in aerobic bottle:    Gram Positive Cocci in Clusters    Culture - Blood (collected 24 Feb 2018 05:15)  Source: .Blood Blood  Gram Stain (25 Feb 2018 20:49):    Growth in anaerobic bottle: Gram Positive Cocci in Clusters    Growth in aerobic bottle: Gram Positive Cocci in Clusters  Preliminary Report (25 Feb 2018 20:49):    Growth in anaerobic bottle: Gram Positive Cocci in Clusters    Growth in aerobic bottle: Gram Positive Cocci in Clusters    Culture - Blood (collected 24 Feb 2018 00:59)  Source: .Blood Blood  Gram Stain (25 Feb 2018 02:08):    Growth in anaerobic bottle: Gram Positive Cocci in Clusters    Growth in aerobic bottle: Gram Positive Cocci in Clusters  Final Report (25 Feb 2018 17:06):    Growth in aerobic and anaerobic bottles: Staphylococcus aureus    See previous culture [id number]    Culture - Blood (collected 24 Feb 2018 00:59)  Source: .Blood Blood  Gram Stain (25 Feb 2018 10:47):    Growth in aerobic bottle: Gram Positive Cocci in Clusters    Growth in anaerobic bottle: Gram Positive Cocci in Clusters  Preliminary Report (25 Feb 2018 10:47):    Growth in aerobic bottle: Gram Positive Cocci in Clusters    Growth in anaerobic bottle: Gram Positive Cocci in Clusters        RADIOLOGY:  [ ] Reviewed and interpreted by me    PULMONARY FUNCTION TESTS:    EKG: CHIEF COMPLAINT: AMS    Interval Events:     Pt seen and examined at bedside. Feeling much better today. Planned for possible KELVIN today.    REVIEW OF SYSTEMS:  Constitutional: [ ] negative [x ] fevers [ ] chills [ ] weight loss [ ] weight gain  HEENT: [ x] negative [ ] dry eyes [ ] eye irritation [ ] postnasal drip [ ] nasal congestion  CV: [ ] negative  [ ] chest pain [ ] orthopnea [ ] palpitations [ ] murmur  Resp: [ ] negative [x ] cough [ ] shortness of breath [x ] dyspnea [ x] wheezing [x ] sputum [ ] hemoptysis  GI: [x ] negative [ ] nausea [ ] vomiting [ ] diarrhea [ ] constipation [ ] abd pain [ ] dysphagia   : [x ] negative [ ] dysuria [ ] nocturia [ ] hematuria [ ] increased urinary frequency  Musculoskeletal: [ ] negative [x ] back pain [ ] myalgias [ ] arthralgias [ ] fracture  Skin: [x ] negative [ ] rash [ ] itch  Neurological: [x ] negative [ ] headache [ ] dizziness [ ] syncope [ ] weakness [ ] numbness  Psychiatric: [x ] negative [ ] anxiety [ ] depression  Endocrine: x ] negative [ ] diabetes [ ] thyroid problem  Hematologic/Lymphatic: [x ] negative [ ] anemia [ ] bleeding problem  Allergic/Immunologic: [x ] negative [ ] itchy eyes [ ] nasal discharge [ ] hives [ ] angioedema  [ ] All other systems negative  [ ] Unable to assess ROS because ________      OBJECTIVE:  ICU Vital Signs Last 24 Hrs  T(C): 37.3 (26 Feb 2018 08:31), Max: 37.5 (26 Feb 2018 04:30)  T(F): 99.1 (26 Feb 2018 08:31), Max: 99.5 (26 Feb 2018 04:30)  HR: 95 (26 Feb 2018 09:45) (82 - 101)  BP: 130/83 (26 Feb 2018 08:31) (120/79 - 150/82)  BP(mean): --  ABP: --  ABP(mean): --  RR: 21 (26 Feb 2018 09:45) (18 - 22)  SpO2: 95% (26 Feb 2018 09:45) (92% - 97%)        02-25 @ 07:01  -  02-26 @ 07:00  --------------------------------------------------------  IN: 780 mL / OUT: 2530 mL / NET: -1750 mL    02-26 @ 07:01  - 02-26 @ 10:02  --------------------------------------------------------  IN: 0 mL / OUT: 300 mL / NET: -300 mL      CAPILLARY BLOOD GLUCOSE      POCT Blood Glucose.: 137 mg/dL (25 Feb 2018 11:41)      PHYSICAL EXAM:  General: NAD   HEENT: NCAT, MOM/ ERIN/EOMI   Neck: Supple, large  Respiratory: CTA B/L  Cardiovascular: RRR, (-) m/g/r  Abdomen: NT/ND  Extremities: RLE in cast, raised on pillows  Skin: As above  Neurological: Nonfocal  Psychiatry: Appropriate      HOSPITAL MEDICATIONS:  MEDICATIONS  (STANDING):  ALBUTerol/ipratropium for Nebulization 3 milliLiter(s) Nebulizer every 6 hours  ceFAZolin   IVPB 2000 milliGRAM(s) IV Intermittent every 8 hours  clindamycin IVPB      clindamycin IVPB 900 milliGRAM(s) IV Intermittent every 8 hours  folic acid 1 milliGRAM(s) Oral daily  furosemide   Injectable 20 milliGRAM(s) IV Push daily  lactobacillus acidophilus 1 Tablet(s) Oral three times a day with meals  lidocaine   Patch 1 Patch Transdermal daily  losartan 50 milliGRAM(s) Oral daily  multivitamin 1 Tablet(s) Oral daily  thiamine 100 milliGRAM(s) Oral daily    MEDICATIONS  (PRN):  acetaminophen   Tablet 650 milliGRAM(s) Oral every 6 hours PRN For Temp greater than 38 C (100.4 F)  HYDROmorphone  Injectable 1 milliGRAM(s) IV Push every 6 hours PRN Moderate Pain (4 - 6)  oxyCODONE    5 mG/acetaminophen 325 mG 1 Tablet(s) Oral every 4 hours PRN Severe Pain (7 - 10)      LABS:                        12.1   20.24 )-----------( 318      ( 26 Feb 2018 07:20 )             36.5     Hgb Trend: 12.1<--, 13.4<--, 12.8<--, 13.0<--, 13.2<--  02-25    139  |  95<L>  |  24<H>  ----------------------------<  138<H>  3.9   |  30  |  1.11    Ca    8.4      25 Feb 2018 10:35    TPro  7.0  /  Alb  2.5<L>  /  TBili  0.5  /  DBili  0.1  /  AST  104<H>  /  ALT  81<H>  /  AlkPhos  132<H>  02-25    Creatinine Trend: 1.11<--, 1.09<--, 1.03<--, 1.14<--, 1.40<--  PT/INR - ( 25 Feb 2018 10:35 )   PT: 18.1 sec;   INR: 1.66 ratio         PTT - ( 25 Feb 2018 10:35 )  PTT:30.3 sec          MICROBIOLOGY:     Culture - Abscess with Gram Stain (collected 25 Feb 2018 08:39)  Source: .Abscess right foot  Preliminary Report (26 Feb 2018 08:50):    Few Staphylococcus aureus    Culture - Stool (collected 25 Feb 2018 02:18)  Source: .Stool Feces  Preliminary Report (25 Feb 2018 20:54):    No enteric pathogens to date: Final culture pending    Culture - Blood (collected 24 Feb 2018 12:54)  Source: .Blood Blood  Gram Stain (26 Feb 2018 01:16):    Growth in anaerobic bottle: Gram Positive Cocci in Clusters    Growth in aerobic bottle:    Gram Positive Cocci in Clusters  Preliminary Report (26 Feb 2018 01:16):    Growth in anaerobic bottle: Gram Positive Cocci in Clusters    Growth in aerobic bottle:    Gram Positive Cocci in Clusters    Culture - Blood (collected 24 Feb 2018 05:15)  Source: .Blood Blood  Gram Stain (25 Feb 2018 20:49):    Growth in anaerobic bottle: Gram Positive Cocci in Clusters    Growth in aerobic bottle: Gram Positive Cocci in Clusters  Preliminary Report (25 Feb 2018 20:49):    Growth in anaerobic bottle: Gram Positive Cocci in Clusters    Growth in aerobic bottle: Gram Positive Cocci in Clusters    Culture - Blood (collected 24 Feb 2018 00:59)  Source: .Blood Blood  Gram Stain (25 Feb 2018 02:08):    Growth in anaerobic bottle: Gram Positive Cocci in Clusters    Growth in aerobic bottle: Gram Positive Cocci in Clusters  Final Report (25 Feb 2018 17:06):    Growth in aerobic and anaerobic bottles: Staphylococcus aureus    See previous culture [id number]    Culture - Blood (collected 24 Feb 2018 00:59)  Source: .Blood Blood  Gram Stain (25 Feb 2018 10:47):    Growth in aerobic bottle: Gram Positive Cocci in Clusters    Growth in anaerobic bottle: Gram Positive Cocci in Clusters  Preliminary Report (25 Feb 2018 10:47):    Growth in aerobic bottle: Gram Positive Cocci in Clusters    Growth in anaerobic bottle: Gram Positive Cocci in Clusters        RADIOLOGY:  [x ] Reviewed and interpreted by me    PULMONARY FUNCTION TESTS: None    EKG: Reviewed

## 2018-02-26 NOTE — PROGRESS NOTE ADULT - ASSESSMENT
Pt is a 57M with PMHx daily EtOH use and recent ankle sprain 2/13 presenting from home 2/22 for chest pain and AMS x3-4 days with worsening right ankle swelling and redness admitted for sepsis 2/2 cellulitis and then found to have CT Chest findings concerning for b/l diffuse GGO concerning for infectious process vs. embolic event, less likely 2/2 malignancy and blood cx today (+) Staph aureus bacteremia likely 2/2 RLE cellulitis with embolization to lung. s/p surgical debridement today by podiatry with soft tissue necrosis visualized.    -Abx as per ID recs.   -Would give pt BIPAP prn tachypnea, rosa when sleeping. 12/6 at 40% FiO2.  -TTE performed. Pt will need KELVIN at some point  -c/w DuoNebs q6hr prn for wheezing  -Pt has hx of SHIELA with CPAP at home, but is not compliant. Will need O/P sleep study with titration after hospital discharge  -Will continue to follow Pt is a 57M with PMHx SHIELA (on CPAP qhs, non-compliant), obesity, and daily EtOH use with recent ankle sprain 2/13 presenting from home 2/22 for chest pain and AMS x3-4 days with worsening right ankle swelling and redness admitted for sepsis 2/2 cellulitis and then found to have CT Chest findings concerning for septic embolization from MSSA bacteremia likely 2/2 RLE cellulitis with embolization to lung now s/p surgical debridement yesterday by podiatry with soft tissue necrosis visualized. Pt clinically improved.   -Abx as per ID recs.   -TTE performed. Pt will need KELVNI at some point. Pt optimized for procedure from pulmonary perspective   -c/w DuoNebs q6hr prn for wheezing  -Pt has hx of SHIELA with CPAP at home, but is not compliant. Will need O/P sleep study with titration after hospital discharge  -Will continue to follow

## 2018-02-26 NOTE — PROGRESS NOTE ADULT - SUBJECTIVE AND OBJECTIVE BOX
Patient is a 57y old  Male who presents with a chief complaint of Chest pain, confusion (22 Feb 2018 03:00)       INTERVAL HPI/OVERNIGHT EVENTS:  Patient seen and evaluated at bedside.  Pt is resting comfortable in NAD. Denies N/V/F/C.  Pain rated at X/10    Allergies    No Known Allergies    Intolerances        Vital Signs Last 24 Hrs  T(C): 37.3 (26 Feb 2018 08:31), Max: 37.5 (26 Feb 2018 04:30)  T(F): 99.1 (26 Feb 2018 08:31), Max: 99.5 (26 Feb 2018 04:30)  HR: 82 (26 Feb 2018 09:05) (82 - 101)  BP: 130/83 (26 Feb 2018 08:31) (120/79 - 150/82)  BP(mean): --  RR: 22 (26 Feb 2018 09:05) (18 - 22)  SpO2: 92% (26 Feb 2018 09:05) (92% - 97%)    LABS:                        12.1   20.24 )-----------( 318      ( 26 Feb 2018 07:20 )             36.5     02-25    139  |  95<L>  |  24<H>  ----------------------------<  138<H>  3.9   |  30  |  1.11    Ca    8.4      25 Feb 2018 10:35    TPro  7.0  /  Alb  2.5<L>  /  TBili  0.5  /  DBili  0.1  /  AST  104<H>  /  ALT  81<H>  /  AlkPhos  132<H>  02-25    PT/INR - ( 25 Feb 2018 10:35 )   PT: 18.1 sec;   INR: 1.66 ratio         PTT - ( 25 Feb 2018 10:35 )  PTT:30.3 sec    CAPILLARY BLOOD GLUCOSE      POCT Blood Glucose.: 137 mg/dL (25 Feb 2018 11:41)      Patient is a 57y old  Male who presents with a chief complaint of Chest pain, confusion (22 Feb 2018 03:00)       INTERVAL HPI/OVERNIGHT EVENTS:  Patient seen and evaluated at bedside.  Pt is resting comfortable in NAD. Denies N/V/F/C.  Pain rated at X/10    Allergies    No Known Allergies    Intolerances        Vital Signs Last 24 Hrs  T(C): 37.3 (26 Feb 2018 08:31), Max: 37.5 (26 Feb 2018 04:30)  T(F): 99.1 (26 Feb 2018 08:31), Max: 99.5 (26 Feb 2018 04:30)  HR: 82 (26 Feb 2018 09:05) (82 - 101)  BP: 130/83 (26 Feb 2018 08:31) (120/79 - 150/82)  BP(mean): --  RR: 22 (26 Feb 2018 09:05) (18 - 22)  SpO2: 92% (26 Feb 2018 09:05) (92% - 97%)    LABS:                        12.1   20.24 )-----------( 318      ( 26 Feb 2018 07:20 )             36.5     02-25    139  |  95<L>  |  24<H>  ----------------------------<  138<H>  3.9   |  30  |  1.11    Ca    8.4      25 Feb 2018 10:35    TPro  7.0  /  Alb  2.5<L>  /  TBili  0.5  /  DBili  0.1  /  AST  104<H>  /  ALT  81<H>  /  AlkPhos  132<H>  02-25    PT/INR - ( 25 Feb 2018 10:35 )   PT: 18.1 sec;   INR: 1.66 ratio         PTT - ( 25 Feb 2018 10:35 )  PTT:30.3 sec    CAPILLARY BLOOD GLUCOSE      POCT Blood Glucose.: 137 mg/dL (25 Feb 2018 11:41)        RADIOLOGY & ADDITIONAL TESTS:  LOWER EXTREMITY PHYSICAL EXAM:    Vascular: DP/PT 2/4 LF, DP/PT 1/4 right foot, secondary to swelling, B/L, CFT <3 seconds B/L, Musculoskeletal/Ortho: No gross deformities  Post op day #1  Decreased pain, decreased edema leg and foot right. Decreased erythema  Packing removed ar both I and D sites right forefoot.  Dorsal lateral wound: approx 15 cm x 4 cm to peroneal region with sinus tract clean and void of exudate and purulence.   Underlying tissue, muscle and tendon are guarded at this time.:  Central I and d incision: is 5 x 3 cm deep tissue viability also guarded to bone and tendon  Dorsal skin bridge viability is mottled and guarded viability at this time.   Packed with saline soaked gauze at both sites  Decrease calf tenderness right noted.  Increase WBC noted due to immediate postop liberation of bacteria.                RADIOLOGY & ADDITIONAL TESTS:

## 2018-02-26 NOTE — PROGRESS NOTE ADULT - SUBJECTIVE AND OBJECTIVE BOX
CC: F/U MSSA bacteremia    Interval History/ROS: Patient s/p OR for I&D of right foot for necrosis. Denies fever and chills. States feeling better.    Allergies  No Known Allergies    ANTIMICROBIALS:  ceFAZolin   IVPB 2000 every 8 hours  clindamycin IVPB    clindamycin IVPB 900 every 8 hours      PE:    Vital Signs Last 24 Hrs  T(C): 36.7 (26 Feb 2018 12:11), Max: 37.5 (26 Feb 2018 04:30)  T(F): 98.1 (26 Feb 2018 12:11), Max: 99.5 (26 Feb 2018 04:30)  HR: 85 (26 Feb 2018 12:11) (82 - 95)  BP: 136/78 (26 Feb 2018 12:11) (120/79 - 136/78)  BP(mean): --  RR: 20 (26 Feb 2018 12:11) (18 - 22)  SpO2: 96% (26 Feb 2018 12:11) (92% - 96%)    Gen: AOx3, NAD, non-toxic, pleasant, on NC 4L  CV: S1+S2 normal, no murmurs  Resp: Clear bilat, no resp distress  Abd: Soft, nontender, +BS  Ext: right leg dressed, swelling of right foot.  : No Sosa  IV/Skin: No thrombophlebitis  Neuro: no focal deficits    LABS:                          12.1   20.24 )-----------( 318      ( 26 Feb 2018 07:20 )             36.5       02-26    140  |  95<L>  |  24<H>  ----------------------------<  113<H>  4.5   |  30  |  1.04    Ca    8.7      26 Feb 2018 09:24  Phos  3.5     02-26  Mg     2.9     02-26    TPro  6.7  /  Alb  2.4<L>  /  TBili  0.5  /  DBili  0.2  /  AST  99<H>  /  ALT  81<H>  /  AlkPhos  151<H>  02-26          MICROBIOLOGY:  v  .Abscess right foot  02-25-18   Few Staphylococcus aureus  --  --      .Stool Feces  02-25-18   No enteric pathogens to date: Final culture pending  --  --      .Blood Blood  02-24-18   Growth in anaerobic bottle: Staphylococcus aureus  See previous culture [id number]  Growth in aerobic bottle:  Gram Positive Cocci in Clusters  --    Growth in anaerobic bottle: Gram Positive Cocci in Clusters  Growth in aerobic bottle:  Gram Positive Cocci in Clusters      .Blood Blood  02-24-18   Growth in anaerobic bottle: Gram Positive Cocci in Clusters  Growth in aerobic bottle: Gram Positive Cocci in Clusters  --    Growth in anaerobic bottle: Gram Positive Cocci in Clusters  Growth in aerobic bottle: Gram Positive Cocci in Clusters      .Blood Blood  02-24-18   Growth in aerobic and anaerobic bottles: Staphylococcus aureus  See previous culture [id number]  --    Growth in aerobic bottle: Gram Positive Cocci in Clusters  Growth in anaerobic bottle: Gram Positive Cocci in Clusters      .Blood Blood-Peripheral  02-22-18   Growth in aerobic and anaerobic bottles: Staphylococcus aureus  "Due to technical problems, Proteus sp. will Not be reported as part of  the BCID panel until further notice"  ***Blood Panel PCR results on this specimen are available  approximately 3 hours after the Gram stain result.***  Gram stain, PCR, and/or culture results may not always  correspond due to difference in methodologies.  ************************************************************  This PCR assay was performed using [employer].  The following targets are tested for: Enterococcus,  vancomycin resistant enterococci, Listeria monocytogenes,  coagulase negative staphylococci, S. aureus,  methicillin resistant S. aureus, Streptococcus agalactiae  (Group B), S. pneumoniae, S. pyogenes (Group A),  Acinetobacter baumannii, Enterobacter cloacae, E. coli,  Klebsiella oxytoca, K. pneumoniae, Proteus sp.,  Serratia marcescens, Haemophilus influenzae,  Neisseria meningitidis, Pseudomonas aeruginosa, Candida  albicans, C. glabrata, C krusei, C parapsilosis,  C. tropicalis and the KPC resistance gene.  --  Blood Culture PCR  Staphylococcus aureus      Rapid RVP Result: NotDetec (02-23 @ 13:23)    Clostridium difficile GDH Toxins A&amp;B, EIA:   Negative (02-24-18 @ 22:41)  Clostridium difficile GDH Interpretation: Negative for toxigenic C. Difficile.  This specimen is negative for C.  Difficile glutamate dehydrogenase (GDH) antigen and negative for C.  Difficile Toxins A & B, by EIA.  GDH is a highly sensitive screening  marker for C. Difficile that is produced in large amounts by all C.  Difficile strains, both toxigenic and nontoxigenic.  This assay has not  been validated as a test of cure.  Repeat testing during the same episode  of diarrhea is of limited value and is discouraged.  The results of this  assay should always be interpreted in conjunction with pateint's clinical  history. (02-24-18 @ 22:41)      RADIOLOGY:    < from: Xray Foot AP + Lateral + Oblique, Right (02.25.18 @ 00:51) >  Impression:    Diffuse subcutaneous soft tissue edema overlying the right foot and   ankle. No radiographic findings of osteomyelitis.    < end of copied text >

## 2018-02-26 NOTE — PROGRESS NOTE ADULT - ASSESSMENT
recent ankle sprain which got infected resulting in MSSA sepsis/cellulitis with lung embolization  hemoptysis - minimal - possibly secondary to being on hep drip with pulm septic emboli  ADA - resolved  Transaminitis - non specific  Coagulopathy on admission   Right peroneal vein dvt  Pulm artery enlargement  Morbid obesity  SHIELA  Diarrhea - resolved  respiratory distress once on floor from PACU  LBP - resolved    continue current care  continue abx  GI eval for liver and FOB  appreciate heme input  still awaiting repeat lower extrem dopplers  will wait to repeat blood cultures several days after pt returns to OR for repeat washout  wound vac  non-weightbearing RLE until repeat mri can be done to assess ankle  monitor labs daily

## 2018-02-26 NOTE — PROGRESS NOTE ADULT - ASSESSMENT
POD #1 markedly improved presentation. STAGED PROCEDURE  Second debridement may be needed and prognosis still guarded for tissue viabilty and further necrosis.

## 2018-02-26 NOTE — PROGRESS NOTE ADULT - ASSESSMENT
57M with obesity, ETOH, SHIELA w/ R ankle sprain, developed MSSA cellulitis/ bacteremia, pulmonary septic emboli, R distal DVT now s/p OR, with coagulopathy    #coagulopathy- suspect secondary to decreased PO intake over past week with acute illness; Factor VII level sl low  - will eval further with PT mixing study  - INR sl elevated; no increased bleeding; no prior history of bleeding issues  - hold on FFP/vitamin K unless develops significant bleeding    #R peroneal DVT- no prior history of VTE; provoked with recent injury and decreased mobility  - follow with repeat US to re evaluate  - w/ septic emboli; was on heparin gtt, now off with +FOB    # R ankle sprain/wound- now s/p OR    #ID- MSSA bacteremia, for possible KELVIN; on Abx per ID

## 2018-02-27 LAB
-  AMPICILLIN/SULBACTAM: SIGNIFICANT CHANGE UP
-  CEFAZOLIN: SIGNIFICANT CHANGE UP
-  CIPROFLOXACIN: SIGNIFICANT CHANGE UP
-  CLINDAMYCIN: SIGNIFICANT CHANGE UP
-  ERYTHROMYCIN: SIGNIFICANT CHANGE UP
-  GENTAMICIN: SIGNIFICANT CHANGE UP
-  LEVOFLOXACIN: SIGNIFICANT CHANGE UP
-  MOXIFLOXACIN(AEROBIC): SIGNIFICANT CHANGE UP
-  OXACILLIN: SIGNIFICANT CHANGE UP
-  PENICILLIN: SIGNIFICANT CHANGE UP
-  RIFAMPIN: SIGNIFICANT CHANGE UP
-  TETRACYCLINE: SIGNIFICANT CHANGE UP
-  TRIMETHOPRIM/SULFAMETHOXAZOLE: SIGNIFICANT CHANGE UP
-  VANCOMYCIN: SIGNIFICANT CHANGE UP
ALBUMIN SERPL ELPH-MCNC: 2.3 G/DL — LOW (ref 3.3–5)
ALP SERPL-CCNC: 86 U/L — SIGNIFICANT CHANGE UP (ref 40–120)
ALT FLD-CCNC: 56 U/L RC — HIGH (ref 10–45)
ANION GAP SERPL CALC-SCNC: 11 MMOL/L — SIGNIFICANT CHANGE UP (ref 5–17)
AST SERPL-CCNC: 55 U/L — HIGH (ref 10–40)
BASOPHILS # BLD AUTO: 0 K/UL — SIGNIFICANT CHANGE UP (ref 0–0.2)
BILIRUB SERPL-MCNC: 0.5 MG/DL — SIGNIFICANT CHANGE UP (ref 0.2–1.2)
BUN SERPL-MCNC: 20 MG/DL — SIGNIFICANT CHANGE UP (ref 7–23)
CALCIUM SERPL-MCNC: 8.2 MG/DL — LOW (ref 8.4–10.5)
CHLORIDE SERPL-SCNC: 95 MMOL/L — LOW (ref 96–108)
CO2 SERPL-SCNC: 30 MMOL/L — SIGNIFICANT CHANGE UP (ref 22–31)
CREAT SERPL-MCNC: 0.88 MG/DL — SIGNIFICANT CHANGE UP (ref 0.5–1.3)
EOSINOPHIL # BLD AUTO: 0.1 K/UL — SIGNIFICANT CHANGE UP (ref 0–0.5)
FIBRINOGEN PPP-MCNC: 485 MG/DL — SIGNIFICANT CHANGE UP (ref 310–510)
GGT SERPL-CCNC: 52 U/L — HIGH (ref 9–50)
GLUCOSE SERPL-MCNC: 161 MG/DL — HIGH (ref 70–99)
HCT VFR BLD CALC: 36.6 % — LOW (ref 39–50)
HGB BLD-MCNC: 12.4 G/DL — LOW (ref 13–17)
LYMPHOCYTES # BLD AUTO: 1.1 K/UL — SIGNIFICANT CHANGE UP (ref 1–3.3)
LYMPHOCYTES # BLD AUTO: 6 % — LOW (ref 13–44)
MAGNESIUM SERPL-MCNC: 2.6 MG/DL — SIGNIFICANT CHANGE UP (ref 1.6–2.6)
MCHC RBC-ENTMCNC: 33.9 GM/DL — SIGNIFICANT CHANGE UP (ref 32–36)
MCHC RBC-ENTMCNC: 34.1 PG — HIGH (ref 27–34)
MCV RBC AUTO: 100 FL — SIGNIFICANT CHANGE UP (ref 80–100)
METHOD TYPE: SIGNIFICANT CHANGE UP
MONOCYTES # BLD AUTO: 0.6 K/UL — SIGNIFICANT CHANGE UP (ref 0–0.9)
MONOCYTES NFR BLD AUTO: 2 % — SIGNIFICANT CHANGE UP (ref 2–14)
MYELOCYTES NFR BLD: 1 % — HIGH (ref 0–0)
NEUTROPHILS # BLD AUTO: 16.5 K/UL — HIGH (ref 1.8–7.4)
NEUTROPHILS NFR BLD AUTO: 88 % — HIGH (ref 43–77)
NEUTS BAND # BLD: 3 % — SIGNIFICANT CHANGE UP (ref 0–8)
PHOSPHATE SERPL-MCNC: 2.5 MG/DL — SIGNIFICANT CHANGE UP (ref 2.5–4.5)
PLAT MORPH BLD: NORMAL — SIGNIFICANT CHANGE UP
PLATELET # BLD AUTO: 400 K/UL — SIGNIFICANT CHANGE UP (ref 150–400)
POTASSIUM SERPL-MCNC: 4 MMOL/L — SIGNIFICANT CHANGE UP (ref 3.5–5.3)
POTASSIUM SERPL-SCNC: 4 MMOL/L — SIGNIFICANT CHANGE UP (ref 3.5–5.3)
PROT SERPL-MCNC: 6.8 G/DL — SIGNIFICANT CHANGE UP (ref 6–8.3)
PT 100%: 18.5 SEC — HIGH (ref 9.8–12.7)
PT 50/50: 13.3 SEC — SIGNIFICANT CHANGE UP (ref 9.7–15.2)
RBC # BLD: 3.65 M/UL — LOW (ref 4.2–5.8)
RBC # FLD: 12.7 % — SIGNIFICANT CHANGE UP (ref 10.3–14.5)
RBC BLD AUTO: SIGNIFICANT CHANGE UP
SODIUM SERPL-SCNC: 136 MMOL/L — SIGNIFICANT CHANGE UP (ref 135–145)
THROMBIN TIME: 21.1 SECS — SIGNIFICANT CHANGE UP (ref 16–25)
TOXIC GRANULES BLD QL SMEAR: PRESENT — SIGNIFICANT CHANGE UP
WBC # BLD: 18.3 K/UL — HIGH (ref 3.8–10.5)
WBC # FLD AUTO: 18.3 K/UL — HIGH (ref 3.8–10.5)

## 2018-02-27 PROCEDURE — 93971 EXTREMITY STUDY: CPT | Mod: 26

## 2018-02-27 PROCEDURE — 99232 SBSQ HOSP IP/OBS MODERATE 35: CPT

## 2018-02-27 RX ORDER — HYDROMORPHONE HYDROCHLORIDE 2 MG/ML
1 INJECTION INTRAMUSCULAR; INTRAVENOUS; SUBCUTANEOUS EVERY 4 HOURS
Qty: 0 | Refills: 0 | Status: DISCONTINUED | OUTPATIENT
Start: 2018-02-27 | End: 2018-03-01

## 2018-02-27 RX ADMIN — LIDOCAINE 1 PATCH: 4 CREAM TOPICAL at 12:22

## 2018-02-27 RX ADMIN — Medication 1 TABLET(S): at 12:22

## 2018-02-27 RX ADMIN — Medication 3 MILLILITER(S): at 17:50

## 2018-02-27 RX ADMIN — HYDROMORPHONE HYDROCHLORIDE 1 MILLIGRAM(S): 2 INJECTION INTRAMUSCULAR; INTRAVENOUS; SUBCUTANEOUS at 13:43

## 2018-02-27 RX ADMIN — Medication 20 MILLIGRAM(S): at 05:20

## 2018-02-27 RX ADMIN — HYDROMORPHONE HYDROCHLORIDE 1 MILLIGRAM(S): 2 INJECTION INTRAMUSCULAR; INTRAVENOUS; SUBCUTANEOUS at 20:05

## 2018-02-27 RX ADMIN — LOSARTAN POTASSIUM 50 MILLIGRAM(S): 100 TABLET, FILM COATED ORAL at 05:20

## 2018-02-27 RX ADMIN — HYDROMORPHONE HYDROCHLORIDE 1 MILLIGRAM(S): 2 INJECTION INTRAMUSCULAR; INTRAVENOUS; SUBCUTANEOUS at 20:35

## 2018-02-27 RX ADMIN — Medication 1 MILLIGRAM(S): at 12:22

## 2018-02-27 RX ADMIN — Medication 1 TABLET(S): at 10:05

## 2018-02-27 RX ADMIN — Medication 3 MILLILITER(S): at 23:19

## 2018-02-27 RX ADMIN — Medication 100 MILLIGRAM(S): at 12:22

## 2018-02-27 RX ADMIN — HYDROMORPHONE HYDROCHLORIDE 1 MILLIGRAM(S): 2 INJECTION INTRAMUSCULAR; INTRAVENOUS; SUBCUTANEOUS at 14:00

## 2018-02-27 RX ADMIN — Medication 100 MILLIGRAM(S): at 05:21

## 2018-02-27 RX ADMIN — LIDOCAINE 1 PATCH: 4 CREAM TOPICAL at 23:51

## 2018-02-27 RX ADMIN — HYDROMORPHONE HYDROCHLORIDE 1 MILLIGRAM(S): 2 INJECTION INTRAMUSCULAR; INTRAVENOUS; SUBCUTANEOUS at 09:30

## 2018-02-27 RX ADMIN — LIDOCAINE 1 PATCH: 4 CREAM TOPICAL at 00:45

## 2018-02-27 RX ADMIN — Medication 100 MILLIGRAM(S): at 03:30

## 2018-02-27 RX ADMIN — Medication 3 MILLILITER(S): at 12:22

## 2018-02-27 RX ADMIN — HYDROMORPHONE HYDROCHLORIDE 1 MILLIGRAM(S): 2 INJECTION INTRAMUSCULAR; INTRAVENOUS; SUBCUTANEOUS at 03:27

## 2018-02-27 RX ADMIN — Medication 3 MILLILITER(S): at 05:22

## 2018-02-27 RX ADMIN — Medication 100 MILLIGRAM(S): at 11:10

## 2018-02-27 RX ADMIN — Medication 100 MILLIGRAM(S): at 20:07

## 2018-02-27 RX ADMIN — Medication 1 TABLET(S): at 16:42

## 2018-02-27 RX ADMIN — HYDROMORPHONE HYDROCHLORIDE 1 MILLIGRAM(S): 2 INJECTION INTRAMUSCULAR; INTRAVENOUS; SUBCUTANEOUS at 04:00

## 2018-02-27 RX ADMIN — HYDROMORPHONE HYDROCHLORIDE 1 MILLIGRAM(S): 2 INJECTION INTRAMUSCULAR; INTRAVENOUS; SUBCUTANEOUS at 09:15

## 2018-02-27 RX ADMIN — Medication 100 MILLIGRAM(S): at 17:49

## 2018-02-27 NOTE — PROVIDER CONTACT NOTE (MEDICATION) - RECOMMENDATIONS
Spoke w/ Wandy from pharmacy. As per Wandy the medication has to be prepared; there is shortage on Ancef.

## 2018-02-27 NOTE — PROVIDER CONTACT NOTE (MEDICATION) - ACTION/TREATMENT ORDERED:
Spoke w/ Wandy from pharmacy. As per Wandy the medication has to be prepared; there is shortage on Ancef. Will send another bullet to pharmacy.

## 2018-02-27 NOTE — PROGRESS NOTE ADULT - ASSESSMENT
recent ankle sprain which got infected resulting in MSSA sepsis/cellulitis with lung embolization  hemoptysis - minimal - possibly secondary to being on hep drip with pulm septic emboli  ADA - resolved  Transaminitis - non specific - improving  Coagulopathy on admission - most likley due to dietary deficiency  Right peroneal vein dvt  Pulm artery enlargement  Morbid obesity  SHIELA continue nocturnal bipap  Diarrhea - resolved  LBP - resolved    continue current care  continue abx, would like to continue clinda as well until return to OR for possible deeper debridement  nocturnal bipap  f/u with podiatry post op as to weight bearing status/ repeat mri to assess tendon/ligaments/bones  doppler shows no propagation of clot will review a/c vs serial dopplers with heme

## 2018-02-27 NOTE — PROGRESS NOTE ADULT - SUBJECTIVE AND OBJECTIVE BOX
CC: F/U MSSA bacteremia, Right leg cellulitis    Interval History/ROS: Patient feeling better today. States breathing is improved. Remains with right leg pain improved with pain medication. Denies fever, chills.    Allergies  No Known Allergies    ANTIMICROBIALS:  ceFAZolin   IVPB 2000 every 8 hours  clindamycin IVPB    clindamycin IVPB 900 every 8 hours    PE:    Vital Signs Last 24 Hrs  T(C): 37.3 (27 Feb 2018 09:10), Max: 37.3 (26 Feb 2018 19:45)  T(F): 99.1 (27 Feb 2018 09:10), Max: 99.2 (26 Feb 2018 19:45)  HR: 86 (27 Feb 2018 09:48) (66 - 91)  BP: 138/80 (27 Feb 2018 09:10) (120/71 - 138/80)  BP(mean): --  RR: 20 (27 Feb 2018 09:10) (18 - 118)  SpO2: 97% (27 Feb 2018 09:48) (92% - 97%)    Gen: AOx3, NAD, non-toxic, pleasant  CV: S1+S2 normal, no murmurs  Resp: Clear bilat, no resp distress  Abd: Soft, nontender, +BS  Ext: RLE bandaged  : No Sosa  IV/Skin: No thrombophlebitis  Neuro: no focal deficits    LABS:                          12.4   19.60 )-----------( 365      ( 26 Feb 2018 21:57 )             38.1       02-26    140  |  95<L>  |  24<H>  ----------------------------<  113<H>  4.5   |  30  |  1.04    Ca    8.7      26 Feb 2018 09:24  Phos  3.5     02-26  Mg     2.9     02-26    TPro  6.7  /  Alb  2.4<L>  /  TBili  0.5  /  DBili  0.2  /  AST  99<H>  /  ALT  81<H>  /  AlkPhos  151<H>  02-26          MICROBIOLOGY:  v  .Sputum Sputum  02-26-18 --  --    Rare polymorphonuclear leukocytes per low power field  No squamous epithelial cells per low power field  Moderate Gram Positive Cocci in Pairs and Chains per oil power field      .Surgical Swab RIGHT FOOT PUS  02-25-18   Moderate Staphylococcus aureus  --  --      .Abscess right foot  02-25-18   Few Staphylococcus aureus  --  Staphylococcus aureus      .Stool Feces  02-25-18   No enteric pathogens isolated.  (Stool culture examined for Salmonella,  Shigella, Campylobacter, Aeromonas, Plesiomonas,  Vibrio, E.coli O157 and Yersinia)  --  --      .Blood Blood  02-24-18   Growth in anaerobic bottle: Staphylococcus aureus  See previous culture [id number]  Growth in aerobic bottle:  Gram Positive Cocci in Clusters  --    Growth in anaerobic bottle: Gram Positive Cocci in Clusters  Growth in aerobic bottle:  Gram Positive Cocci in Clusters      .Blood Blood  02-24-18   Growth in aerobic and anaerobic bottles: Staphylococcus aureus  See previous culture [id number]  --    Growth in anaerobic bottle: Gram Positive Cocci in Clusters  Growth in aerobic bottle: Gram Positive Cocci in Clusters      .Blood Blood  02-24-18   Growth in aerobic and anaerobic bottles: Staphylococcus aureus  See previous culture [id number]  --    Growth in aerobic bottle: Gram Positive Cocci in Clusters  Growth in anaerobic bottle: Gram Positive Cocci in Clusters      .Blood Blood-Peripheral  02-22-18   Growth in aerobic and anaerobic bottles: Staphylococcus aureus  "Due to technical problems, Proteus sp. will Not be reported as part of  the BCID panel until further notice"  ***Blood Panel PCR results on this specimen are available  approximately 3 hours after the Gram stain result.***  Gram stain, PCR, and/or culture results may not always  correspond due to difference in methodologies.  ************************************************************  This PCR assay was performed using [employer].  The following targets are tested for: Enterococcus,  vancomycin resistant enterococci, Listeria monocytogenes,  coagulase negative staphylococci, S. aureus,  methicillin resistant S. aureus, Streptococcus agalactiae  (Group B), S. pneumoniae, S. pyogenes (Group A),  Acinetobacter baumannii, Enterobacter cloacae, E. coli,  Klebsiella oxytoca, K. pneumoniae, Proteus sp.,  Serratia marcescens, Haemophilus influenzae,  Neisseria meningitidis, Pseudomonas aeruginosa, Candida  albicans, C. glabrata, C krusei, C parapsilosis,  C. tropicalis and the KPC resistance gene.  --  Blood Culture PCR  Staphylococcus aureus      Rapid RVP Result: NotDetec (02-23 @ 13:23)    Clostridium difficile GDH Toxins A&amp;B, EIA:   Negative (02-24-18 @ 22:41)  Clostridium difficile GDH Interpretation: Negative for toxigenic C. Difficile.  This specimen is negative for C.  Difficile glutamate dehydrogenase (GDH) antigen and negative for C.  Difficile Toxins A & B, by EIA.  GDH is a highly sensitive screening  marker for C. Difficile that is produced in large amounts by all C.  Difficile strains, both toxigenic and nontoxigenic.  This assay has not  been validated as a test of cure.  Repeat testing during the same episode  of diarrhea is of limited value and is discouraged.  The results of this  assay should always be interpreted in conjunction with pateint's clinical  history. (02-24-18 @ 22:41)      RADIOLOGY:    No new images.

## 2018-02-27 NOTE — CONSULT NOTE ADULT - ASSESSMENT
57 M Hx HTN, daily ETOH use for many years who was brought in by family because of altered mental status. Found to have right foot abscess s/p I&D and MSSA bacteremia.   Additionally, he has right leg DVT. Currently doing well on antibiotics. GI consulted because of mildly elevated LFTs, which I suspect is due to ETOH or fatty liver disease, compounded by sepsis/bacteremia. LFTs are improving. Viral hepatitis serologies negative.   Additionally he has heme positive stool but is not anemic and no signs of overt GI bleeding.     - No contraindications from a GI standpoint for initiation of anti-coagulation for treatment of Right LE DVT.   - No endoscopic intervention at this time unless there are signs of active bleeding.   - Continue IV antibiotics as per ID  - Continue to monitor LFTs  - I strongly recommended the patient to abstain or at least cut down on his ETOH consumption. 57 M Hx HTN, daily ETOH use for many years who was brought in by family because of altered mental status. Found to have right foot abscess s/p I&D and MSSA bacteremia.   Additionally, he has right leg DVT. Currently doing well on antibiotics. GI consulted because of mildly elevated LFTs, which I suspect is due to ETOH or fatty liver disease, compounded by sepsis/bacteremia. LFTs are improving. Viral hepatitis serologies negative.   Additionally he has heme positive stool but is not significantly anemic or have signs of overt GI bleeding.     - No contraindications from a GI standpoint for initiation of anti-coagulation for treatment of Right LE DVT.   - No endoscopic intervention at this time unless there are signs of active bleeding. Should get outpatient colonoscopy for CRC screening.   - Continue IV antibiotics as per ID  - Continue to monitor LFTs  - I strongly recommended the patient to abstain or at least cut down on his ETOH consumption.

## 2018-02-27 NOTE — PROGRESS NOTE ADULT - ASSESSMENT
57M with obesity, ETOH, SHIELA w/ R ankle sprain, developed MSSA cellulitis/ bacteremia, pulmonary septic emboli, R distal DVT now s/p OR, with coagulopathy    #coagulopathy- suspect secondary to vitamin K deficiency/ decreased PO intake over past week with acute illness; Factor VII level sl low  - PT mixing study corrected confirming factor deficiency, no evidence of inhibitor- consistent with vitamin K deficiency  - INR sl elevated; no increased bleeding; no prior history of bleeding issues  - will continue to hold on FFP/vitamin K  - recheck coags in AM- if no improvement, give vitamin K prior to OR    #R peroneal DVT- no prior history of VTE; provoked with recent injury and decreased mobility  - follow with repeat US to re evaluate  - w/ septic emboli; was on heparin gtt, now off with +FOB    # R ankle sprain/wound- now s/p OR- for return to OR later this week per Podiatry    #ID- MSSA bacteremia, KELVIN negative for vegetation; on Abx per ID 57M with obesity, ETOH, SHIELA w/ R ankle sprain, developed MSSA cellulitis/ bacteremia, pulmonary septic emboli, R distal DVT now s/p OR, with mild coagulopathy    #coagulopathy- suspect secondary to vitamin K deficiency/ decreased PO intake over past week with acute illness; Factor VII level sl low  - PT mixing study corrected confirming factor deficiency, no evidence of inhibitor- consistent with vitamin K deficiency  - INR sl elevated; no increased bleeding; no prior history of bleeding issues  - will continue to hold on FFP/vitamin K  - recheck coags in AM- if no improvement, give vitamin K prior to OR    #R peroneal DVT- no prior history of VTE; provoked with recent injury and decreased mobility  - repeat US pending to evaluate for propagation  - w/ septic emboli; was on heparin gtt, now off with +FOB    # R ankle sprain/wound- now s/p OR- for return to OR later this week per Podiatry    #ID- MSSA bacteremia, KELVIN negative for vegetation; on Abx per ID

## 2018-02-27 NOTE — PROGRESS NOTE ADULT - SUBJECTIVE AND OBJECTIVE BOX
Chief Complaint: fu    History of Present Illness: denies pain; breathing remains stable, no chest pain, no f/c, no n/v/abd pain, no overt bleeding, urine clear and no other bleeding, wound redressed      MEDICATIONS  (STANDING):  ALBUTerol/ipratropium for Nebulization 3 milliLiter(s) Nebulizer every 6 hours  ceFAZolin   IVPB 2000 milliGRAM(s) IV Intermittent every 8 hours  clindamycin IVPB      clindamycin IVPB 900 milliGRAM(s) IV Intermittent every 8 hours  folic acid 1 milliGRAM(s) Oral daily  furosemide   Injectable 20 milliGRAM(s) IV Push daily  lactobacillus acidophilus 1 Tablet(s) Oral three times a day with meals  lidocaine   Patch 1 Patch Transdermal daily  losartan 50 milliGRAM(s) Oral daily  multivitamin 1 Tablet(s) Oral daily  thiamine 100 milliGRAM(s) Oral daily    MEDICATIONS  (PRN):  acetaminophen   Tablet 650 milliGRAM(s) Oral every 6 hours PRN For Temp greater than 38 C (100.4 F)  HYDROmorphone  Injectable 1 milliGRAM(s) IV Push every 6 hours PRN Moderate Pain (4 - 6)  oxyCODONE    5 mG/acetaminophen 325 mG 1 Tablet(s) Oral every 4 hours PRN Severe Pain (7 - 10)      Allergies    No Known Allergies    Intolerances        Vital Signs Last 24 Hrs  T(C): 37 (27 Feb 2018 13:40), Max: 37.3 (26 Feb 2018 19:45)  T(F): 98.6 (27 Feb 2018 13:40), Max: 99.2 (26 Feb 2018 19:45)  HR: 98 (27 Feb 2018 13:40) (66 - 98)  BP: 132/70 (27 Feb 2018 13:40) (120/71 - 138/80)  BP(mean): --  RR: 18 (27 Feb 2018 13:40) (18 - 20)  SpO2: 94% (27 Feb 2018 13:40) (92% - 97%)    PHYSICAL EXAM  General: adult in NAD  HEENT: clear oropharynx, anicteric sclera, pink conjunctiva  Neck: supple  CV: normal S1/S2   Lungs: clear to auscultation, no wheezes, no rales  Abdomen: soft, obese non-tender non-distended, positive bowel sounds  Ext: RLE bandage clean    LABS:                          12.4   18.3  )-----------( 400      ( 27 Feb 2018 12:06 )             36.6         Mean Cell Volume : 100.0 fl  Mean Cell Hemoglobin : 34.1 pg  Mean Cell Hemoglobin Concentration : 33.9 gm/dL  Auto Neutrophil # : 16.5 K/uL  Auto Lymphocyte # : 1.1 K/uL  Auto Monocyte # : 0.6 K/uL  Auto Eosinophil # : 0.1 K/uL  Auto Basophil # : 0.0 K/uL  Auto Neutrophil % : 88.0 %  Auto Lymphocyte % : 6.0 %  Auto Monocyte % : 2.0 %  Auto Eosinophil % : x  Auto Basophil % : x      Serial CBC's  02-27 @ 12:06  Hct-36.6 / Hgb-12.4 / Plat-400 / RBC-3.65 / WBC-18.3  Serial CBC's  02-26 @ 21:57  Hct-38.1 / Hgb-12.4 / Plat-365 / RBC-3.87 / WBC-19.60  Serial CBC's  02-26 @ 07:20  Hct-36.5 / Hgb-12.1 / Plat-318 / RBC-3.77 / WBC-20.24  Serial CBC's  02-25 @ 10:35  Hct-39.9 / Hgb-13.4 / Plat-341 / RBC-4.01 / WBC-19.0  Serial CBC's  02-24 @ 12:39  Hct-39.1 / Hgb-12.8 / Plat-300 / RBC-3.88 / WBC-18.9  Serial CBC's  02-24 @ 03:02  Hct-40.6 / Hgb-13.0 / Plat-300 / RBC-4.01 / WBC-18.6      02-27    136  |  95<L>  |  20  ----------------------------<  161<H>  4.0   |  30  |  0.88    Ca    8.2<L>      27 Feb 2018 12:05  Phos  2.5     02-27  Mg     2.6     02-27    TPro  6.8  /  Alb  2.3<L>  /  TBili  0.5  /  DBili  x   /  AST  55<H>  /  ALT  56<H>  /  AlkPhos  86  02-27      PTT - ( 26 Feb 2018 09:09 )  PTT:30.5 sec        PT 50/50: 13.3: A reference range has been established using normal samples. PT 50/50  results which correct into the reference range should be evaluated  for  factor deficiency. PT 50/50  results which do not correct suggest the  presence of an inhibitor. sec (02.27.18 @ 11:47)            Radiology:  < from: Transesophageal Echocardiogram w/o TTE (02.26.18 @ 18:04) >  Conclusions:  1. Normal mitral valve. Minimal mitral regurgitation.  2. Mildly thickened aortic valve with nromal opening. Mild  aortic regurgitation.  3. No left atrial or left atrial appendage thrombus. Normal  left atrial appendage function (velocity>40 cm/s).  4. Normal left ventricular systolic function.  5. Normal right ventricular size and function.  6. Mobile interatrial septum. Agitated saline injection and  color flow doppler demonstrate no evidence of a patent  foramen ovale.  No vegetations seen. If clinical suspicion for endocardits  remains high,consider interval follow-up KELVIN at a later  date.

## 2018-02-27 NOTE — PROGRESS NOTE ADULT - SUBJECTIVE AND OBJECTIVE BOX
Patient is a 57y old  Male who presents with a chief complaint of Chest pain, confusion (22 Feb 2018 03:00)       INTERVAL HPI/OVERNIGHT EVENTS:  Patient seen and evaluated at bedside.  Pt is felling better with less pain.  Denies N/V/F/C.    Allergies    No Known Allergies    Intolerances        Vital Signs Last 24 Hrs  T(C): 37 (27 Feb 2018 13:40), Max: 37.3 (26 Feb 2018 19:45)  T(F): 98.6 (27 Feb 2018 13:40), Max: 99.2 (26 Feb 2018 19:45)  HR: 98 (27 Feb 2018 13:40) (66 - 98)  BP: 132/70 (27 Feb 2018 13:40) (120/71 - 138/80)  BP(mean): --  RR: 18 (27 Feb 2018 13:40) (18 - 20)  SpO2: 94% (27 Feb 2018 13:40) (92% - 97%)    LABS:                        12.4   18.3  )-----------( 400      ( 27 Feb 2018 12:06 )             36.6     02-27    136  |  95<L>  |  20  ----------------------------<  161<H>  4.0   |  30  |  0.88    Ca    8.2<L>      27 Feb 2018 12:05  Phos  2.5     02-27  Mg     2.6     02-27    TPro  6.8  /  Alb  2.3<L>  /  TBili  0.5  /  DBili  x   /  AST  55<H>  /  ALT  56<H>  /  AlkPhos  86  02-27    PTT - ( 26 Feb 2018 09:09 )  PTT:30.5 sec    CAPILLARY BLOOD GLUCOSE          Lower Extremity Physical Exam:  Sx site right foot stable with scant pus noted postero-lateral but no crepitus.  Skin bridge and hanna wound region still demarcating and ? for survival.  Edema decreased as well as the erythema.  There is no odor and the patient is able to move his toes with good CR and no signs of digital necrosis.    RADIOLOGY & ADDITIONAL TESTS:  Culture - Surgical Swab (02.25.18 @ 22:01)    Specimen Source: .Surgical Swab RIGHT FOOT PUS    Culture Results:   Moderate Staphylococcus aureus

## 2018-02-27 NOTE — PROGRESS NOTE ADULT - ASSESSMENT
Pt is a 57M with PMHx SHIELA (on CPAP qhs, non-compliant), obesity, and daily EtOH use with recent ankle sprain 2/13 presenting from home 2/22 for chest pain and AMS x3-4 days with worsening right ankle swelling and redness admitted for sepsis 2/2 cellulitis and then found to have CT Chest findings concerning for septic embolization from MSSA bacteremia likely 2/2 RLE cellulitis with embolization to lung now s/p surgical debridement yesterday by podiatry with soft tissue necrosis visualized. Pt clinically improved.   -Abx as per ID recs.   -TTE and TTE performed and (-) for vegetation.   -c/w DuoNebs q6hr prn for wheezing  -Pt has hx of SHIELA with CPAP at home, but is not compliant. Will need O/P sleep study with titration after hospital discharge  -Will continue to follow Pt is a 57M with PMHx SHIELA (on CPAP qhs, non-compliant), obesity, and daily EtOH use with recent ankle sprain 2/13 presenting from home 2/22 for chest pain and AMS x3-4 days with worsening right ankle swelling and redness admitted for sepsis 2/2 cellulitis and then found to have CT Chest findings concerning for septic embolization from MSSA bacteremia likely 2/2 RLE cellulitis with embolization to lung now s/p surgical debridement yesterday by podiatry with soft tissue necrosis visualized. Pt clinically improved. no SOB.  -Abx as per ID recs.   -TTE and TTE performed and (-) for vegetation.   -c/w DuoNebs q6hr prn for wheezing  -Pt has hx of SHIELA with CPAP at home, but is not compliant. Will need O/P sleep study with titration after hospital discharge

## 2018-02-27 NOTE — PROGRESS NOTE ADULT - SUBJECTIVE AND OBJECTIVE BOX
CHIEF COMPLAINT:    Interval Events:    REVIEW OF SYSTEMS:  Constitutional: [ ] negative [x ] fevers [ ] chills [ ] weight loss [ ] weight gain  HEENT: [ x] negative [ ] dry eyes [ ] eye irritation [ ] postnasal drip [ ] nasal congestion  CV: [ ] negative  [ ] chest pain [ ] orthopnea [ ] palpitations [ ] murmur  Resp: [ ] negative [x ] cough [ ] shortness of breath [x ] dyspnea [ x] wheezing [x ] sputum [ ] hemoptysis  GI: [x ] negative [ ] nausea [ ] vomiting [ ] diarrhea [ ] constipation [ ] abd pain [ ] dysphagia   : [x ] negative [ ] dysuria [ ] nocturia [ ] hematuria [ ] increased urinary frequency  Musculoskeletal: [ ] negative [x ] back pain [ ] myalgias [ ] arthralgias [ ] fracture  Skin: [x ] negative [ ] rash [ ] itch  Neurological: [x ] negative [ ] headache [ ] dizziness [ ] syncope [ ] weakness [ ] numbness  Psychiatric: [x ] negative [ ] anxiety [ ] depression  Endocrine: x ] negative [ ] diabetes [ ] thyroid problem  Hematologic/Lymphatic: [x ] negative [ ] anemia [ ] bleeding problem  Allergic/Immunologic: [x ] negative [ ] itchy eyes [ ] nasal discharge [ ] hives [ ] angioedema  [ ] All other systems negative  [ ] Unable to assess ROS because ________  OBJECTIVE:  ICU Vital Signs Last 24 Hrs  T(C): 37.1 (27 Feb 2018 12:15), Max: 37.3 (26 Feb 2018 19:45)  T(F): 98.8 (27 Feb 2018 12:15), Max: 99.2 (26 Feb 2018 19:45)  HR: 89 (27 Feb 2018 12:15) (66 - 91)  BP: 128/73 (27 Feb 2018 12:15) (120/71 - 138/80)  BP(mean): --  ABP: --  ABP(mean): --  RR: 18 (27 Feb 2018 12:15) (18 - 118)  SpO2: 93% (27 Feb 2018 12:15) (92% - 97%)        02-26 @ 07:01  -  02-27 @ 07:00  --------------------------------------------------------  IN: 420 mL / OUT: 2620 mL / NET: -2200 mL    02-27 @ 07:01  - 02-27 @ 12:46  --------------------------------------------------------  IN: 237 mL / OUT: 400 mL / NET: -163 mL      CAPILLARY BLOOD GLUCOSE      PHYSICAL EXAM:  General: NAD   HEENT: NCAT, MOM/ ERIN/EOMI   Neck: Supple, large  Respiratory: CTA B/L  Cardiovascular: RRR, (-) m/g/r  Abdomen: NT/ND  Extremities: RLE in cast, raised on pillows  Skin: As above  Neurological: Nonfocal  Psychiatry: Appropriate      HOSPITAL MEDICATIONS:  MEDICATIONS  (STANDING):  ALBUTerol/ipratropium for Nebulization 3 milliLiter(s) Nebulizer every 6 hours  ceFAZolin   IVPB 2000 milliGRAM(s) IV Intermittent every 8 hours  clindamycin IVPB      clindamycin IVPB 900 milliGRAM(s) IV Intermittent every 8 hours  folic acid 1 milliGRAM(s) Oral daily  furosemide   Injectable 20 milliGRAM(s) IV Push daily  lactobacillus acidophilus 1 Tablet(s) Oral three times a day with meals  lidocaine   Patch 1 Patch Transdermal daily  losartan 50 milliGRAM(s) Oral daily  multivitamin 1 Tablet(s) Oral daily  thiamine 100 milliGRAM(s) Oral daily    MEDICATIONS  (PRN):  acetaminophen   Tablet 650 milliGRAM(s) Oral every 6 hours PRN For Temp greater than 38 C (100.4 F)  HYDROmorphone  Injectable 1 milliGRAM(s) IV Push every 6 hours PRN Moderate Pain (4 - 6)  oxyCODONE    5 mG/acetaminophen 325 mG 1 Tablet(s) Oral every 4 hours PRN Severe Pain (7 - 10)      LABS:                        12.4   18.3  )-----------( 400      ( 27 Feb 2018 12:06 )             36.6     Hgb Trend: 12.4<--, 12.4<--, 12.1<--, 13.4<--, 12.8<--  02-27    136  |  95<L>  |  20  ----------------------------<  161<H>  4.0   |  30  |  0.88    Ca    8.2<L>      27 Feb 2018 12:05  Phos  2.5     02-27  Mg     2.6     02-27    TPro  6.8  /  Alb  2.3<L>  /  TBili  0.5  /  DBili  x   /  AST  55<H>  /  ALT  56<H>  /  AlkPhos  86  02-27    Creatinine Trend: 0.88<--, 1.04<--, 1.11<--, 1.09<--, 1.03<--, 1.14<--  PTT - ( 26 Feb 2018 09:09 )  PTT:30.5 sec    Arterial Blood Gas:  02-26 @ 12:15  7.49/44/56/33/89/9.0  ABG lactate: --        MICROBIOLOGY:     Culture - Sputum (collected 26 Feb 2018 18:05)  Source: .Sputum Sputum  Gram Stain (26 Feb 2018 21:30):    Rare polymorphonuclear leukocytes per low power field    No squamous epithelial cells per low power field    Moderate Gram Positive Cocci in Pairs and Chains per oil power field    Culture - Surgical Swab (collected 25 Feb 2018 22:01)  Source: .Surgical Swab RIGHT FOOT PUS  Preliminary Report (26 Feb 2018 16:58):    Moderate Staphylococcus aureus    Culture - Abscess with Gram Stain (collected 25 Feb 2018 08:39)  Source: .Abscess right foot  Preliminary Report (26 Feb 2018 08:50):    Few Staphylococcus aureus  Organism: Staphylococcus aureus (27 Feb 2018 08:09)  Organism: Staphylococcus aureus (27 Feb 2018 08:09)    Culture - Stool (collected 25 Feb 2018 02:18)  Source: .Stool Feces  Final Report (26 Feb 2018 16:39):    No enteric pathogens isolated.    (Stool culture examined for Salmonella,    Shigella, Campylobacter, Aeromonas, Plesiomonas,    Vibrio, E.coli O157 and Yersinia)    Culture - Blood (collected 24 Feb 2018 12:54)  Source: .Blood Blood  Gram Stain (26 Feb 2018 01:16):    Growth in anaerobic bottle: Gram Positive Cocci in Clusters    Growth in aerobic bottle:    Gram Positive Cocci in Clusters  Preliminary Report (26 Feb 2018 10:44):    Growth in anaerobic bottle: Staphylococcus aureus    See previous culture [id number]    Growth in aerobic bottle:    Gram Positive Cocci in Clusters        RADIOLOGY:  [ ] Reviewed and interpreted by me    PULMONARY FUNCTION TESTS:    EKG:

## 2018-02-27 NOTE — PROGRESS NOTE ADULT - ASSESSMENT
57 year old male with HTN and daily alcohol use presenting with worsening SOB, chest pain, confusion and worsening right lower extremity pain and swelling and redness.    Patient sustained an injury to his right foot at work 2/13/18 and went to the ED - discharged home with pain meds. While home, developed worsening SOB, confusion and right foot pain over 2 weeks.     CT chest concerning for septic emboli. Found to have a DVT. Patient taken to OR for I&D for necrosis of right foot.     Now afebrile, remains with leukocytosis, ADA improved.  Blood cx with MSSA. Repeat pending.  Wound cx with S. aureus  Clinically looks better and feels better today  KELVIN neg for vegetation    Recommend:  -Continue cefazolin 2 grams IV q 8 hours and clindamycin 900 mg IV q 8 hours.   -Can DC clindamycin tomorrow.  -F/U repeat blood cxs  -F/U Podiatry   -Will follow along with you.    Corey Salmeron MD  Pager [phone]  After 5pm/weekends call [phone]

## 2018-02-27 NOTE — PROVIDER CONTACT NOTE (MEDICATION) - SITUATION
Patient scheduled to receive Ancef 2G @ 2pm. A bullet was sent to pharmacy and medication was requested 3 times.

## 2018-02-27 NOTE — PHYSICAL THERAPY INITIAL EVALUATION ADULT - PERTINENT HX OF CURRENT PROBLEM, REHAB EVAL
57M with PMHx daily EtOH use, HTN, and recent ankle sprain 2/13 (sent home with pain meds and ankle brace) presenting from home 2/22 for chest pain and AMS x3-4 days with worsening right ankle swelling and redness admitted for sepsis 2/2 MSSA bacteremia/ cellulitis and then found to have CT Chest findings concerning for embolization to lungs. Also found to have acute DVT RLE.

## 2018-02-27 NOTE — PHYSICAL THERAPY INITIAL EVALUATION ADULT - PRECAUTIONS/LIMITATIONS, REHAB EVAL
CT LE: Diffuse cellulitis throughout the right lower leg as outlined above with cutaneous bleb along the dorsal lateral aspect of the foot. There is no subcutaneous air to suggest necrotizing fasciitis.Ill-defined fluid attenuation within the region of the extensor digitorum brevis musculature and within the region of the abductor hallucis muscle which may be related to muscle strain or myositis. Developing fluid collections are also a consideration in the appropriate clinical context. No peripherally enhancing fluid collection is noted on the current examination.Intramuscular fullness and edema within the myofascial planes about the calf musculature likely related to patient's known right peroneal deep vein thrombosis.No CT evidence of osteomyelitis.Osteochondral lesion along the medial aspect of the talar dome.CT ABDOMEN/PELVIS: Redemonstration of bilateral pulmonary nodules/masses in the visualized lung bases. Small right pleural effusion with adjacent passive atelectasis.Right renal cysts. Otherwise, no acute intra-abdominal or intrapelvic process.CTH: Normal non-contrast CT of the brain. Left parietal extra calvarial soft tissue swelling. No acute stroke

## 2018-02-27 NOTE — CONSULT NOTE ADULT - SUBJECTIVE AND OBJECTIVE BOX
Patient is a 57y old  Male who presents with a chief complaint of Chest pain, confusion (22 Feb 2018 03:00)      HPI:  57 M Hx HTN, daily ETOH use for many years who was brought in by family because of altered mental status. Patient first developed R ankle injury at work on 2/13/18, saw his chiropractor the next day who told him to go to ED due to extensive swelling.  He was seen in ED, Xrays done, d/c home with ankle sprain on pain meds (oxycodone) and ordered ankle brace which he received recently.  However, over the past 3-4 fays, right ankle swelling worsened and became increasing painful. Pt's wife also noticed that the patient was more confused and breathing more labored. This prompted him to come to ED.   In the ED, patient was tachycardic and slightly lethargic. WBC was elevated. Blood culture was positive for MSSA. Pt also complained of chest pain and CT of the chest concerning for septic emboli. Source of infection was from right foot abscess with possible necrotizing fascitis. Pt underwent I&D by podiatry on 2/25/18. Pt also noted to have right foot DVT. KELVIN was negative for vegetation on heart valve. Pt has right leg DVT.   GI consulted because of mildly elevated LFTs and heme positive stool. Pt denies abdominal pain, nausea, vomiting, GERD, dysphagia, diarrhea, constipation, blood in stool or melena. Pt never had EGD or colonoscopy. He was scheduled for first time screening colonoscopy but was cancel because of recent illness.   Regarding his ETOH history. The patient claims that for many years he drinks 6-12 beers and several shots of vodka about 4 times a week.       PAST MEDICAL & SURGICAL HISTORY:  Sciatica of right side  Essential hypertension  No significant past surgical history      Allergies    No Known Allergies        MEDICATIONS  (STANDING):  ALBUTerol/ipratropium for Nebulization 3 milliLiter(s) Nebulizer every 6 hours  ceFAZolin   IVPB 2000 milliGRAM(s) IV Intermittent every 8 hours  clindamycin IVPB      clindamycin IVPB 900 milliGRAM(s) IV Intermittent every 8 hours  folic acid 1 milliGRAM(s) Oral daily  furosemide   Injectable 20 milliGRAM(s) IV Push daily  lactobacillus acidophilus 1 Tablet(s) Oral three times a day with meals  lidocaine   Patch 1 Patch Transdermal daily  losartan 50 milliGRAM(s) Oral daily  multivitamin 1 Tablet(s) Oral daily  thiamine 100 milliGRAM(s) Oral daily    MEDICATIONS  (PRN):  acetaminophen   Tablet 650 milliGRAM(s) Oral every 6 hours PRN For Temp greater than 38 C (100.4 F)  HYDROmorphone  Injectable 1 milliGRAM(s) IV Push every 6 hours PRN Moderate Pain (4 - 6)  oxyCODONE    5 mG/acetaminophen 325 mG 1 Tablet(s) Oral every 4 hours PRN Severe Pain (7 - 10)      Social History: Excessive ETOH consumption. Non-smoker, denies illicit drug use. Works in construction.     Family History: DM, HTN, CAD    Review of Systems:    General:  Fatigue. Denies fever, chills or weight loss  CV:  No pain, palpitations, hypo/hypertension  Resp:  No dyspnea, cough, tachypnea, wheezing  GI:  No pain, No nausea, No vomiting, No diarrhea, No constipation, No weight loss, No fever, No pruritis, No rectal bleeding, No tarry stools, No dysphagia,  :  No pain, bleeding, incontinence, nocturia  Muscle:  Right foot swelling and pain  Neuro:  No weakness, tingling, memory problems  Psych:  No fatigue, insomnia, mood problems, depression  Endocrine:  No polyuria, polydypsia, cold/heat intolerance  Heme:  No petechiae, ecchymosis, easy bruisability  Skin:  Redness and pain, open wound on right foot.     Vital Signs Last 24 Hrs  T(C): 37 (27 Feb 2018 13:40), Max: 37.3 (27 Feb 2018 09:10)  T(F): 98.6 (27 Feb 2018 13:40), Max: 99.1 (27 Feb 2018 09:10)  HR: 82 (27 Feb 2018 17:23) (66 - 98)  BP: 132/70 (27 Feb 2018 13:40) (120/71 - 138/80)  BP(mean): --  RR: 18 (27 Feb 2018 13:40) (18 - 20)  SpO2: 98% (27 Feb 2018 17:23) (93% - 98%)    PHYSICAL EXAM:    Constitutional: NAD, well-developed, obese  Neck: No LAD, supple  Respiratory: CTA and P  Cardiovascular: S1 and S2, RRR, no M  Gastrointestinal: BS+, soft, NT/ND, neg HSM,  Extremities: Erythema and edema in right foot. Foot is covered with bandage.  Vascular: 1+ peripheral pulses  Neurological: A/O x 3, no focal deficits  Psychiatric: Normal mood, normal affect  Skin: No jaundice.       LABS:                        12.4   18.3  )-----------( 400      ( 27 Feb 2018 12:06 )             36.6     02-27    136  |  95<L>  |  20  ----------------------------<  161<H>  4.0   |  30  |  0.88    Ca    8.2<L>      27 Feb 2018 12:05  Phos  2.5     02-27  Mg     2.6     02-27    TPro  6.8  /  Alb  2.3<L>  /  TBili  0.5  /  DBili  x   /  AST  55<H>  /  ALT  56<H>  /  AlkPhos  86  02-27    PTT - ( 26 Feb 2018 09:09 )  PTT:30.5 sec    LIVER FUNCTIONS - ( 27 Feb 2018 16:41 )  Alb: x     / Pro: x     / ALK PHOS: x     / ALT: x     / AST: x     / GGT: 52 U/L         RADIOLOGY & ADDITIONAL TESTS:

## 2018-02-27 NOTE — PROGRESS NOTE ADULT - ASSESSMENT
2d S/P I&D abscess/nec fasc right foot  - Site flushed with copious saline and repacked  - continue to monitor demarcation  - leukocytosis trending down.  No need for emergent RTOR.  Will wait for skin to demarcate and take back for definitive debridement later in the week  - ID note appreciated

## 2018-02-27 NOTE — PHYSICAL THERAPY INITIAL EVALUATION ADULT - MODALITIES TREATMENT COMMENTS
Pt rec'ed supine in bed, call barby in lamar, NIEVES/Agatha medicating pt for pain, dressing to L foot I&D wounds (dorsal & lateral) taken down by Podiatry Attending Dr. Keller & Podiatry Resident/ Landon at bedside. Purlence expressed by Dr. Keller, irrigated w/ NS. As per Dr. Keller, hold on VAC application as pt is going to return to the OR for further debridement w/ Podiatry. Dorsal foot wound measuring 3cm x 1.5cm x 2cm w/ 1cm tunnel at 12:00. Lateral wound measuring 11cm x 5cm x 2cm w/ 4cm tunnel at 12:00 towards heel. Tendon expoased. Wounds irrigated w/ NS, cavilon to periwound, repacked by Podiatry resident/Landon w/ iodoform packing strips, covered w/ DSD & ABD pad, secured w/ dede. Podiatry to follow for dressing changes & will reconsult as needed after debridement in OR. Pt left supine in bed, NADl call barby in lamar, NIEVES/Agatha aware, purposeful proactive rounding took place.

## 2018-02-27 NOTE — PROGRESS NOTE ADULT - SUBJECTIVE AND OBJECTIVE BOX
MEDICINE, PROGRESS NOTE [phone]    JUWAN DOMÍNGUEZ 57y [id number]    Patient seen and examined.  Patient is a 57y old  Male who presents with a chief complaint of Chest pain, confusion (22 Feb 2018 03:00)  Pt feels better but pain is still bad in surgical area.    PAST MEDICAL & SURGICAL HISTORY:  Sciatica of right side  Essential hypertension  No significant past surgical history    MEDICATIONS  (STANDING):  ALBUTerol/ipratropium for Nebulization 3 milliLiter(s) Nebulizer every 6 hours  ceFAZolin   IVPB 2000 milliGRAM(s) IV Intermittent every 8 hours  clindamycin IVPB      clindamycin IVPB 900 milliGRAM(s) IV Intermittent every 8 hours  folic acid 1 milliGRAM(s) Oral daily  furosemide   Injectable 20 milliGRAM(s) IV Push daily  lactobacillus acidophilus 1 Tablet(s) Oral three times a day with meals  lidocaine   Patch 1 Patch Transdermal daily  losartan 50 milliGRAM(s) Oral daily  multivitamin 1 Tablet(s) Oral daily  thiamine 100 milliGRAM(s) Oral daily    MEDICATIONS  (PRN):  acetaminophen   Tablet 650 milliGRAM(s) Oral every 6 hours PRN For Temp greater than 38 C (100.4 F)  HYDROmorphone  Injectable 1 milliGRAM(s) IV Push every 6 hours PRN Moderate Pain (4 - 6)  HYDROmorphone  Injectable 1 milliGRAM(s) IV Push every 4 hours PRN Breakthrough Pain  oxyCODONE    5 mG/acetaminophen 325 mG 1 Tablet(s) Oral every 4 hours PRN Severe Pain (7 - 10)    Allergies    No Known Allergies    Intolerances        PHYSICAL EXAM:  Constitutional: NAD  HEENT: Normocephalic, EOMI  Neck:  No JVD  Respiratory: CTA B/L, No wheezes  Cardiovascular: S1, S2, RRR, + systolic murmur  Gastrointestinal: BS+, soft, NT/ND  Extremities: RLE  peripheral edema - improving, dressing - c/d/i  Neurological: AAOX3, no focal deficits  Psychiatric: Normal mood, normal affect  : No Sosa    Vital Signs Last 24 Hrs  T(C): 37 (27 Feb 2018 13:40), Max: 37.3 (27 Feb 2018 09:10)  T(F): 98.6 (27 Feb 2018 13:40), Max: 99.1 (27 Feb 2018 09:10)  HR: 82 (27 Feb 2018 17:23) (66 - 98)  BP: 132/70 (27 Feb 2018 13:40) (120/71 - 138/80)  BP(mean): --  RR: 18 (27 Feb 2018 13:40) (18 - 20)  SpO2: 98% (27 Feb 2018 17:23) (93% - 98%)  I&O's Summary    26 Feb 2018 07:01  -  27 Feb 2018 07:00  --------------------------------------------------------  IN: 420 mL / OUT: 2620 mL / NET: -2200 mL    27 Feb 2018 07:01  -  27 Feb 2018 21:32  --------------------------------------------------------  IN: 527 mL / OUT: 640 mL / NET: -113 mL        LABS:                        12.4   18.3  )-----------( 400      ( 27 Feb 2018 12:06 )             36.6     02-27    136  |  95<L>  |  20  ----------------------------<  161<H>  4.0   |  30  |  0.88    Ca    8.2<L>      27 Feb 2018 12:05  Phos  2.5     02-27  Mg     2.6     02-27    TPro  6.8  /  Alb  2.3<L>  /  TBili  0.5  /  DBili  x   /  AST  55<H>  /  ALT  56<H>  /  AlkPhos  86  02-27        Magnesium, Serum: 2.6 mg/dL (02-27 @ 12:05)

## 2018-02-28 LAB
ALBUMIN SERPL ELPH-MCNC: 2.2 G/DL — LOW (ref 3.3–5)
ALP SERPL-CCNC: 82 U/L — SIGNIFICANT CHANGE UP (ref 40–120)
ALT FLD-CCNC: 65 U/L — HIGH (ref 10–45)
ANION GAP SERPL CALC-SCNC: 10 MMOL/L — SIGNIFICANT CHANGE UP (ref 5–17)
APTT BLD: 28.7 SEC — SIGNIFICANT CHANGE UP (ref 27.5–37.4)
AST SERPL-CCNC: 67 U/L — HIGH (ref 10–40)
BASOPHILS # BLD AUTO: 0.04 K/UL — SIGNIFICANT CHANGE UP (ref 0–0.2)
BASOPHILS NFR BLD AUTO: 0.2 % — SIGNIFICANT CHANGE UP (ref 0–2)
BILIRUB SERPL-MCNC: 0.5 MG/DL — SIGNIFICANT CHANGE UP (ref 0.2–1.2)
BUN SERPL-MCNC: 19 MG/DL — SIGNIFICANT CHANGE UP (ref 7–23)
CALCIUM SERPL-MCNC: 8.1 MG/DL — LOW (ref 8.4–10.5)
CHLORIDE SERPL-SCNC: 98 MMOL/L — SIGNIFICANT CHANGE UP (ref 96–108)
CO2 SERPL-SCNC: 27 MMOL/L — SIGNIFICANT CHANGE UP (ref 22–31)
CREAT SERPL-MCNC: 0.91 MG/DL — SIGNIFICANT CHANGE UP (ref 0.5–1.3)
CULTURE RESULTS: SIGNIFICANT CHANGE UP
CULTURE RESULTS: SIGNIFICANT CHANGE UP
EOSINOPHIL # BLD AUTO: 0.07 K/UL — SIGNIFICANT CHANGE UP (ref 0–0.5)
EOSINOPHIL NFR BLD AUTO: 0.4 % — SIGNIFICANT CHANGE UP (ref 0–6)
GLUCOSE SERPL-MCNC: 114 MG/DL — HIGH (ref 70–99)
GRAM STN FLD: SIGNIFICANT CHANGE UP
GRAM STN FLD: SIGNIFICANT CHANGE UP
HCT VFR BLD CALC: 35.8 % — LOW (ref 39–50)
HCT VFR BLD CALC: 36.5 % — LOW (ref 39–50)
HCT VFR BLD CALC: 37.9 % — LOW (ref 39–50)
HGB BLD-MCNC: 11.7 G/DL — LOW (ref 13–17)
HGB BLD-MCNC: 11.8 G/DL — LOW (ref 13–17)
HGB BLD-MCNC: 12.7 G/DL — LOW (ref 13–17)
IMM GRANULOCYTES NFR BLD AUTO: 1.4 % — SIGNIFICANT CHANGE UP (ref 0–1.5)
INR BLD: 1.54 RATIO — HIGH (ref 0.88–1.16)
LYMPHOCYTES # BLD AUTO: 1.12 K/UL — SIGNIFICANT CHANGE UP (ref 1–3.3)
LYMPHOCYTES # BLD AUTO: 6.7 % — LOW (ref 13–44)
MAGNESIUM SERPL-MCNC: 2.6 MG/DL — SIGNIFICANT CHANGE UP (ref 1.6–2.6)
MCHC RBC-ENTMCNC: 31.2 PG — SIGNIFICANT CHANGE UP (ref 27–34)
MCHC RBC-ENTMCNC: 32.1 GM/DL — SIGNIFICANT CHANGE UP (ref 32–36)
MCHC RBC-ENTMCNC: 32.4 PG — SIGNIFICANT CHANGE UP (ref 27–34)
MCHC RBC-ENTMCNC: 33 GM/DL — SIGNIFICANT CHANGE UP (ref 32–36)
MCHC RBC-ENTMCNC: 33.6 GM/DL — SIGNIFICANT CHANGE UP (ref 32–36)
MCHC RBC-ENTMCNC: 33.7 PG — SIGNIFICANT CHANGE UP (ref 27–34)
MCV RBC AUTO: 100 FL — SIGNIFICANT CHANGE UP (ref 80–100)
MCV RBC AUTO: 97.3 FL — SIGNIFICANT CHANGE UP (ref 80–100)
MCV RBC AUTO: 98.4 FL — SIGNIFICANT CHANGE UP (ref 80–100)
MONOCYTES # BLD AUTO: 1.1 K/UL — HIGH (ref 0–0.9)
MONOCYTES NFR BLD AUTO: 6.6 % — SIGNIFICANT CHANGE UP (ref 2–14)
NEUTROPHILS # BLD AUTO: 14.16 K/UL — HIGH (ref 1.8–7.4)
NEUTROPHILS NFR BLD AUTO: 84.7 % — HIGH (ref 43–77)
PHOSPHATE SERPL-MCNC: 3.5 MG/DL — SIGNIFICANT CHANGE UP (ref 2.5–4.5)
PLATELET # BLD AUTO: 393 K/UL — SIGNIFICANT CHANGE UP (ref 150–400)
PLATELET # BLD AUTO: 411 K/UL — HIGH (ref 150–400)
PLATELET # BLD AUTO: 455 K/UL — HIGH (ref 150–400)
POTASSIUM SERPL-MCNC: 4.7 MMOL/L — SIGNIFICANT CHANGE UP (ref 3.5–5.3)
POTASSIUM SERPL-SCNC: 4.7 MMOL/L — SIGNIFICANT CHANGE UP (ref 3.5–5.3)
PROT SERPL-MCNC: 6.5 G/DL — SIGNIFICANT CHANGE UP (ref 6–8.3)
PROTHROM AB SERPL-ACNC: 17.6 SEC — HIGH (ref 10–13.1)
RBC # BLD: 3.64 M/UL — LOW (ref 4.2–5.8)
RBC # BLD: 3.75 M/UL — LOW (ref 4.2–5.8)
RBC # BLD: 3.78 M/UL — LOW (ref 4.2–5.8)
RBC # FLD: 12.8 % — SIGNIFICANT CHANGE UP (ref 10.3–14.5)
RBC # FLD: 14.2 % — SIGNIFICANT CHANGE UP (ref 10.3–14.5)
RBC # FLD: 14.5 % — SIGNIFICANT CHANGE UP (ref 10.3–14.5)
SODIUM SERPL-SCNC: 135 MMOL/L — SIGNIFICANT CHANGE UP (ref 135–145)
SPECIMEN SOURCE: SIGNIFICANT CHANGE UP
WBC # BLD: 16.73 K/UL — HIGH (ref 3.8–10.5)
WBC # BLD: 17.64 K/UL — HIGH (ref 3.8–10.5)
WBC # BLD: 18.3 K/UL — HIGH (ref 3.8–10.5)
WBC # FLD AUTO: 16.73 K/UL — HIGH (ref 3.8–10.5)
WBC # FLD AUTO: 17.64 K/UL — HIGH (ref 3.8–10.5)
WBC # FLD AUTO: 18.3 K/UL — HIGH (ref 3.8–10.5)

## 2018-02-28 PROCEDURE — 99232 SBSQ HOSP IP/OBS MODERATE 35: CPT

## 2018-02-28 PROCEDURE — 73720 MRI LWR EXTREMITY W/O&W/DYE: CPT | Mod: 26,RT

## 2018-02-28 RX ORDER — SODIUM CHLORIDE 9 MG/ML
1000 INJECTION INTRAMUSCULAR; INTRAVENOUS; SUBCUTANEOUS
Qty: 0 | Refills: 0 | Status: DISCONTINUED | OUTPATIENT
Start: 2018-02-28 | End: 2018-03-01

## 2018-02-28 RX ADMIN — Medication 100 MILLIGRAM(S): at 23:22

## 2018-02-28 RX ADMIN — Medication 100 MILLIGRAM(S): at 18:28

## 2018-02-28 RX ADMIN — HYDROMORPHONE HYDROCHLORIDE 1 MILLIGRAM(S): 2 INJECTION INTRAMUSCULAR; INTRAVENOUS; SUBCUTANEOUS at 00:26

## 2018-02-28 RX ADMIN — HYDROMORPHONE HYDROCHLORIDE 1 MILLIGRAM(S): 2 INJECTION INTRAMUSCULAR; INTRAVENOUS; SUBCUTANEOUS at 13:05

## 2018-02-28 RX ADMIN — Medication 3 MILLILITER(S): at 12:09

## 2018-02-28 RX ADMIN — Medication 1 MILLIGRAM(S): at 12:09

## 2018-02-28 RX ADMIN — LOSARTAN POTASSIUM 50 MILLIGRAM(S): 100 TABLET, FILM COATED ORAL at 05:27

## 2018-02-28 RX ADMIN — Medication 3 MILLILITER(S): at 18:15

## 2018-02-28 RX ADMIN — Medication 1 TABLET(S): at 12:10

## 2018-02-28 RX ADMIN — Medication 1 TABLET(S): at 18:15

## 2018-02-28 RX ADMIN — Medication 20 MILLIGRAM(S): at 05:27

## 2018-02-28 RX ADMIN — Medication 3 MILLILITER(S): at 23:23

## 2018-02-28 RX ADMIN — HYDROMORPHONE HYDROCHLORIDE 1 MILLIGRAM(S): 2 INJECTION INTRAMUSCULAR; INTRAVENOUS; SUBCUTANEOUS at 12:49

## 2018-02-28 RX ADMIN — HYDROMORPHONE HYDROCHLORIDE 1 MILLIGRAM(S): 2 INJECTION INTRAMUSCULAR; INTRAVENOUS; SUBCUTANEOUS at 18:13

## 2018-02-28 RX ADMIN — LIDOCAINE 1 PATCH: 4 CREAM TOPICAL at 12:09

## 2018-02-28 RX ADMIN — HYDROMORPHONE HYDROCHLORIDE 1 MILLIGRAM(S): 2 INJECTION INTRAMUSCULAR; INTRAVENOUS; SUBCUTANEOUS at 23:22

## 2018-02-28 RX ADMIN — HYDROMORPHONE HYDROCHLORIDE 1 MILLIGRAM(S): 2 INJECTION INTRAMUSCULAR; INTRAVENOUS; SUBCUTANEOUS at 07:44

## 2018-02-28 RX ADMIN — Medication 100 MILLIGRAM(S): at 12:04

## 2018-02-28 RX ADMIN — Medication 3 MILLILITER(S): at 05:27

## 2018-02-28 RX ADMIN — Medication 100 MILLIGRAM(S): at 04:45

## 2018-02-28 RX ADMIN — Medication 100 MILLIGRAM(S): at 00:32

## 2018-02-28 RX ADMIN — HYDROMORPHONE HYDROCHLORIDE 1 MILLIGRAM(S): 2 INJECTION INTRAMUSCULAR; INTRAVENOUS; SUBCUTANEOUS at 09:55

## 2018-02-28 RX ADMIN — HYDROMORPHONE HYDROCHLORIDE 1 MILLIGRAM(S): 2 INJECTION INTRAMUSCULAR; INTRAVENOUS; SUBCUTANEOUS at 08:00

## 2018-02-28 RX ADMIN — HYDROMORPHONE HYDROCHLORIDE 1 MILLIGRAM(S): 2 INJECTION INTRAMUSCULAR; INTRAVENOUS; SUBCUTANEOUS at 18:29

## 2018-02-28 RX ADMIN — Medication 100 MILLIGRAM(S): at 08:52

## 2018-02-28 RX ADMIN — HYDROMORPHONE HYDROCHLORIDE 1 MILLIGRAM(S): 2 INJECTION INTRAMUSCULAR; INTRAVENOUS; SUBCUTANEOUS at 09:40

## 2018-02-28 RX ADMIN — Medication 100 MILLIGRAM(S): at 12:10

## 2018-02-28 RX ADMIN — Medication 1 TABLET(S): at 07:45

## 2018-02-28 RX ADMIN — HYDROMORPHONE HYDROCHLORIDE 1 MILLIGRAM(S): 2 INJECTION INTRAMUSCULAR; INTRAVENOUS; SUBCUTANEOUS at 00:56

## 2018-02-28 NOTE — PROGRESS NOTE ADULT - SUBJECTIVE AND OBJECTIVE BOX
MEDICINE, PROGRESS NOTE [phone]    JUWAN DOMÍNGUEZ 57y [id number]    Patient seen and examined.  Patient is a 57y old  Male who presents with a chief complaint of Chest pain, confusion (22 Feb 2018 03:00)  Pt feels ok.    PAST MEDICAL & SURGICAL HISTORY:  Sciatica of right side  Essential hypertension  No significant past surgical history    MEDICATIONS  (STANDING):  ALBUTerol/ipratropium for Nebulization 3 milliLiter(s) Nebulizer every 6 hours  ceFAZolin   IVPB 2000 milliGRAM(s) IV Intermittent every 8 hours  clindamycin IVPB      clindamycin IVPB 900 milliGRAM(s) IV Intermittent every 8 hours  folic acid 1 milliGRAM(s) Oral daily  furosemide   Injectable 20 milliGRAM(s) IV Push daily  lactobacillus acidophilus 1 Tablet(s) Oral three times a day with meals  lidocaine   Patch 1 Patch Transdermal daily  losartan 50 milliGRAM(s) Oral daily  multivitamin 1 Tablet(s) Oral daily  sodium chloride 0.9%. 1000 milliLiter(s) (100 mL/Hr) IV Continuous <Continuous>  thiamine 100 milliGRAM(s) Oral daily    MEDICATIONS  (PRN):  acetaminophen   Tablet 650 milliGRAM(s) Oral every 6 hours PRN For Temp greater than 38 C (100.4 F)  HYDROmorphone  Injectable 1 milliGRAM(s) IV Push every 6 hours PRN Moderate Pain (4 - 6)  HYDROmorphone  Injectable 1 milliGRAM(s) IV Push every 4 hours PRN Breakthrough Pain  oxyCODONE    5 mG/acetaminophen 325 mG 1 Tablet(s) Oral every 4 hours PRN Severe Pain (7 - 10)    Allergies    No Known Allergies    Intolerances        PHYSICAL EXAM:  Constitutional: NAD  HEENT: Normocephalic, EOMI  Neck:  No JVD  Respiratory: CTA B/L, No wheezes  Cardiovascular: S1, S2, RRR, + systolic murmur  Gastrointestinal: BS+, soft, NT/ND  Extremities: dec peripheral edema right leg, dressing intact  Neurological: AAOX3, no focal deficits  Psychiatric: Normal mood, normal affect  : No Sosa    Vital Signs Last 24 Hrs  T(C): 37 (28 Feb 2018 12:03), Max: 37.7 (27 Feb 2018 21:47)  T(F): 98.6 (28 Feb 2018 12:03), Max: 99.8 (27 Feb 2018 21:47)  HR: 86 (28 Feb 2018 12:03) (81 - 90)  BP: 114/69 (28 Feb 2018 12:03) (107/71 - 145/67)  BP(mean): --  RR: 16 (28 Feb 2018 12:03) (16 - 20)  SpO2: 97% (28 Feb 2018 12:03) (95% - 98%)  I&O's Summary    27 Feb 2018 07:01  -  28 Feb 2018 07:00  --------------------------------------------------------  IN: 797 mL / OUT: 1870 mL / NET: -1073 mL    28 Feb 2018 07:01  -  28 Feb 2018 16:23  --------------------------------------------------------  IN: 720 mL / OUT: 1300 mL / NET: -580 mL        LABS:                        11.7   17.64 )-----------( 393      ( 28 Feb 2018 07:50 )             36.5     02-28    135  |  98  |  19  ----------------------------<  114<H>  4.7   |  27  |  0.91    Ca    8.1<L>      28 Feb 2018 07:37  Phos  3.5     02-28  Mg     2.6     02-28    TPro  6.5  /  Alb  2.2<L>  /  TBili  0.5  /  DBili  x   /  AST  67<H>  /  ALT  65<H>  /  AlkPhos  82  02-28        Magnesium, Serum: 2.6 mg/dL (02-28 @ 07:37)

## 2018-02-28 NOTE — PROGRESS NOTE ADULT - ASSESSMENT
recent ankle sprain which got infected resulting in MSSA sepsis/cellulitis with lung embolization s/p OR debridement  hemoptysis - minimal - possibly secondary to being on hep drip with pulm septic emboli  ADA - resolved  Transaminitis - non specific - improving  Coagulopathy on admission - most likley due to dietary deficiency  Right peroneal vein dvt  Pulm artery enlargement  Morbid obesity  SHIELA continue nocturnal bipap  Diarrhea - resolved  LBP - resolved    continue current care  await MRI to assess extent of infection per podiatry, have asked radiology to extend MRI to mid calf down   no need for further blood cultures at present until or debridements are complete.  monitor labs daily  nocturnal bipap  will re-evaluate weight bearing status and foot once MRI and OR complete  continue leg elevation

## 2018-02-28 NOTE — PROGRESS NOTE ADULT - ASSESSMENT
57 year old male with HTN and daily alcohol use presenting with worsening SOB, chest pain, confusion and worsening right lower extremity pain and swelling and redness.    Patient sustained an injury to his right foot at work 2/13/18 and went to the ED - discharged home with pain meds. While home, developed worsening SOB, confusion and right foot pain over 2 weeks.     CT chest concerning for septic emboli. Found to have a DVT. Patient taken to OR for I&D for necrosis of right foot.     Now afebrile, remains with leukocytosis, ADA improved.  Blood cx with MSSA. Repeat pending.  Wound cx with S. aureus  Clinically looks better and feels better today  KELVIN neg for vegetation    Recommend:  -Continue cefazolin 2 grams IV q 8 hours   -Can DC clindamycin  -F/U repeat blood cxs  -F/U Podiatry   -Will follow along with you.    Corey Salmeron MD  Pager [phone]  After 5pm/weekends call [phone]

## 2018-02-28 NOTE — PROGRESS NOTE ADULT - SUBJECTIVE AND OBJECTIVE BOX
Patient is a 57y old  Male who presents with a chief complaint of Chest pain, confusion (22 Feb 2018 03:00)       INTERVAL HPI/OVERNIGHT EVENTS:  Patient seen and evaluated at bedside.  Pt is resting comfortable in NAD. Denies N/V/F/C.  Pain rated at X/10    Allergies    No Known Allergies    Intolerances        Vital Signs Last 24 Hrs  T(C): 37 (28 Feb 2018 12:03), Max: 37.7 (27 Feb 2018 21:47)  T(F): 98.6 (28 Feb 2018 12:03), Max: 99.8 (27 Feb 2018 21:47)  HR: 86 (28 Feb 2018 12:03) (81 - 90)  BP: 114/69 (28 Feb 2018 12:03) (107/71 - 145/67)  BP(mean): --  RR: 16 (28 Feb 2018 12:03) (16 - 20)  SpO2: 97% (28 Feb 2018 12:03) (95% - 98%)    LABS:                        11.7   17.64 )-----------( 393      ( 28 Feb 2018 07:50 )             36.5     02-28    135  |  98  |  19  ----------------------------<  114<H>  4.7   |  27  |  0.91    Ca    8.1<L>      28 Feb 2018 07:37  Phos  3.5     02-28  Mg     2.6     02-28    TPro  6.5  /  Alb  2.2<L>  /  TBili  0.5  /  DBili  x   /  AST  67<H>  /  ALT  65<H>  /  AlkPhos  82  02-28    PT/INR - ( 28 Feb 2018 07:56 )   PT: 17.6 sec;   INR: 1.54 ratio         PTT - ( 28 Feb 2018 07:56 )  PTT:28.7 sec    CAPILLARY BLOOD GLUCOSE          Lower Extremity Physical Exam:  Sx site right foot stable with scant pus noted postero-lateral but no crepitus.  Skin bridge and hanna wound region still demarcating and ? for survival.  Edema decreased as well as the erythema.  There is no odor and the patient is able to move his toes with good CR and no signs of digital necrosis.    RADIOLOGY & ADDITIONAL TESTS:  Culture - Surgical Swab (02.25.18 @ 22:01)    Specimen Source: .Surgical Swab RIGHT FOOT PUS    Culture Results:   Moderate Staphylococcus aureus

## 2018-02-28 NOTE — CHART NOTE - NSCHARTNOTEFT_GEN_A_CORE
Nutrition Note:  Malnutrition Follow-up    As per chart 57 year old male with HTN and daily alcohol use presenting with worsening SOB, chest pain, confusion and worsening right lower extremity pain and swelling and redness. Patient sustained an injury to his right foot at work 2/13/18 and went to the ED - discharged home with pain meds. While home, developed worsening SOB, confusion and right foot pain over 2 weeks.  CT chest concerning for septic emboli. Found to have a DVT. Patient taken to OR for I&D for necrosis of right foot. Now afebrile, remains with leukocytosis, ADA improved.  Blood cx with MSSA. Repeat pending. Wound cx with S. aureus Clinically looks better and feels better today, KELVIN neg for vegetation      Source: Patient [x ]    Family [x ]     other [x ] Pt's wife at bedside, EMR    Diet : DASH/TLC       Patient reports: denies any GI distress, + BM today     PO intake:  50-75% [ x]   % [ ]  other :     Source for PO intake [x ] Patient [x ] family [ ] chart [x ] staff [ ] other     Pt reports that his appetite is improving. Pt was previously on Ensure Enlive x 3/day to assist with decreased appetite. Pt states that his doctor no longer wants him to have Ensure and prefers the patient to consume solid foods. Pt's food preferences obtained and pt agreeable to trying health shakes to assist with appetite and increased protein needs.         Current Weight: 272 lbs (2/28, standing)  Weight from previous RD note: 269.1lbs   1.2% Weight Change since previous RD note    Pertinent Medications: MEDICATIONS  (STANDING):  ALBUTerol/ipratropium for Nebulization 3 milliLiter(s) Nebulizer every 6 hours  ceFAZolin   IVPB 2000 milliGRAM(s) IV Intermittent every 8 hours  clindamycin IVPB      clindamycin IVPB 900 milliGRAM(s) IV Intermittent every 8 hours  folic acid 1 milliGRAM(s) Oral daily  furosemide   Injectable 20 milliGRAM(s) IV Push daily  lactobacillus acidophilus 1 Tablet(s) Oral three times a day with meals  lidocaine   Patch 1 Patch Transdermal daily  losartan 50 milliGRAM(s) Oral daily  multivitamin 1 Tablet(s) Oral daily  thiamine 100 milliGRAM(s) Oral daily    MEDICATIONS  (PRN):  acetaminophen   Tablet 650 milliGRAM(s) Oral every 6 hours PRN For Temp greater than 38 C (100.4 F)  HYDROmorphone  Injectable 1 milliGRAM(s) IV Push every 6 hours PRN Moderate Pain (4 - 6)  HYDROmorphone  Injectable 1 milliGRAM(s) IV Push every 4 hours PRN Breakthrough Pain  oxyCODONE    5 mG/acetaminophen 325 mG 1 Tablet(s) Oral every 4 hours PRN Severe Pain (7 - 10)    Pertinent Labs:  02-28 Na135 mmol/L Glu 114 mg/dL<H> K+ 4.7 mmol/L Cr  0.91 mg/dL BUN 19 mg/dL Phos 3.5 mg/dL Alb 2.2 g/dL<L>, Calcium 8.1      Skin: +1 b/l feet edema, RLE infection, right foot wound    Estimated Needs:   [x ] no change since previous assessment  [ ] recalculated:       Previous Nutrition Diagnosis:     [ x] Malnutrition- severe          Nutrition Diagnosis is [ x] ongoing- being addressed with pt's improving appetite, obtained pt's food preferences, and health shakes         New Nutrition Diagnosis: [ x] not applicable    [ ] Inadequate Protein Energy Intake [ ]Inadequate Oral Intake [ ] Excessive Energy Intake     [ ] Underweight [ ] Increased Nutrient Needs [ ] Overweight/Obesity     [ ] Altered GI Function [ ] Unintended Weight Loss [ ] Food & Nutrition Related Knowledge Deficit[ ] Limited Adherence to nutrition related recommendations [ ] Malnutrition  [ ] other: Free text       Related to:      As evidenced by:      Interventions:     Recommend    [ ] Change Diet To:    [ ] Nutrition Supplement    [ ] Nutrition Support    [x ] Other:   1) Continue with current DASH/TLC diet   2) Provide pt with health shakes  3) Pt encouraged and made aware of importance of good energy and protein intake to assist with wound healing  4) Reviewed foods high in protein  5) Pt's food preferences obtained and continue to provide pt with food preferences with diet restriction       Monitoring and Evaluation:     [ x] PO intake [ x] Tolerance to diet prescription [x ] weights [ x] follow up per protocol    [ x] other: RD to remain available Nutrition Note:  Malnutrition Follow-up    As per chart 57 year old male with HTN and daily alcohol use presenting with worsening SOB, chest pain, confusion and worsening right lower extremity pain and swelling and redness. Patient sustained an injury to his right foot at work 2/13/18 and went to the ED - discharged home with pain meds. While home, developed worsening SOB, confusion and right foot pain over 2 weeks.  CT chest concerning for septic emboli. Found to have a DVT. Patient taken to OR for I&D for necrosis of right foot. Now afebrile, remains with leukocytosis, ADA improved.  Blood cx with MSSA. Repeat pending. Wound cx with S. aureus Clinically looks better and feels better today, KELVIN neg for vegetation      Source: Patient [x ]    Family [x ]     other [x ] Pt's wife at bedside, EMR    Diet : DASH/TLC       Patient reports: denies any GI distress, + BM today     PO intake:  50-75% [ x]   % [ ]  other :     Source for PO intake [x ] Patient [x ] family [ ] chart [x ] staff [ ] other     Pt seen for malnutrition follow-up. Pt reports that his appetite is improving. Pt was previously on Ensure Enlive x 3/day to assist with decreased appetite. Pt states that his doctor no longer wants him to have Ensure and prefers the patient to consume solid foods. Pt's food preferences obtained and pt agreeable to trying health shakes to assist with appetite and increased protein needs.         Current Weight: 272 lbs (2/28, standing)  Weight from previous RD note: 269.1lbs   1.2% Weight Change since previous RD note    Pertinent Medications: MEDICATIONS  (STANDING):  ALBUTerol/ipratropium for Nebulization 3 milliLiter(s) Nebulizer every 6 hours  ceFAZolin   IVPB 2000 milliGRAM(s) IV Intermittent every 8 hours  clindamycin IVPB      clindamycin IVPB 900 milliGRAM(s) IV Intermittent every 8 hours  folic acid 1 milliGRAM(s) Oral daily  furosemide   Injectable 20 milliGRAM(s) IV Push daily  lactobacillus acidophilus 1 Tablet(s) Oral three times a day with meals  lidocaine   Patch 1 Patch Transdermal daily  losartan 50 milliGRAM(s) Oral daily  multivitamin 1 Tablet(s) Oral daily  thiamine 100 milliGRAM(s) Oral daily    MEDICATIONS  (PRN):  acetaminophen   Tablet 650 milliGRAM(s) Oral every 6 hours PRN For Temp greater than 38 C (100.4 F)  HYDROmorphone  Injectable 1 milliGRAM(s) IV Push every 6 hours PRN Moderate Pain (4 - 6)  HYDROmorphone  Injectable 1 milliGRAM(s) IV Push every 4 hours PRN Breakthrough Pain  oxyCODONE    5 mG/acetaminophen 325 mG 1 Tablet(s) Oral every 4 hours PRN Severe Pain (7 - 10)    Pertinent Labs:  02-28 Na135 mmol/L Glu 114 mg/dL<H> K+ 4.7 mmol/L Cr  0.91 mg/dL BUN 19 mg/dL Phos 3.5 mg/dL Alb 2.2 g/dL<L>, Calcium 8.1      Skin: +1 b/l feet edema, RLE infection, right foot wound    Estimated Needs:   [x ] no change since previous assessment  [ ] recalculated:       Previous Nutrition Diagnosis:     [ x] Malnutrition- severe          Nutrition Diagnosis is [ x] ongoing- being addressed with pt's improving appetite, obtained pt's food preferences, and health shakes         New Nutrition Diagnosis: [ x] not applicable    [ ] Inadequate Protein Energy Intake [ ]Inadequate Oral Intake [ ] Excessive Energy Intake     [ ] Underweight [ ] Increased Nutrient Needs [ ] Overweight/Obesity     [ ] Altered GI Function [ ] Unintended Weight Loss [ ] Food & Nutrition Related Knowledge Deficit[ ] Limited Adherence to nutrition related recommendations [ ] Malnutrition  [ ] other: Free text       Related to:      As evidenced by:      Interventions:     Recommend    [ ] Change Diet To:    [ ] Nutrition Supplement    [ ] Nutrition Support    [x ] Other:   1) Continue with current DASH/TLC diet   2) Provide pt with health shakes  3) Pt encouraged and made aware of importance of good energy and protein intake to assist with wound healing  4) Reviewed foods high in protein  5) Pt's food preferences obtained and continue to provide pt with food preferences with diet restriction       Monitoring and Evaluation:     [ x] PO intake [ x] Tolerance to diet prescription [x ] weights [ x] follow up per protocol    [ x] other: RD to remain available

## 2018-02-28 NOTE — PROGRESS NOTE ADULT - ASSESSMENT
57M s/p I&D of right foot due to nec fasc  - POD #3  - Appearance improving initial source control achieved however purulence noted from lateral foot and ankle along peroneal tendon sheath during dressing change today  - RTOR tomorrow 1:30PM for staged I&D and debridement of right foot and ankle  - Would appreciate expedited MRI of R foot and ankle to evaluate extend of infection  - NPO after midnight  - AM labs  - Please optimize pt for OR, local under sedation  - Seen w/ attending

## 2018-02-28 NOTE — PROGRESS NOTE ADULT - SUBJECTIVE AND OBJECTIVE BOX
CC: F/U bacteremia, RLE infection     Interval History/ROS: Patient states feeling better. Breathing improving. Denies fever, chills. Remains with pain in RLE.    Allergies  No Known Allergies      ANTIMICROBIALS:  ceFAZolin   IVPB 2000 every 8 hours  clindamycin IVPB    clindamycin IVPB 900 every 8 hours    PE:    Vital Signs Last 24 Hrs  T(C): 36.9 (28 Feb 2018 09:39), Max: 37.7 (27 Feb 2018 21:47)  T(F): 98.5 (28 Feb 2018 09:39), Max: 99.8 (27 Feb 2018 21:47)  HR: 90 (28 Feb 2018 09:39) (81 - 98)  BP: 115/69 (28 Feb 2018 09:39) (107/71 - 145/67)  BP(mean): --  RR: 18 (28 Feb 2018 09:39) (18 - 20)  SpO2: 96% (28 Feb 2018 09:39) (93% - 98%)    Gen: AOx3, NAD, non-toxic  CV: S1+S2 normal, no murmurs  Resp: Clear bilat, no resp distress  Abd: Soft, nontender, +BS  Ext: RLE bandaged  : No Sosa  IV/Skin: No thrombophlebitis  Neuro: no focal deficits    LABS:                          11.7   17.64 )-----------( 393      ( 28 Feb 2018 07:50 )             36.5       02-28    135  |  98  |  19  ----------------------------<  114<H>  4.7   |  27  |  0.91    Ca    8.1<L>      28 Feb 2018 07:37  Phos  3.5     02-28  Mg     2.6     02-28    TPro  6.5  /  Alb  2.2<L>  /  TBili  0.5  /  DBili  x   /  AST  67<H>  /  ALT  65<H>  /  AlkPhos  82  02-28          MICROBIOLOGY:  v  .Sputum Sputum  02-26-18   Normal Respiratory Emily present  --    Rare polymorphonuclear leukocytes per low power field  No squamous epithelial cells per low power field  Moderate Gram Positive Cocci in Pairs and Chains per oil power field      .Surgical Swab RIGHT FOOT PUS  02-25-18   Moderate Staphylococcus aureus  --  Staphylococcus aureus      .Abscess right foot  02-25-18   Few Staphylococcus aureus  --  Staphylococcus aureus      .Stool Feces  02-25-18   No enteric pathogens isolated.  (Stool culture examined for Salmonella,  Shigella, Campylobacter, Aeromonas, Plesiomonas,  Vibrio, E.coli O157 and Yersinia)  --  --      .Blood Blood  02-24-18   Growth in anaerobic bottle: Staphylococcus aureus  See previous culture [id number]  Growth in aerobic bottle:  Coag Negative Staphylococcus  --    Growth in anaerobic bottle: Gram Positive Cocci in Clusters  Growth in aerobic bottle:  Gram Positive Cocci in Clusters      .Blood Blood  02-24-18   Growth in aerobic and anaerobic bottles: Staphylococcus aureus  See previous culture [id number]  --    Growth in anaerobic bottle: Gram Positive Cocci in Clusters  Growth in aerobic bottle: Gram Positive Cocci in Clusters      .Blood Blood  02-24-18   Growth in aerobic and anaerobic bottles: Staphylococcus aureus  See previous culture [id number]  --    Growth in aerobic bottle: Gram Positive Cocci in Clusters  Growth in anaerobic bottle: Gram Positive Cocci in Clusters      .Blood Blood-Peripheral  02-22-18   Growth in aerobic and anaerobic bottles: Staphylococcus aureus  "Due to technical problems, Proteus sp. will Not be reported as part of  the BCID panel until further notice"  ***Blood Panel PCR results on this specimen are available  approximately 3 hours after the Gram stain result.***  Gram stain, PCR, and/or culture results may not always  correspond due to difference in methodologies.  ************************************************************  This PCR assay was performed using DLS.  The following targets are tested for: Enterococcus,  vancomycin resistant enterococci, Listeria monocytogenes,  coagulase negative staphylococci, S. aureus,  methicillin resistant S. aureus, Streptococcus agalactiae  (Group B), S. pneumoniae, S. pyogenes (Group A),  Acinetobacter baumannii, Enterobacter cloacae, E. coli,  Klebsiella oxytoca, K. pneumoniae, Proteus sp.,  Serratia marcescens, Haemophilus influenzae,  Neisseria meningitidis, Pseudomonas aeruginosa, Candida  albicans, C. glabrata, C krusei, C parapsilosis,  C. tropicalis and the KPC resistance gene.  --  Blood Culture PCR  Staphylococcus aureus    Rapid RVP Result: NotDetec (02-23 @ 13:23)    Clostridium difficile GDH Toxins A&amp;B, EIA:   Negative (02-24-18 @ 22:41)  Clostridium difficile GDH Interpretation: Negative for toxigenic C. Difficile.  This specimen is negative for C.  Difficile glutamate dehydrogenase (GDH) antigen and negative for C.  Difficile Toxins A & B, by EIA.  GDH is a highly sensitive screening  marker for C. Difficile that is produced in large amounts by all C.  Difficile strains, both toxigenic and nontoxigenic.  This assay has not  been validated as a test of cure.  Repeat testing during the same episode  of diarrhea is of limited value and is discouraged.  The results of this  assay should always be interpreted in conjunction with pateint's clinical  history. (02-24-18 @ 22:41)      RADIOLOGY:    < from: [facility] Duplex Upper Ext Vein Scan, Right (02.27.18 @ 15:14) >  IMPRESSION: No DVT identified of the right upper extremity.    < end of copied text >

## 2018-03-01 ENCOUNTER — RESULT REVIEW (OUTPATIENT)
Age: 58
End: 2018-03-01

## 2018-03-01 LAB
ALBUMIN SERPL ELPH-MCNC: 2.1 G/DL — LOW (ref 3.3–5)
ALP SERPL-CCNC: 68 U/L — SIGNIFICANT CHANGE UP (ref 40–120)
ALT FLD-CCNC: 77 U/L RC — HIGH (ref 10–45)
ANION GAP SERPL CALC-SCNC: 9 MMOL/L — SIGNIFICANT CHANGE UP (ref 5–17)
APTT BLD: 27.6 SEC — SIGNIFICANT CHANGE UP (ref 27.5–37.4)
AST SERPL-CCNC: 81 U/L — HIGH (ref 10–40)
BASOPHILS # BLD AUTO: 0.01 K/UL — SIGNIFICANT CHANGE UP (ref 0–0.2)
BASOPHILS NFR BLD AUTO: 0.1 % — SIGNIFICANT CHANGE UP (ref 0–2)
BILIRUB SERPL-MCNC: 0.4 MG/DL — SIGNIFICANT CHANGE UP (ref 0.2–1.2)
BLD GP AB SCN SERPL QL: NEGATIVE — SIGNIFICANT CHANGE UP
BUN SERPL-MCNC: 16 MG/DL — SIGNIFICANT CHANGE UP (ref 7–23)
CALCIUM SERPL-MCNC: 8.1 MG/DL — LOW (ref 8.4–10.5)
CHLORIDE SERPL-SCNC: 99 MMOL/L — SIGNIFICANT CHANGE UP (ref 96–108)
CO2 SERPL-SCNC: 27 MMOL/L — SIGNIFICANT CHANGE UP (ref 22–31)
CREAT SERPL-MCNC: 0.83 MG/DL — SIGNIFICANT CHANGE UP (ref 0.5–1.3)
EOSINOPHIL # BLD AUTO: 0.06 K/UL — SIGNIFICANT CHANGE UP (ref 0–0.5)
EOSINOPHIL NFR BLD AUTO: 0.4 % — SIGNIFICANT CHANGE UP (ref 0–6)
GLUCOSE SERPL-MCNC: 120 MG/DL — HIGH (ref 70–99)
GRAM STN FLD: SIGNIFICANT CHANGE UP
HCT VFR BLD CALC: 34.3 % — LOW (ref 39–50)
HGB BLD-MCNC: 11.2 G/DL — LOW (ref 13–17)
IMM GRANULOCYTES NFR BLD AUTO: 1 % — SIGNIFICANT CHANGE UP (ref 0–1.5)
INR BLD: 1.5 RATIO — HIGH (ref 0.88–1.16)
LYMPHOCYTES # BLD AUTO: 1.16 K/UL — SIGNIFICANT CHANGE UP (ref 1–3.3)
LYMPHOCYTES # BLD AUTO: 7.3 % — LOW (ref 13–44)
MAGNESIUM SERPL-MCNC: 2.5 MG/DL — SIGNIFICANT CHANGE UP (ref 1.6–2.6)
MCHC RBC-ENTMCNC: 32.2 PG — SIGNIFICANT CHANGE UP (ref 27–34)
MCHC RBC-ENTMCNC: 32.7 GM/DL — SIGNIFICANT CHANGE UP (ref 32–36)
MCV RBC AUTO: 98.6 FL — SIGNIFICANT CHANGE UP (ref 80–100)
MONOCYTES # BLD AUTO: 0.92 K/UL — HIGH (ref 0–0.9)
MONOCYTES NFR BLD AUTO: 5.8 % — SIGNIFICANT CHANGE UP (ref 2–14)
NEUTROPHILS # BLD AUTO: 13.54 K/UL — HIGH (ref 1.8–7.4)
NEUTROPHILS NFR BLD AUTO: 85.4 % — HIGH (ref 43–77)
PHOSPHATE SERPL-MCNC: 3.7 MG/DL — SIGNIFICANT CHANGE UP (ref 2.5–4.5)
PLATELET # BLD AUTO: 409 K/UL — HIGH (ref 150–400)
POTASSIUM SERPL-MCNC: 4.6 MMOL/L — SIGNIFICANT CHANGE UP (ref 3.5–5.3)
POTASSIUM SERPL-SCNC: 4.6 MMOL/L — SIGNIFICANT CHANGE UP (ref 3.5–5.3)
PROT SERPL-MCNC: 6.5 G/DL — SIGNIFICANT CHANGE UP (ref 6–8.3)
PROTHROM AB SERPL-ACNC: 17.1 SEC — HIGH (ref 10–13.1)
RBC # BLD: 3.48 M/UL — LOW (ref 4.2–5.8)
RBC # FLD: 14.2 % — SIGNIFICANT CHANGE UP (ref 10.3–14.5)
RH IG SCN BLD-IMP: POSITIVE — SIGNIFICANT CHANGE UP
SODIUM SERPL-SCNC: 135 MMOL/L — SIGNIFICANT CHANGE UP (ref 135–145)
WBC # BLD: 15.85 K/UL — HIGH (ref 3.8–10.5)
WBC # FLD AUTO: 15.85 K/UL — HIGH (ref 3.8–10.5)

## 2018-03-01 PROCEDURE — 88305 TISSUE EXAM BY PATHOLOGIST: CPT | Mod: 26

## 2018-03-01 PROCEDURE — 99252 IP/OBS CONSLTJ NEW/EST SF 35: CPT

## 2018-03-01 PROCEDURE — 99233 SBSQ HOSP IP/OBS HIGH 50: CPT

## 2018-03-01 RX ORDER — FUROSEMIDE 40 MG
20 TABLET ORAL DAILY
Qty: 0 | Refills: 0 | Status: DISCONTINUED | OUTPATIENT
Start: 2018-03-01 | End: 2018-03-03

## 2018-03-01 RX ORDER — IPRATROPIUM/ALBUTEROL SULFATE 18-103MCG
3 AEROSOL WITH ADAPTER (GRAM) INHALATION EVERY 6 HOURS
Qty: 0 | Refills: 0 | Status: DISCONTINUED | OUTPATIENT
Start: 2018-03-01 | End: 2018-03-09

## 2018-03-01 RX ORDER — HYDROMORPHONE HYDROCHLORIDE 2 MG/ML
0.5 INJECTION INTRAMUSCULAR; INTRAVENOUS; SUBCUTANEOUS ONCE
Qty: 0 | Refills: 0 | Status: DISCONTINUED | OUTPATIENT
Start: 2018-03-01 | End: 2018-03-01

## 2018-03-01 RX ORDER — SODIUM CHLORIDE 9 MG/ML
1000 INJECTION INTRAMUSCULAR; INTRAVENOUS; SUBCUTANEOUS
Qty: 0 | Refills: 0 | Status: DISCONTINUED | OUTPATIENT
Start: 2018-03-01 | End: 2018-03-01

## 2018-03-01 RX ORDER — CEFAZOLIN SODIUM 1 G
2000 VIAL (EA) INJECTION EVERY 8 HOURS
Qty: 0 | Refills: 0 | Status: DISCONTINUED | OUTPATIENT
Start: 2018-03-01 | End: 2018-03-03

## 2018-03-01 RX ORDER — FOLIC ACID 0.8 MG
1 TABLET ORAL DAILY
Qty: 0 | Refills: 0 | Status: DISCONTINUED | OUTPATIENT
Start: 2018-03-01 | End: 2018-03-09

## 2018-03-01 RX ORDER — HYDROMORPHONE HYDROCHLORIDE 2 MG/ML
0.25 INJECTION INTRAMUSCULAR; INTRAVENOUS; SUBCUTANEOUS
Qty: 0 | Refills: 0 | Status: DISCONTINUED | OUTPATIENT
Start: 2018-03-01 | End: 2018-03-01

## 2018-03-01 RX ORDER — THIAMINE MONONITRATE (VIT B1) 100 MG
100 TABLET ORAL DAILY
Qty: 0 | Refills: 0 | Status: DISCONTINUED | OUTPATIENT
Start: 2018-03-01 | End: 2018-03-09

## 2018-03-01 RX ORDER — LACTOBACILLUS ACIDOPHILUS 100MM CELL
1 CAPSULE ORAL
Qty: 0 | Refills: 0 | Status: DISCONTINUED | OUTPATIENT
Start: 2018-03-01 | End: 2018-03-09

## 2018-03-01 RX ORDER — OXYCODONE AND ACETAMINOPHEN 5; 325 MG/1; MG/1
1 TABLET ORAL EVERY 4 HOURS
Qty: 0 | Refills: 0 | Status: DISCONTINUED | OUTPATIENT
Start: 2018-03-01 | End: 2018-03-01

## 2018-03-01 RX ORDER — HYDROMORPHONE HYDROCHLORIDE 2 MG/ML
1 INJECTION INTRAMUSCULAR; INTRAVENOUS; SUBCUTANEOUS EVERY 4 HOURS
Qty: 0 | Refills: 0 | Status: DISCONTINUED | OUTPATIENT
Start: 2018-03-01 | End: 2018-03-01

## 2018-03-01 RX ORDER — ACETAMINOPHEN 500 MG
650 TABLET ORAL EVERY 6 HOURS
Qty: 0 | Refills: 0 | Status: DISCONTINUED | OUTPATIENT
Start: 2018-03-01 | End: 2018-03-01

## 2018-03-01 RX ORDER — HYDROMORPHONE HYDROCHLORIDE 2 MG/ML
30 INJECTION INTRAMUSCULAR; INTRAVENOUS; SUBCUTANEOUS
Qty: 0 | Refills: 0 | Status: DISCONTINUED | OUTPATIENT
Start: 2018-03-01 | End: 2018-03-06

## 2018-03-01 RX ORDER — ONDANSETRON 8 MG/1
4 TABLET, FILM COATED ORAL ONCE
Qty: 0 | Refills: 0 | Status: DISCONTINUED | OUTPATIENT
Start: 2018-03-01 | End: 2018-03-01

## 2018-03-01 RX ORDER — HYDROMORPHONE HYDROCHLORIDE 2 MG/ML
1 INJECTION INTRAMUSCULAR; INTRAVENOUS; SUBCUTANEOUS EVERY 6 HOURS
Qty: 0 | Refills: 0 | Status: DISCONTINUED | OUTPATIENT
Start: 2018-03-01 | End: 2018-03-01

## 2018-03-01 RX ORDER — NALOXONE HYDROCHLORIDE 4 MG/.1ML
0.1 SPRAY NASAL
Qty: 0 | Refills: 0 | Status: DISCONTINUED | OUTPATIENT
Start: 2018-03-01 | End: 2018-03-06

## 2018-03-01 RX ORDER — SODIUM CHLORIDE 9 MG/ML
1000 INJECTION INTRAMUSCULAR; INTRAVENOUS; SUBCUTANEOUS
Qty: 0 | Refills: 0 | Status: DISCONTINUED | OUTPATIENT
Start: 2018-03-01 | End: 2018-03-02

## 2018-03-01 RX ORDER — LOSARTAN POTASSIUM 100 MG/1
50 TABLET, FILM COATED ORAL DAILY
Qty: 0 | Refills: 0 | Status: DISCONTINUED | OUTPATIENT
Start: 2018-03-01 | End: 2018-03-09

## 2018-03-01 RX ORDER — ACETAMINOPHEN 500 MG
650 TABLET ORAL ONCE
Qty: 0 | Refills: 0 | Status: COMPLETED | OUTPATIENT
Start: 2018-03-01 | End: 2018-03-01

## 2018-03-01 RX ORDER — LIDOCAINE 4 G/100G
1 CREAM TOPICAL DAILY
Qty: 0 | Refills: 0 | Status: DISCONTINUED | OUTPATIENT
Start: 2018-03-01 | End: 2018-03-09

## 2018-03-01 RX ORDER — HYDROMORPHONE HYDROCHLORIDE 2 MG/ML
0.5 INJECTION INTRAMUSCULAR; INTRAVENOUS; SUBCUTANEOUS
Qty: 0 | Refills: 0 | Status: DISCONTINUED | OUTPATIENT
Start: 2018-03-01 | End: 2018-03-06

## 2018-03-01 RX ADMIN — HYDROMORPHONE HYDROCHLORIDE 1 MILLIGRAM(S): 2 INJECTION INTRAMUSCULAR; INTRAVENOUS; SUBCUTANEOUS at 12:32

## 2018-03-01 RX ADMIN — HYDROMORPHONE HYDROCHLORIDE 0.25 MILLIGRAM(S): 2 INJECTION INTRAMUSCULAR; INTRAVENOUS; SUBCUTANEOUS at 17:25

## 2018-03-01 RX ADMIN — SODIUM CHLORIDE 100 MILLILITER(S): 9 INJECTION INTRAMUSCULAR; INTRAVENOUS; SUBCUTANEOUS at 11:04

## 2018-03-01 RX ADMIN — HYDROMORPHONE HYDROCHLORIDE 0.25 MILLIGRAM(S): 2 INJECTION INTRAMUSCULAR; INTRAVENOUS; SUBCUTANEOUS at 17:30

## 2018-03-01 RX ADMIN — HYDROMORPHONE HYDROCHLORIDE 30 MILLILITER(S): 2 INJECTION INTRAMUSCULAR; INTRAVENOUS; SUBCUTANEOUS at 22:52

## 2018-03-01 RX ADMIN — HYDROMORPHONE HYDROCHLORIDE 1 MILLIGRAM(S): 2 INJECTION INTRAMUSCULAR; INTRAVENOUS; SUBCUTANEOUS at 08:25

## 2018-03-01 RX ADMIN — HYDROMORPHONE HYDROCHLORIDE 0.25 MILLIGRAM(S): 2 INJECTION INTRAMUSCULAR; INTRAVENOUS; SUBCUTANEOUS at 18:15

## 2018-03-01 RX ADMIN — Medication 3 MILLILITER(S): at 11:05

## 2018-03-01 RX ADMIN — SODIUM CHLORIDE 100 MILLILITER(S): 9 INJECTION INTRAMUSCULAR; INTRAVENOUS; SUBCUTANEOUS at 00:35

## 2018-03-01 RX ADMIN — HYDROMORPHONE HYDROCHLORIDE 1 MILLIGRAM(S): 2 INJECTION INTRAMUSCULAR; INTRAVENOUS; SUBCUTANEOUS at 07:05

## 2018-03-01 RX ADMIN — HYDROMORPHONE HYDROCHLORIDE 0.25 MILLIGRAM(S): 2 INJECTION INTRAMUSCULAR; INTRAVENOUS; SUBCUTANEOUS at 18:30

## 2018-03-01 RX ADMIN — HYDROMORPHONE HYDROCHLORIDE 1 MILLIGRAM(S): 2 INJECTION INTRAMUSCULAR; INTRAVENOUS; SUBCUTANEOUS at 13:53

## 2018-03-01 RX ADMIN — LIDOCAINE 1 PATCH: 4 CREAM TOPICAL at 11:05

## 2018-03-01 RX ADMIN — HYDROMORPHONE HYDROCHLORIDE 30 MILLILITER(S): 2 INJECTION INTRAMUSCULAR; INTRAVENOUS; SUBCUTANEOUS at 22:00

## 2018-03-01 RX ADMIN — HYDROMORPHONE HYDROCHLORIDE 1 MILLIGRAM(S): 2 INJECTION INTRAMUSCULAR; INTRAVENOUS; SUBCUTANEOUS at 08:09

## 2018-03-01 RX ADMIN — HYDROMORPHONE HYDROCHLORIDE 1 MILLIGRAM(S): 2 INJECTION INTRAMUSCULAR; INTRAVENOUS; SUBCUTANEOUS at 05:52

## 2018-03-01 RX ADMIN — Medication 3 MILLILITER(S): at 05:52

## 2018-03-01 RX ADMIN — HYDROMORPHONE HYDROCHLORIDE 0.5 MILLIGRAM(S): 2 INJECTION INTRAMUSCULAR; INTRAVENOUS; SUBCUTANEOUS at 19:00

## 2018-03-01 RX ADMIN — Medication 20 MILLIGRAM(S): at 06:20

## 2018-03-01 RX ADMIN — HYDROMORPHONE HYDROCHLORIDE 0.5 MILLIGRAM(S): 2 INJECTION INTRAMUSCULAR; INTRAVENOUS; SUBCUTANEOUS at 18:45

## 2018-03-01 RX ADMIN — LIDOCAINE 1 PATCH: 4 CREAM TOPICAL at 00:27

## 2018-03-01 RX ADMIN — HYDROMORPHONE HYDROCHLORIDE 1 MILLIGRAM(S): 2 INJECTION INTRAMUSCULAR; INTRAVENOUS; SUBCUTANEOUS at 04:00

## 2018-03-01 RX ADMIN — HYDROMORPHONE HYDROCHLORIDE 0.25 MILLIGRAM(S): 2 INJECTION INTRAMUSCULAR; INTRAVENOUS; SUBCUTANEOUS at 18:45

## 2018-03-01 RX ADMIN — LOSARTAN POTASSIUM 50 MILLIGRAM(S): 100 TABLET, FILM COATED ORAL at 06:20

## 2018-03-01 RX ADMIN — HYDROMORPHONE HYDROCHLORIDE 1 MILLIGRAM(S): 2 INJECTION INTRAMUSCULAR; INTRAVENOUS; SUBCUTANEOUS at 00:10

## 2018-03-01 RX ADMIN — Medication 100 MILLIGRAM(S): at 07:46

## 2018-03-01 RX ADMIN — HYDROMORPHONE HYDROCHLORIDE 30 MILLILITER(S): 2 INJECTION INTRAMUSCULAR; INTRAVENOUS; SUBCUTANEOUS at 19:59

## 2018-03-01 RX ADMIN — HYDROMORPHONE HYDROCHLORIDE 1 MILLIGRAM(S): 2 INJECTION INTRAMUSCULAR; INTRAVENOUS; SUBCUTANEOUS at 12:47

## 2018-03-01 RX ADMIN — Medication 100 MILLIGRAM(S): at 08:11

## 2018-03-01 RX ADMIN — HYDROMORPHONE HYDROCHLORIDE 0.25 MILLIGRAM(S): 2 INJECTION INTRAMUSCULAR; INTRAVENOUS; SUBCUTANEOUS at 17:45

## 2018-03-01 RX ADMIN — Medication 100 MILLIGRAM(S): at 22:03

## 2018-03-01 RX ADMIN — SODIUM CHLORIDE 50 MILLILITER(S): 9 INJECTION INTRAMUSCULAR; INTRAVENOUS; SUBCUTANEOUS at 23:30

## 2018-03-01 RX ADMIN — HYDROMORPHONE HYDROCHLORIDE 0.5 MILLIGRAM(S): 2 INJECTION INTRAMUSCULAR; INTRAVENOUS; SUBCUTANEOUS at 20:29

## 2018-03-01 RX ADMIN — HYDROMORPHONE HYDROCHLORIDE 0.25 MILLIGRAM(S): 2 INJECTION INTRAMUSCULAR; INTRAVENOUS; SUBCUTANEOUS at 18:00

## 2018-03-01 RX ADMIN — Medication 3 MILLILITER(S): at 21:45

## 2018-03-01 RX ADMIN — LIDOCAINE 1 PATCH: 4 CREAM TOPICAL at 23:46

## 2018-03-01 RX ADMIN — Medication 100 MILLIGRAM(S): at 21:29

## 2018-03-01 RX ADMIN — HYDROMORPHONE HYDROCHLORIDE 1 MILLIGRAM(S): 2 INJECTION INTRAMUSCULAR; INTRAVENOUS; SUBCUTANEOUS at 03:29

## 2018-03-01 RX ADMIN — SODIUM CHLORIDE 30 MILLILITER(S): 9 INJECTION INTRAMUSCULAR; INTRAVENOUS; SUBCUTANEOUS at 20:05

## 2018-03-01 RX ADMIN — HYDROMORPHONE HYDROCHLORIDE 0.25 MILLIGRAM(S): 2 INJECTION INTRAMUSCULAR; INTRAVENOUS; SUBCUTANEOUS at 17:07

## 2018-03-01 RX ADMIN — Medication 650 MILLIGRAM(S): at 23:29

## 2018-03-01 NOTE — CONSULT NOTE ADULT - ASSESSMENT
Assessment/Plan: 57y Male with right foot abscess, concern for necrotizing fasciitis of right calf. On exam, calf if soft, nontender, no crepitus, so suspicion for necrotizing soft tissue infection is low.     - Does not appear to need fasciotomies and calf debridement based on exam  - Awaiting debridement with podiatry today  - Continue with antibiotics per ID    Discussed with Dr. Patrick  Pager [phone]

## 2018-03-01 NOTE — PROGRESS NOTE ADULT - ASSESSMENT
57M with HTN and ETOH abuse admitted 2/19/18 with R foot/leg infection.  Course complicated by MSSA bacteremia, MRI foot with OM/abscess, R leg DVT and CTA suggestive of possible septic pulmonary emboli though TTE negative.  Now post-op debridement of the R foot by podiatry today for source control.    Recommend:  -Continue cefazolin 2 grams IV q 8 hours   -DC clindamycin  -repeat BC  -KELVIN r/o abscess

## 2018-03-01 NOTE — PROGRESS NOTE ADULT - SUBJECTIVE AND OBJECTIVE BOX
Chief Complaint: "I am coughing up some dark stuff".    History of Present Illness:    The patient does have a cough that is dark tinged.  No chest pain.  No pleuritic chest pain.  No abdominal pain.  No nausea. No vomiting.  No fevers.  No calf pain.  Has some leg discomfort.  No obvious active bleeding.  Overall is feeling weak.  Remainder ROS is stable.     MEDICATIONS  (STANDING):  ALBUTerol/ipratropium for Nebulization 3 milliLiter(s) Nebulizer every 6 hours  ceFAZolin   IVPB 2000 milliGRAM(s) IV Intermittent every 8 hours  clindamycin IVPB      clindamycin IVPB 900 milliGRAM(s) IV Intermittent every 8 hours  folic acid 1 milliGRAM(s) Oral daily  furosemide   Injectable 20 milliGRAM(s) IV Push daily  lactobacillus acidophilus 1 Tablet(s) Oral three times a day with meals  lidocaine   Patch 1 Patch Transdermal daily  losartan 50 milliGRAM(s) Oral daily  multivitamin 1 Tablet(s) Oral daily  sodium chloride 0.9%. 1000 milliLiter(s) (100 mL/Hr) IV Continuous <Continuous>  thiamine 100 milliGRAM(s) Oral daily    MEDICATIONS  (PRN):  acetaminophen   Tablet 650 milliGRAM(s) Oral every 6 hours PRN For Temp greater than 38 C (100.4 F)  HYDROmorphone  Injectable 1 milliGRAM(s) IV Push every 6 hours PRN Moderate Pain (4 - 6)  HYDROmorphone  Injectable 1 milliGRAM(s) IV Push every 4 hours PRN Breakthrough Pain  oxyCODONE    5 mG/acetaminophen 325 mG 1 Tablet(s) Oral every 4 hours PRN Severe Pain (7 - 10)      Allergies    No Known Allergies    Intolerances        Vital Signs Last 24 Hrs  T(C): 36.5 (01 Mar 2018 11:31), Max: 37.1 (01 Mar 2018 00:11)  T(F): 97.7 (01 Mar 2018 11:31), Max: 98.7 (01 Mar 2018 00:11)  HR: 78 (01 Mar 2018 11:31) (74 - 88)  BP: 102/71 (01 Mar 2018 11:31) (102/71 - 120/74)  BP(mean): --  RR: 18 (01 Mar 2018 11:31) (18 - 19)  SpO2: 95% (01 Mar 2018 11:31) (95% - 99%)    PHYSICAL EXAM  General: Weak  HEENT: clear oropharynx, anicteric sclera, pink conjunctiva  Neck: supple  CV: normal S1/S2   Lungs: decreased at the bases  Abdomen: soft non-tender non-distended, positive bowel sounds  Ext: LE edema, bandage on right leg  Neuro: alert and oriented X 3    LABS:                          11.2   15.85 )-----------( 409      ( 01 Mar 2018 07:36 )             34.3         Mean Cell Volume : 98.6 fl  Mean Cell Hemoglobin : 32.2 pg  Mean Cell Hemoglobin Concentration : 32.7 gm/dL  Auto Neutrophil # : 13.54 K/uL  Auto Lymphocyte # : 1.16 K/uL  Auto Monocyte # : 0.92 K/uL  Auto Eosinophil # : 0.06 K/uL  Auto Basophil # : 0.01 K/uL  Auto Neutrophil % : 85.4 %  Auto Lymphocyte % : 7.3 %  Auto Monocyte % : 5.8 %  Auto Eosinophil % : 0.4 %  Auto Basophil % : 0.1 %      Serial CBC's  03-01 @ 07:36  Hct-34.3 / Hgb-11.2 / Plat-409 / RBC-3.48 / WBC-15.85  Serial CBC's  02-28 @ 07:50  Hct-36.5 / Hgb-11.7 / Plat-393 / RBC-3.75 / WBC-17.64  Serial CBC's  02-28 @ 00:39  Hct-37.9 / Hgb-12.7 / Plat-455 / RBC-3.78 / WBC-18.3  Serial CBC's  02-27 @ 12:06  Hct-36.6 / Hgb-12.4 / Plat-400 / RBC-3.65 / WBC-18.3  Serial CBC's  02-26 @ 21:57  Hct-38.1 / Hgb-12.4 / Plat-365 / RBC-3.87 / WBC-19.60  Serial CBC's  02-26 @ 07:20  Hct-36.5 / Hgb-12.1 / Plat-318 / RBC-3.77 / WBC-20.24      03-01    135  |  99  |  16  ----------------------------<  120<H>  4.6   |  27  |  0.83    Ca    8.1<L>      01 Mar 2018 06:05  Phos  3.7     03-01  Mg     2.5     03-01    TPro  6.5  /  Alb  2.1<L>  /  TBili  0.4  /  DBili  x   /  AST  81<H>  /  ALT  77<H>  /  AlkPhos  68  03-01      PT/INR - ( 01 Mar 2018 08:03 )   PT: 17.1 sec;   INR: 1.50 ratio         PTT - ( 01 Mar 2018 08:03 )  PTT:27.6 sec

## 2018-03-01 NOTE — CONSULT NOTE ADULT - SUBJECTIVE AND OBJECTIVE BOX
General Surgery Consult Note  Pager [phone]    HPI:  57 M Hx HTN, daily EtOH use for many years BIB family for chest pain and confusion. General surgery consulted for rule out necrotizing fasciitis based on abnormal MRI results. Patient injured his R ankle at work on 2/13/18, saw his chiropractor the next day who told him to go to ED due to extensive swelling.  He was seen in ED, Xrays done, discharged home with ankle sprain on pain meds (oxycodone) and ordered ankle brace which he received recently. Since his ankle sprain, he has had worsening erythema of the right leg extending from the foot to mid calf. Was taken to the OR by podiatry on 2/25/18 for I&D of his right foot and was found to have purulence and soft tissue necrosis. Underwent MRI of the foot and tib/fib yesterday and was seen to have nonenhancing edema of the superficial and deep fascial layers of the posterior compartment of the right leg. Currently reports marked improvement in the swelling and pain in his right leg compared to admission. Denies loss of sensation.     Patient usually drinks 4-20 beers/day or 5-6 shots of Vodka/day "when I tried to lose weight", and last drink was 12/13/18 when his ankle was injured.  Wife noticed the past 3-4 days, patient was not being himself "mentation cloudy" per patient, "very disoriented" per daughter, hallucination (visual).      PAST MEDICAL & SURGICAL HISTORY:  Sciatica of right side  Essential hypertension  No significant past surgical history      ALLERGIES: NKA    HOME MEDICATIONS:  Home Medications:  losartan-hydrochlorothiazide 100mg-12.5mg oral tablet: 1 tab(s) orally once a day (22 Feb 2018 07:03)  potassium chloride 10 mEq oral capsule, extended release: 1 cap(s) orally once a day (22 Feb 2018 07:04)    MEDICATIONS  (STANDING):  ALBUTerol/ipratropium for Nebulization 3 milliLiter(s) Nebulizer every 6 hours  ceFAZolin   IVPB 2000 milliGRAM(s) IV Intermittent every 8 hours  clindamycin IVPB      clindamycin IVPB 900 milliGRAM(s) IV Intermittent every 8 hours  folic acid 1 milliGRAM(s) Oral daily  furosemide   Injectable 20 milliGRAM(s) IV Push daily  lactobacillus acidophilus 1 Tablet(s) Oral three times a day with meals  lidocaine   Patch 1 Patch Transdermal daily  losartan 50 milliGRAM(s) Oral daily  multivitamin 1 Tablet(s) Oral daily  sodium chloride 0.9%. 1000 milliLiter(s) (100 mL/Hr) IV Continuous <Continuous>  thiamine 100 milliGRAM(s) Oral daily      SOCIAL HISTORY: See HPI for drinking history. .    FAMILY HISTORY:   Family history of cirrhosis of liver (Father): Father  Family history of prostate cancer (Father): Father  Family history of hypertension (Father): Mother      ___________________________________________  REVIEW OF SYSTEMS:  Constitutional: No fevers, chills, no recent weight loss  ENMT: No changes in hearing, no changes in vision, no sore throat, no cough  Respiratory: No shortness of breath  Cardiovascular: No chest pain, palpitations  Gastrointestinal: No abdominal pain, no diarrhea/constipation  Genitourinary: No dysuria, frequency, or urgency    Extremities: See HPI  Neurological: No paresthesia  Skin: No rashes  ___________________________________________  PHYSICAL EXAM:  Vital Signs Last 24 Hrs  T(C): 37.1 (01 Mar 2018 04:25), Max: 37.1 (01 Mar 2018 00:11)  T(F): 98.7 (01 Mar 2018 04:25), Max: 98.7 (01 Mar 2018 00:11)  HR: 78 (01 Mar 2018 09:17) (74 - 88)  BP: 120/74 (01 Mar 2018 06:18) (113/72 - 120/74)  BP(mean): --  RR: 19 (01 Mar 2018 04:25) (16 - 19)  SpO2: 98% (01 Mar 2018 09:17) (96% - 99%)CAPILLARY BLOOD GLUCOSE      I&O's Detail    28 Feb 2018 07:01  -  01 Mar 2018 07:00  --------------------------------------------------------  IN:    Oral Fluid: 960 mL    sodium chloride 0.9%.: 700 mL    Solution: 50 mL    Solution: 50 mL  Total IN: 1760 mL    OUT:    Voided: 2950 mL  Total OUT: 2950 mL    Total NET: -1190 mL      01 Mar 2018 07:01  -  01 Mar 2018 11:13  --------------------------------------------------------  IN:    Solution: 50 mL    Solution: 50 mL  Total IN: 100 mL    OUT:    Voided: 1200 mL  Total OUT: 1200 mL    Total NET: -1100 mL        General: A&O x3, NAD  Neuro: CN II-XII intact, motor and sensory grossly intact with no focal deficits.  HEENT: Normocephalic, atraumatic, EOMI, anicteric sclerae.  Respiratory: Clear to auscultation bilaterally, breathing non-labored.  CVS: Regular rate and rhythm  Abdomen: Soft, nondistended, nontender. No rebound, no guarding, No palpable organomegaly or masses.  Extremities: Right foot dressing intact. Right calf - compartments soft, nontender, no crepitus, nonerythematous.   MSK: Limited ROM in right ankle due to dressing  ____________________________________________  LABS:  CBC Full  -  ( 01 Mar 2018 07:36 )  WBC Count : 15.85 K/uL  Hemoglobin : 11.2 g/dL  Hematocrit : 34.3 %  Platelet Count - Automated : 409 K/uL  Mean Cell Volume : 98.6 fl  Mean Cell Hemoglobin : 32.2 pg  Mean Cell Hemoglobin Concentration : 32.7 gm/dL  Auto Neutrophil # : 13.54 K/uL  Auto Lymphocyte # : 1.16 K/uL  Auto Monocyte # : 0.92 K/uL  Auto Eosinophil # : 0.06 K/uL  Auto Basophil # : 0.01 K/uL  Auto Neutrophil % : 85.4 %  Auto Lymphocyte % : 7.3 %  Auto Monocyte % : 5.8 %  Auto Eosinophil % : 0.4 %  Auto Basophil % : 0.1 %    03-01    135  |  99  |  16  ----------------------------<  120<H>  4.6   |  27  |  0.83    Ca    8.1<L>      01 Mar 2018 06:05  Phos  3.7     03-01  Mg     2.5     03-01    TPro  6.5  /  Alb  2.1<L>  /  TBili  0.4  /  DBili  x   /  AST  81<H>  /  ALT  77<H>  /  AlkPhos  68  03-01    LIVER FUNCTIONS - ( 01 Mar 2018 06:05 )  Alb: 2.1 g/dL / Pro: 6.5 g/dL / ALK PHOS: 68 U/L / ALT: 77 U/L RC / AST: 81 U/L / GGT: x           PT/INR - ( 01 Mar 2018 08:03 )   PT: 17.1 sec;   INR: 1.50 ratio         PTT - ( 01 Mar 2018 08:03 )  PTT:27.6 sec    ____________________________________________  RADIOLOGY:  MR Tib/Fib w/wo IV Cont, Right (02.28.18 @ 23:11)   IMPRESSION:  1.  Soft tissue wound over the dorsolateral aspect of the foot. 2   residual abscesses are seen tracking from the site of the wound, one   posterior, and one along the plantar surface of the foot.  2.  Diffuse osteomyelitis of the midfoot involving the metatarsals,   cuneiforms, cuboid, and navicular.  3.  Myositis of the deep posterior compartment musculature of the leg.  4.  Nonenhancing edema is seen tracking along the superficial and deep   fascial layers of the posterior compartment of the leg. Given other   findings, necrotizing fasciitis cannot be excluded.

## 2018-03-01 NOTE — PROGRESS NOTE ADULT - SUBJECTIVE AND OBJECTIVE BOX
CC:  Right foot pain    F/U:  mssa bacteremia, RLE infection     Interval History/ROS:  MRI foot noted OM.  seen in PACU post-op R foot I&D.  Cannot take a deep breath, however, no pleuritic chest pain per se.  No fever/chills.  Rest of ROS otherwise negative    Allergies  No Known Allergies    ANTIMICROBIALS:    ceFAZolin   IVPB 2000 every 8 hours  clindamycin IVPB 900 every 8 hours    MEDICATIONS  (STANDING):  ALBUTerol/ipratropium for Nebulization 3 every 6 hours  furosemide   Injectable 20 daily  losartan 50 daily    Vital Signs Last 24 Hrs  T(F): 99 (03-01-18 @ 13:52), Max: 99 (03-01-18 @ 13:52)  HR: 93 (03-01-18 @ 16:43)  BP: 168/72 (03-01-18 @ 16:43)  RR: 16 (03-01-18 @ 13:52)  SpO2: 94% (03-01-18 @ 16:43) (94% - 99%)  Wt(kg): --    PHYSICAL EXAM:  General: non-toxic, obese  HEAD/EYES: anicteric  ENT:  supple  Cardiovascular:   S1, S2; no obvious murmur  Respiratory:  clear bilaterally  GI:  soft, non-tender, normal bowel sounds  :  no guido  Musculoskeletal:  R foot in post-op dressing  Neurologic:  grossly non-focal  Skin:  no rash  Lymph: no lymphadenopathy  Psychiatric:  appropriate affect  Vascular:  no phlebitis    MICROBIOLOGY:  Culture - Blood in AM (02.27.18 @ 13:42)    Culture Results:   Growth in anaerobic bottle: Staphylococcus aureus    Culture - Blood in AM (02.27.18 @ 13:41)    Culture Results:   Growth in anaerobic bottle: Staphylococcus aureus Susceptibility to  follow.    Culture - Surgical Swab (02.25.18 @ 22:01)    -  Cefazolin: S <=4    Specimen Source: .Surgical Swab RIGHT FOOT PUS    Culture Results:   Moderate Staphylococcus aureus    Culture - Abscess with Gram Stain (02.25.18 @ 08:39)    -  Oxacillin: S 0.5    Specimen Source: .Abscess right foot    Culture Results:   Few Staphylococcus aureus    Culture - Blood in AM (02.24.18 @ 05:15)  Growth in aerobic and anaerobic bottles: Staphylococcus aureus  See previous culture [id number]    Culture - Blood (02.24.18 @ 00:59)  Growth in aerobic and anaerobic bottles: Staphylococcus aureus  See previous culture [id number]    Culture - Blood (02.22.18 @ 12:56)  Growth in aerobic and anaerobic bottles: Staphylococcus aureus  See previous culture  # [id number]    Culture - Blood (02.22.18 @ 12:56)  Growth in anaerobic bottle:  Specimen Source: .Blood Blood-Peripheral  Staphylococcus aureus    -  Cefazolin: S <=4    RADIOLOGY:  MR Tib/Fib w/wo IV Cont, Right (02.28.18 @ 23:11)   1.  Soft tissue wound over the dorsolateral aspect of the foot. 2 residual abscesses are seen tracking from the site of the wound, one posterior, and one along the plantar surface of the foot.  2.  Diffuse osteomyelitis of the midfoot involving the metatarsals, cuneiforms, cuboid, and navicular.  3.  Myositis of the deep posterior compartment musculature of the leg.  4.  Nonenhancing edema is seen tracking along the superficial and deep fascial layers of the posterior compartment of the leg. Given other findings, necrotizing fasciitis cannot be excluded.    VA Duplex Lower Ext Vein Scan, Right (02.27.18 @ 15:13)  IMPRESSION:  Persistent thrombosis of a single right peroneal vein without evidence of propagation since prior venous ultrasound from 2/22/2018.    CT Lower Extremity w/ IV Cont, Right (02.22.18 @ 21:56)   Impression:  Diffuse cellulitis throughout the right lower leg as outlined above with cutaneous bleb along the dorsal lateral aspect of the foot. There is no subcutaneous air to suggest necrotizing fasciitis.    Ill-defined fluid attenuation within the region of the extensor digitorum brevis musculature and within the region of the abductor hallucis muscle which may be related to muscle strain or myositis. Developing fluid collections are also a consideration in the appropriate clinical context.  No peripherally enhancing fluid collection is noted on the current examination.  Intramuscular fullness and edema within the myofascial planes about the calf musculature likely related to patient's known right peroneal deep vein thrombosis.  No CT evidence of osteomyelitis.  Osteochondral lesion along the medial aspect of the talar dome.    CT Angio Chest w/ IV Cont (02.22.18 @ 00:25)   IMPRESSION:    1.  No main, or lobar pulmonary embolus. Evaluation of the segmental and subsegmental branches is limited secondary to artifact.  2.  Multiple groundglass and nodular opacities with a peripheral and lower lobe distribution could be secondary to infection (? Either septic emboli or fungal infection rather than a community acquired pneumonia) rather than malignancy.

## 2018-03-01 NOTE — PROGRESS NOTE ADULT - ASSESSMENT
recent ankle sprain which got infected resulting in MSSA sepsis/cellulitis with lung embolization s/p OR debridement X 2  OM right foot/necrotizing fasciitis   hemoptysis - minimal - possibly secondary to being on hep drip with pulm septic emboli  ADA - resolved  Transaminitis - non specific - improving  Coagulopathy on admission - most likley due to dietary deficiency  Right peroneal vein dvt  Pulm artery enlargement  Morbid obesity  SHIELA continue nocturnal bipap  Diarrhea - resolved  LBP - resolved    continue current care  continue abx  will possibly need repeat KELVIN in near future  will repeat blood cultures on Saturday    appreciate Gen surg consult-- Dr Patrick did not feel he needed to operate on leg, instructed house staff to have low threshold for re-evaluation by gen surgery if pts complaints change  continue nocturnal bipap  have asked podiatry to re-evaluate for possible pca consult given the severity of pain the pt is in.  hopeful that we have achieved better source control at this point however have informed family that pt may need to return to OR again.  monitor LFT regularly  discussed with pt and wife in detail.

## 2018-03-01 NOTE — CHART NOTE - NSCHARTNOTEFT_GEN_A_CORE
MEDICINE NP NOTE  Spectra [phone]      Received call from radiology with MRI results Right foot and tib/fib.  MRI shows soft tissue wound over the dorsolateral aspect of the foot. Residual abscesses are seen tracking from the site of the wound, one posterior, and one along the plantar surface of the foot.  Diffuse osteomyelitis of the midfoot involving the metatarsals, cuneiforms, cuboid, and navicular. Myositis of the deep posterior compartment musculature of the leg. Nonenhancing edema is seen tracking along the superficial and deep fascial layers of the posterior compartment of the leg. Given other findings, necrotizing fasciitis cannot be excluded.    Results discussed with Podiatry fellow, Landon.  Podiatry requesting general surgery.  General surgery consult called.  Results also discussed with Dr Miranda.   Patient is as medically optimized as possible for necessary surgeries as per Dr Miranda.

## 2018-03-01 NOTE — PROGRESS NOTE ADULT - ASSESSMENT
58 y/o male with obesity, ETOH, SHIELA w/ R ankle sprain, developed MSSA cellulitis/ bacteremia, pulmonary septic emboli, R distal DVT now s/p OR, with mild coagulopathy.    1. coagulopathy- suspect secondary to vitamin K deficiency/ decreased PO intake over past week with acute illness; Factor VII level sl low  - PT mixing study corrected confirming factor deficiency, no evidence of inhibitor- consistent with vitamin K deficiency  - INR sl elevated; no increased bleeding; no prior history of bleeding issues  - will continue to hold on FFP/vitamin K  - PT 17.1 and INR 1.5 from 3-1-18 is better.     2. R peroneal DVT- no prior history of VTE; provoked with recent injury and decreased mobility  - repeat US on 2-27-18 without propagation  - w/ septic emboli; was on heparin gtt, now off with +FOB    3.  R ankle sprain/wound- now s/p OR- for return to OR later this week per Podiatry    4. ID- MSSA bacteremia, KELVIN negative for vegetation; on Abx per ID 56 y/o male with obesity, ETOH, SHIELA w/ R ankle sprain, developed MSSA cellulitis/ bacteremia, pulmonary septic emboli, R distal DVT now s/p OR, with mild coagulopathy.    1. coagulopathy- suspect secondary to vitamin K deficiency/ decreased PO intake over past week with acute illness; Factor VII level sl low  - PT mixing study corrected confirming factor deficiency, no evidence of inhibitor- consistent with vitamin K deficiency  - INR sl elevated; no increased bleeding; no prior history of bleeding issues  - will continue to hold on FFP/vitamin K  - PT 17.1 and INR 1.5 from 3-1-18 is better.     2. R peroneal DVT- no prior history of VTE; provoked with recent injury and decreased mobility  - repeat US on 2-27-18 without propagation  - w/ septic emboli; was on heparin gtt, now off with +FOB    3.  R ankle sprain/wound- now s/p OR- for return to OR later this week per Podiatry    4. ID- MSSA bacteremia, KELVIN negative for vegetation; on Abx per ID    Can add heparin DVT prophylaxis dose when okay with primary team.  Does not need full dose AC at this time.

## 2018-03-01 NOTE — CONSULT NOTE ADULT - ATTENDING COMMENTS
seen and examined 3/1/2018 @ 2668 seen and examined 3/1/2018 @ 1145    no skin changes or tenderness in right leg  no altered mental status  no hyponatremia  no hyperglycemia    There is no evidence of necrotizing soft tissue infection in the leg. much more likely that MRI findings represent reactive inflammation and edema secondary to foot/ankle infection.  No indication for exploration / debridement of right leg.  -please call acute care surgery if the patient's condition worsens and we will re-evaluate

## 2018-03-01 NOTE — PROGRESS NOTE ADULT - SUBJECTIVE AND OBJECTIVE BOX
MEDICINE, PROGRESS NOTE [phone]    JUWAN DOMÍNGUEZ 57y [id number]    Patient seen and examined.  Patient is a 57y old  Male who presents with a chief complaint of Chest pain, confusion (22 Feb 2018 03:00). Pt seen in PACU c/o uncontrolled pain.      PAST MEDICAL & SURGICAL HISTORY:  Sciatica of right side  Essential hypertension  No significant past surgical history    MEDICATIONS  (STANDING):  ALBUTerol/ipratropium for Nebulization 3 milliLiter(s) Nebulizer every 6 hours  ceFAZolin   IVPB 2000 milliGRAM(s) IV Intermittent every 8 hours  clindamycin IVPB 900 milliGRAM(s) IV Intermittent every 8 hours  folic acid 1 milliGRAM(s) Oral daily  furosemide   Injectable 20 milliGRAM(s) IV Push daily  lactobacillus acidophilus 1 Tablet(s) Oral three times a day with meals  lidocaine   Patch 1 Patch Transdermal daily  losartan 50 milliGRAM(s) Oral daily  multivitamin 1 Tablet(s) Oral daily  thiamine 100 milliGRAM(s) Oral daily    MEDICATIONS  (PRN):  HYDROmorphone  Injectable 0.5 milliGRAM(s) IV Push once PRN Severe Pain (7 - 10)  ondansetron Injectable 4 milliGRAM(s) IV Push once PRN Nausea and/or Vomiting    Allergies    No Known Allergies    Intolerances        PHYSICAL EXAM:  Constitutional: NAD  HEENT: Normocephalic, EOMI  Neck:  No JVD  Respiratory: CTA B/L, No wheezes  Cardiovascular: S1, S2, tachy, + systolic murmur  Gastrointestinal: BS+, soft, NT/ND  Extremities: Dec peripheral edema right leg  Neurological: AAOX3, no focal deficits  Psychiatric: Normal mood, normal affect  : No Sosa    Vital Signs Last 24 Hrs  T(C): 36.3 (01 Mar 2018 16:28), Max: 37.2 (01 Mar 2018 13:52)  T(F): 97.3 (01 Mar 2018 16:28), Max: 99 (01 Mar 2018 13:52)  HR: 100 (01 Mar 2018 18:45) (74 - 100)  BP: 155/95 (01 Mar 2018 18:30) (102/71 - 168/72)  BP(mean): 118 (01 Mar 2018 18:30) (91 - 118)  RR: 16 (01 Mar 2018 18:45) (16 - 21)  SpO2: 96% (01 Mar 2018 18:45) (94% - 100%)  I&O's Summary    28 Feb 2018 07:01  -  01 Mar 2018 07:00  --------------------------------------------------------  IN: 1760 mL / OUT: 2950 mL / NET: -1190 mL    01 Mar 2018 07:01  -  01 Mar 2018 19:07  --------------------------------------------------------  IN: 800 mL / OUT: 1600 mL / NET: -800 mL        LABS:                        11.2   15.85 )-----------( 409      ( 01 Mar 2018 07:36 )             34.3     03-01    135  |  99  |  16  ----------------------------<  120<H>  4.6   |  27  |  0.83    Ca    8.1<L>      01 Mar 2018 06:05  Phos  3.7     03-01  Mg     2.5     03-01    TPro  6.5  /  Alb  2.1<L>  /  TBili  0.4  /  DBili  x   /  AST  81<H>  /  ALT  77<H>  /  AlkPhos  68  03-01        Magnesium, Serum: 2.5 mg/dL (03-01 @ 06:05)

## 2018-03-01 NOTE — PROGRESS NOTE ADULT - SUBJECTIVE AND OBJECTIVE BOX
Patient is a 57y old  Male who presents with a chief complaint of Chest pain, confusion (22 Feb 2018 03:00)      INTERVAL HPI/OVERNIGHT EVENTS:   Pt is scheduled for right foot I&D and debridement with Dr. Westbrook at 1:30PM. Patient is aware of procedure and is NPO since midnight.    MEDICATIONS  (STANDING):  ALBUTerol/ipratropium for Nebulization 3 milliLiter(s) Nebulizer every 6 hours  ceFAZolin   IVPB 2000 milliGRAM(s) IV Intermittent every 8 hours  clindamycin IVPB      clindamycin IVPB 900 milliGRAM(s) IV Intermittent every 8 hours  folic acid 1 milliGRAM(s) Oral daily  furosemide   Injectable 20 milliGRAM(s) IV Push daily  lactobacillus acidophilus 1 Tablet(s) Oral three times a day with meals  lidocaine   Patch 1 Patch Transdermal daily  losartan 50 milliGRAM(s) Oral daily  multivitamin 1 Tablet(s) Oral daily  sodium chloride 0.9%. 1000 milliLiter(s) (100 mL/Hr) IV Continuous <Continuous>  thiamine 100 milliGRAM(s) Oral daily    MEDICATIONS  (PRN):  acetaminophen   Tablet 650 milliGRAM(s) Oral every 6 hours PRN For Temp greater than 38 C (100.4 F)  HYDROmorphone  Injectable 1 milliGRAM(s) IV Push every 6 hours PRN Moderate Pain (4 - 6)  HYDROmorphone  Injectable 1 milliGRAM(s) IV Push every 4 hours PRN Breakthrough Pain  oxyCODONE    5 mG/acetaminophen 325 mG 1 Tablet(s) Oral every 4 hours PRN Severe Pain (7 - 10)      Allergies    No Known Allergies    Intolerances        Vital Signs Last 24 Hrs  T(C): 36.5 (01 Mar 2018 11:31), Max: 37.1 (01 Mar 2018 00:11)  T(F): 97.7 (01 Mar 2018 11:31), Max: 98.7 (01 Mar 2018 00:11)  HR: 78 (01 Mar 2018 11:31) (74 - 88)  BP: 102/71 (01 Mar 2018 11:31) (102/71 - 120/74)  BP(mean): --  RR: 18 (01 Mar 2018 11:31) (18 - 19)  SpO2: 95% (01 Mar 2018 11:31) (95% - 99%)    LABS:                        11.2   15.85 )-----------( 409      ( 01 Mar 2018 07:36 )             34.3     03-01    135  |  99  |  16  ----------------------------<  120<H>  4.6   |  27  |  0.83    Ca    8.1<L>      01 Mar 2018 06:05  Phos  3.7     03-01  Mg     2.5     03-01    TPro  6.5  /  Alb  2.1<L>  /  TBili  0.4  /  DBili  x   /  AST  81<H>  /  ALT  77<H>  /  AlkPhos  68  03-01    PT/INR - ( 01 Mar 2018 08:03 )   PT: 17.1 sec;   INR: 1.50 ratio         PTT - ( 01 Mar 2018 08:03 )  PTT:27.6 sec    CAPILLARY BLOOD GLUCOSE          RADIOLOGY & ADDITIONAL TESTS:    Plan:   To OR today at 1:30PM with Dr. Westbrook for right foot and ankle I&D and debridement.   CXR on sunrise.  EKG on sunrise.  Medical/Cardiac clearance since 3/1 and documented in chart.  Consent signed and in chart.  Procedure was explained to patient in detail. All alternatives, risks and complications were discussed. All questions answered.

## 2018-03-02 ENCOUNTER — TRANSCRIPTION ENCOUNTER (OUTPATIENT)
Age: 58
End: 2018-03-02

## 2018-03-02 LAB
-  AMPICILLIN/SULBACTAM: SIGNIFICANT CHANGE UP
-  CEFAZOLIN: SIGNIFICANT CHANGE UP
-  CIPROFLOXACIN: SIGNIFICANT CHANGE UP
-  CLINDAMYCIN: SIGNIFICANT CHANGE UP
-  ERYTHROMYCIN: SIGNIFICANT CHANGE UP
-  GENTAMICIN: SIGNIFICANT CHANGE UP
-  LEVOFLOXACIN: SIGNIFICANT CHANGE UP
-  MOXIFLOXACIN(AEROBIC): SIGNIFICANT CHANGE UP
-  OXACILLIN: SIGNIFICANT CHANGE UP
-  PENICILLIN: SIGNIFICANT CHANGE UP
-  RIFAMPIN: SIGNIFICANT CHANGE UP
-  TETRACYCLINE: SIGNIFICANT CHANGE UP
-  TRIMETHOPRIM/SULFAMETHOXAZOLE: SIGNIFICANT CHANGE UP
-  VANCOMYCIN: SIGNIFICANT CHANGE UP
ALBUMIN SERPL ELPH-MCNC: 2.2 G/DL — LOW (ref 3.3–5)
ALP SERPL-CCNC: 69 U/L — SIGNIFICANT CHANGE UP (ref 40–120)
ALT FLD-CCNC: 70 U/L RC — HIGH (ref 10–45)
ANION GAP SERPL CALC-SCNC: 10 MMOL/L — SIGNIFICANT CHANGE UP (ref 5–17)
AST SERPL-CCNC: 88 U/L — HIGH (ref 10–40)
BASOPHILS # BLD AUTO: 0.02 K/UL — SIGNIFICANT CHANGE UP (ref 0–0.2)
BASOPHILS NFR BLD AUTO: 0.1 % — SIGNIFICANT CHANGE UP (ref 0–2)
BILIRUB SERPL-MCNC: 0.6 MG/DL — SIGNIFICANT CHANGE UP (ref 0.2–1.2)
BUN SERPL-MCNC: 19 MG/DL — SIGNIFICANT CHANGE UP (ref 7–23)
CALCIUM SERPL-MCNC: 8.4 MG/DL — SIGNIFICANT CHANGE UP (ref 8.4–10.5)
CHLORIDE SERPL-SCNC: 99 MMOL/L — SIGNIFICANT CHANGE UP (ref 96–108)
CO2 SERPL-SCNC: 26 MMOL/L — SIGNIFICANT CHANGE UP (ref 22–31)
CREAT SERPL-MCNC: 0.91 MG/DL — SIGNIFICANT CHANGE UP (ref 0.5–1.3)
CULTURE RESULTS: SIGNIFICANT CHANGE UP
EOSINOPHIL # BLD AUTO: 0.01 K/UL — SIGNIFICANT CHANGE UP (ref 0–0.5)
EOSINOPHIL NFR BLD AUTO: 0.1 % — SIGNIFICANT CHANGE UP (ref 0–6)
GLUCOSE SERPL-MCNC: 120 MG/DL — HIGH (ref 70–99)
GRAM STN FLD: SIGNIFICANT CHANGE UP
GRAM STN FLD: SIGNIFICANT CHANGE UP
HCT VFR BLD CALC: 35.1 % — LOW (ref 39–50)
HGB BLD-MCNC: 11.3 G/DL — LOW (ref 13–17)
IMM GRANULOCYTES NFR BLD AUTO: 0.7 % — SIGNIFICANT CHANGE UP (ref 0–1.5)
LYMPHOCYTES # BLD AUTO: 1.16 K/UL — SIGNIFICANT CHANGE UP (ref 1–3.3)
LYMPHOCYTES # BLD AUTO: 6.1 % — LOW (ref 13–44)
MAGNESIUM SERPL-MCNC: 2.6 MG/DL — SIGNIFICANT CHANGE UP (ref 1.6–2.6)
MCHC RBC-ENTMCNC: 32.2 GM/DL — SIGNIFICANT CHANGE UP (ref 32–36)
MCHC RBC-ENTMCNC: 32.8 PG — SIGNIFICANT CHANGE UP (ref 27–34)
MCV RBC AUTO: 102 FL — HIGH (ref 80–100)
METHOD TYPE: SIGNIFICANT CHANGE UP
MONOCYTES # BLD AUTO: 1.14 K/UL — HIGH (ref 0–0.9)
MONOCYTES NFR BLD AUTO: 6 % — SIGNIFICANT CHANGE UP (ref 2–14)
NEUTROPHILS # BLD AUTO: 16.48 K/UL — HIGH (ref 1.8–7.4)
NEUTROPHILS NFR BLD AUTO: 87 % — HIGH (ref 43–77)
NIGHT BLUE STAIN TISS: SIGNIFICANT CHANGE UP
ORGANISM # SPEC MICROSCOPIC CNT: SIGNIFICANT CHANGE UP
PHOSPHATE SERPL-MCNC: 4.5 MG/DL — SIGNIFICANT CHANGE UP (ref 2.5–4.5)
PLATELET # BLD AUTO: 499 K/UL — HIGH (ref 150–400)
POTASSIUM SERPL-MCNC: 6 MMOL/L — HIGH (ref 3.5–5.3)
POTASSIUM SERPL-SCNC: 6 MMOL/L — HIGH (ref 3.5–5.3)
PROT SERPL-MCNC: 7.3 G/DL — SIGNIFICANT CHANGE UP (ref 6–8.3)
RBC # BLD: 3.44 M/UL — LOW (ref 4.2–5.8)
RBC # FLD: 14.2 % — SIGNIFICANT CHANGE UP (ref 10.3–14.5)
SODIUM SERPL-SCNC: 135 MMOL/L — SIGNIFICANT CHANGE UP (ref 135–145)
SPECIMEN SOURCE: SIGNIFICANT CHANGE UP
WBC # BLD: 18.95 K/UL — HIGH (ref 3.8–10.5)
WBC # FLD AUTO: 18.95 K/UL — HIGH (ref 3.8–10.5)

## 2018-03-02 PROCEDURE — 99232 SBSQ HOSP IP/OBS MODERATE 35: CPT

## 2018-03-02 PROCEDURE — 73630 X-RAY EXAM OF FOOT: CPT | Mod: 26,RT

## 2018-03-02 RX ORDER — SODIUM CHLORIDE 9 MG/ML
1000 INJECTION INTRAMUSCULAR; INTRAVENOUS; SUBCUTANEOUS
Qty: 0 | Refills: 0 | Status: DISCONTINUED | OUTPATIENT
Start: 2018-03-02 | End: 2018-03-16

## 2018-03-02 RX ORDER — HEPARIN SODIUM 5000 [USP'U]/ML
5000 INJECTION INTRAVENOUS; SUBCUTANEOUS EVERY 8 HOURS
Qty: 0 | Refills: 0 | Status: DISCONTINUED | OUTPATIENT
Start: 2018-03-02 | End: 2018-03-09

## 2018-03-02 RX ADMIN — Medication 1 TABLET(S): at 11:27

## 2018-03-02 RX ADMIN — HYDROMORPHONE HYDROCHLORIDE 30 MILLILITER(S): 2 INJECTION INTRAMUSCULAR; INTRAVENOUS; SUBCUTANEOUS at 19:13

## 2018-03-02 RX ADMIN — Medication 100 MILLIGRAM(S): at 06:48

## 2018-03-02 RX ADMIN — Medication 100 MILLIGRAM(S): at 13:13

## 2018-03-02 RX ADMIN — Medication 1 TABLET(S): at 07:41

## 2018-03-02 RX ADMIN — Medication 3 MILLILITER(S): at 22:36

## 2018-03-02 RX ADMIN — HYDROMORPHONE HYDROCHLORIDE 0.5 MILLIGRAM(S): 2 INJECTION INTRAMUSCULAR; INTRAVENOUS; SUBCUTANEOUS at 09:54

## 2018-03-02 RX ADMIN — Medication 1 TABLET(S): at 11:26

## 2018-03-02 RX ADMIN — HEPARIN SODIUM 5000 UNIT(S): 5000 INJECTION INTRAVENOUS; SUBCUTANEOUS at 21:35

## 2018-03-02 RX ADMIN — Medication 3 MILLILITER(S): at 05:39

## 2018-03-02 RX ADMIN — Medication 3 MILLILITER(S): at 11:26

## 2018-03-02 RX ADMIN — LIDOCAINE 1 PATCH: 4 CREAM TOPICAL at 11:50

## 2018-03-02 RX ADMIN — Medication 1 MILLIGRAM(S): at 11:26

## 2018-03-02 RX ADMIN — Medication 3 MILLILITER(S): at 17:07

## 2018-03-02 RX ADMIN — SODIUM CHLORIDE 10 MILLILITER(S): 9 INJECTION INTRAMUSCULAR; INTRAVENOUS; SUBCUTANEOUS at 21:35

## 2018-03-02 RX ADMIN — LOSARTAN POTASSIUM 50 MILLIGRAM(S): 100 TABLET, FILM COATED ORAL at 05:33

## 2018-03-02 RX ADMIN — Medication 1 TABLET(S): at 17:07

## 2018-03-02 RX ADMIN — HYDROMORPHONE HYDROCHLORIDE 30 MILLILITER(S): 2 INJECTION INTRAMUSCULAR; INTRAVENOUS; SUBCUTANEOUS at 23:54

## 2018-03-02 RX ADMIN — HYDROMORPHONE HYDROCHLORIDE 0.5 MILLIGRAM(S): 2 INJECTION INTRAMUSCULAR; INTRAVENOUS; SUBCUTANEOUS at 01:38

## 2018-03-02 RX ADMIN — Medication 100 MILLIGRAM(S): at 21:35

## 2018-03-02 RX ADMIN — HYDROMORPHONE HYDROCHLORIDE 30 MILLILITER(S): 2 INJECTION INTRAMUSCULAR; INTRAVENOUS; SUBCUTANEOUS at 08:03

## 2018-03-02 RX ADMIN — Medication 100 MILLIGRAM(S): at 11:26

## 2018-03-02 RX ADMIN — Medication 100 MILLIGRAM(S): at 05:34

## 2018-03-02 RX ADMIN — Medication 100 MILLIGRAM(S): at 22:36

## 2018-03-02 RX ADMIN — Medication 20 MILLIGRAM(S): at 05:33

## 2018-03-02 NOTE — PROGRESS NOTE ADULT - SUBJECTIVE AND OBJECTIVE BOX
Patient is a 57y old  Male who presents with a chief complaint of Chest pain, confusion (22 Feb 2018 03:00)       INTERVAL HPI/OVERNIGHT EVENTS:  Patient seen and evaluated at bedside.  Pt is resting comfortable in NAD. Denies N/V/F/C.  Pain rated at X/10    Allergies    No Known Allergies    Intolerances        Vital Signs Last 24 Hrs  T(C): 36.7 (02 Mar 2018 15:54), Max: 39.1 (01 Mar 2018 22:48)  T(F): 98 (02 Mar 2018 15:54), Max: 102.4 (01 Mar 2018 22:48)  HR: 85 (02 Mar 2018 15:54) (70 - 111)  BP: 122/73 (02 Mar 2018 15:54) (100/64 - 158/78)  BP(mean): 107 (01 Mar 2018 21:30) (81 - 118)  RR: 18 (02 Mar 2018 15:54) (16 - 24)  SpO2: 100% (02 Mar 2018 15:54) (92% - 100%)    LABS:                        11.3   18.95 )-----------( 499      ( 02 Mar 2018 07:18 )             35.1     03-02    135  |  99  |  19  ----------------------------<  120<H>  6.0<H>   |  26  |  0.91    Ca    8.4      02 Mar 2018 05:13  Phos  4.5     03-02  Mg     2.6     03-02    TPro  7.3  /  Alb  2.2<L>  /  TBili  0.6  /  DBili  x   /  AST  88<H>  /  ALT  70<H>  /  AlkPhos  69  03-02    PT/INR - ( 01 Mar 2018 08:03 )   PT: 17.1 sec;   INR: 1.50 ratio         PTT - ( 01 Mar 2018 08:03 )  PTT:27.6 sec    CAPILLARY BLOOD GLUCOSE          Lower Extremity Physical Exam:  right foot dorsolateral and medial foot surgical site stable hanna wound region ischemic border distally, sanguinous drainage, still demarcating, no additional purulence. Edema decreased as well as the erythema, no malodor, CFT to toes good, no signs of digital necrosis.    RADIOLOGY & ADDITIONAL TESTS:

## 2018-03-02 NOTE — PROGRESS NOTE ADULT - ASSESSMENT
recent ankle sprain which got infected resulting in MSSA sepsis/cellulitis with lung embolization s/p OR debridement X 2  OM right foot/necrotizing fasciitis   hemoptysis - minimal - possibly secondary to being on hep drip with pulm septic emboli  ADA - resolved  Transaminitis - non specific - improving  Coagulopathy on admission - most likley due to dietary deficiency  Right peroneal vein dvt  Pulm artery enlargement  Morbid obesity  SHIELA continue nocturnal bipap  Diarrhea - resolved  LBP - resolved    continue current care  keep pt non-weight bearing on right foot  bipap tonight at 8 PM  continue abx  when deeper cx are back will consider to d/c clinda  continue PT   subq heparin once cleared by podiatry  need vac dressing on foot if pod agrees  discussed with pt and wife in detail.

## 2018-03-02 NOTE — PROGRESS NOTE ADULT - SUBJECTIVE AND OBJECTIVE BOX
MEDICINE, PROGRESS NOTE [phone]    JUWAN DOMÍNGUEZ 57y [id number]    Patient seen and examined.  Patient is a 57y old  Male who presents with a chief complaint of Chest pain, confusion (22 Feb 2018 03:00)  Pt c/o pain. Pt somnolent but wakes up and answers questions appropriately.    PAST MEDICAL & SURGICAL HISTORY:  Sciatica of right side  Essential hypertension  No significant past surgical history    MEDICATIONS  (STANDING):  ALBUTerol/ipratropium for Nebulization 3 milliLiter(s) Nebulizer every 6 hours  ceFAZolin   IVPB 2000 milliGRAM(s) IV Intermittent every 8 hours  folic acid 1 milliGRAM(s) Oral daily  furosemide   Injectable 20 milliGRAM(s) IV Push daily  HYDROmorphone PCA (1 mG/mL) 30 milliLiter(s) PCA Continuous PCA Continuous  lactobacillus acidophilus 1 Tablet(s) Oral three times a day with meals  lidocaine   Patch 1 Patch Transdermal daily  losartan 50 milliGRAM(s) Oral daily  multivitamin 1 Tablet(s) Oral daily  sodium chloride 0.9%. 1000 milliLiter(s) (50 mL/Hr) IV Continuous <Continuous>  thiamine 100 milliGRAM(s) Oral daily    MEDICATIONS  (PRN):  HYDROmorphone PCA (1 mG/mL) Rescue Clinician Bolus 0.5 milliGRAM(s) IV Push every 15 minutes PRN for Pain Scale GREATER THAN 6  naloxone Injectable 0.1 milliGRAM(s) IV Push every 3 minutes PRN For ANY of the following changes in patient status:  A. RR LESS THAN 10 breaths per minute, B. Oxygen saturation LESS THAN 90%, C. Sedation score of 6    Allergies    No Known Allergies    Intolerances        PHYSICAL EXAM:  Constitutional: NAD  HEENT: Normocephalic, EOMI  Neck:  No JVD  Respiratory: CTA B/L, No wheezes  Cardiovascular: S1, S2, RRR, + systolic murmur  Gastrointestinal: BS+, soft, NT/ND  Extremities: dec peripheral edema RLE, rle dressing - c/d/i  Neurological: AAOX3, no focal deficits  Psychiatric: Normal mood, normal affect  : No Sosa    Vital Signs Last 24 Hrs  T(C): 36.7 (02 Mar 2018 15:54), Max: 39.1 (01 Mar 2018 22:48)  T(F): 98 (02 Mar 2018 15:54), Max: 102.4 (01 Mar 2018 22:48)  HR: 85 (02 Mar 2018 15:54) (70 - 111)  BP: 122/73 (02 Mar 2018 15:54) (100/64 - 158/78)  BP(mean): 107 (01 Mar 2018 21:30) (81 - 118)  RR: 18 (02 Mar 2018 15:54) (17 - 24)  SpO2: 100% (02 Mar 2018 15:54) (92% - 100%)  I&O's Summary    01 Mar 2018 07:01  -  02 Mar 2018 07:00  --------------------------------------------------------  IN: 1840 mL / OUT: 2625 mL / NET: -785 mL    02 Mar 2018 07:01  -  02 Mar 2018 18:02  --------------------------------------------------------  IN: 1420 mL / OUT: 2900 mL / NET: -1480 mL        LABS:                        11.3   18.95 )-----------( 499      ( 02 Mar 2018 07:18 )             35.1     03-02    135  |  99  |  19  ----------------------------<  120<H>  6.0<H>   |  26  |  0.91    Ca    8.4      02 Mar 2018 05:13  Phos  4.5     03-02  Mg     2.6     03-02    TPro  7.3  /  Alb  2.2<L>  /  TBili  0.6  /  DBili  x   /  AST  88<H>  /  ALT  70<H>  /  AlkPhos  69  03-02

## 2018-03-02 NOTE — PROGRESS NOTE ADULT - ASSESSMENT
57M with obesity, ETOH, SHIELA w/ R ankle sprain, developed MSSA cellulitis/ bacteremia, pulmonary septic emboli, R distal DVT now s/p OR, with mild coagulopathy    #coagulopathy- suspect secondary to vitamin K deficiency/ decreased PO intake over past week with acute illness; Factor VII level sl low  - PT mixing study corrected confirming factor deficiency, no evidence of inhibitor- consistent with vitamin K deficiency  - INR sl elevated; no increased bleeding; no prior history of bleeding issues  - will continue to hold on FFP/vitamin K  - coags improved    #R peroneal DVT- no prior history of VTE; provoked with recent injury and decreased mobility  - repeat US with no evidence of propagation  - w/ septic emboli; was on heparin gtt, now off with +FOB    # R ankle sprain/wound- now s/p OR- s/p return to OR, deep debridement  - pain control; wound care    #ID- MSSA bacteremia, KELVIN negative for vegetation; on Abx per ID    would consider restarting DVT prophylaxis if considered stable    d/w NP

## 2018-03-02 NOTE — PROGRESS NOTE ADULT - SUBJECTIVE AND OBJECTIVE BOX
CC:  Right foot pain    F/U:  mssa bacteremia, RLE infection     Interval History/ROS:  MRI foot noted OM.  back on 6 Maroa.  no fever/chills.  some pain at R foot.  Rest of ROS otherwise negative    Allergies  No Known Allergies    ANTIMICROBIALS:    ceFAZolin   IVPB 2000 every 8 hours  clindamycin IVPB 900 every 8 hours    MEDICATIONS  (STANDING):  ALBUTerol/ipratropium for Nebulization 3 every 6 hours  furosemide   Injectable 20 daily  HYDROmorphone PCA (1 mG/mL) 30 PCA Continuous  losartan 50 daily    Vital Signs Last 24 Hrs  T(F): 97.8 (03-02-18 @ 11:39), Max: 102.4 (03-01-18 @ 22:48)  HR: 95 (03-02-18 @ 14:24)  BP: 117/67 (03-02-18 @ 14:24)  RR: 18 (03-02-18 @ 11:39)  SpO2: 94% (03-02-18 @ 14:24) (92% - 100%)  Wt(kg): --    PHYSICAL EXAM:  General: non-toxic, obese  HEAD/EYES: anicteric  ENT:  supple  Cardiovascular:   S1, S2; no murmur  Respiratory:  clear bilaterally  GI:  soft, non-tender, normal bowel sounds  :  no guido  Musculoskeletal:  R foot in post-op dressing  Neurologic:  grossly non-focal  Skin:  no rash  Lymph: no lymphadenopathy  Psychiatric:  appropriate affect  Vascular:  no phlebitis    MICROBIOLOGY:  Culture - Blood in AM (02.27.18 @ 13:42)    Culture Results:   Growth in anaerobic bottle: Staphylococcus aureus    Culture - Blood in AM (02.27.18 @ 13:41)    Culture Results:   Growth in anaerobic bottle: Staphylococcus aureus Susceptibility to  follow.    Culture - Surgical Swab (02.25.18 @ 22:01)    -  Cefazolin: S <=4    Specimen Source: .Surgical Swab RIGHT FOOT PUS    Culture Results:   Moderate Staphylococcus aureus    Culture - Abscess with Gram Stain (02.25.18 @ 08:39)    -  Oxacillin: S 0.5    Specimen Source: .Abscess right foot    Culture Results:   Few Staphylococcus aureus    Culture - Blood in AM (02.24.18 @ 05:15)  Growth in aerobic and anaerobic bottles: Staphylococcus aureus  See previous culture [id number]    Culture - Blood (02.24.18 @ 00:59)  Growth in aerobic and anaerobic bottles: Staphylococcus aureus  See previous culture [id number]    Culture - Blood (02.22.18 @ 12:56)  Growth in aerobic and anaerobic bottles: Staphylococcus aureus  See previous culture  # [id number]    Culture - Blood (02.22.18 @ 12:56)  Growth in anaerobic bottle:  Specimen Source: .Blood Blood-Peripheral  Staphylococcus aureus    -  Cefazolin: S <=4    RADIOLOGY:  MR Tib/Fib w/wo IV Cont, Right (02.28.18 @ 23:11)   1.  Soft tissue wound over the dorsolateral aspect of the foot. 2 residual abscesses are seen tracking from the site of the wound, one posterior, and one along the plantar surface of the foot.  2.  Diffuse osteomyelitis of the midfoot involving the metatarsals, cuneiforms, cuboid, and navicular.  3.  Myositis of the deep posterior compartment musculature of the leg.  4.  Nonenhancing edema is seen tracking along the superficial and deep fascial layers of the posterior compartment of the leg. Given other findings, necrotizing fasciitis cannot be excluded.    VA Duplex Lower Ext Vein Scan, Right (02.27.18 @ 15:13)  IMPRESSION:  Persistent thrombosis of a single right peroneal vein without evidence of propagation since prior venous ultrasound from 2/22/2018.    CT Lower Extremity w/ IV Cont, Right (02.22.18 @ 21:56)   Impression:  Diffuse cellulitis throughout the right lower leg as outlined above with cutaneous bleb along the dorsal lateral aspect of the foot. There is no subcutaneous air to suggest necrotizing fasciitis.    Ill-defined fluid attenuation within the region of the extensor digitorum brevis musculature and within the region of the abductor hallucis muscle which may be related to muscle strain or myositis. Developing fluid collections are also a consideration in the appropriate clinical context.  No peripherally enhancing fluid collection is noted on the current examination.  Intramuscular fullness and edema within the myofascial planes about the calf musculature likely related to patient's known right peroneal deep vein thrombosis.  No CT evidence of osteomyelitis.  Osteochondral lesion along the medial aspect of the talar dome.    CT Angio Chest w/ IV Cont (02.22.18 @ 00:25)   IMPRESSION:    1.  No main, or lobar pulmonary embolus. Evaluation of the segmental and subsegmental branches is limited secondary to artifact.  2.  Multiple groundglass and nodular opacities with a peripheral and lower lobe distribution could be secondary to infection (? Either septic emboli or fungal infection rather than a community acquired pneumonia) rather than malignancy.

## 2018-03-02 NOTE — PROGRESS NOTE ADULT - ASSESSMENT
57M s/p 2nd staged I&D and debridement of right foot due to nec fasc  - POD #1  - Surgical sites appear to be stable with viable tissues visible  - Satisfactory evacuation of abscess and debridement of necrotic tissue performed in OR  - Wound ready for Irrigation VAC [place]  - F/u OR data  - IV abx per ID  - Pain control  - Seen w/ attending 57M s/p 2nd staged I&D and debridement of right foot due to nec fasc  - POD #1  - Surgical sites appear to be stable with viable tissues visible  - Satisfactory evacuation of abscess and debridement of necrotic tissue performed in OR  - Wound ready for Irrigation VAC [place]  - F/u OR data  - IV abx per ID  - Pain control  - Resume AC  - Seen w/ attending

## 2018-03-02 NOTE — PROGRESS NOTE ADULT - SUBJECTIVE AND OBJECTIVE BOX
Pain Management Attending Addendum    SUBJECTIVE: Patient doing well with IV PCA    Therapy:    [X] IV PCA         [ ] PRN Analgesics    OBJECTIVE:   [X] Pain appropriately controlled    [ ] Other:    Side Effects:  [X] None	             [ ] Nausea              [ ] Pruritis                	[ ] Other:    ASSESSMENT/PLAN: Continue current therapy    Comments: reevaluation in the afternoon

## 2018-03-02 NOTE — CHART NOTE - NSCHARTNOTEFT_GEN_A_CORE
Called by RN to evaluate pt with fever of 102.4.  Pt seen and evaluated,.  Pt s/p right foot I & D and deep debridement today. Reports of right foot pain, on dilaudid PCA pump. Denies chills, chest pain, palpitations, shortness of breath, abdominal pain, nausea, vomiting and diarrhea.     Vital Signs Last 24 Hrs  T(C): 37.1 (02 Mar 2018 00:27), Max: 39.1 (01 Mar 2018 22:48)  T(F): 98.8 (02 Mar 2018 00:27), Max: 102.4 (01 Mar 2018 22:48)  HR: 111 (01 Mar 2018 22:48) (74 - 111)  BP: 118/73 (01 Mar 2018 22:48) (102/71 - 168/72)  BP(mean): 107 (01 Mar 2018 21:30) (81 - 118)  RR: 18 (01 Mar 2018 22:48) (16 - 24)  SpO2: 92% (01 Mar 2018 22:48) (92% - 100%)      Labs:                          11.2   15.85 )-----------( 409      ( 01 Mar 2018 07:36 )             34.3     03-01    135  |  99  |  16  ----------------------------<  120<H>  4.6   |  27  |  0.83    Ca    8.1<L>      01 Mar 2018 06:05  Phos  3.7     03-01  Mg     2.5     03-01    TPro  6.5  /  Alb  2.1<L>  /  TBili  0.4  /  DBili  x   /  AST  81<H>  /  ALT  77<H>  /  AlkPhos  68  03-01        Culture - Blood (collected 27 Feb 2018 13:42)  Source: .Blood Blood-Venous  Gram Stain (01 Mar 2018 02:43):    Growth in anaerobic bottle: Gram Positive Cocci in Clusters    Growth in aerobic bottle: Gram Positive Cocci in Clusters  Preliminary Report (01 Mar 2018 11:34):    Growth in anaerobic bottle: Staphylococcus aureus    Growth in aerobic bottle: Gram Positive Cocci in Clusters    Culture - Blood (collected 27 Feb 2018 13:41)  Source: .Blood Blood-Peripheral  Gram Stain (28 Feb 2018 14:02):    Growth in anaerobic bottle: Gram Positive Cocci in Clusters  Preliminary Report (01 Mar 2018 11:31):    Growth in anaerobic bottle: Staphylococcus aureus Susceptibility to    follow.        Physical Exam:  General: WN/WD NAD  Neurology: A&Ox3, nonfocal, FERMIN x 4  Respiratory: CTA B/L  CV: RRR, S1S2  Abdominal: Soft, NT, ND, + BS X 4  MSK: right foot with dressing c/d/I s/p I & D     Assessment & Plan:  57M with HTN and ETOH abuse admitted 2/19/18 with R foot/leg infection after ankle sprain. .  Course complicated by MSSA bacteremia, MRI foot with OM/abscess/necrotizing fasciitis , R leg DVT and CTA suggestive of possible septic pulmonary emboli. Now s/p I & D and deep debridement of right foot by podiatry today. Now pt presents with fever of 102.4.    Fever    Likely as pt post op from I & D and debridement for  right foot infection/ MSSA bacteremia.     Tylenol 650 mg po X 1    Cooling measures    Normal Saline @ 50cc/hr     Continue Cefazolin and Clindamycin.    D/W Dr. Miranda    Follow up with Attending in AM.    Marva Grubbs NP  [id number]

## 2018-03-02 NOTE — PROGRESS NOTE ADULT - SUBJECTIVE AND OBJECTIVE BOX
Day 1 of Anesthesia Pain Management Service    SUBJECTIVE: Patient is doing well with IV PCA    Pain Scale Score:	[X] Refer to charted pain scores    THERAPY:    [ ] IV PCA Morphine		[ ] 5 mg/mL	[ ] 1 mg/mL  [X] IV PCA Hydromorphone	[ ] 5 mg/mL	[X] 1 mg/mL  [ ] IV PCA Fentanyl		[ ] 50 micrograms/mL    Demand dose: 0.2 mg     Lockout: 6 minutes   Continuous Rate: 0 mg/hr  4 Hour Limit: 4 mg    MEDICATIONS  (STANDING):  ALBUTerol/ipratropium for Nebulization 3 milliLiter(s) Nebulizer every 6 hours  ceFAZolin   IVPB 2000 milliGRAM(s) IV Intermittent every 8 hours  clindamycin IVPB 900 milliGRAM(s) IV Intermittent every 8 hours  folic acid 1 milliGRAM(s) Oral daily  furosemide   Injectable 20 milliGRAM(s) IV Push daily  HYDROmorphone PCA (1 mG/mL) 30 milliLiter(s) PCA Continuous PCA Continuous  lactobacillus acidophilus 1 Tablet(s) Oral three times a day with meals  lidocaine   Patch 1 Patch Transdermal daily  losartan 50 milliGRAM(s) Oral daily  multivitamin 1 Tablet(s) Oral daily  sodium chloride 0.9%. 1000 milliLiter(s) (50 mL/Hr) IV Continuous <Continuous>  thiamine 100 milliGRAM(s) Oral daily    MEDICATIONS  (PRN):  HYDROmorphone PCA (1 mG/mL) Rescue Clinician Bolus 0.5 milliGRAM(s) IV Push every 15 minutes PRN for Pain Scale GREATER THAN 6  naloxone Injectable 0.1 milliGRAM(s) IV Push every 3 minutes PRN For ANY of the following changes in patient status:  A. RR LESS THAN 10 breaths per minute, B. Oxygen saturation LESS THAN 90%, C. Sedation score of 6      OBJECTIVE:    Sedation Score:	[ X] Alert	[ ] Drowsy 	[ ] Arousable	[ ] Asleep	[ ] Unresponsive    Side Effects:	[X ] None	[ ] Nausea	[ ] Vomiting	[ ] Pruritus  		[ ] Other:    Vital Signs Last 24 Hrs  T(C): 36.4 (02 Mar 2018 10:10), Max: 39.1 (01 Mar 2018 22:48)  T(F): 97.6 (02 Mar 2018 10:10), Max: 102.4 (01 Mar 2018 22:48)  HR: 86 (02 Mar 2018 10:10) (70 - 111)  BP: 151/81 (02 Mar 2018 10:10) (100/64 - 168/72)  BP(mean): 107 (01 Mar 2018 21:30) (81 - 118)  RR: 18 (02 Mar 2018 10:10) (16 - 24)  SpO2: 95% (02 Mar 2018 10:10) (92% - 100%)    ASSESSMENT/ PLAN    Therapy to  be:               [X] Continued   [ ] Discontinued   [ ] Changed to PRN Analgesics    Documentation and Verification of current medications:   [X] Done	[ ] Not done, not eligible    Comments:

## 2018-03-02 NOTE — PROGRESS NOTE ADULT - SUBJECTIVE AND OBJECTIVE BOX
Chief Complaint: fu    History of Present Illness: pain now better controlled; no f/c, no cp, no dyspnea, no cough, no n/v/abd pain, no bleeding, tolerating PO      MEDICATIONS  (STANDING):  ALBUTerol/ipratropium for Nebulization 3 milliLiter(s) Nebulizer every 6 hours  ceFAZolin   IVPB 2000 milliGRAM(s) IV Intermittent every 8 hours  clindamycin IVPB 900 milliGRAM(s) IV Intermittent every 8 hours  folic acid 1 milliGRAM(s) Oral daily  furosemide   Injectable 20 milliGRAM(s) IV Push daily  HYDROmorphone PCA (1 mG/mL) 30 milliLiter(s) PCA Continuous PCA Continuous  lactobacillus acidophilus 1 Tablet(s) Oral three times a day with meals  lidocaine   Patch 1 Patch Transdermal daily  losartan 50 milliGRAM(s) Oral daily  multivitamin 1 Tablet(s) Oral daily  sodium chloride 0.9%. 1000 milliLiter(s) (50 mL/Hr) IV Continuous <Continuous>  thiamine 100 milliGRAM(s) Oral daily    MEDICATIONS  (PRN):  HYDROmorphone PCA (1 mG/mL) Rescue Clinician Bolus 0.5 milliGRAM(s) IV Push every 15 minutes PRN for Pain Scale GREATER THAN 6  naloxone Injectable 0.1 milliGRAM(s) IV Push every 3 minutes PRN For ANY of the following changes in patient status:  A. RR LESS THAN 10 breaths per minute, B. Oxygen saturation LESS THAN 90%, C. Sedation score of 6      Allergies    No Known Allergies    Intolerances        Vital Signs Last 24 Hrs  T(C): 36.6 (02 Mar 2018 11:39), Max: 39.1 (01 Mar 2018 22:48)  T(F): 97.8 (02 Mar 2018 11:39), Max: 102.4 (01 Mar 2018 22:48)  HR: 95 (02 Mar 2018 14:24) (70 - 111)  BP: 117/67 (02 Mar 2018 14:24) (100/64 - 168/72)  BP(mean): 107 (01 Mar 2018 21:30) (81 - 118)  RR: 18 (02 Mar 2018 11:39) (16 - 24)  SpO2: 94% (02 Mar 2018 14:24) (92% - 100%)    PHYSICAL EXAM  General: adult in NAD  HEENT: clear oropharynx, anicteric sclera, pink conjunctiva  Neck: supple  CV: normal S1/S2 with no murmur rubs or gallops  Lungs: clear to auscultation, no wheezes, no rales  Abdomen: soft obese non-tender , positive bowel sounds  Ext: no clubbing cyanosis or edema, Right foot bandaged    LABS:                          11.3   18.95 )-----------( 499      ( 02 Mar 2018 07:18 )             35.1         Mean Cell Volume : 102.0 fl  Mean Cell Hemoglobin : 32.8 pg  Mean Cell Hemoglobin Concentration : 32.2 gm/dL  Auto Neutrophil # : 16.48 K/uL  Auto Lymphocyte # : 1.16 K/uL  Auto Monocyte # : 1.14 K/uL  Auto Eosinophil # : 0.01 K/uL  Auto Basophil # : 0.02 K/uL  Auto Neutrophil % : 87.0 %  Auto Lymphocyte % : 6.1 %  Auto Monocyte % : 6.0 %  Auto Eosinophil % : 0.1 %  Auto Basophil % : 0.1 %      Serial CBC's  03-02 @ 07:18  Hct-35.1 / Hgb-11.3 / Plat-499 / RBC-3.44 / WBC-18.95  Serial CBC's  03-01 @ 07:36  Hct-34.3 / Hgb-11.2 / Plat-409 / RBC-3.48 / WBC-15.85  Serial CBC's  02-28 @ 07:50  Hct-36.5 / Hgb-11.7 / Plat-393 / RBC-3.75 / WBC-17.64  Serial CBC's  02-28 @ 00:39  Hct-37.9 / Hgb-12.7 / Plat-455 / RBC-3.78 / WBC-18.3  Serial CBC's  02-27 @ 12:06  Hct-36.6 / Hgb-12.4 / Plat-400 / RBC-3.65 / WBC-18.3  Serial CBC's  02-26 @ 21:57  Hct-38.1 / Hgb-12.4 / Plat-365 / RBC-3.87 / WBC-19.60      03-02    135  |  99  |  19  ----------------------------<  120<H>  6.0<H>   |  26  |  0.91    Ca    8.4      02 Mar 2018 05:13  Phos  4.5     03-02  Mg     2.6     03-02    TPro  7.3  /  Alb  2.2<L>  /  TBili  0.6  /  DBili  x   /  AST  88<H>  /  ALT  70<H>  /  AlkPhos  69  03-02      PT/INR - ( 01 Mar 2018 08:03 )   PT: 17.1 sec;   INR: 1.50 ratio         PTT - ( 01 Mar 2018 08:03 )  PTT:27.6 sec        Radiology:      < from: VA Duplex Lower Ext Vein Scan, Right (02.27.18 @ 15:13) >  IMPRESSION:     Persistent thrombosis of a single right peroneal vein without evidence of   propagation since prior venous ultrasound from 2/22/2018.

## 2018-03-02 NOTE — PROGRESS NOTE ADULT - ASSESSMENT
57M with HTN and ETOH abuse admitted 2/19/18 with R foot/leg infection.  Course complicated by MSSA bacteremia, MRI foot with OM/abscess, R leg DVT and CTA suggestive of possible septic pulmonary emboli though TTE negative.  Now post-op debridement of the R foot by podiatry today for source control.    Recommend:  -Continue cefazolin 2 grams IV q 8 hours - 6 weeks from negative cultures  -no need for clindamycin  -repeat BC (ordered)  -KELVIN r/o abscess  -PICC once BC clear      Please call the ID service [phone] with questions or concerns over the weekend

## 2018-03-03 LAB
-  AMIKACIN: SIGNIFICANT CHANGE UP
-  AMPICILLIN/SULBACTAM: SIGNIFICANT CHANGE UP
-  AZTREONAM: SIGNIFICANT CHANGE UP
-  CEFAZOLIN: SIGNIFICANT CHANGE UP
-  CEFEPIME: SIGNIFICANT CHANGE UP
-  CEFTAZIDIME: SIGNIFICANT CHANGE UP
-  CIPROFLOXACIN: SIGNIFICANT CHANGE UP
-  CIPROFLOXACIN: SIGNIFICANT CHANGE UP
-  CLINDAMYCIN: SIGNIFICANT CHANGE UP
-  ERYTHROMYCIN: SIGNIFICANT CHANGE UP
-  GENTAMICIN: SIGNIFICANT CHANGE UP
-  GENTAMICIN: SIGNIFICANT CHANGE UP
-  IMIPENEM: SIGNIFICANT CHANGE UP
-  LEVOFLOXACIN: SIGNIFICANT CHANGE UP
-  LEVOFLOXACIN: SIGNIFICANT CHANGE UP
-  MEROPENEM: SIGNIFICANT CHANGE UP
-  MOXIFLOXACIN(AEROBIC): SIGNIFICANT CHANGE UP
-  OXACILLIN: SIGNIFICANT CHANGE UP
-  PENICILLIN: SIGNIFICANT CHANGE UP
-  PIPERACILLIN/TAZOBACTAM: SIGNIFICANT CHANGE UP
-  RIFAMPIN: SIGNIFICANT CHANGE UP
-  TETRACYCLINE: SIGNIFICANT CHANGE UP
-  TOBRAMYCIN: SIGNIFICANT CHANGE UP
-  TRIMETHOPRIM/SULFAMETHOXAZOLE: SIGNIFICANT CHANGE UP
-  VANCOMYCIN: SIGNIFICANT CHANGE UP
ALBUMIN SERPL ELPH-MCNC: 2.1 G/DL — LOW (ref 3.3–5)
ALBUMIN SERPL ELPH-MCNC: 2.4 G/DL — LOW (ref 3.3–5)
ALP SERPL-CCNC: 71 U/L — SIGNIFICANT CHANGE UP (ref 40–120)
ALP SERPL-CCNC: 78 U/L — SIGNIFICANT CHANGE UP (ref 40–120)
ALT FLD-CCNC: 53 U/L RC — HIGH (ref 10–45)
ALT FLD-CCNC: 57 U/L RC — HIGH (ref 10–45)
ANION GAP SERPL CALC-SCNC: 10 MMOL/L — SIGNIFICANT CHANGE UP (ref 5–17)
ANION GAP SERPL CALC-SCNC: 9 MMOL/L — SIGNIFICANT CHANGE UP (ref 5–17)
ANISOCYTOSIS BLD QL: SLIGHT — SIGNIFICANT CHANGE UP
APPEARANCE UR: CLEAR — SIGNIFICANT CHANGE UP
AST SERPL-CCNC: 46 U/L — HIGH (ref 10–40)
AST SERPL-CCNC: 47 U/L — HIGH (ref 10–40)
BASOPHILS # BLD AUTO: 0 K/UL — SIGNIFICANT CHANGE UP (ref 0–0.2)
BASOPHILS NFR BLD AUTO: 0 % — SIGNIFICANT CHANGE UP (ref 0–2)
BILIRUB SERPL-MCNC: 0.4 MG/DL — SIGNIFICANT CHANGE UP (ref 0.2–1.2)
BILIRUB SERPL-MCNC: 0.5 MG/DL — SIGNIFICANT CHANGE UP (ref 0.2–1.2)
BILIRUB UR-MCNC: NEGATIVE — SIGNIFICANT CHANGE UP
BUN SERPL-MCNC: 15 MG/DL — SIGNIFICANT CHANGE UP (ref 7–23)
BUN SERPL-MCNC: 16 MG/DL — SIGNIFICANT CHANGE UP (ref 7–23)
CALCIUM SERPL-MCNC: 8.1 MG/DL — LOW (ref 8.4–10.5)
CALCIUM SERPL-MCNC: 8.7 MG/DL — SIGNIFICANT CHANGE UP (ref 8.4–10.5)
CHLORIDE SERPL-SCNC: 97 MMOL/L — SIGNIFICANT CHANGE UP (ref 96–108)
CHLORIDE SERPL-SCNC: 97 MMOL/L — SIGNIFICANT CHANGE UP (ref 96–108)
CO2 SERPL-SCNC: 28 MMOL/L — SIGNIFICANT CHANGE UP (ref 22–31)
CO2 SERPL-SCNC: 29 MMOL/L — SIGNIFICANT CHANGE UP (ref 22–31)
COLOR SPEC: YELLOW — SIGNIFICANT CHANGE UP
CREAT SERPL-MCNC: 0.81 MG/DL — SIGNIFICANT CHANGE UP (ref 0.5–1.3)
CREAT SERPL-MCNC: 0.85 MG/DL — SIGNIFICANT CHANGE UP (ref 0.5–1.3)
DIFF PNL FLD: ABNORMAL
EOSINOPHIL # BLD AUTO: 0.11 K/UL — SIGNIFICANT CHANGE UP (ref 0–0.5)
EOSINOPHIL NFR BLD AUTO: 0.9 — SIGNIFICANT CHANGE UP
GLUCOSE SERPL-MCNC: 119 MG/DL — HIGH (ref 70–99)
GLUCOSE SERPL-MCNC: 121 MG/DL — HIGH (ref 70–99)
GLUCOSE UR QL: NEGATIVE MG/DL — SIGNIFICANT CHANGE UP
HCT VFR BLD CALC: 33.1 % — LOW (ref 39–50)
HCT VFR BLD CALC: 36.2 % — LOW (ref 39–50)
HGB BLD-MCNC: 10.6 G/DL — LOW (ref 13–17)
HGB BLD-MCNC: 11.4 G/DL — LOW (ref 13–17)
KETONES UR-MCNC: NEGATIVE — SIGNIFICANT CHANGE UP
LEUKOCYTE ESTERASE UR-ACNC: NEGATIVE — SIGNIFICANT CHANGE UP
LYMPHOCYTES # BLD AUTO: 0.73 K/UL — LOW (ref 1–3.3)
LYMPHOCYTES # BLD AUTO: 6.1 % — LOW (ref 13–44)
MACROCYTES BLD QL: SLIGHT — SIGNIFICANT CHANGE UP
MAGNESIUM SERPL-MCNC: 2.5 MG/DL — SIGNIFICANT CHANGE UP (ref 1.6–2.6)
MANUAL SMEAR VERIFICATION: SIGNIFICANT CHANGE UP
MCHC RBC-ENTMCNC: 31.2 PG — SIGNIFICANT CHANGE UP (ref 27–34)
MCHC RBC-ENTMCNC: 31.5 GM/DL — LOW (ref 32–36)
MCHC RBC-ENTMCNC: 32 GM/DL — SIGNIFICANT CHANGE UP (ref 32–36)
MCHC RBC-ENTMCNC: 32.5 PG — SIGNIFICANT CHANGE UP (ref 27–34)
MCV RBC AUTO: 102 FL — HIGH (ref 80–100)
MCV RBC AUTO: 99.2 FL — SIGNIFICANT CHANGE UP (ref 80–100)
METHOD TYPE: SIGNIFICANT CHANGE UP
METHOD TYPE: SIGNIFICANT CHANGE UP
MONOCYTES # BLD AUTO: 0.42 K/UL — SIGNIFICANT CHANGE UP (ref 0–0.9)
MONOCYTES NFR BLD AUTO: 3.5 % — SIGNIFICANT CHANGE UP (ref 2–14)
NEUTROPHILS # BLD AUTO: 10.64 K/UL — HIGH (ref 1.8–7.4)
NEUTROPHILS NFR BLD AUTO: 88.6 % — HIGH (ref 43–77)
NITRITE UR-MCNC: NEGATIVE — SIGNIFICANT CHANGE UP
PH UR: 5.5 — SIGNIFICANT CHANGE UP (ref 5–8)
PHOSPHATE SERPL-MCNC: 3.2 MG/DL — SIGNIFICANT CHANGE UP (ref 2.5–4.5)
PLAT MORPH BLD: NORMAL — SIGNIFICANT CHANGE UP
PLATELET # BLD AUTO: 520 K/UL — HIGH (ref 150–400)
PLATELET # BLD AUTO: 528 K/UL — HIGH (ref 150–400)
POTASSIUM SERPL-MCNC: 4.1 MMOL/L — SIGNIFICANT CHANGE UP (ref 3.5–5.3)
POTASSIUM SERPL-MCNC: 4.7 MMOL/L — SIGNIFICANT CHANGE UP (ref 3.5–5.3)
POTASSIUM SERPL-SCNC: 4.1 MMOL/L — SIGNIFICANT CHANGE UP (ref 3.5–5.3)
POTASSIUM SERPL-SCNC: 4.7 MMOL/L — SIGNIFICANT CHANGE UP (ref 3.5–5.3)
PROMYELOCYTES # FLD: 1 % — HIGH (ref 0–0)
PROT SERPL-MCNC: 6.9 G/DL — SIGNIFICANT CHANGE UP (ref 6–8.3)
PROT SERPL-MCNC: 7.8 G/DL — SIGNIFICANT CHANGE UP (ref 6–8.3)
PROT UR-MCNC: NEGATIVE MG/DL — SIGNIFICANT CHANGE UP
RBC # BLD: 3.26 M/UL — LOW (ref 4.2–5.8)
RBC # BLD: 3.65 M/UL — LOW (ref 4.2–5.8)
RBC # FLD: 12.6 % — SIGNIFICANT CHANGE UP (ref 10.3–14.5)
RBC # FLD: 13.6 % — SIGNIFICANT CHANGE UP (ref 10.3–14.5)
RBC BLD AUTO: ABNORMAL
SODIUM SERPL-SCNC: 134 MMOL/L — LOW (ref 135–145)
SODIUM SERPL-SCNC: 136 MMOL/L — SIGNIFICANT CHANGE UP (ref 135–145)
SP GR SPEC: 1.01 — SIGNIFICANT CHANGE UP (ref 1.01–1.02)
UROBILINOGEN FLD QL: NEGATIVE MG/DL — SIGNIFICANT CHANGE UP
WBC # BLD: 12.01 K/UL — HIGH (ref 3.8–10.5)
WBC # BLD: 12.7 K/UL — HIGH (ref 3.8–10.5)
WBC # FLD AUTO: 12.01 K/UL — HIGH (ref 3.8–10.5)
WBC # FLD AUTO: 12.7 K/UL — HIGH (ref 3.8–10.5)

## 2018-03-03 PROCEDURE — 99232 SBSQ HOSP IP/OBS MODERATE 35: CPT

## 2018-03-03 RX ORDER — PIPERACILLIN AND TAZOBACTAM 4; .5 G/20ML; G/20ML
3.38 INJECTION, POWDER, LYOPHILIZED, FOR SOLUTION INTRAVENOUS EVERY 8 HOURS
Qty: 0 | Refills: 0 | Status: DISCONTINUED | OUTPATIENT
Start: 2018-03-03 | End: 2018-03-03

## 2018-03-03 RX ORDER — ACETAMINOPHEN 500 MG
650 TABLET ORAL ONCE
Qty: 0 | Refills: 0 | Status: COMPLETED | OUTPATIENT
Start: 2018-03-03 | End: 2018-03-03

## 2018-03-03 RX ORDER — IMIPENEM AND CILASTATIN 250; 250 MG/100ML; MG/100ML
1000 INJECTION, POWDER, FOR SOLUTION INTRAVENOUS EVERY 8 HOURS
Qty: 0 | Refills: 0 | Status: DISCONTINUED | OUTPATIENT
Start: 2018-03-03 | End: 2018-03-09

## 2018-03-03 RX ORDER — IMIPENEM AND CILASTATIN 250; 250 MG/100ML; MG/100ML
1000 INJECTION, POWDER, FOR SOLUTION INTRAVENOUS ONCE
Qty: 0 | Refills: 0 | Status: COMPLETED | OUTPATIENT
Start: 2018-03-03 | End: 2018-03-03

## 2018-03-03 RX ORDER — IMIPENEM AND CILASTATIN 250; 250 MG/100ML; MG/100ML
INJECTION, POWDER, FOR SOLUTION INTRAVENOUS
Qty: 0 | Refills: 0 | Status: DISCONTINUED | OUTPATIENT
Start: 2018-03-03 | End: 2018-03-09

## 2018-03-03 RX ADMIN — HYDROMORPHONE HYDROCHLORIDE 30 MILLILITER(S): 2 INJECTION INTRAMUSCULAR; INTRAVENOUS; SUBCUTANEOUS at 23:12

## 2018-03-03 RX ADMIN — IMIPENEM AND CILASTATIN 250 MILLIGRAM(S): 250; 250 INJECTION, POWDER, FOR SOLUTION INTRAVENOUS at 16:35

## 2018-03-03 RX ADMIN — Medication 3 MILLILITER(S): at 16:34

## 2018-03-03 RX ADMIN — Medication 3 MILLILITER(S): at 23:15

## 2018-03-03 RX ADMIN — HEPARIN SODIUM 5000 UNIT(S): 5000 INJECTION INTRAVENOUS; SUBCUTANEOUS at 21:04

## 2018-03-03 RX ADMIN — LIDOCAINE 1 PATCH: 4 CREAM TOPICAL at 00:28

## 2018-03-03 RX ADMIN — PIPERACILLIN AND TAZOBACTAM 25 GRAM(S): 4; .5 INJECTION, POWDER, LYOPHILIZED, FOR SOLUTION INTRAVENOUS at 13:02

## 2018-03-03 RX ADMIN — Medication 3 MILLILITER(S): at 11:05

## 2018-03-03 RX ADMIN — LOSARTAN POTASSIUM 50 MILLIGRAM(S): 100 TABLET, FILM COATED ORAL at 06:00

## 2018-03-03 RX ADMIN — Medication 1 TABLET(S): at 11:04

## 2018-03-03 RX ADMIN — HYDROMORPHONE HYDROCHLORIDE 0.5 MILLIGRAM(S): 2 INJECTION INTRAMUSCULAR; INTRAVENOUS; SUBCUTANEOUS at 07:58

## 2018-03-03 RX ADMIN — HYDROMORPHONE HYDROCHLORIDE 0.5 MILLIGRAM(S): 2 INJECTION INTRAMUSCULAR; INTRAVENOUS; SUBCUTANEOUS at 13:35

## 2018-03-03 RX ADMIN — Medication 1 MILLIGRAM(S): at 11:04

## 2018-03-03 RX ADMIN — Medication 650 MILLIGRAM(S): at 00:36

## 2018-03-03 RX ADMIN — Medication 1 TABLET(S): at 16:47

## 2018-03-03 RX ADMIN — Medication 100 MILLIGRAM(S): at 11:05

## 2018-03-03 RX ADMIN — SODIUM CHLORIDE 10 MILLILITER(S): 9 INJECTION INTRAMUSCULAR; INTRAVENOUS; SUBCUTANEOUS at 07:09

## 2018-03-03 RX ADMIN — HEPARIN SODIUM 5000 UNIT(S): 5000 INJECTION INTRAVENOUS; SUBCUTANEOUS at 13:02

## 2018-03-03 RX ADMIN — Medication 100 MILLIGRAM(S): at 06:00

## 2018-03-03 RX ADMIN — HYDROMORPHONE HYDROCHLORIDE 30 MILLILITER(S): 2 INJECTION INTRAMUSCULAR; INTRAVENOUS; SUBCUTANEOUS at 19:13

## 2018-03-03 RX ADMIN — HYDROMORPHONE HYDROCHLORIDE 30 MILLILITER(S): 2 INJECTION INTRAMUSCULAR; INTRAVENOUS; SUBCUTANEOUS at 07:08

## 2018-03-03 RX ADMIN — Medication 650 MILLIGRAM(S): at 21:04

## 2018-03-03 RX ADMIN — IMIPENEM AND CILASTATIN 250 MILLIGRAM(S): 250; 250 INJECTION, POWDER, FOR SOLUTION INTRAVENOUS at 21:04

## 2018-03-03 RX ADMIN — Medication 1 TABLET(S): at 07:39

## 2018-03-03 RX ADMIN — HEPARIN SODIUM 5000 UNIT(S): 5000 INJECTION INTRAVENOUS; SUBCUTANEOUS at 05:13

## 2018-03-03 RX ADMIN — Medication 1 TABLET(S): at 11:05

## 2018-03-03 RX ADMIN — Medication 100 MILLIGRAM(S): at 05:13

## 2018-03-03 RX ADMIN — Medication 3 MILLILITER(S): at 05:13

## 2018-03-03 RX ADMIN — Medication 20 MILLIGRAM(S): at 05:13

## 2018-03-03 NOTE — PROGRESS NOTE ADULT - ASSESSMENT
Recent ankle sprain which got infected resulting in MSSA sepsis/cellulitis with lung embolization s/p OR debridement X 2 now with pseudomonas in OR culture    OM right foot/necrotizing fasciitis     hemoptysis - minimal - possibly secondary to being on hep drip with pulm septic emboli    ADA - resolved    Transaminitis - non specific - improving    Coagulopathy on admission - most likley due to dietary deficiency, holding off on any supplementation for now    Right peroneal vein dvt - on heparin prophy dose per heme    Pulm artery enlargement    Morbid obesity    SHIELA continue nocturnal bipap    PAT    continue current care  abx changed to imipenem   continue wound vac  pt instructed to perform ROM and exercises in bed  nocturnal bipap  pt may shave  continue TELE  d/c lasix  attempting to salvage foot  case discussed with pt and family at bedside per pts request. All questions answered to the best of my ability.  f/u blood cultures

## 2018-03-03 NOTE — PROVIDER CONTACT NOTE (CRITICAL VALUE NOTIFICATION) - ASSESSMENT
a&ox4, vss, currently receiving IVF, abx.
a&ox4, vss, currently receiving IVF, abx.
No fever noted.
No fever noted.
Pt w/no s/s distress. Pt on clidamycin and ancef IVPB
A&Ox4. No c/o distress. VSS. Pt currently on IV abx.
A&Ox4. VSS. No c/o distress. Pt on IV abx. Blood cultures drawn today.
Pt is A&Ox4, No s/s of distress noted.
Pt receiving cefazolin IVPB q8h.
previous results of positive blood culture, pt on IV ABX

## 2018-03-03 NOTE — PROVIDER CONTACT NOTE (CRITICAL VALUE NOTIFICATION) - SITUATION
prelim BCx from 2/22 growth in aerobic bottle- GPC clusters
prelim BCx from 2/22 growth in aerobic bottle- GPC clusters (SECOND SET)
BC+: anaerobic growth, Gm+ cocci clusters
BC prelim results- anaerobic growth- Gm + cocci clusters
Received a critical value of blood culture from lab, Growth in anaerobic and in aerobic gram positive cocci in clusters.
Tissue Culture. Moderate pseudomonas aeruginosa numerous staphylococcus aureus
Tissue Culture. No polymorphonuclear cells seen. Numerous gram + cocci in pairs per oil powerfield.
blood culture 2/24 - gram + cocci in clusters in anaerobic bottle
pt admitted for chest pain, acute peroneal vein DVT; RLE wound

## 2018-03-03 NOTE — PROVIDER CONTACT NOTE (CRITICAL VALUE NOTIFICATION) - BACKGROUND
admit dx: CP. + dvt in RLE on heparin gtt. on vanco, zosyn, clindamycin. CT RLE completed to r/o necrotizing fasciitis.
admit dx: CP. + dvt in RLE on heparin gtt. on vanco, zosyn, clindamycin. CT RLE completed to r/o necrotizing fasciitis.
dx acute DVT RLE, chest pain, sprained ankle with cellulitis RLE  hx HTN, alcohol withdrawa
Admitting diagnosis CP & MRRSA/sepsis bacteremia right leg/foot.
Admitting diagnosis Chest Pain & MRRSA/Sepsis bacteremia in right foot/leg.
Pt is admitted dx chest pain.
dx acute DVT RLE, chest pain, sprained ankle with cellulitis RLE    hx HTN, alcohol withdrawal
dx: cp, sob, confusion  hx: sciatica, HTN, DVT

## 2018-03-03 NOTE — PROVIDER CONTACT NOTE (CRITICAL VALUE NOTIFICATION) - ACTION/TREATMENT ORDERED:
no interventions at this time, cont monitoring
cont monitoring.
NP aware, patient already on abx. Continuing to monitor patient.
Continue to monitor.
NP made aware; will continue to monitor
PA aware. Continuing to monitor patient.
Continue to monitor and maintain safety.
Continue to monitor and maintain safety.
continue IV ABX as ordered

## 2018-03-03 NOTE — PROGRESS NOTE ADULT - SUBJECTIVE AND OBJECTIVE BOX
Day __2_ of Anesthesia Pain Management Service    SUBJECTIVE:    Pain Scale Score	At rest: ___ 	With Activity: ___ 	[ x] Refer to charted pain scores    THERAPY:    [ ] IV PCA Morphine		[ ] 5 mg/mL	[ ] 1 mg/mL  [x ] IV PCA Hydromorphone	[ ] 5 mg/mL	[x ] 1 mg/mL  [ ] IV PCA Fentanyl		[ ] 50 micrograms/mL    Demand dose 0.2mg    lockout  6  (minutes) 0Continuous Rate          MEDICATIONS  (STANDING):  ALBUTerol/ipratropium for Nebulization 3 milliLiter(s) Nebulizer every 6 hours  folic acid 1 milliGRAM(s) Oral daily  furosemide   Injectable 20 milliGRAM(s) IV Push daily  heparin  Injectable 5000 Unit(s) SubCutaneous every 8 hours  HYDROmorphone PCA (1 mG/mL) 30 milliLiter(s) PCA Continuous PCA Continuous  lactobacillus acidophilus 1 Tablet(s) Oral three times a day with meals  lidocaine   Patch 1 Patch Transdermal daily  losartan 50 milliGRAM(s) Oral daily  multivitamin 1 Tablet(s) Oral daily  piperacillin/tazobactam IVPB. 3.375 Gram(s) IV Intermittent every 8 hours  sodium chloride 0.9%. 1000 milliLiter(s) (10 mL/Hr) IV Continuous <Continuous>  thiamine 100 milliGRAM(s) Oral daily    MEDICATIONS  (PRN):  HYDROmorphone PCA (1 mG/mL) Rescue Clinician Bolus 0.5 milliGRAM(s) IV Push every 15 minutes PRN for Pain Scale GREATER THAN 6  naloxone Injectable 0.1 milliGRAM(s) IV Push every 3 minutes PRN For ANY of the following changes in patient status:  A. RR LESS THAN 10 breaths per minute, B. Oxygen saturation LESS THAN 90%, C. Sedation score of 6      OBJECTIVE:    Sedation Score:	[x ] Alert	[ ] Drowsy 	[ ] Arousable	[ ] Asleep	[ ] Unresponsive    Side Effects:	[ x] None	[ ] Nausea	[ ] Vomiting	[ ] Pruritus  		[ ] Other:    Vital Signs Last 24 Hrs  T(C): 36.6 (03 Mar 2018 13:56), Max: 38.1 (03 Mar 2018 00:35)  T(F): 97.8 (03 Mar 2018 13:56), Max: 100.6 (03 Mar 2018 00:35)  HR: 94 (03 Mar 2018 13:56) (68 - 95)  BP: 110/64 (03 Mar 2018 13:56) (103/62 - 129/78)  BP(mean): --  RR: 18 (03 Mar 2018 13:56) (18 - 19)  SpO2: 96% (03 Mar 2018 13:56) (94% - 100%)    ASSESSMENT/ PLAN    Therapy to  be:	[x ] Continue   [ ] Discontinued   [ ] Change to prn Analgesics    Documentation and Verification of current medications:   [X] Done	[ ] Not done, not elligible    Comments: I was physically present for the key portionjs of the evaluation and management service provided. I agree with the above history, physical, and plan which I have reviewed and edited where appropriate

## 2018-03-03 NOTE — PROVIDER CONTACT NOTE (CRITICAL VALUE NOTIFICATION) - RECOMMENDATIONS
Power Jernigan, 02/24/2017, 4:34 PM  Patient failed inpatient criteria. Second level of review completed and supports observation. UR committee in agreement. Discussed with Dr. Rip Tidwell who approves observation status.  Observation letter given to the patient
`
NP made aware
Made NP aware.
Made NP aware.
awaiting

## 2018-03-03 NOTE — PROVIDER CONTACT NOTE (CRITICAL VALUE NOTIFICATION) - TEST AND RESULT REPORTED:
prelim BCx from 2/22 growth in aerobic bottle- GPC clusters
prelim BCx from 2/22 growth in aerobic bottle- GPC clusters (SECOND SET)
BC prelim results- anaerobic growth- Gm + cocci clusters
BC+: anaerobic growth, Gm+ cocci clusters
Blood culture,  Growth in anaerobic and in aerobic gram positive cocci in clusters.
Tissue Culture. Moderate pseudomonas aeruginosa numerous staphylococcus aureus
Tissue Culture. No polymorphonuclear cells seen. Numerous gram + cocci in pairs per oil powerfield.
blood culture 2/24 - gram + cocci in clusters in anaerobic bottle
blood culture aerobic and anaerobic bottle + gram cocci in clusters
gram + cocci clusters in both aerobic and anaerobic bottles

## 2018-03-03 NOTE — PROGRESS NOTE ADULT - SUBJECTIVE AND OBJECTIVE BOX
Patient is a 57y old  Male who presents with a chief complaint of Chest pain, confusion (22 Feb 2018 03:00)       INTERVAL HPI/OVERNIGHT EVENTS:  Patient seen and evaluated at bedside.  Pt is resting comfortable in NAD. Denies N/V/F/C.  Pain rated at X/10    Allergies    No Known Allergies    Intolerances        Vital Signs Last 24 Hrs  T(C): 36.4 (03 Mar 2018 08:15), Max: 38.1 (03 Mar 2018 00:35)  T(F): 97.6 (03 Mar 2018 08:15), Max: 100.6 (03 Mar 2018 00:35)  HR: 92 (03 Mar 2018 08:15) (68 - 95)  BP: 116/76 (03 Mar 2018 08:15) (103/62 - 151/81)  BP(mean): --  RR: 18 (03 Mar 2018 08:15) (18 - 19)  SpO2: 96% (03 Mar 2018 08:15) (94% - 100%)    LABS:                        11.4   12.01 )-----------( 520      ( 03 Mar 2018 07:55 )             36.2     03-03    136  |  97  |  15  ----------------------------<  121<H>  4.1   |  29  |  0.85    Ca    8.7      03 Mar 2018 06:52  Phos  3.2     03-03  Mg     2.5     03-03    TPro  7.8  /  Alb  2.4<L>  /  TBili  0.4  /  DBili  x   /  AST  47<H>  /  ALT  57<H>  /  AlkPhos  78  03-03        CAPILLARY BLOOD GLUCOSE          Lower Extremity Physical Exam:  right foot dorsolateral and medial foot surgical site stable hanna wound region ischemic border distally,   sanguinous drainage, still demarcating, no additional purulence.   Edema decreased as well as the erythema, no malodor,   CFT to toes good, no signs of digital necrosis.57M   s/p 2nd staged I&D and debridement of right foot due to nec fasc  -   RADIOLOGY & ADDITIONAL TESTS:

## 2018-03-03 NOTE — PROGRESS NOTE ADULT - SUBJECTIVE AND OBJECTIVE BOX
CC:  Right foot pain    F/U:  mssa bacteremia, RLE infection     Interval History/ROS:   OR culture with PA.  BC all MSSA.  feels okay.  Podiatry just changed the dressing.  No fever/chills.  Still with some pain at R foot.  Rest of ROS otherwise negative    Allergies  No Known Allergies    ANTIMICROBIALS:    ceFAZolin   IVPB 2000 every 8 hours  clindamycin IVPB 900 every 8 hours    MEDICATIONS  (STANDING):  ALBUTerol/ipratropium for Nebulization 3 every 6 hours  furosemide   Injectable 20 daily  heparin  Injectable 5000 every 8 hours  HYDROmorphone PCA (1 mG/mL) 30 PCA Continuous  losartan 50 daily    Vital Signs Last 24 Hrs  T(F): 97.6 (03-03-18 @ 08:15), Max: 100.6 (03-03-18 @ 00:35)  HR: 84 (03-03-18 @ 08:46)  BP: 116/76 (03-03-18 @ 08:15)  RR: 19 (03-03-18 @ 08:46)  SpO2: 98% (03-03-18 @ 08:46) (94% - 100%)  Wt(kg): --    PHYSICAL EXAM:  General: non-toxic, obese  HEAD/EYES: anicteric  ENT:  supple  Cardiovascular:   S1, S2; no murmur  Respiratory:  clear bilaterally  GI:  soft, non-tender, normal bowel sounds  :  no guido  Musculoskeletal:  R foot - dressing C/D/I  Neurologic:  grossly non-focal  Skin:  no rash  Lymph: no lymphadenopathy  Psychiatric:  appropriate affect  Vascular:  no phlebitis R hand                        11.4   12.01 )-----------( 520      ( 03 Mar 2018 07:55 )             36.2 03-03    136  |  97  |  15  ----------------------------<  121  4.1   |  29  |  0.85  Ca    8.7      03 Mar 2018 06:52Phos  3.2     03-03Mg     2.5     03-03  TPro  7.8  /  Alb  2.4  /  TBili  0.4  /  DBili  x   /  AST  47  /  ALT  57  /  AlkPhos  78  03-03    MICROBIOLOGY:  Culture - Tissue with Gram Stain (03.02.18 @ 00:55)  Moderate Pseudomonas aeruginosa  Numerous Staphylococcus aureus    Culture - Blood in AM (02.27.18 @ 13:42)  Growth in anaerobic bottle: Staphylococcus aureus    Culture - Blood in AM (02.27.18 @ 13:41)  Growth in anaerobic bottle: Staphylococcus aureus Susceptibility to follow.    Culture - Surgical Swab (02.25.18 @ 22:01)    -  Cefazolin: S <=4    Specimen Source: .Surgical Swab RIGHT FOOT PUS  Moderate Staphylococcus aureus    Culture - Abscess with Gram Stain (02.25.18 @ 08:39)    -  Oxacillin: S 0.5    Specimen Source: .Abscess right foot  Few Staphylococcus aureus    Culture - Blood in AM (02.24.18 @ 05:15)  Growth in aerobic and anaerobic bottles: Staphylococcus aureus  See previous culture [id number]    Culture - Blood (02.24.18 @ 00:59)  Growth in aerobic and anaerobic bottles: Staphylococcus aureus  See previous culture [id number]    Culture - Blood (02.22.18 @ 12:56)  Growth in aerobic and anaerobic bottles: Staphylococcus aureus  See previous culture  # [id number]    Culture - Blood (02.22.18 @ 12:56)  Growth in anaerobic bottle:  Specimen Source: .Blood Blood-Peripheral  Staphylococcus aureus    -  Cefazolin: S <=4    RADIOLOGY:  MR Tib/Fib w/wo IV Cont, Right (02.28.18 @ 23:11)   1.  Soft tissue wound over the dorsolateral aspect of the foot. 2 residual abscesses are seen tracking from the site of the wound, one posterior, and one along the plantar surface of the foot.  2.  Diffuse osteomyelitis of the midfoot involving the metatarsals, cuneiforms, cuboid, and navicular.  3.  Myositis of the deep posterior compartment musculature of the leg.  4.  Nonenhancing edema is seen tracking along the superficial and deep fascial layers of the posterior compartment of the leg. Given other findings, necrotizing fasciitis cannot be excluded.    VA Duplex Lower Ext Vein Scan, Right (02.27.18 @ 15:13)  IMPRESSION:  Persistent thrombosis of a single right peroneal vein without evidence of propagation since prior venous ultrasound from 2/22/2018.    CT Lower Extremity w/ IV Cont, Right (02.22.18 @ 21:56)   Impression:  Diffuse cellulitis throughout the right lower leg as outlined above with cutaneous bleb along the dorsal lateral aspect of the foot. There is no subcutaneous air to suggest necrotizing fasciitis.    Ill-defined fluid attenuation within the region of the extensor digitorum brevis musculature and within the region of the abductor hallucis muscle which may be related to muscle strain or myositis. Developing fluid collections are also a consideration in the appropriate clinical context.  No peripherally enhancing fluid collection is noted on the current examination.  Intramuscular fullness and edema within the myofascial planes about the calf musculature likely related to patient's known right peroneal deep vein thrombosis.  No CT evidence of osteomyelitis.  Osteochondral lesion along the medial aspect of the talar dome.    CT Angio Chest w/ IV Cont (02.22.18 @ 00:25)   IMPRESSION:    1.  No main, or lobar pulmonary embolus. Evaluation of the segmental and subsegmental branches is limited secondary to artifact.  2.  Multiple groundglass and nodular opacities with a peripheral and lower lobe distribution could be secondary to infection (? Either septic emboli or fungal infection rather than a community acquired pneumonia) rather than malignancy.

## 2018-03-03 NOTE — PROGRESS NOTE ADULT - ASSESSMENT
57M with HTN and ETOH abuse admitted 2/19/18 with R foot/leg infection.  Course complicated by MSSA bacteremia, MRI foot with OM/abscess, R leg DVT and CTA suggestive of possible septic pulmonary emboli though TTE negative.  Now post-op debridement of the R foot by podiatry today for source control - PA in addition to MSSA    Recommend:  -change cefazolin to zosyn 3.375 q8 over 4H - 6 weeks from negative cultures  -no need for clindamycin - worried about diarrhea/Cdiff  -f/u repeat BC   -KELVIN r/o abscess  -PICC once BC clear    I have discussed plan of care with consulting team (NP on 6T)    Please call the ID service [phone] with questions or concerns over the weekend

## 2018-03-03 NOTE — PROVIDER CONTACT NOTE (CRITICAL VALUE NOTIFICATION) - NAME OF MD/NP/PA/DO NOTIFIED:
TAURUS Dupont
TAURUS Dupont
Bhavna Sethi NP
Bhavna Sethi NP
DAGOBERTO Crawford
DAGOBERTO Crawford
DAGOBERTO Silvestre
DAGOBERTO- Antonette #[id number]
PA Romie
TAURUS Hoyt

## 2018-03-03 NOTE — PROGRESS NOTE ADULT - ASSESSMENT
POD #2  - Surgical sites appear to be stable with viable tissues visible  - Satisfactory evacuation of residual abscess and debridement of necrotic tissue (muscle and fascia) performed in OR (medial plantar and posterior ankle)  - Wound ready for Irrigation VAC Veraflo  - F/u OR data  - IV abx per ID  - Pain control  - Resume AC  -Discussed tx plan w patient and family. Will need additional staging of wound.  Will consider umbilical grafting if wound responds well to VAc and remains viable.  MRI is positive for diffuse marrow edema of metatarsal and tarsal bones.   No exposed bone currently and empiric therapy to be exhaused unless bony exposure or necrosis presents.

## 2018-03-04 LAB
ALBUMIN SERPL ELPH-MCNC: 2 G/DL — LOW (ref 3.3–5)
ALP SERPL-CCNC: 70 U/L — SIGNIFICANT CHANGE UP (ref 40–120)
ALT FLD-CCNC: 73 U/L RC — HIGH (ref 10–45)
ANION GAP SERPL CALC-SCNC: 8 MMOL/L — SIGNIFICANT CHANGE UP (ref 5–17)
AST SERPL-CCNC: 77 U/L — HIGH (ref 10–40)
BASOPHILS # BLD AUTO: 0.03 K/UL — SIGNIFICANT CHANGE UP (ref 0–0.2)
BASOPHILS NFR BLD AUTO: 0.3 % — SIGNIFICANT CHANGE UP (ref 0–2)
BILIRUB SERPL-MCNC: 0.4 MG/DL — SIGNIFICANT CHANGE UP (ref 0.2–1.2)
BUN SERPL-MCNC: 12 MG/DL — SIGNIFICANT CHANGE UP (ref 7–23)
CALCIUM SERPL-MCNC: 8.5 MG/DL — SIGNIFICANT CHANGE UP (ref 8.4–10.5)
CHLORIDE SERPL-SCNC: 98 MMOL/L — SIGNIFICANT CHANGE UP (ref 96–108)
CO2 SERPL-SCNC: 29 MMOL/L — SIGNIFICANT CHANGE UP (ref 22–31)
CREAT SERPL-MCNC: 0.78 MG/DL — SIGNIFICANT CHANGE UP (ref 0.5–1.3)
CULTURE RESULTS: NO GROWTH — SIGNIFICANT CHANGE UP
EOSINOPHIL # BLD AUTO: 0.04 K/UL — SIGNIFICANT CHANGE UP (ref 0–0.5)
EOSINOPHIL NFR BLD AUTO: 0.5 % — SIGNIFICANT CHANGE UP (ref 0–6)
GLUCOSE SERPL-MCNC: 107 MG/DL — HIGH (ref 70–99)
HCT VFR BLD CALC: 35.7 % — LOW (ref 39–50)
HGB BLD-MCNC: 11.5 G/DL — LOW (ref 13–17)
IMM GRANULOCYTES NFR BLD AUTO: 0.6 % — SIGNIFICANT CHANGE UP (ref 0–1.5)
LYMPHOCYTES # BLD AUTO: 1.38 K/UL — SIGNIFICANT CHANGE UP (ref 1–3.3)
LYMPHOCYTES # BLD AUTO: 16.1 % — SIGNIFICANT CHANGE UP (ref 13–44)
MAGNESIUM SERPL-MCNC: 2.3 MG/DL — SIGNIFICANT CHANGE UP (ref 1.6–2.6)
MCHC RBC-ENTMCNC: 32.1 PG — SIGNIFICANT CHANGE UP (ref 27–34)
MCHC RBC-ENTMCNC: 32.2 GM/DL — SIGNIFICANT CHANGE UP (ref 32–36)
MCV RBC AUTO: 99.7 FL — SIGNIFICANT CHANGE UP (ref 80–100)
MONOCYTES # BLD AUTO: 0.55 K/UL — SIGNIFICANT CHANGE UP (ref 0–0.9)
MONOCYTES NFR BLD AUTO: 6.4 % — SIGNIFICANT CHANGE UP (ref 2–14)
NEUTROPHILS # BLD AUTO: 6.54 K/UL — SIGNIFICANT CHANGE UP (ref 1.8–7.4)
NEUTROPHILS NFR BLD AUTO: 76.1 % — SIGNIFICANT CHANGE UP (ref 43–77)
PHOSPHATE SERPL-MCNC: 2.7 MG/DL — SIGNIFICANT CHANGE UP (ref 2.5–4.5)
PLATELET # BLD AUTO: 557 K/UL — HIGH (ref 150–400)
POTASSIUM SERPL-MCNC: 4.3 MMOL/L — SIGNIFICANT CHANGE UP (ref 3.5–5.3)
POTASSIUM SERPL-SCNC: 4.3 MMOL/L — SIGNIFICANT CHANGE UP (ref 3.5–5.3)
PROT SERPL-MCNC: 7.4 G/DL — SIGNIFICANT CHANGE UP (ref 6–8.3)
RBC # BLD: 3.58 M/UL — LOW (ref 4.2–5.8)
RBC # FLD: 13.8 % — SIGNIFICANT CHANGE UP (ref 10.3–14.5)
SODIUM SERPL-SCNC: 135 MMOL/L — SIGNIFICANT CHANGE UP (ref 135–145)
SPECIMEN SOURCE: SIGNIFICANT CHANGE UP
WBC # BLD: 8.59 K/UL — SIGNIFICANT CHANGE UP (ref 3.8–10.5)
WBC # FLD AUTO: 8.59 K/UL — SIGNIFICANT CHANGE UP (ref 3.8–10.5)

## 2018-03-04 RX ADMIN — HEPARIN SODIUM 5000 UNIT(S): 5000 INJECTION INTRAVENOUS; SUBCUTANEOUS at 05:06

## 2018-03-04 RX ADMIN — Medication 3 MILLILITER(S): at 05:05

## 2018-03-04 RX ADMIN — HYDROMORPHONE HYDROCHLORIDE 30 MILLILITER(S): 2 INJECTION INTRAMUSCULAR; INTRAVENOUS; SUBCUTANEOUS at 23:11

## 2018-03-04 RX ADMIN — IMIPENEM AND CILASTATIN 250 MILLIGRAM(S): 250; 250 INJECTION, POWDER, FOR SOLUTION INTRAVENOUS at 15:19

## 2018-03-04 RX ADMIN — Medication 1 TABLET(S): at 11:23

## 2018-03-04 RX ADMIN — HYDROMORPHONE HYDROCHLORIDE 0.5 MILLIGRAM(S): 2 INJECTION INTRAMUSCULAR; INTRAVENOUS; SUBCUTANEOUS at 05:18

## 2018-03-04 RX ADMIN — Medication 1 TABLET(S): at 18:04

## 2018-03-04 RX ADMIN — Medication 1 TABLET(S): at 11:22

## 2018-03-04 RX ADMIN — Medication 100 MILLIGRAM(S): at 11:22

## 2018-03-04 RX ADMIN — HEPARIN SODIUM 5000 UNIT(S): 5000 INJECTION INTRAVENOUS; SUBCUTANEOUS at 15:18

## 2018-03-04 RX ADMIN — LOSARTAN POTASSIUM 50 MILLIGRAM(S): 100 TABLET, FILM COATED ORAL at 05:06

## 2018-03-04 RX ADMIN — HEPARIN SODIUM 5000 UNIT(S): 5000 INJECTION INTRAVENOUS; SUBCUTANEOUS at 21:48

## 2018-03-04 RX ADMIN — Medication 1 TABLET(S): at 07:49

## 2018-03-04 RX ADMIN — Medication 3 MILLILITER(S): at 18:04

## 2018-03-04 RX ADMIN — HYDROMORPHONE HYDROCHLORIDE 30 MILLILITER(S): 2 INJECTION INTRAMUSCULAR; INTRAVENOUS; SUBCUTANEOUS at 07:16

## 2018-03-04 RX ADMIN — Medication 3 MILLILITER(S): at 11:22

## 2018-03-04 RX ADMIN — Medication 3 MILLILITER(S): at 23:05

## 2018-03-04 RX ADMIN — IMIPENEM AND CILASTATIN 250 MILLIGRAM(S): 250; 250 INJECTION, POWDER, FOR SOLUTION INTRAVENOUS at 21:48

## 2018-03-04 RX ADMIN — HYDROMORPHONE HYDROCHLORIDE 30 MILLILITER(S): 2 INJECTION INTRAMUSCULAR; INTRAVENOUS; SUBCUTANEOUS at 19:32

## 2018-03-04 RX ADMIN — IMIPENEM AND CILASTATIN 250 MILLIGRAM(S): 250; 250 INJECTION, POWDER, FOR SOLUTION INTRAVENOUS at 05:05

## 2018-03-04 RX ADMIN — Medication 1 MILLIGRAM(S): at 11:22

## 2018-03-04 NOTE — PROGRESS NOTE ADULT - ASSESSMENT
Recent ankle sprain which got infected resulting in MSSA sepsis/cellulitis with lung embolization s/p OR debridement X 2 now with pseudomonas in OR culture    OM right foot/necrotizing fasciitis     hemoptysis - minimal - possibly secondary to being on hep drip with pulm septic emboli    ADA - resolved    Transaminitis     Coagulopathy on admission - most likley due to dietary deficiency, holding off on any supplementation for now    Right peroneal vein dvt - on heparin prophy dose per heme    Pulm artery enlargement    Morbid obesity    SHIELA continue nocturnal bipap    PAT    continue current care  continue PCA for now  continue guido for now  will re-evaluate with podiatry when vac is taken off as to whether or not pt will need to rtor.  f/u repeat blood culture  continue abx

## 2018-03-04 NOTE — PROGRESS NOTE ADULT - SUBJECTIVE AND OBJECTIVE BOX
Patient is a 57y old  Male who presents with a chief complaint of Chest pain, confusion (22 Feb 2018 03:00)       INTERVAL HPI/OVERNIGHT EVENTS:  Patient seen and evaluated at bedside.  Pt is resting comfortable in NAD. Denies N/V/F/C.    Allergies    No Known Allergies    Intolerances        Vital Signs Last 24 Hrs  T(C): 37.1 (04 Mar 2018 11:00), Max: 38 (03 Mar 2018 20:14)  T(F): 98.8 (04 Mar 2018 11:00), Max: 100.4 (03 Mar 2018 20:14)  HR: 88 (04 Mar 2018 11:00) (76 - 100)  BP: 107/68 (04 Mar 2018 11:00) (107/68 - 136/72)  BP(mean): --  RR: 18 (04 Mar 2018 11:00) (18 - 18)  SpO2: 95% (04 Mar 2018 11:00) (93% - 99%)    LABS:                        11.5   8.59  )-----------( 557      ( 04 Mar 2018 08:17 )             35.7     03-04    135  |  98  |  12  ----------------------------<  107<H>  4.3   |  29  |  0.78    Ca    8.5      04 Mar 2018 06:55  Phos  2.7     03-04  Mg     2.3     03-04    TPro  7.4  /  Alb  2.0<L>  /  TBili  0.4  /  DBili  x   /  AST  77<H>  /  ALT  73<H>  /  AlkPhos  70  03-04      Urinalysis Basic - ( 03 Mar 2018 08:11 )    Color: x / Appearance: x / SG: x / pH: x  Gluc: x / Ketone: x  / Bili: x / Urobili: x   Blood: x / Protein: x / Nitrite: x   Leuk Esterase: x / RBC: 16 /HPF / WBC 3 /HPF   Sq Epi: x / Non Sq Epi: 0 /HPF / Bacteria: Negative      CAPILLARY BLOOD GLUCOSE          Lower Extremity Physical Exam:  VAC intact      RADIOLOGY & ADDITIONAL TESTS:

## 2018-03-04 NOTE — PROGRESS NOTE ADULT - SUBJECTIVE AND OBJECTIVE BOX
Day _3__ of Anesthesia Pain Management Service    SUBJECTIVE:    Pain Scale Score	At rest: ___ 	With Activity: ___ 	[x ] Refer to charted pain scores    THERAPY:    [ ] IV PCA Morphine		[ ] 5 mg/mL	[ ] 1 mg/mL  [x ] IV PCA Hydromorphone	[ ] 5 mg/mL	[x ] 1 mg/mL  [ ] IV PCA Fentanyl		[ ] 50 micrograms/mL    Demand dose 0.2    lockout 6   (minutes) Continuous Rate 0      MEDICATIONS  (STANDING):  ALBUTerol/ipratropium for Nebulization 3 milliLiter(s) Nebulizer every 6 hours  folic acid 1 milliGRAM(s) Oral daily  heparin  Injectable 5000 Unit(s) SubCutaneous every 8 hours  HYDROmorphone PCA (1 mG/mL) 30 milliLiter(s) PCA Continuous PCA Continuous  imipenem/cilastatin  IVPB      imipenem/cilastatin  IVPB 1000 milliGRAM(s) IV Intermittent every 8 hours  lactobacillus acidophilus 1 Tablet(s) Oral three times a day with meals  lidocaine   Patch 1 Patch Transdermal daily  losartan 50 milliGRAM(s) Oral daily  multivitamin 1 Tablet(s) Oral daily  sodium chloride 0.9%. 1000 milliLiter(s) (10 mL/Hr) IV Continuous <Continuous>  thiamine 100 milliGRAM(s) Oral daily    MEDICATIONS  (PRN):  HYDROmorphone PCA (1 mG/mL) Rescue Clinician Bolus 0.5 milliGRAM(s) IV Push every 15 minutes PRN for Pain Scale GREATER THAN 6  naloxone Injectable 0.1 milliGRAM(s) IV Push every 3 minutes PRN For ANY of the following changes in patient status:  A. RR LESS THAN 10 breaths per minute, B. Oxygen saturation LESS THAN 90%, C. Sedation score of 6      OBJECTIVE:    Sedation Score:	[x ] Alert	[ ] Drowsy 	[ ] Arousable	[ ] Asleep	[ ] Unresponsive    Side Effects:	[x ] None	[ ] Nausea	[ ] Vomiting	[ ] Pruritus  		[ ] Other:    Vital Signs Last 24 Hrs  T(C): 36.8 (04 Mar 2018 07:04), Max: 38 (03 Mar 2018 20:14)  T(F): 98.3 (04 Mar 2018 07:04), Max: 100.4 (03 Mar 2018 20:14)  HR: 92 (04 Mar 2018 07:04) (76 - 100)  BP: 129/81 (04 Mar 2018 07:04) (110/64 - 136/72)  BP(mean): --  RR: 18 (04 Mar 2018 07:04) (18 - 18)  SpO2: 96% (04 Mar 2018 07:04) (93% - 98%)    ASSESSMENT/ PLAN    Therapy to  be:	[x ] Continue   [ ] Discontinued   [ ] Change to prn Analgesics    Documentation and Verification of current medications:   [X] Done	[ ] Not done, not eligible    Comments:

## 2018-03-04 NOTE — PROGRESS NOTE ADULT - SUBJECTIVE AND OBJECTIVE BOX
MEDICINE, PROGRESS NOTE [phone]    JUWAN DOMÍNGUEZ 57y [id number]    Patient seen and examined.  Patient is a 57y old  Male who presents with a chief complaint of Chest pain, confusion (22 Feb 2018 03:00)  Pt has no current complaints. Pain is controlled.     PAST MEDICAL & SURGICAL HISTORY:  Sciatica of right side  Essential hypertension  No significant past surgical history    MEDICATIONS  (STANDING):  ALBUTerol/ipratropium for Nebulization 3 milliLiter(s) Nebulizer every 6 hours  folic acid 1 milliGRAM(s) Oral daily  heparin  Injectable 5000 Unit(s) SubCutaneous every 8 hours  HYDROmorphone PCA (1 mG/mL) 30 milliLiter(s) PCA Continuous PCA Continuous  imipenem/cilastatin  IVPB      imipenem/cilastatin  IVPB 1000 milliGRAM(s) IV Intermittent every 8 hours  lactobacillus acidophilus 1 Tablet(s) Oral three times a day with meals  lidocaine   Patch 1 Patch Transdermal daily  losartan 50 milliGRAM(s) Oral daily  multivitamin 1 Tablet(s) Oral daily  sodium chloride 0.9%. 1000 milliLiter(s) (10 mL/Hr) IV Continuous <Continuous>  thiamine 100 milliGRAM(s) Oral daily    MEDICATIONS  (PRN):  HYDROmorphone PCA (1 mG/mL) Rescue Clinician Bolus 0.5 milliGRAM(s) IV Push every 15 minutes PRN for Pain Scale GREATER THAN 6  naloxone Injectable 0.1 milliGRAM(s) IV Push every 3 minutes PRN For ANY of the following changes in patient status:  A. RR LESS THAN 10 breaths per minute, B. Oxygen saturation LESS THAN 90%, C. Sedation score of 6    Allergies    No Known Allergies    Intolerances        PHYSICAL EXAM:  Constitutional: NAD  HEENT: Normocephalic, EOMI  Neck:  No JVD  Respiratory: CTA B/L, No wheezes  Cardiovascular: S1, S2, RRR, + systolic murmur  Gastrointestinal: BS+, soft, NT/ND  Extremities: dec peripheral edema right le, irrigation vac in place and intact  Neurological: AAOX3, no focal deficits  Psychiatric: Normal mood, normal affect  : + Sosa    Vital Signs Last 24 Hrs  T(C): 37.4 (04 Mar 2018 14:59), Max: 38 (03 Mar 2018 20:14)  T(F): 99.4 (04 Mar 2018 14:59), Max: 100.4 (03 Mar 2018 20:14)  HR: 89 (04 Mar 2018 14:59) (76 - 97)  BP: 111/69 (04 Mar 2018 14:59) (107/68 - 129/81)  BP(mean): --  RR: 18 (04 Mar 2018 14:59) (18 - 18)  SpO2: 94% (04 Mar 2018 14:59) (93% - 99%)  I&O's Summary    03 Mar 2018 07:01  -  04 Mar 2018 07:00  --------------------------------------------------------  IN: 1460 mL / OUT: 2800 mL / NET: -1340 mL    04 Mar 2018 07:01  -  04 Mar 2018 18:18  --------------------------------------------------------  IN: 0 mL / OUT: 4600 mL / NET: -4600 mL        LABS:                        11.5   8.59  )-----------( 557      ( 04 Mar 2018 08:17 )             35.7     03-04    135  |  98  |  12  ----------------------------<  107<H>  4.3   |  29  |  0.78    Ca    8.5      04 Mar 2018 06:55  Phos  2.7     03-04  Mg     2.3     03-04    TPro  7.4  /  Alb  2.0<L>  /  TBili  0.4  /  DBili  x   /  AST  77<H>  /  ALT  73<H>  /  AlkPhos  70  03-04        Magnesium, Serum: 2.3 mg/dL (03-04 @ 06:55)    Urinalysis Basic - ( 03 Mar 2018 08:11 )    Color: x / Appearance: x / SG: x / pH: x  Gluc: x / Ketone: x  / Bili: x / Urobili: x   Blood: x / Protein: x / Nitrite: x   Leuk Esterase: x / RBC: 16 /HPF / WBC 3 /HPF   Sq Epi: x / Non Sq Epi: 0 /HPF / Bacteria: Negative

## 2018-03-04 NOTE — PROGRESS NOTE ADULT - ASSESSMENT
POD #3  - VAC intact  - F/u OR data  - IV abx per ID  - Pain control  - Resume AC  -Discussed tx plan w patient and family. Will need additional staging of wound.  Will consider umbilical grafting if wound responds well to VAc and remains viable.  MRI is positive for diffuse marrow edema of metatarsal and tarsal bones.   No exposed bone currently and empiric therapy to be exhaused unless bony exposure or necrosis presents.

## 2018-03-05 LAB
ALBUMIN SERPL ELPH-MCNC: 2.5 G/DL — LOW (ref 3.3–5)
ALP SERPL-CCNC: 73 U/L — SIGNIFICANT CHANGE UP (ref 40–120)
ALT FLD-CCNC: 102 U/L RC — HIGH (ref 10–45)
ANION GAP SERPL CALC-SCNC: 10 MMOL/L — SIGNIFICANT CHANGE UP (ref 5–17)
AST SERPL-CCNC: 94 U/L — HIGH (ref 10–40)
BASOPHILS # BLD AUTO: 0.02 K/UL — SIGNIFICANT CHANGE UP (ref 0–0.2)
BASOPHILS NFR BLD AUTO: 0.3 % — SIGNIFICANT CHANGE UP (ref 0–2)
BILIRUB SERPL-MCNC: 0.4 MG/DL — SIGNIFICANT CHANGE UP (ref 0.2–1.2)
BUN SERPL-MCNC: 12 MG/DL — SIGNIFICANT CHANGE UP (ref 7–23)
CALCIUM SERPL-MCNC: 8.8 MG/DL — SIGNIFICANT CHANGE UP (ref 8.4–10.5)
CHLORIDE SERPL-SCNC: 99 MMOL/L — SIGNIFICANT CHANGE UP (ref 96–108)
CO2 SERPL-SCNC: 26 MMOL/L — SIGNIFICANT CHANGE UP (ref 22–31)
CREAT SERPL-MCNC: 0.73 MG/DL — SIGNIFICANT CHANGE UP (ref 0.5–1.3)
EOSINOPHIL # BLD AUTO: 0.06 K/UL — SIGNIFICANT CHANGE UP (ref 0–0.5)
EOSINOPHIL NFR BLD AUTO: 0.8 % — SIGNIFICANT CHANGE UP (ref 0–6)
GLUCOSE SERPL-MCNC: 103 MG/DL — HIGH (ref 70–99)
HCT VFR BLD CALC: 34 % — LOW (ref 39–50)
HGB BLD-MCNC: 11 G/DL — LOW (ref 13–17)
IMM GRANULOCYTES NFR BLD AUTO: 0.3 % — SIGNIFICANT CHANGE UP (ref 0–1.5)
LYMPHOCYTES # BLD AUTO: 1.28 K/UL — SIGNIFICANT CHANGE UP (ref 1–3.3)
LYMPHOCYTES # BLD AUTO: 17 % — SIGNIFICANT CHANGE UP (ref 13–44)
MAGNESIUM SERPL-MCNC: 2.1 MG/DL — SIGNIFICANT CHANGE UP (ref 1.6–2.6)
MCHC RBC-ENTMCNC: 31.6 PG — SIGNIFICANT CHANGE UP (ref 27–34)
MCHC RBC-ENTMCNC: 32.4 GM/DL — SIGNIFICANT CHANGE UP (ref 32–36)
MCV RBC AUTO: 97.7 FL — SIGNIFICANT CHANGE UP (ref 80–100)
MONOCYTES # BLD AUTO: 0.64 K/UL — SIGNIFICANT CHANGE UP (ref 0–0.9)
MONOCYTES NFR BLD AUTO: 8.5 % — SIGNIFICANT CHANGE UP (ref 2–14)
NEUTROPHILS # BLD AUTO: 5.49 K/UL — SIGNIFICANT CHANGE UP (ref 1.8–7.4)
NEUTROPHILS NFR BLD AUTO: 73.1 % — SIGNIFICANT CHANGE UP (ref 43–77)
PHOSPHATE SERPL-MCNC: 3.1 MG/DL — SIGNIFICANT CHANGE UP (ref 2.5–4.5)
PLATELET # BLD AUTO: 552 K/UL — HIGH (ref 150–400)
POTASSIUM SERPL-MCNC: 4.7 MMOL/L — SIGNIFICANT CHANGE UP (ref 3.5–5.3)
POTASSIUM SERPL-SCNC: 4.7 MMOL/L — SIGNIFICANT CHANGE UP (ref 3.5–5.3)
PROT SERPL-MCNC: 7.5 G/DL — SIGNIFICANT CHANGE UP (ref 6–8.3)
RBC # BLD: 3.48 M/UL — LOW (ref 4.2–5.8)
RBC # FLD: 14.1 % — SIGNIFICANT CHANGE UP (ref 10.3–14.5)
SODIUM SERPL-SCNC: 135 MMOL/L — SIGNIFICANT CHANGE UP (ref 135–145)
WBC # BLD: 7.51 K/UL — SIGNIFICANT CHANGE UP (ref 3.8–10.5)
WBC # FLD AUTO: 7.51 K/UL — SIGNIFICANT CHANGE UP (ref 3.8–10.5)

## 2018-03-05 PROCEDURE — 99232 SBSQ HOSP IP/OBS MODERATE 35: CPT

## 2018-03-05 RX ADMIN — Medication 1 TABLET(S): at 09:56

## 2018-03-05 RX ADMIN — Medication 3 MILLILITER(S): at 18:33

## 2018-03-05 RX ADMIN — Medication 1 TABLET(S): at 11:19

## 2018-03-05 RX ADMIN — IMIPENEM AND CILASTATIN 250 MILLIGRAM(S): 250; 250 INJECTION, POWDER, FOR SOLUTION INTRAVENOUS at 22:59

## 2018-03-05 RX ADMIN — Medication 3 MILLILITER(S): at 11:16

## 2018-03-05 RX ADMIN — Medication 1 TABLET(S): at 18:33

## 2018-03-05 RX ADMIN — IMIPENEM AND CILASTATIN 250 MILLIGRAM(S): 250; 250 INJECTION, POWDER, FOR SOLUTION INTRAVENOUS at 14:36

## 2018-03-05 RX ADMIN — HEPARIN SODIUM 5000 UNIT(S): 5000 INJECTION INTRAVENOUS; SUBCUTANEOUS at 14:35

## 2018-03-05 RX ADMIN — HYDROMORPHONE HYDROCHLORIDE 0.5 MILLIGRAM(S): 2 INJECTION INTRAMUSCULAR; INTRAVENOUS; SUBCUTANEOUS at 08:56

## 2018-03-05 RX ADMIN — HEPARIN SODIUM 5000 UNIT(S): 5000 INJECTION INTRAVENOUS; SUBCUTANEOUS at 05:30

## 2018-03-05 RX ADMIN — HYDROMORPHONE HYDROCHLORIDE 30 MILLILITER(S): 2 INJECTION INTRAMUSCULAR; INTRAVENOUS; SUBCUTANEOUS at 19:25

## 2018-03-05 RX ADMIN — IMIPENEM AND CILASTATIN 250 MILLIGRAM(S): 250; 250 INJECTION, POWDER, FOR SOLUTION INTRAVENOUS at 05:30

## 2018-03-05 RX ADMIN — Medication 1 TABLET(S): at 11:16

## 2018-03-05 RX ADMIN — HYDROMORPHONE HYDROCHLORIDE 30 MILLILITER(S): 2 INJECTION INTRAMUSCULAR; INTRAVENOUS; SUBCUTANEOUS at 07:32

## 2018-03-05 RX ADMIN — LOSARTAN POTASSIUM 50 MILLIGRAM(S): 100 TABLET, FILM COATED ORAL at 05:31

## 2018-03-05 RX ADMIN — Medication 1 MILLIGRAM(S): at 11:17

## 2018-03-05 RX ADMIN — HEPARIN SODIUM 5000 UNIT(S): 5000 INJECTION INTRAVENOUS; SUBCUTANEOUS at 22:59

## 2018-03-05 RX ADMIN — Medication 3 MILLILITER(S): at 05:31

## 2018-03-05 RX ADMIN — Medication 100 MILLIGRAM(S): at 11:16

## 2018-03-05 RX ADMIN — Medication 3 MILLILITER(S): at 22:59

## 2018-03-05 NOTE — PROGRESS NOTE ADULT - SUBJECTIVE AND OBJECTIVE BOX
Chief Complaint: fu    History of Present Illness: feels ok, +R foot pain controlled with PCA; no f/c, no dyspnea, no cough, no n/ v/abd pain, tolerating PO, no bleeding,       MEDICATIONS  (STANDING):  ALBUTerol/ipratropium for Nebulization 3 milliLiter(s) Nebulizer every 6 hours  folic acid 1 milliGRAM(s) Oral daily  heparin  Injectable 5000 Unit(s) SubCutaneous every 8 hours  HYDROmorphone PCA (1 mG/mL) 30 milliLiter(s) PCA Continuous PCA Continuous  imipenem/cilastatin  IVPB      imipenem/cilastatin  IVPB 1000 milliGRAM(s) IV Intermittent every 8 hours  lactobacillus acidophilus 1 Tablet(s) Oral three times a day with meals  lidocaine   Patch 1 Patch Transdermal daily  losartan 50 milliGRAM(s) Oral daily  multivitamin 1 Tablet(s) Oral daily  sodium chloride 0.9%. 1000 milliLiter(s) (10 mL/Hr) IV Continuous <Continuous>  thiamine 100 milliGRAM(s) Oral daily    MEDICATIONS  (PRN):  HYDROmorphone PCA (1 mG/mL) Rescue Clinician Bolus 0.5 milliGRAM(s) IV Push every 15 minutes PRN for Pain Scale GREATER THAN 6  naloxone Injectable 0.1 milliGRAM(s) IV Push every 3 minutes PRN For ANY of the following changes in patient status:  A. RR LESS THAN 10 breaths per minute, B. Oxygen saturation LESS THAN 90%, C. Sedation score of 6      Allergies    No Known Allergies    Intolerances        Vital Signs Last 24 Hrs  T(C): 36.7 (05 Mar 2018 12:11), Max: 37.4 (04 Mar 2018 14:59)  T(F): 98 (05 Mar 2018 12:11), Max: 99.4 (04 Mar 2018 14:59)  HR: 85 (05 Mar 2018 12:11) (80 - 96)  BP: 120/71 (05 Mar 2018 12:11) (110/72 - 133/86)  BP(mean): --  RR: 18 (05 Mar 2018 12:11) (18 - 18)  SpO2: 98% (05 Mar 2018 12:11) (94% - 98%)    PHYSICAL EXAM  General: adult in NAD  HEENT: clear oropharynx, anicteric sclera, pink conjunctiva  Neck: supple  CV: normal S1/S2 with no murmur rubs or gallops  Lungs: clear to auscultation, no wheezes, no rales  Abdomen: soft non-tender non-distended obese, positive bowel sounds  Ext: R foot bandaged/wound vac    LABS:                          11.0   7.51  )-----------( 552      ( 05 Mar 2018 06:39 )             34.0         Mean Cell Volume : 97.7 fl  Mean Cell Hemoglobin : 31.6 pg  Mean Cell Hemoglobin Concentration : 32.4 gm/dL  Auto Neutrophil # : 5.49 K/uL  Auto Lymphocyte # : 1.28 K/uL  Auto Monocyte # : 0.64 K/uL  Auto Eosinophil # : 0.06 K/uL  Auto Basophil # : 0.02 K/uL  Auto Neutrophil % : 73.1 %  Auto Lymphocyte % : 17.0 %  Auto Monocyte % : 8.5 %  Auto Eosinophil % : 0.8 %  Auto Basophil % : 0.3 %      Serial CBC's  03-05 @ 06:39  Hct-34.0 / Hgb-11.0 / Plat-552 / RBC-3.48 / WBC-7.51  Serial CBC's  03-04 @ 08:17  Hct-35.7 / Hgb-11.5 / Plat-557 / RBC-3.58 / WBC-8.59  Serial CBC's  03-03 @ 07:55  Hct-36.2 / Hgb-11.4 / Plat-520 / RBC-3.65 / WBC-12.01  Serial CBC's  03-03 @ 02:31  Hct-33.1 / Hgb-10.6 / Plat-528 / RBC-3.26 / WBC-12.7  Serial CBC's  03-02 @ 07:18  Hct-35.1 / Hgb-11.3 / Plat-499 / RBC-3.44 / WBC-18.95      03-05    135  |  99  |  12  ----------------------------<  103<H>  4.7   |  26  |  0.73    Ca    8.8      05 Mar 2018 06:10  Phos  3.1     03-05  Mg     2.1     03-05    TPro  7.5  /  Alb  2.5<L>  /  TBili  0.4  /  DBili  x   /  AST  94<H>  /  ALT  102<H>  /  AlkPhos  73  03-05                      Radiology:

## 2018-03-05 NOTE — PROGRESS NOTE ADULT - ASSESSMENT
57M with obesity, ETOH, SHIELA w/ R ankle sprain, developed MSSA cellulitis/ bacteremia, pulmonary septic emboli, R distal DVT now s/p OR, with mild coagulopathy    #coagulopathy- suspect secondary to vitamin K deficiency/ decreased PO intake over past week with acute illness; Factor VII level sl low  - PT mixing study corrected confirming factor deficiency, no evidence of inhibitor- consistent with vitamin K deficiency  - INR sl elevated; no increased bleeding; no prior history of bleeding issues  - will continue to hold on FFP/vitamin K  - coags improved 3/1; will recheck in AM    #R peroneal DVT- no prior history of VTE; provoked with recent injury and decreased mobility  - repeat US 2/27 with no evidence of propagation  - w/ septic emboli; was on heparin gtt, now off with +FOB  - continue DVT prophylaxis  - repeat Duplex to re-evaluate for propagation    # R ankle sprain/wound- now s/p OR- s/p return to OR, deep debridement  - pain control; wound care; s/p wound vac  - per Podiatry    #ID- MSSA bacteremia, KELVIN negative for vegetation; on Abx per ID    d/w NP

## 2018-03-05 NOTE — PROGRESS NOTE ADULT - SUBJECTIVE AND OBJECTIVE BOX
Patient is a 57y old  Male who presents with a chief complaint of Chest pain, confusion (22 Feb 2018 03:00)       INTERVAL HPI/OVERNIGHT EVENTS:  Patient seen and evaluated at bedside.  Pt is resting comfortable in NAD. Denies N/V/F/C.  Pain rated at X/10    Allergies    No Known Allergies    Intolerances        Vital Signs Last 24 Hrs  T(C): 36.5 (05 Mar 2018 08:00), Max: 37.4 (04 Mar 2018 14:59)  T(F): 97.7 (05 Mar 2018 08:00), Max: 99.4 (04 Mar 2018 14:59)  HR: 93 (05 Mar 2018 08:00) (77 - 93)  BP: 133/86 (05 Mar 2018 08:00) (107/68 - 133/86)  BP(mean): --  RR: 18 (05 Mar 2018 08:00) (18 - 18)  SpO2: 97% (05 Mar 2018 08:00) (94% - 99%)    LABS:                        11.0   7.51  )-----------( 552      ( 05 Mar 2018 06:39 )             34.0     03-05    135  |  99  |  12  ----------------------------<  103<H>  4.7   |  26  |  0.73    Ca    8.8      05 Mar 2018 06:10  Phos  3.1     03-05  Mg     2.1     03-05    TPro  7.5  /  Alb  2.5<L>  /  TBili  0.4  /  DBili  x   /  AST  94<H>  /  ALT  102<H>  /  AlkPhos  73  03-05        CAPILLARY BLOOD GLUCOSE            Lower Extremity Physical Exam:  right foot dorsolateral and medial foot surgical site stable hanna wound region ischemic border distally,   sanguinous drainage, still demarcating, no additional purulence.   Edema decreased as well as the erythema, no malodor,  CFT to toes good, no signs of digital necrosis.57M   s/p 2nd staged I&D and debridement of right foot due to nec fasc  -Approx 20cm x 10cm x 1.5       RADIOLOGY & ADDITIONAL TESTS:

## 2018-03-05 NOTE — PROGRESS NOTE ADULT - ASSESSMENT
Recent ankle sprain which got infected resulting in MSSA sepsis/cellulitis with lung embolization s/p OR debridement X 2 now with pseudomonas in OR culture    OM right foot/necrotizing fasciitis     hemoptysis - minimal - possibly secondary to being on hep drip with pulm septic emboli    ADA - resolved    Transaminitis     Coagulopathy on admission - most likley due to dietary deficiency, holding off on any supplementation for now    Right peroneal vein dvt - on heparin prophy dose per heme    Pulm artery enlargement    Morbid obesity    SHIELA continue nocturnal bipap    PAT/ AIVR    continue current care  cardio eval  will need repeat KELVIN given extensive duration of bacteremia  if blood cultures remain negative eventual PICC after KELVIN  repeat lower extremity dopplers bilaterally  continue subq heparin  MAY GIVE DILAUDID 2 MG IVP PRIOR TO VAC CHANGE/DEBRIDEMENT  Appreciate heme/ID/Podiatry input

## 2018-03-05 NOTE — PROGRESS NOTE ADULT - ASSESSMENT
57M with HTN and ETOH abuse admitted 2/19/18 with R foot/leg infection.  Course complicated by MSSA bacteremia, MRI foot with OM/abscess, R leg DVT and CTA suggestive of possible septic pulmonary emboli though TTE negative.  Now post-op debridement of the R foot by podiatry today for source control - PA in addition to MSSA.  Blood only with MSSA which has cleared finally    Recommend:  -for now, imipenem is okay  -change back to to cefazolin upon discharge and po ciprofloxacin  -KELVIN r/o abscess  -PICC

## 2018-03-05 NOTE — PROGRESS NOTE ADULT - ASSESSMENT
- VAC change today wound is stable  - F/u OR data  - IV abx per ID  - Pain control  - Resume AC  -Discussed tx plan w patient and family. Will need additional staging of wound.  Will consider umbilical grafting if wound responds well to VAc and remains viable.  MRI is positive for diffuse marrow edema of metatarsal and tarsal bones.   No exposed bone currently and empiric therapy to be exhaused unless bony exposure or necrosis presents.

## 2018-03-05 NOTE — CHART NOTE - NSCHARTNOTEFT_GEN_A_CORE
Notified by RN that patient has 8 beats of AIVR. Tele strip reviewed, patient asymptomatic  KELVIN with no vegetation, likely r/t infectious etiology/electrolyte deletion    Plan  Continue Telemetry monitoring  BMP, mg, phos stat  Supplement electrolytes PRN  Will continue to monitor  Will update with primary team    Jc MEAD BC  Spectra# [phone]

## 2018-03-05 NOTE — PROGRESS NOTE ADULT - SUBJECTIVE AND OBJECTIVE BOX
CC:  Right foot pain    F/U:  mssa bacteremia, RLE infection     Interval History/ROS:   BC finally cleared of MSSA. OR culture numerous MSSA and moderate PA - fortunately sensitive to cipro.  some pain.  VAC placed.  swelling better.  no n/v/d.  Rest of ROS otherwise negative    Allergies  No Known Allergies    ANTIMICROBIALS:    imipenem/cilastatin  IVPB    imipenem/cilastatin  IVPB 1000 every 8 hours    MEDICATIONS  (STANDING):  ALBUTerol/ipratropium for Nebulization 3 every 6 hours  heparin  Injectable 5000 every 8 hours  HYDROmorphone PCA (1 mG/mL) 30 PCA Continuous  losartan 50 daily    Vital Signs Last 24 Hrs  T(F): 97.7 (03-05-18 @ 08:00), Max: 99.4 (03-04-18 @ 14:59)  HR: 96 (03-05-18 @ 09:26)  BP: 123/78 (03-05-18 @ 09:01)  RR: 18 (03-05-18 @ 08:00)  SpO2: 96% (03-05-18 @ 09:26) (94% - 98%)  Wt(kg): --    PHYSICAL EXAM:  General: non-toxic, obese; lying in bed  HEAD/EYES: anicteric  ENT:  supple  Cardiovascular:   S1, S2; no murmur  Respiratory:  clear bilaterally  GI:  soft, non-tender, normal bowel sounds  :  no guido  Musculoskeletal:  R foot - with VAC  Neurologic:  grossly non-focal  Skin:  no rash  Psychiatric:  appropriate affect  Vascular:  no phlebitis                        11.0   7.51  )-----------( 552      ( 05 Mar 2018 06:39 )             34.0 03-05    135  |  99  |  12  ----------------------------<  103  4.7   |  26  |  0.73  Ca    8.8      05 Mar 2018 06:10Phos  3.1     03-05Mg     2.1     03-05  TPro  7.5  /  Alb  2.5  /  TBili  0.4  /  DBili  x   /  AST  94  /  ALT  102  /  AlkPhos  73  03-05    MICROBIOLOGY:  Culture - Blood (03.03.18 @ 05:23)  No growth to date.    Culture - Blood (03.02.18 @ 19:09):   No growth to date.    Culture - Tissue with Gram Stain (03.02.18 @ 00:55)   Moderate Pseudomonas aeruginosa - I-aztreonam; all else sensitive  Numerous Staphylococcus aureus    Culture - Blood in AM (02.27.18 @ 13:42)  Growth in anaerobic bottle: Staphylococcus aureus    Culture - Blood in AM (02.27.18 @ 13:41)  Growth in anaerobic bottle: Staphylococcus aureus Susceptibility to follow.    Culture - Surgical Swab (02.25.18 @ 22:01)    -  Cefazolin: S <=4    Specimen Source: .Surgical Swab RIGHT FOOT PUS  Moderate Staphylococcus aureus    Culture - Abscess with Gram Stain (02.25.18 @ 08:39)    -  Oxacillin: S 0.5    Specimen Source: .Abscess right foot  Few Staphylococcus aureus    Culture - Blood in AM (02.24.18 @ 05:15)  Growth in aerobic and anaerobic bottles: Staphylococcus aureus  See previous culture [id number]    Culture - Blood (02.24.18 @ 00:59)  Growth in aerobic and anaerobic bottles: Staphylococcus aureus  See previous culture [id number]    Culture - Blood (02.22.18 @ 12:56)  Growth in aerobic and anaerobic bottles: Staphylococcus aureus  See previous culture  # [id number]    Culture - Blood (02.22.18 @ 12:56)  Growth in anaerobic bottle:  Specimen Source: .Blood Blood-Peripheral  Staphylococcus aureus    -  Cefazolin: S <=4    RADIOLOGY:  MR Tib/Fib w/wo IV Cont, Right (02.28.18 @ 23:11)   1.  Soft tissue wound over the dorsolateral aspect of the foot. 2 residual abscesses are seen tracking from the site of the wound, one posterior, and one along the plantar surface of the foot.  2.  Diffuse osteomyelitis of the midfoot involving the metatarsals, cuneiforms, cuboid, and navicular.  3.  Myositis of the deep posterior compartment musculature of the leg.  4.  Nonenhancing edema is seen tracking along the superficial and deep fascial layers of the posterior compartment of the leg. Given other findings, necrotizing fasciitis cannot be excluded.    VA Duplex Lower Ext Vein Scan, Right (02.27.18 @ 15:13)  IMPRESSION:  Persistent thrombosis of a single right peroneal vein without evidence of propagation since prior venous ultrasound from 2/22/2018.    CT Lower Extremity w/ IV Cont, Right (02.22.18 @ 21:56)   Impression:  Diffuse cellulitis throughout the right lower leg as outlined above with cutaneous bleb along the dorsal lateral aspect of the foot. There is no subcutaneous air to suggest necrotizing fasciitis.    Ill-defined fluid attenuation within the region of the extensor digitorum brevis musculature and within the region of the abductor hallucis muscle which may be related to muscle strain or myositis. Developing fluid collections are also a consideration in the appropriate clinical context.  No peripherally enhancing fluid collection is noted on the current examination.  Intramuscular fullness and edema within the myofascial planes about the calf musculature likely related to patient's known right peroneal deep vein thrombosis.  No CT evidence of osteomyelitis.  Osteochondral lesion along the medial aspect of the talar dome.    CT Angio Chest w/ IV Cont (02.22.18 @ 00:25)   IMPRESSION:    1.  No main, or lobar pulmonary embolus. Evaluation of the segmental and subsegmental branches is limited secondary to artifact.  2.  Multiple groundglass and nodular opacities with a peripheral and lower lobe distribution could be secondary to infection (? Either septic emboli or fungal infection rather than a community acquired pneumonia) rather than malignancy.

## 2018-03-05 NOTE — PROGRESS NOTE ADULT - SUBJECTIVE AND OBJECTIVE BOX
MEDICINE, PROGRESS NOTE [phone]    JUWAN DOMÍNGUEZ 57y [id number]    Patient seen and examined.  Patient is a 57y old  Male who presents with a chief complaint of Chest pain, confusion (22 Feb 2018 03:00)  Pt c/o pain in foot, which is even worse when Vac is changed and podiatry is debriding.    PAST MEDICAL & SURGICAL HISTORY:  Sciatica of right side  Essential hypertension  No significant past surgical history    MEDICATIONS  (STANDING):  ALBUTerol/ipratropium for Nebulization 3 milliLiter(s) Nebulizer every 6 hours  folic acid 1 milliGRAM(s) Oral daily  heparin  Injectable 5000 Unit(s) SubCutaneous every 8 hours  HYDROmorphone PCA (1 mG/mL) 30 milliLiter(s) PCA Continuous PCA Continuous  imipenem/cilastatin  IVPB      imipenem/cilastatin  IVPB 1000 milliGRAM(s) IV Intermittent every 8 hours  lactobacillus acidophilus 1 Tablet(s) Oral three times a day with meals  lidocaine   Patch 1 Patch Transdermal daily  losartan 50 milliGRAM(s) Oral daily  multivitamin 1 Tablet(s) Oral daily  sodium chloride 0.9%. 1000 milliLiter(s) (10 mL/Hr) IV Continuous <Continuous>  thiamine 100 milliGRAM(s) Oral daily    MEDICATIONS  (PRN):  HYDROmorphone PCA (1 mG/mL) Rescue Clinician Bolus 0.5 milliGRAM(s) IV Push every 15 minutes PRN for Pain Scale GREATER THAN 6  naloxone Injectable 0.1 milliGRAM(s) IV Push every 3 minutes PRN For ANY of the following changes in patient status:  A. RR LESS THAN 10 breaths per minute, B. Oxygen saturation LESS THAN 90%, C. Sedation score of 6    Allergies    No Known Allergies    Intolerances        PHYSICAL EXAM:  Constitutional: NAD  HEENT: Normocephalic, EOMI  Neck:  No JVD  Respiratory: CTA B/L, No wheezes  Cardiovascular: S1, S2, RRR, + systolic murmur  Gastrointestinal: BS+, soft, NT/ND  Extremities: dec peripheral edema right LE, + VAC right foot  Neurological: AAOX3, no focal deficits  Psychiatric: Normal mood, normal affect  : No Sosa    Vital Signs Last 24 Hrs  T(C): 36.5 (05 Mar 2018 16:00), Max: 37.3 (04 Mar 2018 18:59)  T(F): 97.7 (05 Mar 2018 16:00), Max: 99.1 (04 Mar 2018 18:59)  HR: 82 (05 Mar 2018 16:00) (80 - 96)  BP: 126/68 (05 Mar 2018 16:00) (110/72 - 133/86)  BP(mean): --  RR: 18 (05 Mar 2018 16:00) (18 - 18)  SpO2: 98% (05 Mar 2018 16:00) (94% - 98%)  I&O's Summary    04 Mar 2018 07:01  -  05 Mar 2018 07:00  --------------------------------------------------------  IN: 240 mL / OUT: 8300 mL / NET: -8060 mL    05 Mar 2018 07:01  -  05 Mar 2018 17:01  --------------------------------------------------------  IN: 720 mL / OUT: 1800 mL / NET: -1080 mL        LABS:                        11.0   7.51  )-----------( 552      ( 05 Mar 2018 06:39 )             34.0     03-05    135  |  99  |  12  ----------------------------<  103<H>  4.7   |  26  |  0.73    Ca    8.8      05 Mar 2018 06:10  Phos  3.1     03-05  Mg     2.1     03-05    TPro  7.5  /  Alb  2.5<L>  /  TBili  0.4  /  DBili  x   /  AST  94<H>  /  ALT  102<H>  /  AlkPhos  73  03-05        Magnesium, Serum: 2.1 mg/dL (03-05 @ 06:10)

## 2018-03-05 NOTE — PROGRESS NOTE ADULT - SUBJECTIVE AND OBJECTIVE BOX
Day 4 of Anesthesia Pain Management Service    SUBJECTIVE: Patient is doing well with IV PCA    Pain Scale Score:	[X] Refer to charted pain scores    THERAPY:    [ ] IV PCA Morphine		[ ] 5 mg/mL	[ ] 1 mg/mL  [X] IV PCA Hydromorphone	[ ] 5 mg/mL	[X] 1 mg/mL  [ ] IV PCA Fentanyl		[ ] 50 micrograms/mL    Demand dose: 0.2 mg     Lockout: 6 minutes   Continuous Rate: 0 mg/hr  4 Hour Limit: 4 mg    MEDICATIONS  (STANDING):  ALBUTerol/ipratropium for Nebulization 3 milliLiter(s) Nebulizer every 6 hours  folic acid 1 milliGRAM(s) Oral daily  heparin  Injectable 5000 Unit(s) SubCutaneous every 8 hours  HYDROmorphone PCA (1 mG/mL) 30 milliLiter(s) PCA Continuous PCA Continuous  imipenem/cilastatin  IVPB      imipenem/cilastatin  IVPB 1000 milliGRAM(s) IV Intermittent every 8 hours  lactobacillus acidophilus 1 Tablet(s) Oral three times a day with meals  lidocaine   Patch 1 Patch Transdermal daily  losartan 50 milliGRAM(s) Oral daily  multivitamin 1 Tablet(s) Oral daily  sodium chloride 0.9%. 1000 milliLiter(s) (10 mL/Hr) IV Continuous <Continuous>  thiamine 100 milliGRAM(s) Oral daily    MEDICATIONS  (PRN):  HYDROmorphone PCA (1 mG/mL) Rescue Clinician Bolus 0.5 milliGRAM(s) IV Push every 15 minutes PRN for Pain Scale GREATER THAN 6  naloxone Injectable 0.1 milliGRAM(s) IV Push every 3 minutes PRN For ANY of the following changes in patient status:  A. RR LESS THAN 10 breaths per minute, B. Oxygen saturation LESS THAN 90%, C. Sedation score of 6      OBJECTIVE:    Sedation Score:	[ X] Alert	[ ] Drowsy 	[ ] Arousable	[ ] Asleep	[ ] Unresponsive    Side Effects:	[X ] None	[ ] Nausea	[ ] Vomiting	[ ] Pruritus  		[ ] Other:    Vital Signs Last 24 Hrs  T(C): 36.5 (05 Mar 2018 08:00), Max: 37.4 (04 Mar 2018 14:59)  T(F): 97.7 (05 Mar 2018 08:00), Max: 99.4 (04 Mar 2018 14:59)  HR: 96 (05 Mar 2018 09:01) (77 - 96)  BP: 123/78 (05 Mar 2018 09:01) (107/68 - 133/86)  BP(mean): --  RR: 18 (05 Mar 2018 08:00) (18 - 18)  SpO2: 97% (05 Mar 2018 08:00) (94% - 99%)    ASSESSMENT/ PLAN    Therapy to  be:               [X] Continued   [ ] Discontinued   [ ] Changed to PRN Analgesics    Documentation and Verification of current medications:   [X] Done	[ ] Not done, not eligible    Comments:

## 2018-03-06 LAB
ALBUMIN SERPL ELPH-MCNC: 2.4 G/DL — LOW (ref 3.3–5)
ALP SERPL-CCNC: 88 U/L — SIGNIFICANT CHANGE UP (ref 40–120)
ALT FLD-CCNC: 129 U/L RC — HIGH (ref 10–45)
ANION GAP SERPL CALC-SCNC: 13 MMOL/L — SIGNIFICANT CHANGE UP (ref 5–17)
APTT BLD: 31.4 SEC — SIGNIFICANT CHANGE UP (ref 27.5–37.4)
AST SERPL-CCNC: 112 U/L — HIGH (ref 10–40)
BASOPHILS # BLD AUTO: 0.03 K/UL — SIGNIFICANT CHANGE UP (ref 0–0.2)
BASOPHILS NFR BLD AUTO: 0.4 % — SIGNIFICANT CHANGE UP (ref 0–2)
BILIRUB SERPL-MCNC: 0.3 MG/DL — SIGNIFICANT CHANGE UP (ref 0.2–1.2)
BUN SERPL-MCNC: 18 MG/DL — SIGNIFICANT CHANGE UP (ref 7–23)
CALCIUM SERPL-MCNC: 9 MG/DL — SIGNIFICANT CHANGE UP (ref 8.4–10.5)
CHLORIDE SERPL-SCNC: 97 MMOL/L — SIGNIFICANT CHANGE UP (ref 96–108)
CK MB CFR SERPL CALC: 1.2 NG/ML — SIGNIFICANT CHANGE UP (ref 0–6.7)
CK SERPL-CCNC: 30 U/L — SIGNIFICANT CHANGE UP (ref 30–200)
CO2 SERPL-SCNC: 25 MMOL/L — SIGNIFICANT CHANGE UP (ref 22–31)
CREAT SERPL-MCNC: 0.78 MG/DL — SIGNIFICANT CHANGE UP (ref 0.5–1.3)
CULTURE RESULTS: SIGNIFICANT CHANGE UP
EOSINOPHIL # BLD AUTO: 0.07 K/UL — SIGNIFICANT CHANGE UP (ref 0–0.5)
EOSINOPHIL NFR BLD AUTO: 0.8 % — SIGNIFICANT CHANGE UP (ref 0–6)
GLUCOSE SERPL-MCNC: 129 MG/DL — HIGH (ref 70–99)
HCT VFR BLD CALC: 34.2 % — LOW (ref 39–50)
HGB BLD-MCNC: 10.7 G/DL — LOW (ref 13–17)
IMM GRANULOCYTES NFR BLD AUTO: 0.5 % — SIGNIFICANT CHANGE UP (ref 0–1.5)
INR BLD: 1.29 RATIO — HIGH (ref 0.88–1.16)
LYMPHOCYTES # BLD AUTO: 1.47 K/UL — SIGNIFICANT CHANGE UP (ref 1–3.3)
LYMPHOCYTES # BLD AUTO: 17.6 % — SIGNIFICANT CHANGE UP (ref 13–44)
MAGNESIUM SERPL-MCNC: 2 MG/DL — SIGNIFICANT CHANGE UP (ref 1.6–2.6)
MCHC RBC-ENTMCNC: 30.8 PG — SIGNIFICANT CHANGE UP (ref 27–34)
MCHC RBC-ENTMCNC: 31.3 GM/DL — LOW (ref 32–36)
MCV RBC AUTO: 98.6 FL — SIGNIFICANT CHANGE UP (ref 80–100)
MONOCYTES # BLD AUTO: 0.81 K/UL — SIGNIFICANT CHANGE UP (ref 0–0.9)
MONOCYTES NFR BLD AUTO: 9.7 % — SIGNIFICANT CHANGE UP (ref 2–14)
NEUTROPHILS # BLD AUTO: 5.94 K/UL — SIGNIFICANT CHANGE UP (ref 1.8–7.4)
NEUTROPHILS NFR BLD AUTO: 71 % — SIGNIFICANT CHANGE UP (ref 43–77)
ORGANISM # SPEC MICROSCOPIC CNT: SIGNIFICANT CHANGE UP
PHOSPHATE SERPL-MCNC: 4.1 MG/DL — SIGNIFICANT CHANGE UP (ref 2.5–4.5)
PLATELET # BLD AUTO: 478 K/UL — HIGH (ref 150–400)
POTASSIUM SERPL-MCNC: 4.6 MMOL/L — SIGNIFICANT CHANGE UP (ref 3.5–5.3)
POTASSIUM SERPL-SCNC: 4.6 MMOL/L — SIGNIFICANT CHANGE UP (ref 3.5–5.3)
PROT SERPL-MCNC: 7.7 G/DL — SIGNIFICANT CHANGE UP (ref 6–8.3)
PROTHROM AB SERPL-ACNC: 14.6 SEC — HIGH (ref 10–13.1)
RBC # BLD: 3.47 M/UL — LOW (ref 4.2–5.8)
RBC # FLD: 13.8 % — SIGNIFICANT CHANGE UP (ref 10.3–14.5)
SODIUM SERPL-SCNC: 135 MMOL/L — SIGNIFICANT CHANGE UP (ref 135–145)
SPECIMEN SOURCE: SIGNIFICANT CHANGE UP
SURGICAL PATHOLOGY STUDY: SIGNIFICANT CHANGE UP
TROPONIN T SERPL-MCNC: <0.01 NG/ML — SIGNIFICANT CHANGE UP (ref 0–0.06)
WBC # BLD: 8.36 K/UL — SIGNIFICANT CHANGE UP (ref 3.8–10.5)
WBC # FLD AUTO: 8.36 K/UL — SIGNIFICANT CHANGE UP (ref 3.8–10.5)

## 2018-03-06 PROCEDURE — 99232 SBSQ HOSP IP/OBS MODERATE 35: CPT

## 2018-03-06 RX ORDER — OXYCODONE HYDROCHLORIDE 5 MG/1
10 TABLET ORAL
Qty: 0 | Refills: 0 | Status: DISCONTINUED | OUTPATIENT
Start: 2018-03-06 | End: 2018-03-09

## 2018-03-06 RX ORDER — OXYCODONE HYDROCHLORIDE 5 MG/1
5 TABLET ORAL
Qty: 0 | Refills: 0 | Status: DISCONTINUED | OUTPATIENT
Start: 2018-03-06 | End: 2018-03-06

## 2018-03-06 RX ORDER — OXYCODONE HYDROCHLORIDE 5 MG/1
10 TABLET ORAL EVERY 4 HOURS
Qty: 0 | Refills: 0 | Status: DISCONTINUED | OUTPATIENT
Start: 2018-03-06 | End: 2018-03-06

## 2018-03-06 RX ADMIN — HEPARIN SODIUM 5000 UNIT(S): 5000 INJECTION INTRAVENOUS; SUBCUTANEOUS at 05:20

## 2018-03-06 RX ADMIN — Medication 1 MILLIGRAM(S): at 11:12

## 2018-03-06 RX ADMIN — OXYCODONE HYDROCHLORIDE 10 MILLIGRAM(S): 5 TABLET ORAL at 11:21

## 2018-03-06 RX ADMIN — Medication 3 MILLILITER(S): at 11:11

## 2018-03-06 RX ADMIN — OXYCODONE HYDROCHLORIDE 10 MILLIGRAM(S): 5 TABLET ORAL at 12:20

## 2018-03-06 RX ADMIN — OXYCODONE HYDROCHLORIDE 10 MILLIGRAM(S): 5 TABLET ORAL at 18:47

## 2018-03-06 RX ADMIN — Medication 1 TABLET(S): at 11:12

## 2018-03-06 RX ADMIN — HYDROMORPHONE HYDROCHLORIDE 30 MILLILITER(S): 2 INJECTION INTRAMUSCULAR; INTRAVENOUS; SUBCUTANEOUS at 10:56

## 2018-03-06 RX ADMIN — HYDROMORPHONE HYDROCHLORIDE 30 MILLILITER(S): 2 INJECTION INTRAMUSCULAR; INTRAVENOUS; SUBCUTANEOUS at 07:02

## 2018-03-06 RX ADMIN — OXYCODONE HYDROCHLORIDE 10 MILLIGRAM(S): 5 TABLET ORAL at 15:42

## 2018-03-06 RX ADMIN — Medication 3 MILLILITER(S): at 05:20

## 2018-03-06 RX ADMIN — LIDOCAINE 1 PATCH: 4 CREAM TOPICAL at 22:22

## 2018-03-06 RX ADMIN — IMIPENEM AND CILASTATIN 250 MILLIGRAM(S): 250; 250 INJECTION, POWDER, FOR SOLUTION INTRAVENOUS at 05:21

## 2018-03-06 RX ADMIN — LIDOCAINE 1 PATCH: 4 CREAM TOPICAL at 11:11

## 2018-03-06 RX ADMIN — HEPARIN SODIUM 5000 UNIT(S): 5000 INJECTION INTRAVENOUS; SUBCUTANEOUS at 14:53

## 2018-03-06 RX ADMIN — OXYCODONE HYDROCHLORIDE 10 MILLIGRAM(S): 5 TABLET ORAL at 19:36

## 2018-03-06 RX ADMIN — OXYCODONE HYDROCHLORIDE 10 MILLIGRAM(S): 5 TABLET ORAL at 21:48

## 2018-03-06 RX ADMIN — Medication 1 TABLET(S): at 17:31

## 2018-03-06 RX ADMIN — OXYCODONE HYDROCHLORIDE 10 MILLIGRAM(S): 5 TABLET ORAL at 22:16

## 2018-03-06 RX ADMIN — OXYCODONE HYDROCHLORIDE 10 MILLIGRAM(S): 5 TABLET ORAL at 16:40

## 2018-03-06 RX ADMIN — Medication 1 TABLET(S): at 08:11

## 2018-03-06 RX ADMIN — Medication 3 MILLILITER(S): at 17:31

## 2018-03-06 RX ADMIN — LOSARTAN POTASSIUM 50 MILLIGRAM(S): 100 TABLET, FILM COATED ORAL at 05:20

## 2018-03-06 RX ADMIN — HEPARIN SODIUM 5000 UNIT(S): 5000 INJECTION INTRAVENOUS; SUBCUTANEOUS at 21:48

## 2018-03-06 RX ADMIN — Medication 100 MILLIGRAM(S): at 11:12

## 2018-03-06 RX ADMIN — IMIPENEM AND CILASTATIN 250 MILLIGRAM(S): 250; 250 INJECTION, POWDER, FOR SOLUTION INTRAVENOUS at 22:17

## 2018-03-06 RX ADMIN — Medication 3 MILLILITER(S): at 22:17

## 2018-03-06 RX ADMIN — IMIPENEM AND CILASTATIN 250 MILLIGRAM(S): 250; 250 INJECTION, POWDER, FOR SOLUTION INTRAVENOUS at 14:53

## 2018-03-06 NOTE — PROGRESS NOTE ADULT - ASSESSMENT
Recent ankle sprain at work which got infected resulting in MSSA sepsis/cellulitis with lung embolization s/p OR debridement X 2 now with pseudomonas in OR culture    OM right foot/necrotizing fasciitis     hemoptysis - minimal - possibly secondary to being on hep drip with pulm septic emboli    ADA - resolved    Transaminitis     Coagulopathy on admission - most likley due to dietary deficiency, holding off on any supplementation for now    Right peroneal vein dvt - on heparin prophy dose per heme    Pulm artery enlargement    Morbid obesity    SHIELA continue nocturnal bipap    PAT/ AIVR/13 beats wct    Based on interview with pt and wife pt is not an etoh abuser (neg CAGE, never had wd symptoms in his life, never been in rehab, has gone months without drinking with no problems.)    continue current care  appreciate pain/anesthesia input - off pca  dilaudid ivp prior to vac change  appreciate cardio input  will order repeat KELVIN in am if EP agrees  CE X3  d/c guido with tov in am  with 2 sets of negative blood cultures may place picc line per ID    continue acurre

## 2018-03-06 NOTE — PROGRESS NOTE ADULT - SUBJECTIVE AND OBJECTIVE BOX
CC:  Right foot pain    F/U:  mssa bacteremia, RLE infection     Interval History/ROS:   repeat BC negative.  still with R foot pain - slowly getting better.  no n/v/d.  has not walked yet.  no fever/chills.  Rest of ROS otherwise negative    Allergies  No Known Allergies    ANTIMICROBIALS:    imipenem/cilastatin  IVPB 1000 every 8 hours    MEDICATIONS  (STANDING):  ALBUTerol/ipratropium for Nebulization 3 every 6 hours  heparin  Injectable 5000 every 8 hours  losartan 50 daily    Vital Signs Last 24 Hrs  T(F): 98.1 (03-06-18 @ 08:00), Max: 99.2 (03-05-18 @ 20:10)  HR: 90 (03-06-18 @ 08:00)  BP: 113/75 (03-06-18 @ 08:00)  RR: 18 (03-06-18 @ 08:00)  SpO2: 95% (03-06-18 @ 08:00) (92% - 99%)  Wt(kg): --    PHYSICAL EXAM:  General: lying in bed  HEAD/EYES: anicteric  ENT:  supple  Cardiovascular:   S1, S2; no murmur  Respiratory:  clear bilaterally  GI:  soft, non-tender, normal bowel sounds  :  no guido  Musculoskeletal:  R foot - with VAC; no erythema of the RLE. no swelling  Neurologic:  grossly non-focal  Skin:  no rash  Psychiatric:  appropriate affect  Vascular:  no phlebitis R and L PIV                        10.7   8.36  )-----------( 478      ( 06 Mar 2018 06:00 )             34.2 03-06    135  |  97  |  18  ----------------------------<  129  4.6   |  25  |  0.78  Ca    9.0      06 Mar 2018 03:45Phos  4.1     03-06Mg     2.0     03-06  TPro  7.7  /  Alb  2.4  /  TBili  0.3  /  DBili  x   /  AST  112  /  ALT  129  /  AlkPhos  88  03-06    MICROBIOLOGY:  Culture - Blood (03.03.18 @ 05:23)  No growth to date.    Culture - Blood (03.02.18 @ 19:09):   No growth to date.    Culture - Tissue with Gram Stain (03.02.18 @ 00:55)   Moderate Pseudomonas aeruginosa - I-aztreonam; all else sensitive  Numerous Staphylococcus aureus    Culture - Blood in AM (02.27.18 @ 13:42)  Growth in anaerobic bottle: Staphylococcus aureus    Culture - Blood in AM (02.27.18 @ 13:41)  Growth in anaerobic bottle: Staphylococcus aureus Susceptibility to follow.    Culture - Surgical Swab (02.25.18 @ 22:01)    -  Cefazolin: S <=4    Specimen Source: .Surgical Swab RIGHT FOOT PUS  Moderate Staphylococcus aureus    Culture - Abscess with Gram Stain (02.25.18 @ 08:39)    -  Oxacillin: S 0.5    Specimen Source: .Abscess right foot  Few Staphylococcus aureus    Culture - Blood in AM (02.24.18 @ 05:15)  Growth in aerobic and anaerobic bottles: Staphylococcus aureus  See previous culture [id number]    Culture - Blood (02.24.18 @ 00:59)  Growth in aerobic and anaerobic bottles: Staphylococcus aureus  See previous culture [id number]    Culture - Blood (02.22.18 @ 12:56)  Growth in aerobic and anaerobic bottles: Staphylococcus aureus  See previous culture  # [id number]    Culture - Blood (02.22.18 @ 12:56)  Growth in anaerobic bottle:  Specimen Source: .Blood Blood-Peripheral  Staphylococcus aureus    -  Cefazolin: S <=4    RADIOLOGY:  MR Tib/Fib w/wo IV Cont, Right (02.28.18 @ 23:11)   1.  Soft tissue wound over the dorsolateral aspect of the foot. 2 residual abscesses are seen tracking from the site of the wound, one posterior, and one along the plantar surface of the foot.  2.  Diffuse osteomyelitis of the midfoot involving the metatarsals, cuneiforms, cuboid, and navicular.  3.  Myositis of the deep posterior compartment musculature of the leg.  4.  Nonenhancing edema is seen tracking along the superficial and deep fascial layers of the posterior compartment of the leg. Given other findings, necrotizing fasciitis cannot be excluded.    VA Duplex Lower Ext Vein Scan, Right (02.27.18 @ 15:13)  IMPRESSION:  Persistent thrombosis of a single right peroneal vein without evidence of propagation since prior venous ultrasound from 2/22/2018.    CT Lower Extremity w/ IV Cont, Right (02.22.18 @ 21:56)   Impression:  Diffuse cellulitis throughout the right lower leg as outlined above with cutaneous bleb along the dorsal lateral aspect of the foot. There is no subcutaneous air to suggest necrotizing fasciitis.    Ill-defined fluid attenuation within the region of the extensor digitorum brevis musculature and within the region of the abductor hallucis muscle which may be related to muscle strain or myositis. Developing fluid collections are also a consideration in the appropriate clinical context.  No peripherally enhancing fluid collection is noted on the current examination.  Intramuscular fullness and edema within the myofascial planes about the calf musculature likely related to patient's known right peroneal deep vein thrombosis.  No CT evidence of osteomyelitis.  Osteochondral lesion along the medial aspect of the talar dome.    CT Angio Chest w/ IV Cont (02.22.18 @ 00:25)   IMPRESSION:    1.  No main, or lobar pulmonary embolus. Evaluation of the segmental and subsegmental branches is limited secondary to artifact.  2.  Multiple groundglass and nodular opacities with a peripheral and lower lobe distribution could be secondary to infection (? Either septic emboli or fungal infection rather than a community acquired pneumonia) rather than malignancy.

## 2018-03-06 NOTE — PROGRESS NOTE ADULT - SUBJECTIVE AND OBJECTIVE BOX
Chief Complaint: fu     History of Present Illness:  transitioned to oral pain meds, +pain; no f/c, no cp/dyspnea, sl cough, +chest wall pain with cough, no n/v/abd pain, no bleeding      MEDICATIONS  (STANDING):  ALBUTerol/ipratropium for Nebulization 3 milliLiter(s) Nebulizer every 6 hours  folic acid 1 milliGRAM(s) Oral daily  heparin  Injectable 5000 Unit(s) SubCutaneous every 8 hours  imipenem/cilastatin  IVPB      imipenem/cilastatin  IVPB 1000 milliGRAM(s) IV Intermittent every 8 hours  lactobacillus acidophilus 1 Tablet(s) Oral three times a day with meals  lidocaine   Patch 1 Patch Transdermal daily  losartan 50 milliGRAM(s) Oral daily  multivitamin 1 Tablet(s) Oral daily  sodium chloride 0.9%. 1000 milliLiter(s) (10 mL/Hr) IV Continuous <Continuous>  thiamine 100 milliGRAM(s) Oral daily    MEDICATIONS  (PRN):  oxyCODONE    IR 10 milliGRAM(s) Oral every 3 hours PRN Moderate and Severe Pain (4-10)      Allergies    No Known Allergies    Intolerances        Vital Signs Last 24 Hrs  T(C): 36.6 (06 Mar 2018 12:00), Max: 37.3 (05 Mar 2018 20:10)  T(F): 97.9 (06 Mar 2018 12:00), Max: 99.2 (05 Mar 2018 20:10)  HR: 78 (06 Mar 2018 12:00) (78 - 98)  BP: 122/76 (06 Mar 2018 12:00) (111/74 - 126/68)  BP(mean): --  RR: 18 (06 Mar 2018 12:00) (18 - 18)  SpO2: 97% (06 Mar 2018 12:00) (92% - 99%)    PHYSICAL EXAM  General: adult in NAD  HEENT: clear oropharynx, anicteric sclera, pink conjunctiva  Neck: supple  CV: normal S1/S2   Lungs: decreased BS, poor effort  Abdomen: soft non-tender non-distended, obese, positive bowel sounds  Ext: Right foot bandage    LABS:                          10.7   8.36  )-----------( 478      ( 06 Mar 2018 06:00 )             34.2         Mean Cell Volume : 98.6 fl  Mean Cell Hemoglobin : 30.8 pg  Mean Cell Hemoglobin Concentration : 31.3 gm/dL  Auto Neutrophil # : 5.94 K/uL  Auto Lymphocyte # : 1.47 K/uL  Auto Monocyte # : 0.81 K/uL  Auto Eosinophil # : 0.07 K/uL  Auto Basophil # : 0.03 K/uL  Auto Neutrophil % : 71.0 %  Auto Lymphocyte % : 17.6 %  Auto Monocyte % : 9.7 %  Auto Eosinophil % : 0.8 %  Auto Basophil % : 0.4 %      Serial CBC's  03-06 @ 06:00  Hct-34.2 / Hgb-10.7 / Plat-478 / RBC-3.47 / WBC-8.36  Serial CBC's  03-05 @ 06:39  Hct-34.0 / Hgb-11.0 / Plat-552 / RBC-3.48 / WBC-7.51  Serial CBC's  03-04 @ 08:17  Hct-35.7 / Hgb-11.5 / Plat-557 / RBC-3.58 / WBC-8.59  Serial CBC's  03-03 @ 07:55  Hct-36.2 / Hgb-11.4 / Plat-520 / RBC-3.65 / WBC-12.01  Serial CBC's  03-03 @ 02:31  Hct-33.1 / Hgb-10.6 / Plat-528 / RBC-3.26 / WBC-12.7      03-06    135  |  97  |  18  ----------------------------<  129<H>  4.6   |  25  |  0.78    Ca    9.0      06 Mar 2018 03:45  Phos  4.1     03-06  Mg     2.0     03-06    TPro  7.7  /  Alb  2.4<L>  /  TBili  0.3  /  DBili  x   /  AST  112<H>  /  ALT  129<H>  /  AlkPhos  88  03-06      PT/INR - ( 06 Mar 2018 06:00 )   PT: 14.6 sec;   INR: 1.29 ratio         PTT - ( 06 Mar 2018 06:00 )  PTT:31.4 sec

## 2018-03-06 NOTE — CONSULT NOTE ADULT - SUBJECTIVE AND OBJECTIVE BOX
Requesting Physician : Dr. Miranda    Reason for Consultation: CP, sob, NSVT    HISTORY OF PRESENT ILLNESS:  56 yo M with history of HTN, ETOH use who is being seen for NSVT, cp, sob.  The patient was admitted on 02/22/18 with chest pain and confusion.  He had recently injured his right ankle and noticed swelling around the injury site.  He was admitted with clinical concern for necrotizing fascitis and had a prolonged hospital course which included I&D in the or and being diagnosed with RLE DVT with possible septic emboli.  He was placed on IV abx and heparin gtt.  KELVIN was performed showing no endocarditis.  On 03/05/18 the patient had an episode of NSVT.  Cardiology consulted to further evaluate.  The patient denies any symptoms during NSVT.  He denies history of syncope.  Currently no chest pain, sob, orthopnea, le edema, or any other cardiac complaints.           PAST MEDICAL & SURGICAL HISTORY:  Sciatica of right side  Essential hypertension  No significant past surgical history          MEDICATIONS:  MEDICATIONS  (STANDING):  ALBUTerol/ipratropium for Nebulization 3 milliLiter(s) Nebulizer every 6 hours  folic acid 1 milliGRAM(s) Oral daily  heparin  Injectable 5000 Unit(s) SubCutaneous every 8 hours  imipenem/cilastatin  IVPB      imipenem/cilastatin  IVPB 1000 milliGRAM(s) IV Intermittent every 8 hours  lactobacillus acidophilus 1 Tablet(s) Oral three times a day with meals  lidocaine   Patch 1 Patch Transdermal daily  losartan 50 milliGRAM(s) Oral daily  multivitamin 1 Tablet(s) Oral daily  sodium chloride 0.9%. 1000 milliLiter(s) (10 mL/Hr) IV Continuous <Continuous>  thiamine 100 milliGRAM(s) Oral daily      Allergies    No Known Allergies    Intolerances        FAMILY HISTORY:  Family history of cirrhosis of liver (Father): Father  Family history of prostate cancer (Father): Father  Family history of hypertension (Father): Mother    Non-contributary for premature coronary disease or sudden cardiac death    SOCIAL HISTORY:    [ ] Non-smoker  [ ] Smoker  [x ] Alcohol      REVIEW OF SYSTEMS:  [x ]chest pain  [ x ]shortness of breath  [  ]palpitations  [  ]syncope  [ ]near syncope [ ]upper extremity weakness   [ ] lower extremity weakness  [  ]diplopia  [  ]altered mental status   [  ]fevers  [ ]chills [ ]nausea  [ ]vomitting  [  ]dysphagia    [ ]abdominal pain  [ ]melena  [ ]BRBPR    [  ]epistaxis  [  ]rash    [x ]lower extremity edema        [x ] All others negative	  [ ] Unable to obtain    PHYSICAL EXAM:  T(C): 36.6 (03-06-18 @ 12:00), Max: 37.3 (03-05-18 @ 20:10)  HR: 78 (03-06-18 @ 12:00) (78 - 98)  BP: 122/76 (03-06-18 @ 12:00) (111/74 - 126/68)  RR: 18 (03-06-18 @ 12:00) (18 - 18)  SpO2: 97% (03-06-18 @ 12:00) (92% - 99%)  Wt(kg): --  I&O's Summary    05 Mar 2018 07:01  -  06 Mar 2018 07:00  --------------------------------------------------------  IN: 1460 mL / OUT: 5000 mL / NET: -3540 mL    06 Mar 2018 07:01  -  06 Mar 2018 14:29  --------------------------------------------------------  IN: 960 mL / OUT: 1500 mL / NET: -540 mL          HEENT:   Normal oral mucosa, PERRL, EOMI	  Lymphatic: , no edema  Cardiovascular: Normal S1 S2,RRR No JVD, No murmurs ,   Respiratory: Lungs clear to auscultation, normal effort 	  Gastrointestinal:  Soft, Non-tender, + BS	  Skin: No rashes, No ecchymoses, No cyanosis, warm to touch  Psychiatry:  Mood & affect appropriate      TELEMETRY: SR, 13 beats NSVT on 03/05/18	    ECG: < from: 12 Lead ECG (02.21.18 @ 22:23) >  SINUS TACHYCARDIA  POSSIBLE LEFT ATRIAL ENLARGEMENT    < end of copied text >   	  RADIOLOGY:  OTHER:     DIAGNOSTIC TESTING:  [ ] Echocardiogram: < from: Transesophageal Echocardiogram w/o TTE (02.26.18 @ 18:04) >  Conclusions:  1. Normal mitral valve. Minimal mitral regurgitation.  2. Mildly thickened aortic valve with nromal opening. Mild  aortic regurgitation.  3. No left atrial or left atrial appendage thrombus. Normal  left atrial appendage function (velocity>40 cm/s).  4. Normal left ventricular systolic function.  5. Normal right ventricular size and function.  6. Mobile interatrial septum. Agitated saline injection and  color flow doppler demonstrate no evidence of a patent  foramen ovale.  No vegetations seen. If clinical suspicion for endocardits  remains high,consider interval follow-up KELVIN at a later  date.    < end of copied text >    [ ]  Catheterization:  [ ] Stress Test:    	  	  LABS:	 	    CARDIAC MARKERS:                              10.7   8.36  )-----------( 478      ( 06 Mar 2018 06:00 )             34.2     03-06    135  |  97  |  18  ----------------------------<  129<H>  4.6   |  25  |  0.78    Ca    9.0      06 Mar 2018 03:45  Phos  4.1     03-06  Mg     2.0     03-06    TPro  7.7  /  Alb  2.4<L>  /  TBili  0.3  /  DBili  x   /  AST  112<H>  /  ALT  129<H>  /  AlkPhos  88  03-06    proBNP:   Lipid Profile:   HgA1c:   TSH:     ASSESSMENT/PLAN: 	57yMale with etoh abuse, HTN admitted with DVT/septic emboli, necrotizing fascitis who is being seen for NSVT/cp/sob.   -pt. currently cp free  -no clinical heart failure on exam  -recent TTE with normal LV function  -Would REMY with 3 sets CE  -would keep K > 4, Mg > 2  -will consider low dose beta blockers when BP can tolerate  -EPS consult with Dr. Patel  -further workup pending above    Reanna Laureano MD  [facility]  [address], [address]  [place] [address]  office: [phone]  pager: [phone]

## 2018-03-06 NOTE — PROGRESS NOTE ADULT - ASSESSMENT
57M with obesity, ETOH, SHIELA w/ R ankle sprain, developed MSSA cellulitis/ bacteremia, pulmonary septic emboli, R distal DVT now s/p OR, with mild coagulopathy    #coagulopathy- suspect secondary to vitamin K deficiency/ decreased PO intake over past week with acute illness; Factor VII level sl low  - PT mixing study corrected confirming factor deficiency, no evidence of inhibitor- consistent with vitamin K deficiency  - INR sl elevated; no increased bleeding; no prior history of bleeding issues  - will continue to hold on FFP/vitamin K  - coags continue to improve    #R peroneal DVT- no prior history of VTE; provoked with recent injury and decreased mobility  - repeat US 2/27 with no evidence of propagation  - w/ septic emboli; was on heparin gtt, now off with +FOB  - continue DVT prophylaxis  - repeat Duplex to re-evaluate for propagation is pending    # R ankle sprain/wound- now s/p OR- s/p return to OR, deep debridement  - pain control; wound care; s/p wound vac  - per Podiatry, for OR/graft later this week    #ID- MSSA bacteremia, KELVIN negative for vegetation; on Abx per ID    d/w NP

## 2018-03-06 NOTE — CHART NOTE - NSCHARTNOTEFT_GEN_A_CORE
Informed by RN about 13 beats of WC Tachycardia, pt asymptomatic, sleeping. VS: /70, HR 85, RR 18, pulse ox 99%, T 99. Will check electrolytes stat and follow results. Continue monitoring on tele. Will f/u with day team     Paty Munoz NP, #[id number]

## 2018-03-06 NOTE — CHART NOTE - NSCHARTNOTEFT_GEN_A_CORE
Nutrition Follow-up   Chart reviewed, events noted.     Source: Patient [x]    Family [ ]     other [x] Comprehensive review of medical records, RN     Diet :  DASH    No reports of N+V. Last BM was today. Pt with improved appetite and po intake. Good per flow sheets. Pt also receiving food from outside of the hospital.       PO intake:   [x]   %      Source for PO intake [x] Patient [x] chart [x] staff [ ] other     Enteral /Parenteral Nutrition: n/a       Current Weight: Weight (kg): 123.4 (03-01 @ 04:33)- 2/22: 269.1 pounds -> 2/28: 272 pounds-> 3/2: 249.3 pounds (bed)- recommend confirming standing wt as feasible as large fluctuation noted.   % Weight Change    Pertinent Medications: MEDICATIONS  (STANDING):  ALBUTerol/ipratropium for Nebulization 3 milliLiter(s) Nebulizer every 6 hours  folic acid 1 milliGRAM(s) Oral daily  heparin  Injectable 5000 Unit(s) SubCutaneous every 8 hours  imipenem/cilastatin  IVPB      imipenem/cilastatin  IVPB 1000 milliGRAM(s) IV Intermittent every 8 hours  lactobacillus acidophilus 1 Tablet(s) Oral three times a day with meals  lidocaine   Patch 1 Patch Transdermal daily  losartan 50 milliGRAM(s) Oral daily  multivitamin 1 Tablet(s) Oral daily  sodium chloride 0.9%. 1000 milliLiter(s) (10 mL/Hr) IV Continuous <Continuous>  thiamine 100 milliGRAM(s) Oral daily    MEDICATIONS  (PRN):  oxyCODONE    IR 10 milliGRAM(s) Oral every 3 hours PRN Moderate and Severe Pain (4-10)    Pertinent Labs:  03-06 Na135 mmol/L Glu 129 mg/dL<H> K+ 4.6 mmol/L Cr  0.78 mg/dL BUN 18 mg/dL Phos 4.1 mg/dL Alb 2.4 g/dL<L> PAB n/a         Skin: non pitting right foot, right dorsal foot, lateral foot wounds.     Estimated Needs:   [x] no change since previous assessment  [ ] recalculated:       Previous Nutrition Diagnosis:          [x] Malnutrition- severe          Nutrition Diagnosis is [ x] ongoing- being addressed with pt's improving appetite, pt's food preferences and health shakes         New Nutrition Diagnosis: [ x] not applicable     Interventions/ Recommend  1) Continue current diet.  2) Encourage po intake with nutrient dense foods.   3) Provide food preferences as able.   4) Monitor weight, lab values, skin, po intake and GI tolerance.   5) Provide health shakes prn.      Monitoring and Evaluation:     follow up per protocol    RD to remain available for further nutritional interventions as indicated.   Yenni Phillip, MS, RD, CDN #[phone] Nutrition Follow-up   Chart reviewed, events noted.     Source: Patient [x]    Family [ ]     other [x] Comprehensive review of medical records, RN     Diet :  DASH    No reports of N+V. Last BM was today. Pt reports improved appetite and good po intake. Good per flow sheets. Pt also receiving food from outside of the hospital.       PO intake:   [x]   %      Source for PO intake [x] Patient [x] chart [x] staff [ ] other     Enteral /Parenteral Nutrition: n/a       Current Weight: Weight (kg): 123.4 (03-01 @ 04:33)- 2/22: 269.1 pounds -> 2/28: 272 pounds-> 3/2: 249.3 pounds (bed)- recommend confirming standing wt as feasible as large fluctuation noted.   % Weight Change    Pertinent Medications: MEDICATIONS  (STANDING):  ALBUTerol/ipratropium for Nebulization 3 milliLiter(s) Nebulizer every 6 hours  folic acid 1 milliGRAM(s) Oral daily  heparin  Injectable 5000 Unit(s) SubCutaneous every 8 hours  imipenem/cilastatin  IVPB      imipenem/cilastatin  IVPB 1000 milliGRAM(s) IV Intermittent every 8 hours  lactobacillus acidophilus 1 Tablet(s) Oral three times a day with meals  lidocaine   Patch 1 Patch Transdermal daily  losartan 50 milliGRAM(s) Oral daily  multivitamin 1 Tablet(s) Oral daily  sodium chloride 0.9%. 1000 milliLiter(s) (10 mL/Hr) IV Continuous <Continuous>  thiamine 100 milliGRAM(s) Oral daily    MEDICATIONS  (PRN):  oxyCODONE    IR 10 milliGRAM(s) Oral every 3 hours PRN Moderate and Severe Pain (4-10)    Pertinent Labs:  03-06 Na135 mmol/L Glu 129 mg/dL<H> K+ 4.6 mmol/L Cr  0.78 mg/dL BUN 18 mg/dL Phos 4.1 mg/dL Alb 2.4 g/dL<L> PAB n/a         Skin: non pitting right foot, right dorsal foot, lateral foot wounds.     Estimated Needs:   [x] no change since previous assessment  [ ] recalculated:       Previous Nutrition Diagnosis:          [x] Malnutrition (acute-severe)         Nutrition Diagnosis is [ x] resolving with pt's improving appetite, pt's food preferences and health shakes         New Nutrition Diagnosis: [ x] not applicable     Interventions/ Recommend  1) Continue current diet.  2) Encourage po intake with nutrient dense foods.   3) Provide food preferences as able.   4) Monitor weight, lab values, skin, po intake and GI tolerance.   5) Provide health shakes prn.      Monitoring and Evaluation:     follow up per protocol    RD to remain available for further nutritional interventions as indicated.   Yenni Phillip, MS, RD, CDN #[phone]

## 2018-03-06 NOTE — PROGRESS NOTE ADULT - SUBJECTIVE AND OBJECTIVE BOX
MEDICINE, PROGRESS NOTE [phone]    JUWAN DOMÍNGUEZ 57y [id number]    Patient seen and examined.  Patient is a 57y old  Male who presents with a chief complaint of Chest pain, confusion (22 Feb 2018 03:00)      PAST MEDICAL & SURGICAL HISTORY:  Sciatica of right side  Essential hypertension  No significant past surgical history    MEDICATIONS  (STANDING):  ALBUTerol/ipratropium for Nebulization 3 milliLiter(s) Nebulizer every 6 hours  folic acid 1 milliGRAM(s) Oral daily  heparin  Injectable 5000 Unit(s) SubCutaneous every 8 hours  imipenem/cilastatin  IVPB      imipenem/cilastatin  IVPB 1000 milliGRAM(s) IV Intermittent every 8 hours  lactobacillus acidophilus 1 Tablet(s) Oral three times a day with meals  lidocaine   Patch 1 Patch Transdermal daily  losartan 50 milliGRAM(s) Oral daily  multivitamin 1 Tablet(s) Oral daily  sodium chloride 0.9%. 1000 milliLiter(s) (10 mL/Hr) IV Continuous <Continuous>  thiamine 100 milliGRAM(s) Oral daily    MEDICATIONS  (PRN):  oxyCODONE    IR 10 milliGRAM(s) Oral every 3 hours PRN Moderate and Severe Pain (4-10)    Allergies    No Known Allergies    Intolerances        PHYSICAL EXAM:  Constitutional: NAD  HEENT: Normocephalic, EOMI  Neck:  No JVD  Respiratory: CTA B/L, No wheezes  Cardiovascular: S1, S2, RRR, + systolic murmur  Gastrointestinal: BS+, soft, NT/ND  Extremities: No peripheral edema  Neurological: AAOX3, no focal deficits  Psychiatric: Normal mood, normal affect  : No Sosa    Vital Signs Last 24 Hrs  T(C): 37 (06 Mar 2018 20:20), Max: 37.2 (06 Mar 2018 03:05)  T(F): 98.6 (06 Mar 2018 20:20), Max: 99 (06 Mar 2018 03:05)  HR: 103 (06 Mar 2018 20:20) (78 - 103)  BP: 129/69 (06 Mar 2018 20:20) (110/67 - 129/69)  BP(mean): --  RR: 19 (06 Mar 2018 20:20) (18 - 19)  SpO2: 94% (06 Mar 2018 20:20) (92% - 99%)  I&O's Summary    05 Mar 2018 07:01  -  06 Mar 2018 07:00  --------------------------------------------------------  IN: 1460 mL / OUT: 5000 mL / NET: -3540 mL    06 Mar 2018 07:01  -  06 Mar 2018 21:06  --------------------------------------------------------  IN: 1310 mL / OUT: 1500 mL / NET: -190 mL        LABS:                        10.7   8.36  )-----------( 478      ( 06 Mar 2018 06:00 )             34.2     03-06    135  |  97  |  18  ----------------------------<  129<H>  4.6   |  25  |  0.78    Ca    9.0      06 Mar 2018 03:45  Phos  4.1     03-06  Mg     2.0     03-06    TPro  7.7  /  Alb  2.4<L>  /  TBili  0.3  /  DBili  x   /  AST  112<H>  /  ALT  129<H>  /  AlkPhos  88  03-06    CARDIAC MARKERS ( 06 Mar 2018 15:58 )  x     / <0.01 ng/mL / 30 U/L / x     / 1.2 ng/mL      Magnesium, Serum: 2.0 mg/dL (03-06 @ 03:45)

## 2018-03-06 NOTE — PROGRESS NOTE ADULT - SUBJECTIVE AND OBJECTIVE BOX
Day 5 of Anesthesia Pain Management Service    SUBJECTIVE: Patient is doing well with IV PCA    Pain Scale Score:	[X] Refer to charted pain scores    THERAPY:    [ ] IV PCA Morphine		[ ] 5 mg/mL	[ ] 1 mg/mL  [X] IV PCA Hydromorphone	[ ] 5 mg/mL	[X] 1 mg/mL  [ ] IV PCA Fentanyl		[ ] 50 micrograms/mL    Demand dose: 0.2 mg     Lockout: 6 minutes   Continuous Rate: 0 mg/hr  4 Hour Limit: 4 mg    MEDICATIONS  (STANDING):  ALBUTerol/ipratropium for Nebulization 3 milliLiter(s) Nebulizer every 6 hours  folic acid 1 milliGRAM(s) Oral daily  heparin  Injectable 5000 Unit(s) SubCutaneous every 8 hours  imipenem/cilastatin  IVPB      imipenem/cilastatin  IVPB 1000 milliGRAM(s) IV Intermittent every 8 hours  lactobacillus acidophilus 1 Tablet(s) Oral three times a day with meals  lidocaine   Patch 1 Patch Transdermal daily  losartan 50 milliGRAM(s) Oral daily  multivitamin 1 Tablet(s) Oral daily  sodium chloride 0.9%. 1000 milliLiter(s) (10 mL/Hr) IV Continuous <Continuous>  thiamine 100 milliGRAM(s) Oral daily    MEDICATIONS  (PRN):  oxyCODONE    IR 5 milliGRAM(s) Oral every 3 hours PRN Moderate Pain (4 - 6)  oxyCODONE    IR 10 milliGRAM(s) Oral every 4 hours PRN Severe Pain (7 - 10)      OBJECTIVE:    Sedation Score:	[ X] Alert	[ ] Drowsy 	[ ] Arousable	[ ] Asleep	[ ] Unresponsive    Side Effects:	[X ] None	[ ] Nausea	[ ] Vomiting	[ ] Pruritus  		[ ] Other:    Vital Signs Last 24 Hrs  T(C): 36.7 (06 Mar 2018 08:00), Max: 37.3 (05 Mar 2018 20:10)  T(F): 98.1 (06 Mar 2018 08:00), Max: 99.2 (05 Mar 2018 20:10)  HR: 90 (06 Mar 2018 08:00) (82 - 98)  BP: 113/75 (06 Mar 2018 08:00) (111/74 - 126/68)  BP(mean): --  RR: 18 (06 Mar 2018 08:00) (18 - 18)  SpO2: 95% (06 Mar 2018 08:00) (92% - 99%)    ASSESSMENT/ PLAN    Therapy to  be:               [ ] Continued   [X] Discontinued   [ ] Changed to PRN Analgesics    Documentation and Verification of current medications:   [X] Done	[ ] Not done, not eligible    Comments: Will d/c IV PCA, will start Oxycodone with IV push with dressing change per primary team

## 2018-03-07 LAB
ALBUMIN SERPL ELPH-MCNC: 2.3 G/DL — LOW (ref 3.3–5)
ALP SERPL-CCNC: 80 U/L — SIGNIFICANT CHANGE UP (ref 40–120)
ALT FLD-CCNC: 104 U/L RC — HIGH (ref 10–45)
ANION GAP SERPL CALC-SCNC: 11 MMOL/L — SIGNIFICANT CHANGE UP (ref 5–17)
AST SERPL-CCNC: 68 U/L — HIGH (ref 10–40)
BASOPHILS # BLD AUTO: 0.03 K/UL — SIGNIFICANT CHANGE UP (ref 0–0.2)
BASOPHILS NFR BLD AUTO: 0.4 % — SIGNIFICANT CHANGE UP (ref 0–2)
BILIRUB SERPL-MCNC: 0.4 MG/DL — SIGNIFICANT CHANGE UP (ref 0.2–1.2)
BUN SERPL-MCNC: 16 MG/DL — SIGNIFICANT CHANGE UP (ref 7–23)
CALCIUM SERPL-MCNC: 8.8 MG/DL — SIGNIFICANT CHANGE UP (ref 8.4–10.5)
CHLORIDE SERPL-SCNC: 97 MMOL/L — SIGNIFICANT CHANGE UP (ref 96–108)
CO2 SERPL-SCNC: 26 MMOL/L — SIGNIFICANT CHANGE UP (ref 22–31)
CREAT SERPL-MCNC: 0.78 MG/DL — SIGNIFICANT CHANGE UP (ref 0.5–1.3)
CULTURE RESULTS: SIGNIFICANT CHANGE UP
CULTURE RESULTS: SIGNIFICANT CHANGE UP
EOSINOPHIL # BLD AUTO: 0.05 K/UL — SIGNIFICANT CHANGE UP (ref 0–0.5)
EOSINOPHIL NFR BLD AUTO: 0.7 % — SIGNIFICANT CHANGE UP (ref 0–6)
GLUCOSE SERPL-MCNC: 106 MG/DL — HIGH (ref 70–99)
HCT VFR BLD CALC: 32.7 % — LOW (ref 39–50)
HGB BLD-MCNC: 10.5 G/DL — LOW (ref 13–17)
IMM GRANULOCYTES NFR BLD AUTO: 0.4 % — SIGNIFICANT CHANGE UP (ref 0–1.5)
LYMPHOCYTES # BLD AUTO: 1.43 K/UL — SIGNIFICANT CHANGE UP (ref 1–3.3)
LYMPHOCYTES # BLD AUTO: 20.6 % — SIGNIFICANT CHANGE UP (ref 13–44)
MAGNESIUM SERPL-MCNC: 2 MG/DL — SIGNIFICANT CHANGE UP (ref 1.6–2.6)
MCHC RBC-ENTMCNC: 31.3 PG — SIGNIFICANT CHANGE UP (ref 27–34)
MCHC RBC-ENTMCNC: 32.1 GM/DL — SIGNIFICANT CHANGE UP (ref 32–36)
MCV RBC AUTO: 97.6 FL — SIGNIFICANT CHANGE UP (ref 80–100)
MONOCYTES # BLD AUTO: 0.46 K/UL — SIGNIFICANT CHANGE UP (ref 0–0.9)
MONOCYTES NFR BLD AUTO: 6.6 % — SIGNIFICANT CHANGE UP (ref 2–14)
NEUTROPHILS # BLD AUTO: 4.94 K/UL — SIGNIFICANT CHANGE UP (ref 1.8–7.4)
NEUTROPHILS NFR BLD AUTO: 71.3 % — SIGNIFICANT CHANGE UP (ref 43–77)
PHOSPHATE SERPL-MCNC: 3.7 MG/DL — SIGNIFICANT CHANGE UP (ref 2.5–4.5)
PLATELET # BLD AUTO: 452 K/UL — HIGH (ref 150–400)
POTASSIUM SERPL-MCNC: 4.6 MMOL/L — SIGNIFICANT CHANGE UP (ref 3.5–5.3)
POTASSIUM SERPL-SCNC: 4.6 MMOL/L — SIGNIFICANT CHANGE UP (ref 3.5–5.3)
PROT SERPL-MCNC: 7.2 G/DL — SIGNIFICANT CHANGE UP (ref 6–8.3)
RBC # BLD: 3.35 M/UL — LOW (ref 4.2–5.8)
RBC # FLD: 14.2 % — SIGNIFICANT CHANGE UP (ref 10.3–14.5)
SODIUM SERPL-SCNC: 134 MMOL/L — LOW (ref 135–145)
SPECIMEN SOURCE: SIGNIFICANT CHANGE UP
SPECIMEN SOURCE: SIGNIFICANT CHANGE UP
WBC # BLD: 6.94 K/UL — SIGNIFICANT CHANGE UP (ref 3.8–10.5)
WBC # FLD AUTO: 6.94 K/UL — SIGNIFICANT CHANGE UP (ref 3.8–10.5)

## 2018-03-07 PROCEDURE — 71275 CT ANGIOGRAPHY CHEST: CPT | Mod: 26

## 2018-03-07 PROCEDURE — 99232 SBSQ HOSP IP/OBS MODERATE 35: CPT

## 2018-03-07 PROCEDURE — 93970 EXTREMITY STUDY: CPT | Mod: 26

## 2018-03-07 RX ORDER — HYDROMORPHONE HYDROCHLORIDE 2 MG/ML
2 INJECTION INTRAMUSCULAR; INTRAVENOUS; SUBCUTANEOUS ONCE
Qty: 0 | Refills: 0 | Status: DISCONTINUED | OUTPATIENT
Start: 2018-03-07 | End: 2018-03-07

## 2018-03-07 RX ADMIN — Medication 1 MILLIGRAM(S): at 13:33

## 2018-03-07 RX ADMIN — OXYCODONE HYDROCHLORIDE 10 MILLIGRAM(S): 5 TABLET ORAL at 00:48

## 2018-03-07 RX ADMIN — Medication 3 MILLILITER(S): at 13:33

## 2018-03-07 RX ADMIN — OXYCODONE HYDROCHLORIDE 10 MILLIGRAM(S): 5 TABLET ORAL at 10:00

## 2018-03-07 RX ADMIN — OXYCODONE HYDROCHLORIDE 10 MILLIGRAM(S): 5 TABLET ORAL at 04:15

## 2018-03-07 RX ADMIN — OXYCODONE HYDROCHLORIDE 10 MILLIGRAM(S): 5 TABLET ORAL at 18:00

## 2018-03-07 RX ADMIN — OXYCODONE HYDROCHLORIDE 10 MILLIGRAM(S): 5 TABLET ORAL at 17:08

## 2018-03-07 RX ADMIN — HYDROMORPHONE HYDROCHLORIDE 2 MILLIGRAM(S): 2 INJECTION INTRAMUSCULAR; INTRAVENOUS; SUBCUTANEOUS at 09:30

## 2018-03-07 RX ADMIN — Medication 1 TABLET(S): at 08:00

## 2018-03-07 RX ADMIN — Medication 3 MILLILITER(S): at 22:06

## 2018-03-07 RX ADMIN — HYDROMORPHONE HYDROCHLORIDE 2 MILLIGRAM(S): 2 INJECTION INTRAMUSCULAR; INTRAVENOUS; SUBCUTANEOUS at 10:00

## 2018-03-07 RX ADMIN — OXYCODONE HYDROCHLORIDE 10 MILLIGRAM(S): 5 TABLET ORAL at 03:50

## 2018-03-07 RX ADMIN — LOSARTAN POTASSIUM 50 MILLIGRAM(S): 100 TABLET, FILM COATED ORAL at 05:45

## 2018-03-07 RX ADMIN — OXYCODONE HYDROCHLORIDE 10 MILLIGRAM(S): 5 TABLET ORAL at 07:15

## 2018-03-07 RX ADMIN — IMIPENEM AND CILASTATIN 250 MILLIGRAM(S): 250; 250 INJECTION, POWDER, FOR SOLUTION INTRAVENOUS at 22:06

## 2018-03-07 RX ADMIN — Medication 3 MILLILITER(S): at 17:13

## 2018-03-07 RX ADMIN — OXYCODONE HYDROCHLORIDE 10 MILLIGRAM(S): 5 TABLET ORAL at 14:55

## 2018-03-07 RX ADMIN — Medication 1 TABLET(S): at 13:33

## 2018-03-07 RX ADMIN — OXYCODONE HYDROCHLORIDE 10 MILLIGRAM(S): 5 TABLET ORAL at 20:19

## 2018-03-07 RX ADMIN — OXYCODONE HYDROCHLORIDE 10 MILLIGRAM(S): 5 TABLET ORAL at 01:15

## 2018-03-07 RX ADMIN — LIDOCAINE 1 PATCH: 4 CREAM TOPICAL at 13:33

## 2018-03-07 RX ADMIN — Medication 1 TABLET(S): at 15:41

## 2018-03-07 RX ADMIN — OXYCODONE HYDROCHLORIDE 10 MILLIGRAM(S): 5 TABLET ORAL at 23:41

## 2018-03-07 RX ADMIN — HEPARIN SODIUM 5000 UNIT(S): 5000 INJECTION INTRAVENOUS; SUBCUTANEOUS at 13:33

## 2018-03-07 RX ADMIN — Medication 100 MILLIGRAM(S): at 13:33

## 2018-03-07 RX ADMIN — IMIPENEM AND CILASTATIN 250 MILLIGRAM(S): 250; 250 INJECTION, POWDER, FOR SOLUTION INTRAVENOUS at 05:45

## 2018-03-07 RX ADMIN — IMIPENEM AND CILASTATIN 250 MILLIGRAM(S): 250; 250 INJECTION, POWDER, FOR SOLUTION INTRAVENOUS at 15:41

## 2018-03-07 RX ADMIN — HEPARIN SODIUM 5000 UNIT(S): 5000 INJECTION INTRAVENOUS; SUBCUTANEOUS at 05:45

## 2018-03-07 RX ADMIN — OXYCODONE HYDROCHLORIDE 10 MILLIGRAM(S): 5 TABLET ORAL at 14:03

## 2018-03-07 RX ADMIN — OXYCODONE HYDROCHLORIDE 10 MILLIGRAM(S): 5 TABLET ORAL at 06:50

## 2018-03-07 RX ADMIN — OXYCODONE HYDROCHLORIDE 10 MILLIGRAM(S): 5 TABLET ORAL at 09:11

## 2018-03-07 RX ADMIN — Medication 3 MILLILITER(S): at 05:45

## 2018-03-07 RX ADMIN — OXYCODONE HYDROCHLORIDE 10 MILLIGRAM(S): 5 TABLET ORAL at 21:10

## 2018-03-07 RX ADMIN — HEPARIN SODIUM 5000 UNIT(S): 5000 INJECTION INTRAVENOUS; SUBCUTANEOUS at 22:06

## 2018-03-07 NOTE — CHART NOTE - NSCHARTNOTEFT_GEN_A_CORE
MEDICINE NP NOTE  Spectra [id number]    Discussed plans for repeat KELVIN to r/o abscess with EP, Dr Patel.  There are no EP objections to proceeding with KELVIN.

## 2018-03-07 NOTE — CONSULT NOTE ADULT - ATTENDING COMMENTS
patient with palpitations/? NSVT  EP eval in progress  KELVIN in am  no need for urgent ischemic evaluation.

## 2018-03-07 NOTE — PROGRESS NOTE ADULT - ASSESSMENT
Recent ankle sprain at work which got infected resulting in MSSA sepsis/cellulitis with lung embolization s/p OR debridement X 2 now with pseudomonas in OR culture    OM right foot/necrotizing fasciitis     hemoptysis - minimal - possibly secondary to being on hep drip with pulm septic emboli    ADA - resolved    Transaminitis     Coagulopathy on admission - most likley due to dietary deficiency, holding off on any supplementation for now    Right peroneal vein dvt - on heparin prophy dose per heme    Pulm artery enlargement    Morbid obesity    SHIELA continue nocturnal bipap    PAT/ AIVR/13 beats wct    Based on interview with pt and wife pt is not an etoh abuser (neg CAGE, never had wd symptoms in his life, never been in rehab, has gone months without drinking with no problems.)    continue current care  continue irrigation VAC  continue abx  monitor labs   f/u cx  repeat KELVIN  repeat ct chest with contrast - pt had some hemoptysis overnight  repeat Lower extrem dopplers  continue tele  appreciate cario/interventional/ep input  discussed with Dr regalado  discussed care with pt in detail and answered all his questions to the best of my ability.  discussed with pt family per his request via phone ( specifically Shaina)

## 2018-03-07 NOTE — CONSULT NOTE ADULT - CONSULT REQUESTED DATE/TIME
01-Mar-2018 11:13
06-Mar-2018 14:29
07-Mar-2018 07:53
07-Mar-2018 14:32
22-Feb-2018 12:00
23-Feb-2018 01:05
25-Feb-2018 01:07
26-Feb-2018
27-Feb-2018 19:58
22-Feb-2018 16:16

## 2018-03-07 NOTE — CONSULT NOTE ADULT - PROVIDER SPECIALTY LIST ADULT
Cardiology
Electrophysiology
Gastroenterology
Heme/Onc
Intervent Cardiology
Podiatry
Pulmonology
Surgery
Vascular Surgery
Infectious Disease

## 2018-03-07 NOTE — PROGRESS NOTE ADULT - SUBJECTIVE AND OBJECTIVE BOX
Subjective: No chest pain or sob   	  MEDICATIONS:  MEDICATIONS  (STANDING):  ALBUTerol/ipratropium for Nebulization 3 milliLiter(s) Nebulizer every 6 hours  folic acid 1 milliGRAM(s) Oral daily  heparin  Injectable 5000 Unit(s) SubCutaneous every 8 hours  imipenem/cilastatin  IVPB      imipenem/cilastatin  IVPB 1000 milliGRAM(s) IV Intermittent every 8 hours  lactobacillus acidophilus 1 Tablet(s) Oral three times a day with meals  lidocaine   Patch 1 Patch Transdermal daily  losartan 50 milliGRAM(s) Oral daily  multivitamin 1 Tablet(s) Oral daily  sodium chloride 0.9%. 1000 milliLiter(s) (10 mL/Hr) IV Continuous <Continuous>  thiamine 100 milliGRAM(s) Oral daily      LABS:	 	    CARDIAC MARKERS:  CARDIAC MARKERS ( 06 Mar 2018 15:58 )  x     / <0.01 ng/mL / 30 U/L / x     / 1.2 ng/mL                                10.5   6.94  )-----------( 452      ( 07 Mar 2018 07:36 )             32.7     03-07    134<L>  |  97  |  16  ----------------------------<  106<H>  4.6   |  26  |  0.78    Ca    8.8      07 Mar 2018 06:18  Phos  3.7     03-07  Mg     2.0     03-07    TPro  7.2  /  Alb  2.3<L>  /  TBili  0.4  /  DBili  x   /  AST  68<H>  /  ALT  104<H>  /  AlkPhos  80  03-07    proBNP:   Lipid Profile:   HgA1c:   TSH:       PHYSICAL EXAM:  T(C): 36.7 (03-07-18 @ 04:38), Max: 37.1 (03-06-18 @ 16:32)  HR: 91 (03-07-18 @ 09:32) (78 - 103)  BP: 126/53 (03-07-18 @ 09:32) (110/67 - 139/74)  RR: 18 (03-07-18 @ 04:38) (18 - 19)  SpO2: 96% (03-07-18 @ 08:33) (94% - 99%)  Wt(kg): --  I&O's Summary    06 Mar 2018 07:01  -  07 Mar 2018 07:00  --------------------------------------------------------  IN: 1810 mL / OUT: 3700 mL / NET: -1890 mL    07 Mar 2018 07:01  -  07 Mar 2018 13:06  --------------------------------------------------------  IN: 600 mL / OUT: 500 mL / NET: 100 mL      Heart: normal S1, S2, RRR, no m/r/g  Lungs: cta b/l  Abd: soft nT,nD  Ext: no edema        TELEMETRY: SR	    ECG:  	  RADIOLOGY:   DIAGNOSTIC TESTING:  [ ] Echocardiogram:  [ ]  Catheterization:  [ ] Stress Test:    OTHER: 	      ASSESSMENT/PLAN:  57yMale with etoh abuse, HTN admitted with DVT/septic emboli, necrotizing fascitis who is being seen for chest pain/sob.   -Appreciate eps consult  -monitor tele  -pt. cp free  -cardiac enzymes negative   -recommend interventional consult with Dr. Dickerson to evaluate for ischemic eval  -f/u ID  -further workup pending above     Reanna Laureano MD  [facility]  [address], [address]  [place] [address]  office: [phone]  pager: [phone]

## 2018-03-07 NOTE — CONSULT NOTE ADULT - CONSULT REASON
Abnormal CT Chest Findings
CP, sob, NSVT
Heme positive stool. Elevated LFTs
Right foot wound
dyspnea
elevated INR
rule out necrotizing fasciitis in the right calf
tachycardia (brief episodes of asymptomatic paroxysmal atrial tachycardia)
Pneumonia/fevers
RLE DVT, r/o compartment syndrome

## 2018-03-07 NOTE — CONSULT NOTE ADULT - SUBJECTIVE AND OBJECTIVE BOX
HISTORY OF PRESENT ILLNESS:  56 yo M with history of HTN, no known CAD/MI, admitted initially 2/22/18 with chest pain and confusion. He ruled out for ACS and was found to have  Staph aureus bacteremia likely from right ankle cellulitis and necrotizing fascitis.   TTE with grossly normal LV function and KELVIN revealed no evidence of endocarditis. He has subsequently also been diagnosed RLE DVT with possible septic emboli on CTA chest.   He was placed on IV abx and is on heparin subq q8h.   On 03/05/18 the patient had an apparent episode of NSVT however upon further review with electrophysiology, it appears to have all been PAT.   Currently the patient has no chest pain, shortness of breath, palpitations , syncope or near syncope. Prior to admit he was ambulating without anginal symptoms.       PAST MEDICAL & SURGICAL HISTORY:  Sciatica of right side  Essential hypertension  No significant past surgical history    	    MEDICATIONS:  heparin  Injectable 5000 Unit(s) SubCutaneous every 8 hours  losartan 50 milliGRAM(s) Oral daily  imipenem/cilastatin  IVPB      imipenem/cilastatin  IVPB 1000 milliGRAM(s) IV Intermittent every 8 hours  ALBUTerol/ipratropium for Nebulization 3 milliLiter(s) Nebulizer every 6 hours  oxyCODONE    IR 10 milliGRAM(s) Oral every 3 hours PRN  folic acid 1 milliGRAM(s) Oral daily  lidocaine   Patch 1 Patch Transdermal daily  multivitamin 1 Tablet(s) Oral daily  sodium chloride 0.9%. 1000 milliLiter(s) IV Continuous <Continuous>  thiamine 100 milliGRAM(s) Oral daily      Allergies  No Known Allergies      FAMILY HISTORY:  Family history of cirrhosis of liver (Father): Father  Family history of prostate cancer (Father): Father  Family history of hypertension (Father): Mother      SOCIAL HISTORY:    [ ] Non-smoker  [ ] Smoker  [ ] Alcohol      REVIEW OF SYSTEMS:  foot pain   otherwise negative     PHYSICAL EXAM:  T(C): 36.6 (03-07-18 @ 13:41), Max: 37.1 (03-06-18 @ 16:32)  HR: 90 (03-07-18 @ 13:41) (78 - 103)  BP: 115/73 (03-07-18 @ 13:41) (110/67 - 139/74)  RR: 18 (03-07-18 @ 13:41) (18 - 19)  SpO2: 95% (03-07-18 @ 13:41) (94% - 99%)  Wt(kg): --  I&O's Summary    06 Mar 2018 07:01  -  07 Mar 2018 07:00  --------------------------------------------------------  IN: 1810 mL / OUT: 3700 mL / NET: -1890 mL    07 Mar 2018 07:01  -  07 Mar 2018 14:33  --------------------------------------------------------  IN: 600 mL / OUT: 1000 mL / NET: -400 mL      S1S2 RRR  CTABL  SOFT NT ND  WARM DRY NO EDEMA RIGHT ANKLE WOUND VAC IN PLACE  ALERT    TELEMETRY:  NSR  PAT  	    ECG:  	sinus tach    RADIOLOGY:    Xray Chest 1 View- PORTABLE-Urgent (02.24.18 @ 14:04) >  The cardiomediastinal silhouette is similar in appearance to the prior   study. There are diffuse bilateral pulmonary opacities, most pronounced   in the rightlower lobe, significantly progressed as compared with the   prior study. These findings may represent pulmonary edema and/or   pneumonia. Given stated history of hemoptysis, pulmonary hemorrhage is   included in the differential diagnosis for these findings. No   pneumothorax is identified on this projection.  	  	  LABS:	 	    Troponin T, Serum: <0.01 ng/mL (03.06.18 @ 15:58)                          10.5   6.94  )-----------( 452      ( 07 Mar 2018 07:36 )             32.7       03-07    134<L>  |  97  |  16  ----------------------------<  106<H>  4.6   |  26  |  0.78    Ca    8.8      07 Mar 2018 06:18  Phos  3.7     03-07  Mg     2.0     03-07    TPro  7.2  /  Alb  2.3<L>  /  TBili  0.4  /  DBili  x   /  AST  68<H>  /  ALT  104<H>  /  AlkPhos  80  03-07      ASSESSMENT/PLAN: 	56 yo M with history of HTN, no known CAD/MI, admitted initially 2/22/18 with chest pain and confusion. He ruled out for ACS and was found to have  Staph aureus bacteremia likely from right ankle cellulitis and necrotizing fascitis.   TTE with grossly normal LV function and KELVIN revealed no evidence of endocarditis. He has subsequently also been diagnosed RLE DVT with possible septic emboli on CTA chest.   He was placed on IV abx and is on heparin subq q8h.   On 03/05/18 the patient had an apparent episode of NSVT however upon further review with electrophysiology, it appears to have all been PAT.   Currently the patient has no chest pain, shortness of breath, palpitations , syncope or near syncope. Prior to admit he was ambulating without anginal symptoms.     --  The patient has ruled out for acute coronary syndrome with negative serial cardiac enzymes  -- TTE with normal LV function   -- at this time given the patient' r/o ACS, has no anginal symptoms, no evidence NSVT on tele - no need for urgent cath  -- IV abx per primary team/ID  -- the patient is pending  OR debridement w/ stravix graft and VAC application on Friday at 10 am w/ Dr Westbrook  -- The patient is not in decompensated CHF    Claudia Mckinney [facility]  O:  [phone]  P: [phone]

## 2018-03-07 NOTE — CONSULT NOTE ADULT - SUBJECTIVE AND OBJECTIVE BOX
EP ATTENDING      HISTORY OF PRESENT ILLNESS: He is a pleasant 56 y/o male PMH HTN admitted with staph aureus bacteremia, likely from right ankle cellulitis/nec fasc. A KELVIN and TTE were negative for endocarditis. In this setting he developed asymptomatic episodes of paroxysmal atrial tachycardia. There has been no evidence of atrial fibrillation. He denies palpitations nor syncope.    PAST MEDICAL & SURGICAL HISTORY:  as above  Essential hypertension    right ankle surgery for nec fasc    MEDICATIONS  (STANDING):  ALBUTerol/ipratropium for Nebulization 3 milliLiter(s) Nebulizer every 6 hours  folic acid 1 milliGRAM(s) Oral daily  heparin  Injectable 5000 Unit(s) SubCutaneous every 8 hours  imipenem/cilastatin  IVPB      imipenem/cilastatin  IVPB 1000 milliGRAM(s) IV Intermittent every 8 hours  lactobacillus acidophilus 1 Tablet(s) Oral three times a day with meals  lidocaine   Patch 1 Patch Transdermal daily  losartan 50 milliGRAM(s) Oral daily  multivitamin 1 Tablet(s) Oral daily  sodium chloride 0.9%. 1000 milliLiter(s) (10 mL/Hr) IV Continuous <Continuous>  thiamine 100 milliGRAM(s) Oral daily    No Known Allergies    FAMILY HISTORY:  Family history of cirrhosis of liver (Father): Father  Family history of prostate cancer (Father): Father  Family history of hypertension (Father): Mother  Non-contributary for premature coronary disease or sudden cardiac death    SOCIAL HISTORY:    [x ] Non-smoker  [ ] Smoker  [ x] Alcohol      REVIEW OF SYSTEMS:  [ ]chest pain  [  ]shortness of breath  [  ]palpitations  [  ]syncope  [ ]near syncope [ ]upper extremity weakness   [ ] lower extremity weakness  [  ]diplopia  [  ]altered mental status   [  ]fevers  [ ]chills [ ]nausea  [ ]vomitting  [  ]dysphagia    [ ]abdominal pain  [ ]melena  [ ]BRBPR    [  ]epistaxis  [  ]rash    [ ]lower extremity edema        [ x] All others negative	  [ ] Unable to obtain    PHYSICAL EXAM:  T(C): 36.7 (03-07-18 @ 04:38), Max: 37.1 (03-06-18 @ 16:32)  HR: 85 (03-07-18 @ 06:11) (78 - 103)  BP: 122/71 (03-07-18 @ 04:38) (110/67 - 139/74)  RR: 18 (03-07-18 @ 04:38) (18 - 19)  SpO2: 97% (03-07-18 @ 06:11) (94% - 99%)  Wt(kg): --    no JVD  RRR, no murmurs  CTAB  soft nt/nd  no c/c/e    TELEMETRY: 	  NSR, PAT  ECG:  	NSR, PAT    Echo: normal  NST: n/a  Cath: n/a  	  	  LABS:	 	                          10.7   8.36  )-----------( 478      ( 06 Mar 2018 06:00 )             34.2     03-07    134<L>  |  97  |  16  ----------------------------<  106<H>  4.6   |  26  |  0.78    Ca    8.8      07 Mar 2018 06:18  Phos  3.7     03-07  Mg     2.0     03-07    TPro  7.2  /  Alb  2.3<L>  /  TBili  0.4  /  DBili  x   /  AST  68<H>  /  ALT  104<H>  /  AlkPhos  80  03-07    proBNP:   Lipid Profile:   HgA1c:   TSH:       A/P)  He is a pleasant 56 y/o male PMH HTN admitted with staph aureus bacteremia, likely from right ankle cellulitis/nec fasc. A KELVIN and TTE were negative for endocarditis. In this setting he developed asymptomatic episodes of paroxysmal atrial tachycardia. There has been no evidence of atrial fibrillation. He denies palpitations nor syncope.    -given the above there is no indication for anticoagulation at this time  -given his lack of symptoms and brief duration of his PAT episodes I would not start beta blockers  -workup of dyspnea and chest pain as per cardiology  -will follow      Dany Patel M.D., [facility]  Cardiac Electrophysiology  [facility]  [address] [place] [address], [address] [place] [address]  [email or web address]    office [phone]  pager [phone]

## 2018-03-07 NOTE — PROGRESS NOTE ADULT - SUBJECTIVE AND OBJECTIVE BOX
Patient is a 57y old  Male who presents with a chief complaint of Chest pain, confusion (22 Feb 2018 03:00)       INTERVAL HPI/OVERNIGHT EVENTS:  Patient seen and evaluated at bedside.  Pt is resting comfortable in NAD. Denies N/V/F/C.  Pt off PCA and tolerating well.    Allergies    No Known Allergies    Intolerances        Vital Signs Last 24 Hrs  T(C): 36.7 (07 Mar 2018 04:38), Max: 37.1 (06 Mar 2018 16:32)  T(F): 98 (07 Mar 2018 04:38), Max: 98.7 (06 Mar 2018 16:32)  HR: 91 (07 Mar 2018 09:32) (78 - 103)  BP: 126/53 (07 Mar 2018 09:32) (110/67 - 139/74)  BP(mean): --  RR: 18 (07 Mar 2018 04:38) (18 - 19)  SpO2: 96% (07 Mar 2018 08:33) (94% - 99%)    LABS:                        10.5   6.94  )-----------( 452      ( 07 Mar 2018 07:36 )             32.7     03-07    134<L>  |  97  |  16  ----------------------------<  106<H>  4.6   |  26  |  0.78    Ca    8.8      07 Mar 2018 06:18  Phos  3.7     03-07  Mg     2.0     03-07    TPro  7.2  /  Alb  2.3<L>  /  TBili  0.4  /  DBili  x   /  AST  68<H>  /  ALT  104<H>  /  AlkPhos  80  03-07    PT/INR - ( 06 Mar 2018 06:00 )   PT: 14.6 sec;   INR: 1.29 ratio         PTT - ( 06 Mar 2018 06:00 )  PTT:31.4 sec    CAPILLARY BLOOD GLUCOSE          Lower Extremity Physical Exam:  right foot dorsolateral and medial foot surgical site stable hanna wound region ischemic border distally,   sanguinous drainage, still demarcating, no additional purulence, CFT to skin edges   Edema decreased as well as the erythema, no malodor,  CFT to toes good, no signs of digital necrosis.57M   s/p 2nd staged I&D and debridement of right foot due to nec fasc  -Approx 20cm x 10cm x 1.5    RADIOLOGY & ADDITIONAL TESTS:

## 2018-03-07 NOTE — PROGRESS NOTE ADULT - SUBJECTIVE AND OBJECTIVE BOX
INFECTIOUS DISEASES FOLLOW UP--Poncho Guido MD  Pager [phone]    This is a follow up note for this  57y Male with  MSSA in blood and R foot infection with MSSA and PA---currently with VAC on R foot and on imipenem.  PICC pending.  No new complaints.    Further ROS:  CONSTITUTIONAL:  No fever, good appetite  RESPIRATORY:  No dyspnea  GASTROINTESTINAL:  No nausea, vomiting, diarrhea, or abdominal pain  GENITOURINARY:  No dysuria    Allergies  No Known Allergies    ANTIBIOTICS/RELEVANT:  antimicrobials  imipenem/cilastatin  IVPB      imipenem/cilastatin  IVPB 1000 milliGRAM(s) IV Intermittent every 8 hours    OTHER:  ALBUTerol/ipratropium for Nebulization 3 milliLiter(s) Nebulizer every 6 hours  folic acid 1 milliGRAM(s) Oral daily  heparin  Injectable 5000 Unit(s) SubCutaneous every 8 hours  lactobacillus acidophilus 1 Tablet(s) Oral three times a day with meals  lidocaine   Patch 1 Patch Transdermal daily  losartan 50 milliGRAM(s) Oral daily  multivitamin 1 Tablet(s) Oral daily  oxyCODONE    IR 10 milliGRAM(s) Oral every 3 hours PRN  sodium chloride 0.9%. 1000 milliLiter(s) IV Continuous <Continuous>  thiamine 100 milliGRAM(s) Oral daily    Objective:  Vital Signs Last 24 Hrs  T(C): 36.7 (07 Mar 2018 04:38), Max: 37.1 (06 Mar 2018 16:32)  T(F): 98 (07 Mar 2018 04:38), Max: 98.7 (06 Mar 2018 16:32)  HR: 91 (07 Mar 2018 09:32) (78 - 103)  BP: 126/53 (07 Mar 2018 09:32) (110/67 - 139/74)  BP(mean): --  RR: 18 (07 Mar 2018 04:38) (18 - 19)  SpO2: 96% (07 Mar 2018 08:33) (94% - 99%)    PHYSICAL EXAM:  no stigmata of infective endocarditis.  Constitutional:no acute distress  Eyes:ERIN, EOMI  Ear/Nose/Throat: no oral lesions, 	  Respiratory: clear BL  Cardiovascular: S1S2  Gastrointestinal:soft, (+) BS, no tenderness  Extremities: VAC R foot.  No Lymphadenopathy  IV sites not inflammed.    LABS:                        10.5   6.94  )-----------( 452      ( 07 Mar 2018 07:36 )             32.7     03-07    134<L>  |  97  |  16  ----------------------------<  106<H>  4.6   |  26  |  0.78    Ca    8.8      07 Mar 2018 06:18  Phos  3.7     03-07  Mg     2.0     03-07    TPro  7.2  /  Alb  2.3<L>  /  TBili  0.4  /  DBili  x   /  AST  68<H>  /  ALT  104<H>  /  AlkPhos  80  03-07    PT/INR - ( 06 Mar 2018 06:00 )   PT: 14.6 sec;   INR: 1.29 ratio      PTT - ( 06 Mar 2018 06:00 )  PTT:31.4 sec    MICROBIOLOGY:  Blood cult repeat neg.    Imp: severe R foot infection--now VAC in place.  MSSA in blood and MSSA/PA from foot.    Rx:  OK to place PICC  continue imipenem for now.  Upon DC may change to ancef iv and po cipro---(previously outlined)

## 2018-03-07 NOTE — PROGRESS NOTE ADULT - ASSESSMENT
- VAC change today wound is stable  - F/u OR data  - IV abx per ID  - Pain control  - Resume AC  - Discussed tx plan w patient and family. Will need additional staging of wound.  - Currently scheduled for OR debridement w/ stravix graft and VAC application on Friday at 10 am w/ Dr Westbrook  - Consent signed and in chart  - Please medically optimize  - d/w attending  No exposed bone currently and empiric therapy to be exhaused unless bony exposure or necrosis presents.

## 2018-03-07 NOTE — PROGRESS NOTE ADULT - SUBJECTIVE AND OBJECTIVE BOX
MEDICINE, PROGRESS NOTE [phone]    JUWAN DOMÍNGUEZ 57y [id number]    Patient seen and examined.  Patient is a 57y old  Male who presents with a chief complaint of Chest pain, confusion (22 Feb 2018 03:00)  Pt feels much better.  VAC changed today due to malfunction.    PAST MEDICAL & SURGICAL HISTORY:  Sciatica of right side  Essential hypertension  No significant past surgical history    MEDICATIONS  (STANDING):  ALBUTerol/ipratropium for Nebulization 3 milliLiter(s) Nebulizer every 6 hours  folic acid 1 milliGRAM(s) Oral daily  heparin  Injectable 5000 Unit(s) SubCutaneous every 8 hours  imipenem/cilastatin  IVPB      imipenem/cilastatin  IVPB 1000 milliGRAM(s) IV Intermittent every 8 hours  lactobacillus acidophilus 1 Tablet(s) Oral three times a day with meals  lidocaine   Patch 1 Patch Transdermal daily  losartan 50 milliGRAM(s) Oral daily  multivitamin 1 Tablet(s) Oral daily  sodium chloride 0.9%. 1000 milliLiter(s) (10 mL/Hr) IV Continuous <Continuous>  thiamine 100 milliGRAM(s) Oral daily    MEDICATIONS  (PRN):  oxyCODONE    IR 10 milliGRAM(s) Oral every 3 hours PRN Moderate and Severe Pain (4-10)    Allergies    No Known Allergies    Intolerances        PHYSICAL EXAM:  Constitutional: NAD  HEENT: Normocephalic, EOMI  Neck:  No JVD  Respiratory: CTA B/L, No wheezes  Cardiovascular: S1, S2, RRR, + systolic murmur  Gastrointestinal: BS+, soft, NT/ND  Extremities: Dec peripheral edema le b/l  Neurological: AAOX3, no focal deficits  Psychiatric: Normal mood, normal affect  : No Sosa    Vital Signs Last 24 Hrs  T(C): 36.6 (07 Mar 2018 13:41), Max: 37.1 (06 Mar 2018 16:32)  T(F): 97.9 (07 Mar 2018 13:41), Max: 98.7 (06 Mar 2018 16:32)  HR: 90 (07 Mar 2018 13:41) (78 - 103)  BP: 115/73 (07 Mar 2018 13:41) (110/67 - 139/74)  BP(mean): --  RR: 18 (07 Mar 2018 13:41) (18 - 19)  SpO2: 95% (07 Mar 2018 13:41) (94% - 99%)  I&O's Summary    06 Mar 2018 07:01  -  07 Mar 2018 07:00  --------------------------------------------------------  IN: 1810 mL / OUT: 3700 mL / NET: -1890 mL    07 Mar 2018 07:01  -  07 Mar 2018 15:07  --------------------------------------------------------  IN: 600 mL / OUT: 1000 mL / NET: -400 mL        LABS:                        10.5   6.94  )-----------( 452      ( 07 Mar 2018 07:36 )             32.7     03-07    134<L>  |  97  |  16  ----------------------------<  106<H>  4.6   |  26  |  0.78    Ca    8.8      07 Mar 2018 06:18  Phos  3.7     03-07  Mg     2.0     03-07    TPro  7.2  /  Alb  2.3<L>  /  TBili  0.4  /  DBili  x   /  AST  68<H>  /  ALT  104<H>  /  AlkPhos  80  03-07    CARDIAC MARKERS ( 06 Mar 2018 15:58 )  x     / <0.01 ng/mL / 30 U/L / x     / 1.2 ng/mL      Magnesium, Serum: 2.0 mg/dL (03-07 @ 06:18)

## 2018-03-08 LAB
ALBUMIN SERPL ELPH-MCNC: 2.1 G/DL — LOW (ref 3.3–5)
ALP SERPL-CCNC: 82 U/L — SIGNIFICANT CHANGE UP (ref 40–120)
ALT FLD-CCNC: 98 U/L RC — HIGH (ref 10–45)
ANION GAP SERPL CALC-SCNC: 8 MMOL/L — SIGNIFICANT CHANGE UP (ref 5–17)
AST SERPL-CCNC: 63 U/L — HIGH (ref 10–40)
BASOPHILS # BLD AUTO: 0.02 K/UL — SIGNIFICANT CHANGE UP (ref 0–0.2)
BASOPHILS NFR BLD AUTO: 0.3 % — SIGNIFICANT CHANGE UP (ref 0–2)
BILIRUB SERPL-MCNC: 0.3 MG/DL — SIGNIFICANT CHANGE UP (ref 0.2–1.2)
BUN SERPL-MCNC: 17 MG/DL — SIGNIFICANT CHANGE UP (ref 7–23)
CALCIUM SERPL-MCNC: 8.8 MG/DL — SIGNIFICANT CHANGE UP (ref 8.4–10.5)
CHLORIDE SERPL-SCNC: 97 MMOL/L — SIGNIFICANT CHANGE UP (ref 96–108)
CO2 SERPL-SCNC: 28 MMOL/L — SIGNIFICANT CHANGE UP (ref 22–31)
CREAT SERPL-MCNC: 0.81 MG/DL — SIGNIFICANT CHANGE UP (ref 0.5–1.3)
CULTURE RESULTS: SIGNIFICANT CHANGE UP
CULTURE RESULTS: SIGNIFICANT CHANGE UP
EOSINOPHIL # BLD AUTO: 0.08 K/UL — SIGNIFICANT CHANGE UP (ref 0–0.5)
EOSINOPHIL NFR BLD AUTO: 1.3 % — SIGNIFICANT CHANGE UP (ref 0–6)
GLUCOSE SERPL-MCNC: 110 MG/DL — HIGH (ref 70–99)
HCT VFR BLD CALC: 32.9 % — LOW (ref 39–50)
HGB BLD-MCNC: 10.8 G/DL — LOW (ref 13–17)
IMM GRANULOCYTES NFR BLD AUTO: 0.5 % — SIGNIFICANT CHANGE UP (ref 0–1.5)
LYMPHOCYTES # BLD AUTO: 0.81 K/UL — LOW (ref 1–3.3)
LYMPHOCYTES # BLD AUTO: 12.9 % — LOW (ref 13–44)
MAGNESIUM SERPL-MCNC: 1.9 MG/DL — SIGNIFICANT CHANGE UP (ref 1.6–2.6)
MCHC RBC-ENTMCNC: 32 PG — SIGNIFICANT CHANGE UP (ref 27–34)
MCHC RBC-ENTMCNC: 32.8 GM/DL — SIGNIFICANT CHANGE UP (ref 32–36)
MCV RBC AUTO: 97.3 FL — SIGNIFICANT CHANGE UP (ref 80–100)
MONOCYTES # BLD AUTO: 0.52 K/UL — SIGNIFICANT CHANGE UP (ref 0–0.9)
MONOCYTES NFR BLD AUTO: 8.3 % — SIGNIFICANT CHANGE UP (ref 2–14)
NEUTROPHILS # BLD AUTO: 4.83 K/UL — SIGNIFICANT CHANGE UP (ref 1.8–7.4)
NEUTROPHILS NFR BLD AUTO: 76.7 % — SIGNIFICANT CHANGE UP (ref 43–77)
PHOSPHATE SERPL-MCNC: 3.6 MG/DL — SIGNIFICANT CHANGE UP (ref 2.5–4.5)
PLATELET # BLD AUTO: 420 K/UL — HIGH (ref 150–400)
POTASSIUM SERPL-MCNC: 4.5 MMOL/L — SIGNIFICANT CHANGE UP (ref 3.5–5.3)
POTASSIUM SERPL-SCNC: 4.5 MMOL/L — SIGNIFICANT CHANGE UP (ref 3.5–5.3)
PROT SERPL-MCNC: 7.7 G/DL — SIGNIFICANT CHANGE UP (ref 6–8.3)
RBC # BLD: 3.38 M/UL — LOW (ref 4.2–5.8)
RBC # FLD: 14.1 % — SIGNIFICANT CHANGE UP (ref 10.3–14.5)
SODIUM SERPL-SCNC: 133 MMOL/L — LOW (ref 135–145)
SPECIMEN SOURCE: SIGNIFICANT CHANGE UP
SPECIMEN SOURCE: SIGNIFICANT CHANGE UP
WBC # BLD: 6.29 K/UL — SIGNIFICANT CHANGE UP (ref 3.8–10.5)
WBC # FLD AUTO: 6.29 K/UL — SIGNIFICANT CHANGE UP (ref 3.8–10.5)

## 2018-03-08 PROCEDURE — 76376 3D RENDER W/INTRP POSTPROCES: CPT | Mod: 26

## 2018-03-08 PROCEDURE — 93312 ECHO TRANSESOPHAGEAL: CPT | Mod: 26

## 2018-03-08 PROCEDURE — 99233 SBSQ HOSP IP/OBS HIGH 50: CPT | Mod: GC

## 2018-03-08 PROCEDURE — 93306 TTE W/DOPPLER COMPLETE: CPT | Mod: 26

## 2018-03-08 PROCEDURE — 99232 SBSQ HOSP IP/OBS MODERATE 35: CPT

## 2018-03-08 RX ORDER — SODIUM CHLORIDE 9 MG/ML
1000 INJECTION INTRAMUSCULAR; INTRAVENOUS; SUBCUTANEOUS
Qty: 0 | Refills: 0 | Status: DISCONTINUED | OUTPATIENT
Start: 2018-03-08 | End: 2018-03-09

## 2018-03-08 RX ADMIN — Medication 3 MILLILITER(S): at 17:08

## 2018-03-08 RX ADMIN — OXYCODONE HYDROCHLORIDE 10 MILLIGRAM(S): 5 TABLET ORAL at 06:37

## 2018-03-08 RX ADMIN — Medication 3 MILLILITER(S): at 22:54

## 2018-03-08 RX ADMIN — OXYCODONE HYDROCHLORIDE 10 MILLIGRAM(S): 5 TABLET ORAL at 07:00

## 2018-03-08 RX ADMIN — Medication 1 TABLET(S): at 17:08

## 2018-03-08 RX ADMIN — Medication 1 TABLET(S): at 11:55

## 2018-03-08 RX ADMIN — LOSARTAN POTASSIUM 50 MILLIGRAM(S): 100 TABLET, FILM COATED ORAL at 05:47

## 2018-03-08 RX ADMIN — LIDOCAINE 1 PATCH: 4 CREAM TOPICAL at 15:37

## 2018-03-08 RX ADMIN — Medication 3 MILLILITER(S): at 11:55

## 2018-03-08 RX ADMIN — OXYCODONE HYDROCHLORIDE 10 MILLIGRAM(S): 5 TABLET ORAL at 23:15

## 2018-03-08 RX ADMIN — OXYCODONE HYDROCHLORIDE 10 MILLIGRAM(S): 5 TABLET ORAL at 16:30

## 2018-03-08 RX ADMIN — Medication 1 MILLIGRAM(S): at 11:55

## 2018-03-08 RX ADMIN — Medication 100 MILLIGRAM(S): at 11:55

## 2018-03-08 RX ADMIN — IMIPENEM AND CILASTATIN 250 MILLIGRAM(S): 250; 250 INJECTION, POWDER, FOR SOLUTION INTRAVENOUS at 05:46

## 2018-03-08 RX ADMIN — OXYCODONE HYDROCHLORIDE 10 MILLIGRAM(S): 5 TABLET ORAL at 22:54

## 2018-03-08 RX ADMIN — OXYCODONE HYDROCHLORIDE 10 MILLIGRAM(S): 5 TABLET ORAL at 00:12

## 2018-03-08 RX ADMIN — OXYCODONE HYDROCHLORIDE 10 MILLIGRAM(S): 5 TABLET ORAL at 15:32

## 2018-03-08 RX ADMIN — OXYCODONE HYDROCHLORIDE 10 MILLIGRAM(S): 5 TABLET ORAL at 11:55

## 2018-03-08 RX ADMIN — IMIPENEM AND CILASTATIN 250 MILLIGRAM(S): 250; 250 INJECTION, POWDER, FOR SOLUTION INTRAVENOUS at 15:32

## 2018-03-08 RX ADMIN — HEPARIN SODIUM 5000 UNIT(S): 5000 INJECTION INTRAVENOUS; SUBCUTANEOUS at 14:14

## 2018-03-08 RX ADMIN — OXYCODONE HYDROCHLORIDE 10 MILLIGRAM(S): 5 TABLET ORAL at 19:34

## 2018-03-08 RX ADMIN — HEPARIN SODIUM 5000 UNIT(S): 5000 INJECTION INTRAVENOUS; SUBCUTANEOUS at 05:46

## 2018-03-08 RX ADMIN — Medication 3 MILLILITER(S): at 05:46

## 2018-03-08 RX ADMIN — OXYCODONE HYDROCHLORIDE 10 MILLIGRAM(S): 5 TABLET ORAL at 03:33

## 2018-03-08 RX ADMIN — HEPARIN SODIUM 5000 UNIT(S): 5000 INJECTION INTRAVENOUS; SUBCUTANEOUS at 22:54

## 2018-03-08 RX ADMIN — OXYCODONE HYDROCHLORIDE 10 MILLIGRAM(S): 5 TABLET ORAL at 12:45

## 2018-03-08 RX ADMIN — OXYCODONE HYDROCHLORIDE 10 MILLIGRAM(S): 5 TABLET ORAL at 20:20

## 2018-03-08 RX ADMIN — OXYCODONE HYDROCHLORIDE 10 MILLIGRAM(S): 5 TABLET ORAL at 04:19

## 2018-03-08 NOTE — PROGRESS NOTE ADULT - ASSESSMENT
- Vac left intact  - F/u OR data  - IV abx per ID  - Pain control  - Currently scheduled for OR debridement w/ stravix graft and VAC application tomorrow at 10 am w/ Dr Westbrook  - Consent signed and in chart  - Please medically optimize  - NPO at midnight  - d/w attending

## 2018-03-08 NOTE — PROGRESS NOTE ADULT - SUBJECTIVE AND OBJECTIVE BOX
Patient is a 57y old  Male who presents with a chief complaint of Chest pain, confusion (22 Feb 2018 03:00)       INTERVAL HPI/OVERNIGHT EVENTS:  Patient seen and evaluated at bedside.  Pt is resting comfortable in NAD. Denies N/V/F/C.      Allergies    No Known Allergies    Intolerances        Vital Signs Last 24 Hrs  T(C): 36.6 (08 Mar 2018 11:51), Max: 37 (08 Mar 2018 04:45)  T(F): 97.9 (08 Mar 2018 11:51), Max: 98.6 (08 Mar 2018 04:45)  HR: 96 (08 Mar 2018 12:33) (82 - 98)  BP: 116/65 (08 Mar 2018 12:33) (109/69 - 128/73)  BP(mean): --  RR: 18 (08 Mar 2018 12:33) (18 - 18)  SpO2: 96% (08 Mar 2018 12:33) (93% - 98%)    LABS:                        10.8   6.29  )-----------( 420      ( 08 Mar 2018 07:35 )             32.9     03-08    133<L>  |  97  |  17  ----------------------------<  110<H>  4.5   |  28  |  0.81    Ca    8.8      08 Mar 2018 05:48  Phos  3.6     03-08  Mg     1.9     03-08    TPro  7.7  /  Alb  2.1<L>  /  TBili  0.3  /  DBili  x   /  AST  63<H>  /  ALT  98<H>  /  AlkPhos  82  03-08        CAPILLARY BLOOD GLUCOSE          Lower Extremity Physical Exam:  Vac intact    RADIOLOGY & ADDITIONAL TESTS:

## 2018-03-08 NOTE — PROGRESS NOTE ADULT - SUBJECTIVE AND OBJECTIVE BOX
Chief Complaint:  fu  History of Present Illness:  feeling better, still with some cough with phlegm especially in AM after taking off BIPAP.  NO CP, no N/V/D/C, no bleeding    MEDICATIONS  (STANDING):  ALBUTerol/ipratropium for Nebulization 3 milliLiter(s) Nebulizer every 6 hours  folic acid 1 milliGRAM(s) Oral daily  heparin  Injectable 5000 Unit(s) SubCutaneous every 8 hours  imipenem/cilastatin  IVPB      imipenem/cilastatin  IVPB 1000 milliGRAM(s) IV Intermittent every 8 hours  lactobacillus acidophilus 1 Tablet(s) Oral three times a day with meals  lidocaine   Patch 1 Patch Transdermal daily  losartan 50 milliGRAM(s) Oral daily  multivitamin 1 Tablet(s) Oral daily  sodium chloride 0.9%. 1000 milliLiter(s) (10 mL/Hr) IV Continuous <Continuous>  sodium chloride 0.9%. 1000 milliLiter(s) (75 mL/Hr) IV Continuous <Continuous>  thiamine 100 milliGRAM(s) Oral daily    MEDICATIONS  (PRN):  oxyCODONE    IR 10 milliGRAM(s) Oral every 3 hours PRN Moderate and Severe Pain (4-10)      Allergies    No Known Allergies    Intolerances        Vital Signs Last 24 Hrs  T(C): 36.6 (08 Mar 2018 11:51), Max: 37 (08 Mar 2018 04:45)  T(F): 97.9 (08 Mar 2018 11:51), Max: 98.6 (08 Mar 2018 04:45)  HR: 98 (08 Mar 2018 16:59) (82 - 98)  BP: 116/65 (08 Mar 2018 12:33) (109/69 - 128/73)  BP(mean): --  RR: 20 (08 Mar 2018 16:59) (18 - 20)  SpO2: 98% (08 Mar 2018 16:59) (93% - 98%)    PHYSICAL EXAM  General: adult in NAD, obese  HEENT: clear oropharynx, anicteric sclera, pink conjunctiva  Neck: supple  CV: normal S1/S2   Lungs: decreased BS, poor effort  Abdomen: soft non-tender non-distended, obese, positive bowel sounds  Ext: Right foot bandage    LABS:                          10.8   6.29  )-----------( 420      ( 08 Mar 2018 07:35 )             32.9         Mean Cell Volume : 97.3 fl  Mean Cell Hemoglobin : 32.0 pg  Mean Cell Hemoglobin Concentration : 32.8 gm/dL  Auto Neutrophil # : 4.83 K/uL  Auto Lymphocyte # : 0.81 K/uL  Auto Monocyte # : 0.52 K/uL  Auto Eosinophil # : 0.08 K/uL  Auto Basophil # : 0.02 K/uL  Auto Neutrophil % : 76.7 %  Auto Lymphocyte % : 12.9 %  Auto Monocyte % : 8.3 %  Auto Eosinophil % : 1.3 %  Auto Basophil % : 0.3 %      Serial CBC's  03-08 @ 07:35  Hct-32.9 / Hgb-10.8 / Plat-420 / RBC-3.38 / WBC-6.29  Serial CBC's  03-07 @ 07:36  Hct-32.7 / Hgb-10.5 / Plat-452 / RBC-3.35 / WBC-6.94  Serial CBC's  03-06 @ 06:00  Hct-34.2 / Hgb-10.7 / Plat-478 / RBC-3.47 / WBC-8.36  Serial CBC's  03-05 @ 06:39  Hct-34.0 / Hgb-11.0 / Plat-552 / RBC-3.48 / WBC-7.51      03-08    133<L>  |  97  |  17  ----------------------------<  110<H>  4.5   |  28  |  0.81    Ca    8.8      08 Mar 2018 05:48  Phos  3.6     03-08  Mg     1.9     03-08    TPro  7.7  /  Alb  2.1<L>  /  TBili  0.3  /  DBili  x   /  AST  63<H>  /  ALT  98<H>  /  AlkPhos  82  03-08        < from: CT Angio Chest w/ IV Cont (03.07.18 @ 17:33) >    IMPRESSION: Multiple scattered foci of nodular consolidation have   increased in size and density from 2/22/2018, with one nodule   demonstrating early cavitation. Given the provided clinical history these   nodules most likely represent septic emboli.      < end of copied text >    < from: [facility] Duplex Lower Ext Vein Scan, Bilat (03.07.18 @ 15:38) >  Persistent right peroneal vein DVT in the calf, without central   propagation.    No DVT of the left lower extremity.    < end of copied text >

## 2018-03-08 NOTE — PROGRESS NOTE ADULT - ASSESSMENT
Recent ankle sprain at work which got infected resulting in MSSA sepsis/cellulitis with lung embolization s/p OR debridement X 2 now with pseudomonas in OR culture    OM right foot/necrotizing fasciitis     hemoptysis - minimal - possibly secondary to being on hep drip with pulm septic emboli    ADA - resolved    Transaminitis     Coagulopathy on admission - most likley due to dietary deficiency, holding off on any supplementation for now    Right peroneal vein dvt - on heparin prophy dose per heme    Pulm artery enlargement    Morbid obesity    SHIELA continue nocturnal bipap    PAT/ AIVR/13 beats wct - no further events    Based on interview with pt and wife, pt is not an etoh abuser (neg CAGE, never had wd symptoms in his life, never been in rehab, has gone months without drinking with no problems.)    - continue current care  - at this point with no cardiac abscess or further bacteremia may place PICC line for long term abx  - Plan for podiatry to place stravix graft lizz, if so then irrigation VAC will need to be stopped and patient will need reg VAC dressing place. same machine may be used however settings will need to default to continuous vac.  - await pulm f/u in regards to cavitation and worsening pulmonary findings on ct.  - appreciate id/cardio/interventional/ep input  - continue tele  - continue PT and non- weight bearing on right foot  - pt encouraged to continue bed exercises  - will need to evaluate ambulatory pulse ox as well  discussed with NP mingo

## 2018-03-08 NOTE — PROGRESS NOTE ADULT - ASSESSMENT
57M with obesity, ETOH, SHIELA w/ R ankle sprain, developed MSSA cellulitis/ bacteremia, pulmonary septic emboli, R distal DVT now s/p OR, with mild coagulopathy    #coagulopathy- suspect secondary to vitamin K deficiency/ decreased PO intake over the week prior to admission with acute illness; Factor VII level sl low  - PT mixing study corrected confirming factor deficiency, no evidence of inhibitor - consistent with vitamin K deficiency  - INR sl elevated; no increased bleeding; no prior history of bleeding issues  - will continue to hold on FFP/vitamin K  - coags continue to improve    #R peroneal DVT- no prior history of VTE; provoked with recent injury and decreased mobility  - repeat US 2/27 with no evidence of propagation, repeat US 3/7 with no evidence of propogation  - w/ septic emboli; was on heparin gtt, now off with +FOB  - continue DVT prophylaxis    # R ankle sprain/wound- now s/p OR- s/p return to OR, deep debridement  - pain control; wound care; s/p wound vac  - per Podiatry, s/p OR/graft tomorrow    #ID- MSSA bacteremia, KELVIN negative for vegetation; on Abx per ID  - enlarging septic emboli on CT chest    # Anemia   - mild, stable - due to acute / chronic illness

## 2018-03-08 NOTE — PROGRESS NOTE ADULT - SUBJECTIVE AND OBJECTIVE BOX
Patient with no anginal chest pain or shortness of breath  No palpitations  NO dizziness  tolerated KELVIN this morning with no issues    Tele: NSR no events     MEDICATIONS  (STANDING):  ALBUTerol/ipratropium for Nebulization 3 milliLiter(s) Nebulizer every 6 hours  folic acid 1 milliGRAM(s) Oral daily  heparin  Injectable 5000 Unit(s) SubCutaneous every 8 hours  imipenem/cilastatin  IVPB      imipenem/cilastatin  IVPB 1000 milliGRAM(s) IV Intermittent every 8 hours  lactobacillus acidophilus 1 Tablet(s) Oral three times a day with meals  lidocaine   Patch 1 Patch Transdermal daily  losartan 50 milliGRAM(s) Oral daily  multivitamin 1 Tablet(s) Oral daily  sodium chloride 0.9%. 1000 milliLiter(s) (10 mL/Hr) IV Continuous <Continuous>  thiamine 100 milliGRAM(s) Oral daily    MEDICATIONS  (PRN):  oxyCODONE    IR 10 milliGRAM(s) Oral every 3 hours PRN Moderate and Severe Pain (4-10)      LABS:                        10.8   6.29  )-----------( 420      ( 08 Mar 2018 07:35 )             32.9       03-08    133<L>  |  97  |  17  ----------------------------<  110<H>  4.5   |  28  |  0.81    Ca    8.8      08 Mar 2018 05:48  Phos  3.6     03-08  Mg     1.9     03-08    TPro  7.7  /  Alb  2.1<L>  /  TBili  0.3  /  DBili  x   /  AST  63<H>  /  ALT  98<H>  /  AlkPhos  82  03-08        CARDIAC MARKERS ( 06 Mar 2018 15:58 )  x     / <0.01 ng/mL / 30 U/L / x     / 1.2 ng/mL          PHYSICAL EXAM:  Vital Signs Last 24 Hrs  T(C): 37 (08 Mar 2018 04:45), Max: 37 (08 Mar 2018 04:45)  T(F): 98.6 (08 Mar 2018 04:45), Max: 98.6 (08 Mar 2018 04:45)  HR: 98 (08 Mar 2018 06:40) (82 - 98)  BP: 113/71 (08 Mar 2018 06:35) (109/69 - 128/73)  BP(mean): --  RR: 18 (08 Mar 2018 04:45) (18 - 18)  SpO2: 98% (08 Mar 2018 06:40) (93% - 98%)    S1S2 RRR  CTABL   SOFT NT ND  WARM DRY NO EDEMA  ALERT      DIAGNOSTIC DATA      RADIOLOGY:   < from: CT Angio Chest w/ IV Cont (03.07.18 @ 17:33) >  IMPRESSION: Multiple scattered foci of nodular consolidation have   increased in size and density from 2/22/2018, with one nodule   demonstrating early cavitation. Given the provided clinical history these   nodules most likely represent septic emboli.    < end of copied text >      ASSESSMENT AND PLAN:    He is a pleasant 56 y/o male PMH HTN admitted with staph aureus bacteremia, likely from right ankle cellulitis/nec fasc. A KELVIN performed earlier this admission and TTE were negative for endocarditis. In this setting he developed asymptomatic episodes of paroxysmal atrial tachycardia. There has been no evidence of atrial fibrillation. He denies palpitations nor syncope.    -given the above there is no indication for anticoagulation from an EP perspective at this time  -given his lack of symptoms and brief duration of his PAT episodes I would not start beta blockers  - f/u repeat KELVIN results  - remainder of care primary team     Claudia Mckinney [facility]  O:  [phone]  P: [phone]

## 2018-03-08 NOTE — PROGRESS NOTE ADULT - SUBJECTIVE AND OBJECTIVE BOX
Patient denies CP, Breathing comfortably. ROS (-)    ALBUTerol/ipratropium for Nebulization 3 milliLiter(s) Nebulizer every 6 hours  folic acid 1 milliGRAM(s) Oral daily  heparin  Injectable 5000 Unit(s) SubCutaneous every 8 hours  imipenem/cilastatin  IVPB      imipenem/cilastatin  IVPB 1000 milliGRAM(s) IV Intermittent every 8 hours  lactobacillus acidophilus 1 Tablet(s) Oral three times a day with meals  lidocaine   Patch 1 Patch Transdermal daily  losartan 50 milliGRAM(s) Oral daily  multivitamin 1 Tablet(s) Oral daily  oxyCODONE    IR 10 milliGRAM(s) Oral every 3 hours PRN  sodium chloride 0.9%. 1000 milliLiter(s) IV Continuous <Continuous>  sodium chloride 0.9%. 1000 milliLiter(s) IV Continuous <Continuous>  thiamine 100 milliGRAM(s) Oral daily                            10.8   6.29  )-----------( 420      ( 08 Mar 2018 07:35 )             32.9       Hemoglobin: 10.8 g/dL (03-08 @ 07:35)  Hemoglobin: 10.5 g/dL (03-07 @ 07:36)  Hemoglobin: 10.7 g/dL (03-06 @ 06:00)  Hemoglobin: 11.0 g/dL (03-05 @ 06:39)  Hemoglobin: 11.5 g/dL (03-04 @ 08:17)      03-08    133<L>  |  97  |  17  ----------------------------<  110<H>  4.5   |  28  |  0.81    Ca    8.8      08 Mar 2018 05:48  Phos  3.6     03-08  Mg     1.9     03-08    TPro  7.7  /  Alb  2.1<L>  /  TBili  0.3  /  DBili  x   /  AST  63<H>  /  ALT  98<H>  /  AlkPhos  82  03-08    Creatinine Trend: 0.81<--, 0.78<--, 0.78<--, 0.73<--, 0.78<--, 0.85<--    COAGS:     CARDIAC MARKERS ( 06 Mar 2018 15:58 )  x     / <0.01 ng/mL / 30 U/L / x     / 1.2 ng/mL        T(C): 36.6 (03-08-18 @ 11:51), Max: 37 (03-08-18 @ 04:45)  HR: 96 (03-08-18 @ 12:33) (82 - 98)  BP: 116/65 (03-08-18 @ 12:33) (109/69 - 128/73)  RR: 18 (03-08-18 @ 12:33) (18 - 18)  SpO2: 96% (03-08-18 @ 12:33) (93% - 98%)  Wt(kg): --    I&O's Summary    07 Mar 2018 07:01  -  08 Mar 2018 07:00  --------------------------------------------------------  IN: 1550 mL / OUT: 3875 mL / NET: -2325 mL    08 Mar 2018 07:01  -  08 Mar 2018 14:43  --------------------------------------------------------  IN: 250 mL / OUT: 350 mL / NET: -100 mL        Heart: normal S1, S2, RRR, no m/r/g  Lungs: cta b/l  Abd: soft nT,nD  Ext: no edema        TELEMETRY: SR	    ECG:  	  RADIOLOGY:   DIAGNOSTIC TESTING:  [ ] Echocardiogram:  [ ]  Catheterization:  [ ] Stress Test:    OTHER: 	      ASSESSMENT/PLAN:  57yMale with etoh abuse, HTN admitted with DVT/septic emboli, necrotizing fascitis who is being seen for chest pain/sob.     - F/u KELVIN from this AM  - Abx per Med  - EP f/u appreciated    Blair Benjamin MD, [facility], [facility]  [address] [place] [address]  PHONE:  [phone]  BEEPER : [phone]

## 2018-03-08 NOTE — PROGRESS NOTE ADULT - SUBJECTIVE AND OBJECTIVE BOX
Subjective: patietnt seen/examined earlier.  No chest pain. NAD  	  MEDICATIONS:  MEDICATIONS  (STANDING):  ALBUTerol/ipratropium for Nebulization 3 milliLiter(s) Nebulizer every 6 hours  folic acid 1 milliGRAM(s) Oral daily  heparin  Injectable 5000 Unit(s) SubCutaneous every 8 hours  imipenem/cilastatin  IVPB      imipenem/cilastatin  IVPB 1000 milliGRAM(s) IV Intermittent every 8 hours  lactobacillus acidophilus 1 Tablet(s) Oral three times a day with meals  lidocaine   Patch 1 Patch Transdermal daily  losartan 50 milliGRAM(s) Oral daily  multivitamin 1 Tablet(s) Oral daily  sodium chloride 0.9%. 1000 milliLiter(s) (10 mL/Hr) IV Continuous <Continuous>  sodium chloride 0.9%. 1000 milliLiter(s) (75 mL/Hr) IV Continuous <Continuous>  thiamine 100 milliGRAM(s) Oral daily      LABS:	 	    CARDIAC MARKERS:  CARDIAC MARKERS ( 06 Mar 2018 15:58 )  x     / <0.01 ng/mL / 30 U/L / x     / 1.2 ng/mL                                10.8   6.29  )-----------( 420      ( 08 Mar 2018 07:35 )             32.9     Hemoglobin: 10.8 g/dL (03-08 @ 07:35)  Hemoglobin: 10.5 g/dL (03-07 @ 07:36)  Hemoglobin: 10.7 g/dL (03-06 @ 06:00)  Hemoglobin: 11.0 g/dL (03-05 @ 06:39)  Hemoglobin: 11.5 g/dL (03-04 @ 08:17)      03-08    133<L>  |  97  |  17  ----------------------------<  110<H>  4.5   |  28  |  0.81    Ca    8.8      08 Mar 2018 05:48  Phos  3.6     03-08  Mg     1.9     03-08    TPro  7.7  /  Alb  2.1<L>  /  TBili  0.3  /  DBili  x   /  AST  63<H>  /  ALT  98<H>  /  AlkPhos  82  03-08    Creatinine Trend: 0.81<--, 0.78<--, 0.78<--, 0.73<--, 0.78<--, 0.85<--      PHYSICAL EXAM:  T(C): 36.6 (03-08-18 @ 11:51), Max: 37 (03-08-18 @ 04:45)  HR: 98 (03-08-18 @ 16:59) (82 - 98)  BP: 116/65 (03-08-18 @ 12:33) (109/69 - 128/73)  RR: 20 (03-08-18 @ 16:59) (18 - 20)  SpO2: 98% (03-08-18 @ 16:59) (93% - 98%)  Wt(kg): --  I&O's Summary    07 Mar 2018 07:01  -  08 Mar 2018 07:00  --------------------------------------------------------  IN: 1550 mL / OUT: 3875 mL / NET: -2325 mL    08 Mar 2018 07:01  -  08 Mar 2018 17:19  --------------------------------------------------------  IN: 800 mL / OUT: 650 mL / NET: 150 mL          HEENT:   Normal oral mucosa, PERRL, EOMI	  Lymphatic: No obvious lymphadenopathy , no edema  Cardiovascular: Normal S1 S2, No JVD, 1/6 KITTY murmur, Peripheral pulses palpable 2+ bilaterally  Respiratory: Lungs clear to auscultation, normal effort 	  Gastrointestinal:  Soft, Non-tender, + BS	  Skin: No rashes,  No cyanosis, warm to touch  Musculoskeletal: Normal range of motion, normal strength  Psychiatry:  Appropriate Mood & affect     TELEMETRY: 	    ECG:  	  	      ASSESSMENT/PLAN: 	57y Male wiht pmhx of htn/obesity admitted with osteomyelitis s/p episode of palpitaitons/? NSVT  1) Tele with no NSVT--possible PAT  2) no need for urgent cath  3) echo with normal LV function  4) pain control  5) medically optimized--proceed with planned procedure.

## 2018-03-08 NOTE — PROGRESS NOTE ADULT - SUBJECTIVE AND OBJECTIVE BOX
CHIEF COMPLAINT: Hemoptysis    Interval Events: Pt seen and examined at bedside. No acute events overnight. Pt doing well today. Some scant hemoptysis still occurs rarely. Pt uses CPAP up to 6 hrs a night. Able to work with PT but gets SOB with exertion.     REVIEW OF SYSTEMS:  Constitutional: [ ] negative [x ] fevers [ ] chills [ ] weight loss [ ] weight gain  HEENT: [ x] negative [ ] dry eyes [ ] eye irritation [ ] postnasal drip [ ] nasal congestion  CV: [ ] negative  [ ] chest pain [ ] orthopnea [ ] palpitations [ ] murmur  Resp: [ ] negative [x ] cough [ ] shortness of breath [x ] dyspnea [ x] wheezing [x ] sputum [ ] hemoptysis  GI: [x ] negative [ ] nausea [ ] vomiting [ ] diarrhea [ ] constipation [ ] abd pain [ ] dysphagia   : [x ] negative [ ] dysuria [ ] nocturia [ ] hematuria [ ] increased urinary frequency  Musculoskeletal: [ ] negative [x ] back pain [ ] myalgias [ ] arthralgias [ ] fracture  Skin: [x ] negative [ ] rash [ ] itch  Neurological: [x ] negative [ ] headache [ ] dizziness [ ] syncope [ ] weakness [ ] numbness  Psychiatric: [x ] negative [ ] anxiety [ ] depression  Endocrine: x ] negative [ ] diabetes [ ] thyroid problem  Hematologic/Lymphatic: [x ] negative [ ] anemia [ ] bleeding problem  Allergic/Immunologic: [x ] negative [ ] itchy eyes [ ] nasal discharge [ ] hives [ ] angioedema  [ ] All other systems negative  [ ] Unable to assess ROS because ________      OBJECTIVE:  ICU Vital Signs Last 24 Hrs  T(C): 36.6 (08 Mar 2018 11:51), Max: 37 (08 Mar 2018 04:45)  T(F): 97.9 (08 Mar 2018 11:51), Max: 98.6 (08 Mar 2018 04:45)  HR: 96 (08 Mar 2018 12:33) (82 - 98)  BP: 116/65 (08 Mar 2018 12:33) (109/69 - 128/73)  BP(mean): --  ABP: --  ABP(mean): --  RR: 18 (08 Mar 2018 12:33) (18 - 18)  SpO2: 96% (08 Mar 2018 12:33) (93% - 98%)        03-07 @ 07:01  -  03-08 @ 07:00  --------------------------------------------------------  IN: 1550 mL / OUT: 3875 mL / NET: -2325 mL    03-08 @ 07:01  -  03-08 @ 16:38  --------------------------------------------------------  IN: 250 mL / OUT: 350 mL / NET: -100 mL      CAPILLARY BLOOD GLUCOSE          PHYSICAL EXAM:  General: NAD   HEENT: NCAT, MOM/ ERIN/EOMI   Neck: Supple, large  Respiratory: CTA B/L  Cardiovascular: RRR, (-) m/g/r  Abdomen: NT/ND  Extremities: RLE in cast with wound vac, raised on pillows  Skin: As above  Neurological: Nonfocal  Psychiatry: Appropriate    HOSPITAL MEDICATIONS:  MEDICATIONS  (STANDING):  ALBUTerol/ipratropium for Nebulization 3 milliLiter(s) Nebulizer every 6 hours  folic acid 1 milliGRAM(s) Oral daily  heparin  Injectable 5000 Unit(s) SubCutaneous every 8 hours  imipenem/cilastatin  IVPB      imipenem/cilastatin  IVPB 1000 milliGRAM(s) IV Intermittent every 8 hours  lactobacillus acidophilus 1 Tablet(s) Oral three times a day with meals  lidocaine   Patch 1 Patch Transdermal daily  losartan 50 milliGRAM(s) Oral daily  multivitamin 1 Tablet(s) Oral daily  sodium chloride 0.9%. 1000 milliLiter(s) (10 mL/Hr) IV Continuous <Continuous>  sodium chloride 0.9%. 1000 milliLiter(s) (75 mL/Hr) IV Continuous <Continuous>  thiamine 100 milliGRAM(s) Oral daily    MEDICATIONS  (PRN):  oxyCODONE    IR 10 milliGRAM(s) Oral every 3 hours PRN Moderate and Severe Pain (4-10)      LABS:                        10.8   6.29  )-----------( 420      ( 08 Mar 2018 07:35 )             32.9     Hgb Trend: 10.8<--, 10.5<--, 10.7<--, 11.0<--, 11.5<--  03-08    133<L>  |  97  |  17  ----------------------------<  110<H>  4.5   |  28  |  0.81    Ca    8.8      08 Mar 2018 05:48  Phos  3.6     03-08  Mg     1.9     03-08    TPro  7.7  /  Alb  2.1<L>  /  TBili  0.3  /  DBili  x   /  AST  63<H>  /  ALT  98<H>  /  AlkPhos  82  03-08    Creatinine Trend: 0.81<--, 0.78<--, 0.78<--, 0.73<--, 0.78<--, 0.85<--            MICROBIOLOGY:       RADIOLOGY:  [x ] Reviewed and interpreted by me    PULMONARY FUNCTION TESTS:    EKG: CHIEF COMPLAINT: Hemoptysis    Interval Events: Pt seen and examined at bedside. No acute events overnight. Pt doing well today. Some scant hemoptysis still occurs rarely. Pt uses CPAP up to 6 hrs a night. Able to work with PT but gets SOB with exertion.     REVIEW OF SYSTEMS:  Constitutional: [ ] negative [x ] fevers [ ] chills [ ] weight loss [ ] weight gain  HEENT: [ x] negative [ ] dry eyes [ ] eye irritation [ ] postnasal drip [ ] nasal congestion  CV: [ ] negative  [ ] chest pain [ ] orthopnea [ ] palpitations [ ] murmur  Resp: [ ] negative [x ] cough [ ] shortness of breath [x ] dyspnea [ x] wheezing [x ] sputum [ ] hemoptysis  GI: [x ] negative [ ] nausea [ ] vomiting [ ] diarrhea [ ] constipation [ ] abd pain [ ] dysphagia   : [x ] negative [ ] dysuria [ ] nocturia [ ] hematuria [ ] increased urinary frequency  Musculoskeletal: [ ] negative [x ] back pain [ ] myalgias [ ] arthralgias [ ] fracture  Skin: [x ] negative [ ] rash [ ] itch  Neurological: [x ] negative [ ] headache [ ] dizziness [ ] syncope [ ] weakness [ ] numbness  Psychiatric: [x ] negative [ ] anxiety [ ] depression  Endocrine: x ] negative [ ] diabetes [ ] thyroid problem  Hematologic/Lymphatic: [x ] negative [ ] anemia [ ] bleeding problem  Allergic/Immunologic: [x ] negative [ ] itchy eyes [ ] nasal discharge [ ] hives [ ] angioedema  [ ] All other systems negative  [ ] Unable to assess ROS because ________      OBJECTIVE:  ICU Vital Signs Last 24 Hrs  T(C): 36.6 (08 Mar 2018 11:51), Max: 37 (08 Mar 2018 04:45)  T(F): 97.9 (08 Mar 2018 11:51), Max: 98.6 (08 Mar 2018 04:45)  HR: 96 (08 Mar 2018 12:33) (82 - 98)  BP: 116/65 (08 Mar 2018 12:33) (109/69 - 128/73)  BP(mean): --  ABP: --  ABP(mean): --  RR: 18 (08 Mar 2018 12:33) (18 - 18)  SpO2: 96% (08 Mar 2018 12:33) (93% - 98%)        03-07 @ 07:01  -  03-08 @ 07:00  --------------------------------------------------------  IN: 1550 mL / OUT: 3875 mL / NET: -2325 mL    03-08 @ 07:01  -  03-08 @ 16:38  --------------------------------------------------------  IN: 250 mL / OUT: 350 mL / NET: -100 mL      CAPILLARY BLOOD GLUCOSE          PHYSICAL EXAM:  General: NAD   HEENT: NCAT, MOM/ ERIN/EOMI   Neck: Supple, large  Respiratory: CTA B/L  Cardiovascular: RRR, (-) m/g/r  Abdomen: NT/ND  Extremities: RLE in cast with wound vac, raised on pillows  Skin: As above  Neurological: Nonfocal  Psychiatry: Appropriate    HOSPITAL MEDICATIONS:  MEDICATIONS  (STANDING):  ALBUTerol/ipratropium for Nebulization 3 milliLiter(s) Nebulizer every 6 hours  folic acid 1 milliGRAM(s) Oral daily  heparin  Injectable 5000 Unit(s) SubCutaneous every 8 hours  imipenem/cilastatin  IVPB      imipenem/cilastatin  IVPB 1000 milliGRAM(s) IV Intermittent every 8 hours  lactobacillus acidophilus 1 Tablet(s) Oral three times a day with meals  lidocaine   Patch 1 Patch Transdermal daily  losartan 50 milliGRAM(s) Oral daily  multivitamin 1 Tablet(s) Oral daily  sodium chloride 0.9%. 1000 milliLiter(s) (10 mL/Hr) IV Continuous <Continuous>  sodium chloride 0.9%. 1000 milliLiter(s) (75 mL/Hr) IV Continuous <Continuous>  thiamine 100 milliGRAM(s) Oral daily    MEDICATIONS  (PRN):  oxyCODONE    IR 10 milliGRAM(s) Oral every 3 hours PRN Moderate and Severe Pain (4-10)      LABS:                        10.8   6.29  )-----------( 420      ( 08 Mar 2018 07:35 )             32.9     Hgb Trend: 10.8<--, 10.5<--, 10.7<--, 11.0<--, 11.5<--  03-08    133<L>  |  97  |  17  ----------------------------<  110<H>  4.5   |  28  |  0.81    Ca    8.8      08 Mar 2018 05:48  Phos  3.6     03-08  Mg     1.9     03-08    TPro  7.7  /  Alb  2.1<L>  /  TBili  0.3  /  DBili  x   /  AST  63<H>  /  ALT  98<H>  /  AlkPhos  82  03-08    Creatinine Trend: 0.81<--, 0.78<--, 0.78<--, 0.73<--, 0.78<--, 0.85<--            MICROBIOLOGY:     < from: CT Angio Chest w/ IV Cont (03.07.18 @ 17:33) >  IMPRESSION: Multiple scattered foci of nodular consolidation have   increased in size and density from 2/22/2018, with one nodule   demonstrating early cavitation. Given the provided clinical history these   nodules most likely represent septic emboli.      < end of copied text >    RADIOLOGY:  [x ] Reviewed and interpreted by me    PULMONARY FUNCTION TESTS:    EKG:

## 2018-03-08 NOTE — PROGRESS NOTE ADULT - SUBJECTIVE AND OBJECTIVE BOX
INFECTIOUS DISEASES FOLLOW UP--Poncho Guido MD  Pager [phone]    This is a follow up note for this  57y Male with  MSSA in blood and MSSA and PA from R foot wound---s/p debridement.  planning for long term IV abx in view of the MSSA in blood (regardless of KELVIN findings)    Further ROS:  CONSTITUTIONAL:  No fever, good appetite  CARDIOVASCULAR:  No chest pain or palpitations  RESPIRATORY:  No dyspnea  GASTROINTESTINAL:  No nausea, vomiting, diarrhea, or abdominal pain  GENITOURINARY:  No dysuria  NEUROLOGIC:  No headache,     Allergies  No Known Allergies    ANTIBIOTICS/RELEVANT:  antimicrobials  imipenem/cilastatin  IVPB      imipenem/cilastatin  IVPB 1000 milliGRAM(s) IV Intermittent every 8 hours    OTHER:  ALBUTerol/ipratropium for Nebulization 3 milliLiter(s) Nebulizer every 6 hours  folic acid 1 milliGRAM(s) Oral daily  heparin  Injectable 5000 Unit(s) SubCutaneous every 8 hours  lactobacillus acidophilus 1 Tablet(s) Oral three times a day with meals  lidocaine   Patch 1 Patch Transdermal daily  losartan 50 milliGRAM(s) Oral daily  multivitamin 1 Tablet(s) Oral daily  oxyCODONE    IR 10 milliGRAM(s) Oral every 3 hours PRN  sodium chloride 0.9%. 1000 milliLiter(s) IV Continuous <Continuous>  sodium chloride 0.9%. 1000 milliLiter(s) IV Continuous <Continuous>  thiamine 100 milliGRAM(s) Oral daily      Objective:  Vital Signs Last 24 Hrs  T(C): 36.6 (08 Mar 2018 11:51), Max: 37 (08 Mar 2018 04:45)  T(F): 97.9 (08 Mar 2018 11:51), Max: 98.6 (08 Mar 2018 04:45)  HR: 96 (08 Mar 2018 12:33) (82 - 98)  BP: 116/65 (08 Mar 2018 12:33) (109/69 - 128/73)  BP(mean): --  RR: 18 (08 Mar 2018 12:33) (18 - 18)  SpO2: 96% (08 Mar 2018 12:33) (93% - 98%)    PHYSICAL EXAM:  Constitutional:no acute distress  Eyes:ERIN, EOMI  Ear/Nose/Throat: no oral lesions, 	  Respiratory: clear BL  Cardiovascular: S1S2  Gastrointestinal:soft, (+) BS, no tenderness  Extremities:no e/e/c  No Lymphadenopathy  IV sites not inflammed.    LABS:                        10.8   6.29  )-----------( 420      ( 08 Mar 2018 07:35 )             32.9     03-08    133<L>  |  97  |  17  ----------------------------<  110<H>  4.5   |  28  |  0.81    Ca    8.8      08 Mar 2018 05:48  Phos  3.6     03-08  Mg     1.9     03-08    TPro  7.7  /  Alb  2.1<L>  /  TBili  0.3  /  DBili  x   /  AST  63<H>  /  ALT  98<H>  /  AlkPhos  82  03-08      MICROBIOLOGY:  repeat BC remain negative    Imp/Rx:  Bacteremia from foot infection----MSSA and PA at foot.....MSSA in blood.  To Rx with long term iv abx.  Please place picc line.  dc planning and iv abx----decisions for VAC moving forward per primary/surgery.

## 2018-03-08 NOTE — PROGRESS NOTE ADULT - SUBJECTIVE AND OBJECTIVE BOX
MEDICINE, PROGRESS NOTE [phone]    JUWAN DOMÍNGUEZ 57y [id number]    Patient seen and examined.  Patient is a 57y old  Male who presents with a chief complaint of Chest pain, confusion (22 Feb 2018 03:00)      PAST MEDICAL & SURGICAL HISTORY:  Sciatica of right side  Essential hypertension  No significant past surgical history    MEDICATIONS  (STANDING):  ALBUTerol/ipratropium for Nebulization 3 milliLiter(s) Nebulizer every 6 hours  folic acid 1 milliGRAM(s) Oral daily  heparin  Injectable 5000 Unit(s) SubCutaneous every 8 hours  imipenem/cilastatin  IVPB      imipenem/cilastatin  IVPB 1000 milliGRAM(s) IV Intermittent every 8 hours  lactobacillus acidophilus 1 Tablet(s) Oral three times a day with meals  lidocaine   Patch 1 Patch Transdermal daily  losartan 50 milliGRAM(s) Oral daily  multivitamin 1 Tablet(s) Oral daily  sodium chloride 0.9%. 1000 milliLiter(s) (10 mL/Hr) IV Continuous <Continuous>  sodium chloride 0.9%. 1000 milliLiter(s) (75 mL/Hr) IV Continuous <Continuous>  thiamine 100 milliGRAM(s) Oral daily    MEDICATIONS  (PRN):  oxyCODONE    IR 10 milliGRAM(s) Oral every 3 hours PRN Moderate and Severe Pain (4-10)    Allergies    No Known Allergies    Intolerances        PHYSICAL EXAM:  Constitutional: NAD  HEENT: Normocephalic, EOMI  Neck:  No JVD  Respiratory: CTA B/L, No wheezes  Cardiovascular: S1, S2, RRR, + systolic murmur  Gastrointestinal: BS+, soft, NT/ND  Extremities: No peripheral edema  Neurological: AAOX3, no focal deficits  Psychiatric: Normal mood, normal affect  : No Sosa    Vital Signs Last 24 Hrs  T(C): 36.9 (08 Mar 2018 18:59), Max: 37 (08 Mar 2018 04:45)  T(F): 98.4 (08 Mar 2018 18:59), Max: 98.6 (08 Mar 2018 04:45)  HR: 92 (08 Mar 2018 18:59) (82 - 98)  BP: 119/74 (08 Mar 2018 18:59) (109/69 - 128/73)  BP(mean): --  RR: 18 (08 Mar 2018 18:59) (18 - 20)  SpO2: 95% (08 Mar 2018 18:59) (93% - 98%)  I&O's Summary    07 Mar 2018 07:01  -  08 Mar 2018 07:00  --------------------------------------------------------  IN: 1550 mL / OUT: 3875 mL / NET: -2325 mL    08 Mar 2018 07:01  -  08 Mar 2018 19:47  --------------------------------------------------------  IN: 800 mL / OUT: 650 mL / NET: 150 mL        LABS:                        10.8   6.29  )-----------( 420      ( 08 Mar 2018 07:35 )             32.9     03-08    133<L>  |  97  |  17  ----------------------------<  110<H>  4.5   |  28  |  0.81    Ca    8.8      08 Mar 2018 05:48  Phos  3.6     03-08  Mg     1.9     03-08    TPro  7.7  /  Alb  2.1<L>  /  TBili  0.3  /  DBili  x   /  AST  63<H>  /  ALT  98<H>  /  AlkPhos  82  03-08    Magnesium, Serum: 1.9 mg/dL (03-08 @ 05:48)  < from: CT Angio Chest w/ IV Cont (03.07.18 @ 17:33) >  EXAM:  CT ANGIO CHEST (W)AW IC                          PROCEDURE DATE:  03/07/2018      INTERPRETATION:  CLINICAL INFORMATION: Necrotizing fasciitis with   episodes of hemoptysis.    COMPARISON: None.    PROCEDURE:   CT Angiography of the Chest.  90 ml of Omnipaque 350 was injected intravenously. 10 ml were discarded.  Sagittal and coronal reformats were performed as well as 3D (MIP)   reconstructions.    FINDINGS:    CHEST:     LUNGS AND LARGE AIRWAYS: Patent central airways.  Againappreciated are   multiple, peripheral, nodular consolidations scattered throughout both   lungs which have increased in size and density from prior CT. One such   nodule in the left lower lobe demonstrates interval cavitation (series 6,   images 249-280). There is near complete right lower lobe passive   atelectasis.  PLEURA: Moderate right pleural effusion.  VESSELS: Within normal limits.  HEART: Heart size is normal. No pericardial effusion.  MEDIASTINUM AND JESSY: No lymphadenopathy.  CHEST WALL AND LOWER NECK: Within normal limits.  VISUALIZED UPPER ABDOMEN: Calcified granuloma in the spleen.  BONES: Degenerative changes of the thoracic spine.    IMPRESSION: Multiple scattered foci of nodular consolidation have   increased in size and density from 2/22/2018, with one nodule   demonstrating early cavitation. Given the provided clinical history these   nodules most likely represent septic emboli.    ARNEL SOUSA M.D., RADIOLOGY RESIDENT  This document has been electronically signed.  JESSIAC HOLLEY M.D., ATTENDING RADIOLOGIST  This document has been electronically signed. Mar  8 2018 10:48AM  < from: [facility] Duplex Lower Ext Vein Scan, Bilat (03.07.18 @ 15:38) >  EXAM:  DUPLEX SCAN EXT VEINS LOWER BI                          PROCEDURE DATE:  03/07/2018      INTERPRETATION:  CLINICAL INFORMATION: Lower extremity pain. Right foot   surgery. Persistent right peroneal vein had been identified, last on a  2/27/2018 examination. Further follow-up study.     TECHNIQUE: Duplex sonography of the BILATERAL LOWER extremities with   color and spectral Doppler, with and without compression.      FINDINGS:    There is normal compressibility of the bilateral common femoral, femoral   and popliteal veins. Doppler examination shows normal spontaneous and   phasic flow.    There is persistent thrombosis of a right peroneal vein proximal   propagation.    The right posterior tibial veins are compressible and not thrombosed.    The left calf veins are not thrombosed.    IMPRESSION:     Persistent right peroneal vein DVT in the calf, without central   propagation.    No DVT of the left lower extremity.    ROLA REDDY M.D., RADIOLOGY RESIDENT  This document has been electronically signed.  LOS TAVERA M.D., ATTENDING RADIOLOGIST  This document has been electronically signed. Mar  7 2018  4:48PM  < from: KELVIN w/TTE (w/3D Echo) (03.08.18 @ 07:21) >  Patient name: JUWAN DOMÍNGUEZ  YOB: 1960   Age: 57 (M)   MR#: [id number]  Study Date: 3/8/2018  Location: [address] [place], [place] [id number]: Rosina Mc RDCS  Study quality: Technically difficult  Referring Physician: Carlos Miranda MD  Blood Pressure: 117/66 mmHg  Height: 163 cm  Weight: 122 kg  BSA: 2.2 m2  ------------------------------------------------------------------------  PROCEDURE: Transesophageal and transthoracic  echocardiograms with 2-D, M-Mode and complete spectral and  color flow Doppler were performed.  Informed consent was  first obtained for KELVIN. The patient was sedated - see  anesthesia record.  The procedure was monitored with  automatic blood pressure monitoring, ECG tracings and pulse  oximetry.  The transesophagealprobe was placed in the  esophagus posterior to the heart without complications.  Real-time and reconstructed 3-dimensional imaging was  performed.  Color Doppler analysis was carried out. Patient  was injected with 10 cc's of aerosolized saline. Patient  was injected with 10 cc's of aerosolized saline.  INDICATION: Endocarditis, valve unspecified (I38)  ------------------------------------------------------------------------  Dimensions:    Normal Values:  LA:     3.1    2.0 - 4.0 cm  Ao:     3.7    2.0 - 3.8 cm  SEPTUM: 1.0    0.6 - 1.2 cm  PWT:    1.0    0.6 - 1.1 cm  LVIDd:  5.2    3.0 - 5.6 cm  LVIDs:  3.7    1.8 - 4.0 cm  Derived variables:  LVMI: 87 g/m2  RWT: 0.38  Fractional short: 29 %  EF (Baljeettz): 55 %  Doppler Peak Velocity (m/sec): AoV=1.0  ------------------------------------------------------------------------  Observations:  Mitral Valve: Mitral annular calcification, otherwise  normal mitral valve.  Aortic Valve/Aorta: Thickened trileaflet aortic valve with  normal opening. Mild aortic regurgitation.  Mild atheroma noted in aortic arch/descending aorta.  Left Atrium: No left atrial or left atrial appendage  thrombus.  Left Ventricle: Normal left ventricular systolic function.  Normal left ventricular internal dimensions and wall  thicknesses.  Right Heart: Normal right atrium. Normal right ventricular  size and function. Normal tricuspid valve. Normal pulmonic  valve.  Pericardium/Pleura: Normal pericardium with no pericardial  effusion.  Hemodynamic: Contrastinjection demonstrates no evidence of  a patent foramen ovale.  ------------------------------------------------------------------------  Conclusions:  1. Mitral annular calcification, otherwise normal mitral  valve.  2. Thickened trileaflet aortic valve with normal opening.  Mild aortic regurgitation.  3. No left atrial or left atrial appendage thrombus.  4. Normal left ventricular systolic function.  5. Normal right ventricular size and function.  6. Contrast injection demonstrates no evidence of a patent  foramen ovale.  7. No evidence of valvular vegetation is seen.  *** Compared with echocardiogram of 2/26/2018, no  significant changes noted.  ------------------------------------------------------------------------  Confirmed on  3/8/2018 - 16:49:14 by Christine Driscoll M.D.  ------------------------------------------------------------------------

## 2018-03-08 NOTE — PROGRESS NOTE ADULT - ASSESSMENT
Pt is a 57M with PMHx SHIELA (on CPAP qhs, non-compliant), obesity, and daily EtOH use with recent ankle sprain 2/13 presenting from home 2/22 for chest pain and AMS x3-4 days with worsening right ankle swelling and redness admitted for sepsis 2/2 cellulitis and then found to have CT Chest findings concerning for septic embolization from MSSA bacteremia likely 2/2 RLE cellulitis with embolization to lung s/p surgical debridement with necrosis and abscess formation visualized now with wound vac. Plan for repeat OR debridement tomorrow. BCx cleared, on Primaxin for MSSA/Pseudomonas bacteremia.  Repeat CTA shows enlarging septic emboli with cavitation of lower lobe lesion.   -Abx as per ID recs.   -TTE and TTE performed and (-) for vegetation. Repeat KELVIN as per cardiology  -c/w DuoNebs q6hr prn for wheezing  -Pt has hx of SHIELA with CPAP at home, but is not compliant. Will need O/P sleep study with titration after hospital discharge  -No pulmonary c/i to surgery tomorrow. As per ARISCAT criteria, pt is at low-intermediate risk for pulmonary complication. If using general anesthesia would extubate to BIPAP.

## 2018-03-09 LAB
ALBUMIN SERPL ELPH-MCNC: 2.2 G/DL — LOW (ref 3.3–5)
ALP SERPL-CCNC: 87 U/L — SIGNIFICANT CHANGE UP (ref 40–120)
ALT FLD-CCNC: 88 U/L RC — HIGH (ref 10–45)
ANION GAP SERPL CALC-SCNC: 9 MMOL/L — SIGNIFICANT CHANGE UP (ref 5–17)
APTT BLD: 31 SEC — SIGNIFICANT CHANGE UP (ref 27.5–37.4)
AST SERPL-CCNC: 58 U/L — HIGH (ref 10–40)
BASOPHILS # BLD AUTO: 0.01 K/UL — SIGNIFICANT CHANGE UP (ref 0–0.2)
BASOPHILS NFR BLD AUTO: 0.2 % — SIGNIFICANT CHANGE UP (ref 0–2)
BILIRUB SERPL-MCNC: 0.3 MG/DL — SIGNIFICANT CHANGE UP (ref 0.2–1.2)
BLD GP AB SCN SERPL QL: NEGATIVE — SIGNIFICANT CHANGE UP
BUN SERPL-MCNC: 16 MG/DL — SIGNIFICANT CHANGE UP (ref 7–23)
CALCIUM SERPL-MCNC: 8.7 MG/DL — SIGNIFICANT CHANGE UP (ref 8.4–10.5)
CHLORIDE SERPL-SCNC: 100 MMOL/L — SIGNIFICANT CHANGE UP (ref 96–108)
CO2 SERPL-SCNC: 25 MMOL/L — SIGNIFICANT CHANGE UP (ref 22–31)
CREAT SERPL-MCNC: 0.73 MG/DL — SIGNIFICANT CHANGE UP (ref 0.5–1.3)
EOSINOPHIL # BLD AUTO: 0.15 K/UL — SIGNIFICANT CHANGE UP (ref 0–0.5)
EOSINOPHIL NFR BLD AUTO: 2.7 % — SIGNIFICANT CHANGE UP (ref 0–6)
GLUCOSE SERPL-MCNC: 107 MG/DL — HIGH (ref 70–99)
HCT VFR BLD CALC: 31.2 % — LOW (ref 39–50)
HGB BLD-MCNC: 9.9 G/DL — LOW (ref 13–17)
IMM GRANULOCYTES NFR BLD AUTO: 0.7 % — SIGNIFICANT CHANGE UP (ref 0–1.5)
INR BLD: 1.22 RATIO — HIGH (ref 0.88–1.16)
LYMPHOCYTES # BLD AUTO: 1.29 K/UL — SIGNIFICANT CHANGE UP (ref 1–3.3)
LYMPHOCYTES # BLD AUTO: 23.3 % — SIGNIFICANT CHANGE UP (ref 13–44)
MAGNESIUM SERPL-MCNC: 2 MG/DL — SIGNIFICANT CHANGE UP (ref 1.6–2.6)
MCHC RBC-ENTMCNC: 30.9 PG — SIGNIFICANT CHANGE UP (ref 27–34)
MCHC RBC-ENTMCNC: 31.7 GM/DL — LOW (ref 32–36)
MCV RBC AUTO: 97.5 FL — SIGNIFICANT CHANGE UP (ref 80–100)
MONOCYTES # BLD AUTO: 0.49 K/UL — SIGNIFICANT CHANGE UP (ref 0–0.9)
MONOCYTES NFR BLD AUTO: 8.8 % — SIGNIFICANT CHANGE UP (ref 2–14)
NEUTROPHILS # BLD AUTO: 3.56 K/UL — SIGNIFICANT CHANGE UP (ref 1.8–7.4)
NEUTROPHILS NFR BLD AUTO: 64.3 % — SIGNIFICANT CHANGE UP (ref 43–77)
PHOSPHATE SERPL-MCNC: 3.9 MG/DL — SIGNIFICANT CHANGE UP (ref 2.5–4.5)
PLATELET # BLD AUTO: 329 K/UL — SIGNIFICANT CHANGE UP (ref 150–400)
POTASSIUM SERPL-MCNC: 4.4 MMOL/L — SIGNIFICANT CHANGE UP (ref 3.5–5.3)
POTASSIUM SERPL-SCNC: 4.4 MMOL/L — SIGNIFICANT CHANGE UP (ref 3.5–5.3)
PROT SERPL-MCNC: 7.3 G/DL — SIGNIFICANT CHANGE UP (ref 6–8.3)
PROTHROM AB SERPL-ACNC: 13.8 SEC — HIGH (ref 10–13.1)
RBC # BLD: 3.2 M/UL — LOW (ref 4.2–5.8)
RBC # FLD: 13.7 % — SIGNIFICANT CHANGE UP (ref 10.3–14.5)
RH IG SCN BLD-IMP: POSITIVE — SIGNIFICANT CHANGE UP
SODIUM SERPL-SCNC: 134 MMOL/L — LOW (ref 135–145)
WBC # BLD: 5.54 K/UL — SIGNIFICANT CHANGE UP (ref 3.8–10.5)
WBC # FLD AUTO: 5.54 K/UL — SIGNIFICANT CHANGE UP (ref 3.8–10.5)

## 2018-03-09 PROCEDURE — 99232 SBSQ HOSP IP/OBS MODERATE 35: CPT

## 2018-03-09 RX ORDER — ONDANSETRON 8 MG/1
4 TABLET, FILM COATED ORAL ONCE
Qty: 0 | Refills: 0 | Status: DISCONTINUED | OUTPATIENT
Start: 2018-03-09 | End: 2018-03-09

## 2018-03-09 RX ORDER — HYDROMORPHONE HYDROCHLORIDE 2 MG/ML
0.5 INJECTION INTRAMUSCULAR; INTRAVENOUS; SUBCUTANEOUS EVERY 4 HOURS
Qty: 0 | Refills: 0 | Status: DISCONTINUED | OUTPATIENT
Start: 2018-03-09 | End: 2018-03-09

## 2018-03-09 RX ORDER — LOSARTAN POTASSIUM 100 MG/1
50 TABLET, FILM COATED ORAL DAILY
Qty: 0 | Refills: 0 | Status: DISCONTINUED | OUTPATIENT
Start: 2018-03-09 | End: 2018-03-16

## 2018-03-09 RX ORDER — IMIPENEM AND CILASTATIN 250; 250 MG/100ML; MG/100ML
1000 INJECTION, POWDER, FOR SOLUTION INTRAVENOUS EVERY 8 HOURS
Qty: 0 | Refills: 0 | Status: DISCONTINUED | OUTPATIENT
Start: 2018-03-09 | End: 2018-03-16

## 2018-03-09 RX ORDER — LACTOBACILLUS ACIDOPHILUS 100MM CELL
1 CAPSULE ORAL
Qty: 0 | Refills: 0 | Status: DISCONTINUED | OUTPATIENT
Start: 2018-03-09 | End: 2018-03-16

## 2018-03-09 RX ORDER — SODIUM CHLORIDE 9 MG/ML
1000 INJECTION INTRAMUSCULAR; INTRAVENOUS; SUBCUTANEOUS
Qty: 0 | Refills: 0 | Status: DISCONTINUED | OUTPATIENT
Start: 2018-03-09 | End: 2018-03-12

## 2018-03-09 RX ORDER — OXYCODONE HYDROCHLORIDE 5 MG/1
10 TABLET ORAL
Qty: 0 | Refills: 0 | Status: DISCONTINUED | OUTPATIENT
Start: 2018-03-09 | End: 2018-03-16

## 2018-03-09 RX ORDER — HYDROMORPHONE HYDROCHLORIDE 2 MG/ML
1 INJECTION INTRAMUSCULAR; INTRAVENOUS; SUBCUTANEOUS ONCE
Qty: 0 | Refills: 0 | Status: DISCONTINUED | OUTPATIENT
Start: 2018-03-09 | End: 2018-03-09

## 2018-03-09 RX ORDER — IPRATROPIUM/ALBUTEROL SULFATE 18-103MCG
3 AEROSOL WITH ADAPTER (GRAM) INHALATION EVERY 6 HOURS
Qty: 0 | Refills: 0 | Status: DISCONTINUED | OUTPATIENT
Start: 2018-03-09 | End: 2018-03-16

## 2018-03-09 RX ORDER — LIDOCAINE 4 G/100G
1 CREAM TOPICAL DAILY
Qty: 0 | Refills: 0 | Status: DISCONTINUED | OUTPATIENT
Start: 2018-03-09 | End: 2018-03-12

## 2018-03-09 RX ORDER — HEPARIN SODIUM 5000 [USP'U]/ML
5000 INJECTION INTRAVENOUS; SUBCUTANEOUS EVERY 8 HOURS
Qty: 0 | Refills: 0 | Status: DISCONTINUED | OUTPATIENT
Start: 2018-03-09 | End: 2018-03-16

## 2018-03-09 RX ORDER — THIAMINE MONONITRATE (VIT B1) 100 MG
100 TABLET ORAL DAILY
Qty: 0 | Refills: 0 | Status: DISCONTINUED | OUTPATIENT
Start: 2018-03-09 | End: 2018-03-12

## 2018-03-09 RX ORDER — HYDROMORPHONE HYDROCHLORIDE 2 MG/ML
0.5 INJECTION INTRAMUSCULAR; INTRAVENOUS; SUBCUTANEOUS
Qty: 0 | Refills: 0 | Status: DISCONTINUED | OUTPATIENT
Start: 2018-03-09 | End: 2018-03-12

## 2018-03-09 RX ORDER — FOLIC ACID 0.8 MG
1 TABLET ORAL DAILY
Qty: 0 | Refills: 0 | Status: DISCONTINUED | OUTPATIENT
Start: 2018-03-09 | End: 2018-03-12

## 2018-03-09 RX ORDER — HYDROMORPHONE HYDROCHLORIDE 2 MG/ML
0.2 INJECTION INTRAMUSCULAR; INTRAVENOUS; SUBCUTANEOUS
Qty: 0 | Refills: 0 | Status: DISCONTINUED | OUTPATIENT
Start: 2018-03-09 | End: 2018-03-09

## 2018-03-09 RX ORDER — HYDROMORPHONE HYDROCHLORIDE 2 MG/ML
0.5 INJECTION INTRAMUSCULAR; INTRAVENOUS; SUBCUTANEOUS EVERY 4 HOURS
Qty: 0 | Refills: 0 | Status: DISCONTINUED | OUTPATIENT
Start: 2018-03-09 | End: 2018-03-12

## 2018-03-09 RX ADMIN — HYDROMORPHONE HYDROCHLORIDE 0.5 MILLIGRAM(S): 2 INJECTION INTRAMUSCULAR; INTRAVENOUS; SUBCUTANEOUS at 14:23

## 2018-03-09 RX ADMIN — HYDROMORPHONE HYDROCHLORIDE 0.5 MILLIGRAM(S): 2 INJECTION INTRAMUSCULAR; INTRAVENOUS; SUBCUTANEOUS at 06:34

## 2018-03-09 RX ADMIN — HYDROMORPHONE HYDROCHLORIDE 0.2 MILLIGRAM(S): 2 INJECTION INTRAMUSCULAR; INTRAVENOUS; SUBCUTANEOUS at 13:53

## 2018-03-09 RX ADMIN — SODIUM CHLORIDE 75 MILLILITER(S): 9 INJECTION INTRAMUSCULAR; INTRAVENOUS; SUBCUTANEOUS at 05:50

## 2018-03-09 RX ADMIN — HYDROMORPHONE HYDROCHLORIDE 0.2 MILLIGRAM(S): 2 INJECTION INTRAMUSCULAR; INTRAVENOUS; SUBCUTANEOUS at 13:33

## 2018-03-09 RX ADMIN — OXYCODONE HYDROCHLORIDE 10 MILLIGRAM(S): 5 TABLET ORAL at 22:46

## 2018-03-09 RX ADMIN — OXYCODONE HYDROCHLORIDE 10 MILLIGRAM(S): 5 TABLET ORAL at 23:35

## 2018-03-09 RX ADMIN — Medication 3 MILLILITER(S): at 05:46

## 2018-03-09 RX ADMIN — Medication 1 TABLET(S): at 16:39

## 2018-03-09 RX ADMIN — HYDROMORPHONE HYDROCHLORIDE 0.5 MILLIGRAM(S): 2 INJECTION INTRAMUSCULAR; INTRAVENOUS; SUBCUTANEOUS at 21:45

## 2018-03-09 RX ADMIN — IMIPENEM AND CILASTATIN 250 MILLIGRAM(S): 250; 250 INJECTION, POWDER, FOR SOLUTION INTRAVENOUS at 00:44

## 2018-03-09 RX ADMIN — IMIPENEM AND CILASTATIN 250 MILLIGRAM(S): 250; 250 INJECTION, POWDER, FOR SOLUTION INTRAVENOUS at 07:55

## 2018-03-09 RX ADMIN — HYDROMORPHONE HYDROCHLORIDE 0.5 MILLIGRAM(S): 2 INJECTION INTRAMUSCULAR; INTRAVENOUS; SUBCUTANEOUS at 02:15

## 2018-03-09 RX ADMIN — HYDROMORPHONE HYDROCHLORIDE 0.5 MILLIGRAM(S): 2 INJECTION INTRAMUSCULAR; INTRAVENOUS; SUBCUTANEOUS at 21:15

## 2018-03-09 RX ADMIN — HYDROMORPHONE HYDROCHLORIDE 0.2 MILLIGRAM(S): 2 INJECTION INTRAMUSCULAR; INTRAVENOUS; SUBCUTANEOUS at 14:13

## 2018-03-09 RX ADMIN — IMIPENEM AND CILASTATIN 250 MILLIGRAM(S): 250; 250 INJECTION, POWDER, FOR SOLUTION INTRAVENOUS at 21:15

## 2018-03-09 RX ADMIN — HYDROMORPHONE HYDROCHLORIDE 0.2 MILLIGRAM(S): 2 INJECTION INTRAMUSCULAR; INTRAVENOUS; SUBCUTANEOUS at 13:42

## 2018-03-09 RX ADMIN — HEPARIN SODIUM 5000 UNIT(S): 5000 INJECTION INTRAVENOUS; SUBCUTANEOUS at 21:16

## 2018-03-09 RX ADMIN — LOSARTAN POTASSIUM 50 MILLIGRAM(S): 100 TABLET, FILM COATED ORAL at 16:39

## 2018-03-09 RX ADMIN — HYDROMORPHONE HYDROCHLORIDE 1 MILLIGRAM(S): 2 INJECTION INTRAMUSCULAR; INTRAVENOUS; SUBCUTANEOUS at 09:45

## 2018-03-09 RX ADMIN — HEPARIN SODIUM 5000 UNIT(S): 5000 INJECTION INTRAVENOUS; SUBCUTANEOUS at 15:32

## 2018-03-09 RX ADMIN — OXYCODONE HYDROCHLORIDE 10 MILLIGRAM(S): 5 TABLET ORAL at 19:41

## 2018-03-09 RX ADMIN — HYDROMORPHONE HYDROCHLORIDE 0.5 MILLIGRAM(S): 2 INJECTION INTRAMUSCULAR; INTRAVENOUS; SUBCUTANEOUS at 14:31

## 2018-03-09 RX ADMIN — OXYCODONE HYDROCHLORIDE 10 MILLIGRAM(S): 5 TABLET ORAL at 20:40

## 2018-03-09 RX ADMIN — Medication 3 MILLILITER(S): at 23:33

## 2018-03-09 RX ADMIN — OXYCODONE HYDROCHLORIDE 10 MILLIGRAM(S): 5 TABLET ORAL at 16:39

## 2018-03-09 RX ADMIN — HYDROMORPHONE HYDROCHLORIDE 0.5 MILLIGRAM(S): 2 INJECTION INTRAMUSCULAR; INTRAVENOUS; SUBCUTANEOUS at 01:45

## 2018-03-09 RX ADMIN — HYDROMORPHONE HYDROCHLORIDE 0.2 MILLIGRAM(S): 2 INJECTION INTRAMUSCULAR; INTRAVENOUS; SUBCUTANEOUS at 13:26

## 2018-03-09 RX ADMIN — Medication 100 MILLIGRAM(S): at 16:39

## 2018-03-09 RX ADMIN — IMIPENEM AND CILASTATIN 250 MILLIGRAM(S): 250; 250 INJECTION, POWDER, FOR SOLUTION INTRAVENOUS at 15:38

## 2018-03-09 RX ADMIN — HYDROMORPHONE HYDROCHLORIDE 0.5 MILLIGRAM(S): 2 INJECTION INTRAMUSCULAR; INTRAVENOUS; SUBCUTANEOUS at 05:46

## 2018-03-09 RX ADMIN — OXYCODONE HYDROCHLORIDE 10 MILLIGRAM(S): 5 TABLET ORAL at 17:28

## 2018-03-09 RX ADMIN — HYDROMORPHONE HYDROCHLORIDE 1 MILLIGRAM(S): 2 INJECTION INTRAMUSCULAR; INTRAVENOUS; SUBCUTANEOUS at 09:21

## 2018-03-09 RX ADMIN — Medication 3 MILLILITER(S): at 18:44

## 2018-03-09 RX ADMIN — Medication 1 MILLIGRAM(S): at 16:39

## 2018-03-09 RX ADMIN — HYDROMORPHONE HYDROCHLORIDE 0.2 MILLIGRAM(S): 2 INJECTION INTRAMUSCULAR; INTRAVENOUS; SUBCUTANEOUS at 13:12

## 2018-03-09 NOTE — PROGRESS NOTE ADULT - ASSESSMENT
Recent ankle sprain at work which got infected resulting in MSSA sepsis/cellulitis with lung embolization s/p OR debridement X 2 now with pseudomonas in OR culture    OM right foot/necrotizing fasciitis     hemoptysis - minimal - possibly secondary to being on hep drip with pulm septic emboli    ADA - resolved    Transaminitis - improving    Coagulopathy on admission - most likley due to dietary deficiency, holding off on any supplementation for now    Right peroneal vein dvt - on heparin prophy dose per heme    Pulm artery enlargement    Morbid obesity    SHIELA continue nocturnal bipap    PAT/ AIVR/13 beats wct - no further events    Based on interview with pt and wife, pt is not an etoh abuser (neg CAGE, never had wd symptoms in his life, never been in rehab, has gone months without drinking with no problems.)    continue current care  eventual vac placement when podiatry agrees  continue iv abx  place picc  will review long term planning with podiatry

## 2018-03-09 NOTE — PROVIDER CONTACT NOTE (OTHER) - RECOMMENDATIONS
DAGOBERTO Handy made aware
DAGOBERTO Durham made aware; tylenol for fever?
DAGOBERTO Handy made aware
Made NP aware.
NP contacted and aware. As per wound care; pt needs PT/wound consult.
NP made aware.
Notify and clarify administration with PA
NP contacted and aware.

## 2018-03-09 NOTE — BRIEF OPERATIVE NOTE - OPERATION/FINDINGS
s/p right foot dorsal wound debridement with versajet and stravix graft application, right foot medial wound delayed primary closure
s/p right foot I&D and debridement medial 1st ray, lateral ankle and dorsal foot    - large amount of purulence expressed from 1st interspace and sinus tarsi, extensive necrosis noted
15 cc of purulent/dirty dishwater drainage, necrosis to soft tissue, all sinus tracts explored, dorsal incisions over 2nd and 4th metatarsals left open and packed. Pulse lavage with bacitracin

## 2018-03-09 NOTE — PROGRESS NOTE ADULT - SUBJECTIVE AND OBJECTIVE BOX
S: no chest pain or sob         ALBUTerol/ipratropium for Nebulization 3 milliLiter(s) Nebulizer every 6 hours  folic acid 1 milliGRAM(s) Oral daily  heparin  Injectable 5000 Unit(s) SubCutaneous every 8 hours  HYDROmorphone  Injectable 0.5 milliGRAM(s) IV Push every 4 hours PRN  HYDROmorphone  Injectable 0.5 milliGRAM(s) IV Push every 10 minutes  HYDROmorphone  Injectable 0.2 milliGRAM(s) IV Push every 10 minutes PRN  imipenem/cilastatin  IVPB 1000 milliGRAM(s) IV Intermittent every 8 hours  lactobacillus acidophilus 1 Tablet(s) Oral three times a day with meals  lidocaine   Patch 1 Patch Transdermal daily  losartan 50 milliGRAM(s) Oral daily  multivitamin 1 Tablet(s) Oral daily  ondansetron Injectable 4 milliGRAM(s) IV Push once PRN  oxyCODONE    IR 10 milliGRAM(s) Oral every 3 hours PRN  sodium chloride 0.9%. 1000 milliLiter(s) IV Continuous <Continuous>  sodium chloride 0.9%. 1000 milliLiter(s) IV Continuous <Continuous>  thiamine 100 milliGRAM(s) Oral daily                            9.9    5.54  )-----------( 329      ( 09 Mar 2018 08:09 )             31.2       03-09    134<L>  |  100  |  16  ----------------------------<  107<H>  4.4   |  25  |  0.73    Ca    8.7      09 Mar 2018 06:03  Phos  3.9     03-09  Mg     2.0     03-09    TPro  7.3  /  Alb  2.2<L>  /  TBili  0.3  /  DBili  x   /  AST  58<H>  /  ALT  88<H>  /  AlkPhos  87  03-09      CARDIAC MARKERS ( 06 Mar 2018 15:58 )  x     / <0.01 ng/mL / 30 U/L / x     / 1.2 ng/mL        T(C): 36.5 (03-09-18 @ 12:42), Max: 36.9 (03-08-18 @ 18:59)  HR: 78 (03-09-18 @ 13:45) (69 - 98)  BP: 129/74 (03-09-18 @ 13:45) (110/69 - 133/77)  RR: 16 (03-09-18 @ 13:45) (15 - 20)  SpO2: 100% (03-09-18 @ 13:45) (94% - 100%)  Wt(kg): --    I&O's Summary    08 Mar 2018 07:01  -  09 Mar 2018 07:00  --------------------------------------------------------  IN: 1800 mL / OUT: 3365 mL / NET: -1565 mL            Heart: normal S1, S2, RRR, no m/r/g  Lungs: cta b/l  Abd: soft nT,nD  Ext: no edema        TELEMETRY: SR	    ECG:  	  RADIOLOGY:   DIAGNOSTIC TESTING:  [ ] Echocardiogram:  [ ]  Catheterization:  [ ] Stress Test:    OTHER: 	      ASSESSMENT/PLAN:  57yMale with etoh abuse, HTN admitted with DVT/septic emboli, necrotizing fascitis who is being seen for chest pain/sob.     -KELVIN with no vegetations  -Pt. tolerated podiatry procedure well in terms of cv perspective  -monitor tele  -f/u interventional cardiology  -ABx per primary team     Reanna Laureano MD  [facility]  [address] [place] [address], [id number]  [place] [address]  office: [phone]  pager: [phone]

## 2018-03-09 NOTE — PROGRESS NOTE ADULT - SUBJECTIVE AND OBJECTIVE BOX
INFECTIOUS DISEASES FOLLOW UP--Poncho Guido MD  Pager [phone]    This is a follow up note for this  57y Male with  MSSA in blood.  Foot infection with MSSA and PA.  repeat KELVIN no vegetations.  stable on iv imipenem.    Further ROS:  CONSTITUTIONAL:  No fever, good appetite  CARDIOVASCULAR:  No chest pain or palpitations  RESPIRATORY:  No dyspnea  GASTROINTESTINAL:  No nausea, vomiting, diarrhea, or abdominal pain  GENITOURINARY:  No dysuria  NEUROLOGIC:  No headache,     Allergies  No Known Allergies    ANTIBIOTICS/RELEVANT:  antimicrobials  imipenem/cilastatin  IVPB      imipenem/cilastatin  IVPB 1000 milliGRAM(s) IV Intermittent every 8 hours    OTHER:  ALBUTerol/ipratropium for Nebulization 3 milliLiter(s) Nebulizer every 6 hours  folic acid 1 milliGRAM(s) Oral daily  heparin  Injectable 5000 Unit(s) SubCutaneous every 8 hours  HYDROmorphone  Injectable 0.5 milliGRAM(s) IV Push every 4 hours PRN  lactobacillus acidophilus 1 Tablet(s) Oral three times a day with meals  lidocaine   Patch 1 Patch Transdermal daily  losartan 50 milliGRAM(s) Oral daily  multivitamin 1 Tablet(s) Oral daily  oxyCODONE    IR 10 milliGRAM(s) Oral every 3 hours PRN  sodium chloride 0.9%. 1000 milliLiter(s) IV Continuous <Continuous>  sodium chloride 0.9%. 1000 milliLiter(s) IV Continuous <Continuous>  thiamine 100 milliGRAM(s) Oral daily    Objective:  Vital Signs Last 24 Hrs  T(C): 36.7 (09 Mar 2018 04:22), Max: 36.9 (08 Mar 2018 18:59)  T(F): 98 (09 Mar 2018 04:22), Max: 98.4 (08 Mar 2018 18:59)  HR: 95 (09 Mar 2018 06:30) (69 - 98)  BP: 120/76 (09 Mar 2018 05:48) (110/69 - 124/81)  BP(mean): --  RR: 18 (09 Mar 2018 04:22) (18 - 20)  SpO2: 96% (09 Mar 2018 06:30) (94% - 99%)    PHYSICAL EXAM:  Constitutional:no acute distress	  Respiratory: clear BL  Cardiovascular: S1S2  Gastrointestinal:soft, (+) BS, no tenderness  foot VAC--R foot  No Lymphadenopathy  IV sites not inflammed.    LABS:                        10.8   6.29  )-----------( 420      ( 08 Mar 2018 07:35 )             32.9     03-09    134<L>  |  100  |  16  ----------------------------<  107<H>  4.4   |  25  |  0.73    Ca    8.7      09 Mar 2018 06:03  Phos  3.9     03-09  Mg     2.0     03-09    TPro  7.3  /  Alb  2.2<L>  /  TBili  0.3  /  DBili  x   /  AST  58<H>  /  ALT  88<H>  /  AlkPhos  87  03-09    MICROBIOLOGY:  no new micro    Imp:  Severe R foot infection  VAC in place  MSSA and PA from foot and MSSA in blood.    Rx:  Increase dose of imipenem to 500 mg iv q6 hours  place picc line.  id covers this weekend [id number]    id issues d/w wife at bedside this am

## 2018-03-09 NOTE — PROGRESS NOTE ADULT - ASSESSMENT
To OR today at w/ Dr Westbrook for foot wound debridement and application of stravix graft  CXR on sunrise.  EKG on sunrise.  Cardiac documented in chart.  Consent signed and in chart.  Procedure was explained to patient in detail. All alternatives, risks and complications were discussed. All questions answered.

## 2018-03-09 NOTE — PROVIDER CONTACT NOTE (OTHER) - SITUATION
Patient ordered for subcutaneous heparin in the morning time prior to OR debridement and graft procedure.

## 2018-03-09 NOTE — CHART NOTE - NSCHARTNOTEFT_GEN_A_CORE
Malnutrition Follow-up   Chart reviewed, events noted.  Pt NPO for OR today for foot wound debridement and application of stravix graft      Source: Patient [x]    Family [ ]     other [x] Comprehensive review of medical records     Diet : NPO after midnight, DASH      Patient reports very good appetite and po intake. RD encouraged adequate protein and provided in-depth nutrition education for heart health and wt loss to pt and pt's wife. Pt reports trying to consume adequate protein, amenable to trying Prosource for added protein.       PO intake:   [x]   %     Source for PO intake [x] Patient [ ] family [x] chart [ ] staff [ ] other          Current Weight: 2/22: 269.1 pounds -> 2/28: 272 pounds-> 3/2: 249.3 pounds (bed)- recommend confirming standing or wt via zeroed bed scale as feasible as large fluctuation noted.         Pertinent Medications: MEDICATIONS  (STANDING):  ALBUTerol/ipratropium for Nebulization 3 milliLiter(s) Nebulizer every 6 hours  folic acid 1 milliGRAM(s) Oral daily  heparin  Injectable 5000 Unit(s) SubCutaneous every 8 hours  imipenem/cilastatin  IVPB      imipenem/cilastatin  IVPB 1000 milliGRAM(s) IV Intermittent every 8 hours  lactobacillus acidophilus 1 Tablet(s) Oral three times a day with meals  lidocaine   Patch 1 Patch Transdermal daily  losartan 50 milliGRAM(s) Oral daily  multivitamin 1 Tablet(s) Oral daily  sodium chloride 0.9%. 1000 milliLiter(s) (10 mL/Hr) IV Continuous <Continuous>  sodium chloride 0.9%. 1000 milliLiter(s) (75 mL/Hr) IV Continuous <Continuous>  thiamine 100 milliGRAM(s) Oral daily    MEDICATIONS  (PRN):  HYDROmorphone  Injectable 0.5 milliGRAM(s) IV Push every 4 hours PRN Moderate Pain (4 - 6)  oxyCODONE    IR 10 milliGRAM(s) Oral every 3 hours PRN Moderate and Severe Pain (4-10)    Pertinent Labs:  03-09 Na134 mmol/L<L> Glu 107 mg/dL<H> K+ 4.4 mmol/L Cr  0.73 mg/dL BUN 16 mg/dL Phos 3.9 mg/dL Alb 2.2 g/dL<L> PAB n/a         Skin: non pitting right foot edema; right dorsal foot, lateral foot wounds.     Estimated Needs:   [x] no change since previous assessment  [ ] recalculated:       Previous Nutrition Diagnosis:         [x] Malnutrition (acute-severe)  [x] Morbid Obesity          Nutrition Diagnosis is [ x] resolving with pt's improving appetite, pt's food preferences and health shakes  Nutrition Education provided for Morbid Obesity.          New Nutrition Diagnosis: [ x] not applicable     Interventions/ Recommend  1) Continue DASH diet, add Prosource x1/day for extra protein.   2) Encourage po intake with nutrient dense foods.   3) Provide food preferences as able.   4) Monitor weight, lab values, skin, po intake and GI tolerance.   5) Continue to provide nutrition education/reinforcement as able.      Monitoring and Evaluation:     follow up per protocol    RD to remain available for further nutritional interventions as indicated.   Yenni Phillip, MS, RD, CDN #[phone].

## 2018-03-09 NOTE — BRIEF OPERATIVE NOTE - PRE-OP DX
Abscess  02/25/2018  right foot, concern for necrotizing fasiitis  Active  Margot Calderon  Necrotizing fasciitis, ankle and foot  03/09/2018    Active  Darlene Martinez

## 2018-03-09 NOTE — PROGRESS NOTE ADULT - SUBJECTIVE AND OBJECTIVE BOX
Subjective: pt seen and examined, no complaints on exam.     ALBUTerol/ipratropium for Nebulization 3 milliLiter(s) Nebulizer every 6 hours  folic acid 1 milliGRAM(s) Oral daily  heparin  Injectable 5000 Unit(s) SubCutaneous every 8 hours  HYDROmorphone  Injectable 0.5 milliGRAM(s) IV Push every 4 hours PRN  imipenem/cilastatin  IVPB      imipenem/cilastatin  IVPB 1000 milliGRAM(s) IV Intermittent every 8 hours  lactobacillus acidophilus 1 Tablet(s) Oral three times a day with meals  lidocaine   Patch 1 Patch Transdermal daily  losartan 50 milliGRAM(s) Oral daily  multivitamin 1 Tablet(s) Oral daily  oxyCODONE    IR 10 milliGRAM(s) Oral every 3 hours PRN  sodium chloride 0.9%. 1000 milliLiter(s) IV Continuous <Continuous>  sodium chloride 0.9%. 1000 milliLiter(s) IV Continuous <Continuous>  thiamine 100 milliGRAM(s) Oral daily                            10.8   6.29  )-----------( 420      ( 08 Mar 2018 07:35 )             32.9       Hemoglobin: 10.8 g/dL (03-08 @ 07:35)  Hemoglobin: 10.5 g/dL (03-07 @ 07:36)  Hemoglobin: 10.7 g/dL (03-06 @ 06:00)  Hemoglobin: 11.0 g/dL (03-05 @ 06:39)  Hemoglobin: 11.5 g/dL (03-04 @ 08:17)      03-08    133<L>  |  97  |  17  ----------------------------<  110<H>  4.5   |  28  |  0.81    Ca    8.8      08 Mar 2018 05:48  Phos  3.6     03-08  Mg     1.9     03-08    TPro  7.7  /  Alb  2.1<L>  /  TBili  0.3  /  DBili  x   /  AST  63<H>  /  ALT  98<H>  /  AlkPhos  82  03-08    Creatinine Trend: 0.81<--, 0.78<--, 0.78<--, 0.73<--, 0.78<--, 0.85<--    COAGS:     CARDIAC MARKERS ( 06 Mar 2018 15:58 )  x     / <0.01 ng/mL / 30 U/L / x     / 1.2 ng/mL        T(C): 36.7 (03-09-18 @ 04:22), Max: 36.9 (03-08-18 @ 18:59)  HR: 69 (03-09-18 @ 04:22) (69 - 98)  BP: 122/68 (03-09-18 @ 04:22) (110/69 - 124/81)  RR: 18 (03-09-18 @ 04:22) (18 - 20)  SpO2: 99% (03-09-18 @ 04:22) (94% - 99%)  Wt(kg): --    I&O's Summary    07 Mar 2018 07:01  -  08 Mar 2018 07:00  --------------------------------------------------------  IN: 1550 mL / OUT: 3875 mL / NET: -2325 mL    08 Mar 2018 07:01  -  09 Mar 2018 05:13  --------------------------------------------------------  IN: 1350 mL / OUT: 2425 mL / NET: -1075 mL      HEENT:   Normal oral mucosa, PERRL, EOMI	  Lymphatic: No obvious lymphadenopathy , no edema  Cardiovascular: Normal S1 S2, No JVD, 1/6 KITTY murmur, Peripheral pulses palpable 2+ bilaterally  Respiratory: Lungs clear to auscultation, normal effort 	  Gastrointestinal:  Soft, Non-tender, + BS	  Skin: No rashes,  No cyanosis, warm to touch  Musculoskeletal: Normal range of motion, normal strength  Psychiatry:  Appropriate Mood & affect     TELEMETRY:  	  NSR     	  ECHO: < from: KELVIN w/TTE (w/3D Echo) (03.08.18 @ 07:21) >  Conclusions:  1. Mitral annular calcification, otherwise normal mitral  valve.  2. Thickened trileaflet aortic valve with normal opening.  Mild aortic regurgitation.  3. No left atrial or left atrial appendage thrombus.  4. Normal left ventricular systolic function.  5. Normal right ventricular size and function.  6. Contrast injection demonstrates no evidence of a patent  foramen ovale.  7. No evidence of valvular vegetation is seen.  *** Compared with echocardiogram of 2/26/2018, no  significant changes noted.    < end of copied text >  	      ASSESSMENT/PLAN: 	57y Male wiht pmhx of htn/obesity admitted with osteomyelitis s/p episode of palpitaitons/? NSVT    cont tele   KELVIN nl LV/ RV fx   cont abx   GI / DVT prophylaxis.   keep K>4, mag >2.0   no need for cardiac cath at present.  pt optimized for sx today   D/W Dr Dickerson

## 2018-03-09 NOTE — PROGRESS NOTE ADULT - SUBJECTIVE AND OBJECTIVE BOX
EP ATTENDING    tele: NSR, no events    no palpitations, no syncope, no angina    ALBUTerol/ipratropium for Nebulization 3 milliLiter(s) Nebulizer every 6 hours  folic acid 1 milliGRAM(s) Oral daily  heparin  Injectable 5000 Unit(s) SubCutaneous every 8 hours  HYDROmorphone  Injectable 0.5 milliGRAM(s) IV Push every 4 hours PRN  imipenem/cilastatin  IVPB      imipenem/cilastatin  IVPB 1000 milliGRAM(s) IV Intermittent every 8 hours  lactobacillus acidophilus 1 Tablet(s) Oral three times a day with meals  lidocaine   Patch 1 Patch Transdermal daily  losartan 50 milliGRAM(s) Oral daily  multivitamin 1 Tablet(s) Oral daily  oxyCODONE    IR 10 milliGRAM(s) Oral every 3 hours PRN  sodium chloride 0.9%. 1000 milliLiter(s) IV Continuous <Continuous>  sodium chloride 0.9%. 1000 milliLiter(s) IV Continuous <Continuous>  thiamine 100 milliGRAM(s) Oral daily                            10.8   6.29  )-----------( 420      ( 08 Mar 2018 07:35 )             32.9       03-09    134<L>  |  100  |  16  ----------------------------<  107<H>  4.4   |  25  |  0.73    Ca    8.7      09 Mar 2018 06:03  Phos  3.9     03-09  Mg     2.0     03-09    TPro  7.3  /  Alb  2.2<L>  /  TBili  0.3  /  DBili  x   /  AST  58<H>  /  ALT  88<H>  /  AlkPhos  87  03-09      CARDIAC MARKERS ( 06 Mar 2018 15:58 )  x     / <0.01 ng/mL / 30 U/L / x     / 1.2 ng/mL        T(C): 36.7 (03-09-18 @ 04:22), Max: 36.9 (03-08-18 @ 18:59)  HR: 95 (03-09-18 @ 06:30) (69 - 98)  BP: 120/76 (03-09-18 @ 05:48) (110/69 - 124/81)  RR: 18 (03-09-18 @ 04:22) (18 - 20)  SpO2: 96% (03-09-18 @ 06:30) (94% - 99%)  Wt(kg): --    no JVD  RRR, no murmurs  CTAB  soft nt/nd  no c/c/e    repeat KELVIN: unremarkable, no evidence of endocarditis      A/P)  He is a pleasant 58 y/o male PMH HTN admitted with staph aureus bacteremia, likely from right ankle cellulitis/nec fasc. A KELVIN and TTE were negative for endocarditis. In this setting he developed asymptomatic episodes of paroxysmal atrial tachycardia. There has been no evidence of atrial fibrillation. He denies palpitations nor syncope.    -given his lack of symptoms and brief duration of his PAT episodes I would not start beta blockers  -workup of dyspnea and chest pain as per cardiology  -will follow  -management of DVT as per medicine      Dany Patel M.D., [facility]  Cardiac Electrophysiology  [facility]  [address] [place] [address], [address] [place] [address]  [email or web address]    office [phone]  pager [phone]

## 2018-03-09 NOTE — PROGRESS NOTE ADULT - SUBJECTIVE AND OBJECTIVE BOX
MEDICINE, PROGRESS NOTE [phone]    JUWAN DOMÍNGUEZ 57y [id number]    Patient seen and examined.  Patient is a 57y old  Male who presents with a chief complaint of Chest pain, confusion (22 Feb 2018 03:00)      PAST MEDICAL & SURGICAL HISTORY:  Sciatica of right side  Essential hypertension  No significant past surgical history    MEDICATIONS  (STANDING):  ALBUTerol/ipratropium for Nebulization 3 milliLiter(s) Nebulizer every 6 hours  folic acid 1 milliGRAM(s) Oral daily  heparin  Injectable 5000 Unit(s) SubCutaneous every 8 hours  HYDROmorphone  Injectable 0.5 milliGRAM(s) IV Push every 10 minutes  imipenem/cilastatin  IVPB 1000 milliGRAM(s) IV Intermittent every 8 hours  lactobacillus acidophilus 1 Tablet(s) Oral three times a day with meals  lidocaine   Patch 1 Patch Transdermal daily  losartan 50 milliGRAM(s) Oral daily  multivitamin 1 Tablet(s) Oral daily  sodium chloride 0.9%. 1000 milliLiter(s) (10 mL/Hr) IV Continuous <Continuous>  sodium chloride 0.9%. 1000 milliLiter(s) (75 mL/Hr) IV Continuous <Continuous>  thiamine 100 milliGRAM(s) Oral daily    MEDICATIONS  (PRN):  HYDROmorphone  Injectable 0.5 milliGRAM(s) IV Push every 4 hours PRN Moderate Pain (4 - 6)  oxyCODONE    IR 10 milliGRAM(s) Oral every 3 hours PRN Moderate and Severe Pain (4-10)    Allergies    No Known Allergies    Intolerances        PHYSICAL EXAM:  Constitutional: NAD  HEENT: Normocephalic, EOMI  Neck:  No JVD  Respiratory: CTA B/L, No wheezes  Cardiovascular: S1, S2, RRR, + systolic murmur  Gastrointestinal: BS+, soft, NT/ND  Extremities: No peripheral edema  Neurological: AAOX3, no focal deficits  Psychiatric: Normal mood, normal affect  : No Sosa    Vital Signs Last 24 Hrs  T(C): 37.2 (09 Mar 2018 16:36), Max: 37.2 (09 Mar 2018 16:36)  T(F): 98.9 (09 Mar 2018 16:36), Max: 98.9 (09 Mar 2018 16:36)  HR: 91 (09 Mar 2018 16:36) (69 - 95)  BP: 136/76 (09 Mar 2018 16:36) (110/69 - 136/76)  BP(mean): 93 (09 Mar 2018 15:00) (82 - 97)  RR: 18 (09 Mar 2018 16:36) (15 - 20)  SpO2: 94% (09 Mar 2018 16:36) (94% - 100%)  I&O's Summary    08 Mar 2018 07:01  -  09 Mar 2018 07:00  --------------------------------------------------------  IN: 1800 mL / OUT: 3365 mL / NET: -1565 mL    09 Mar 2018 07:01  -  09 Mar 2018 17:09  --------------------------------------------------------  IN: 0 mL / OUT: 500 mL / NET: -500 mL        LABS:                        9.9    5.54  )-----------( 329      ( 09 Mar 2018 08:09 )             31.2     03-09    134<L>  |  100  |  16  ----------------------------<  107<H>  4.4   |  25  |  0.73    Ca    8.7      09 Mar 2018 06:03  Phos  3.9     03-09  Mg     2.0     03-09    TPro  7.3  /  Alb  2.2<L>  /  TBili  0.3  /  DBili  x   /  AST  58<H>  /  ALT  88<H>  /  AlkPhos  87  03-09  Magnesium, Serum: 2.0 mg/dL (03-09 @ 06:03)

## 2018-03-09 NOTE — PROGRESS NOTE ADULT - SUBJECTIVE AND OBJECTIVE BOX
Patient is a 57y old  Male who presents with a chief complaint of Chest pain, confusion (22 Feb 2018 03:00)      INTERVAL HPI/OVERNIGHT EVENTS:   Pt is scheduled forwound debridement w/ Dr Carmona. Patient is aware of procedure and is NPO since midnight.    MEDICATIONS  (STANDING):  ALBUTerol/ipratropium for Nebulization 3 milliLiter(s) Nebulizer every 6 hours  folic acid 1 milliGRAM(s) Oral daily  heparin  Injectable 5000 Unit(s) SubCutaneous every 8 hours  imipenem/cilastatin  IVPB      imipenem/cilastatin  IVPB 1000 milliGRAM(s) IV Intermittent every 8 hours  lactobacillus acidophilus 1 Tablet(s) Oral three times a day with meals  lidocaine   Patch 1 Patch Transdermal daily  losartan 50 milliGRAM(s) Oral daily  multivitamin 1 Tablet(s) Oral daily  sodium chloride 0.9%. 1000 milliLiter(s) (10 mL/Hr) IV Continuous <Continuous>  sodium chloride 0.9%. 1000 milliLiter(s) (75 mL/Hr) IV Continuous <Continuous>  thiamine 100 milliGRAM(s) Oral daily    MEDICATIONS  (PRN):  HYDROmorphone  Injectable 0.5 milliGRAM(s) IV Push every 4 hours PRN Moderate Pain (4 - 6)  oxyCODONE    IR 10 milliGRAM(s) Oral every 3 hours PRN Moderate and Severe Pain (4-10)      Allergies    No Known Allergies    Intolerances        Vital Signs Last 24 Hrs  T(C): 36.7 (09 Mar 2018 04:22), Max: 36.9 (08 Mar 2018 18:59)  T(F): 98 (09 Mar 2018 04:22), Max: 98.4 (08 Mar 2018 18:59)  HR: 95 (09 Mar 2018 06:30) (69 - 98)  BP: 120/76 (09 Mar 2018 05:48) (110/69 - 124/81)  BP(mean): --  RR: 18 (09 Mar 2018 04:22) (18 - 20)  SpO2: 96% (09 Mar 2018 06:30) (94% - 99%)    LABS:                        10.8   6.29  )-----------( 420      ( 08 Mar 2018 07:35 )             32.9     03-09    134<L>  |  100  |  16  ----------------------------<  107<H>  4.4   |  25  |  0.73    Ca    8.7      09 Mar 2018 06:03  Phos  3.9     03-09  Mg     2.0     03-09    TPro  7.3  /  Alb  2.2<L>  /  TBili  0.3  /  DBili  x   /  AST  58<H>  /  ALT  88<H>  /  AlkPhos  87  03-09        CAPILLARY BLOOD GLUCOSE          RADIOLOGY & ADDITIONAL TESTS:

## 2018-03-10 LAB
ALBUMIN SERPL ELPH-MCNC: 2.2 G/DL — LOW (ref 3.3–5)
ALP SERPL-CCNC: 80 U/L — SIGNIFICANT CHANGE UP (ref 40–120)
ALT FLD-CCNC: 77 U/L RC — HIGH (ref 10–45)
ANION GAP SERPL CALC-SCNC: 12 MMOL/L — SIGNIFICANT CHANGE UP (ref 5–17)
AST SERPL-CCNC: 43 U/L — HIGH (ref 10–40)
BASOPHILS # BLD AUTO: 0.02 K/UL — SIGNIFICANT CHANGE UP (ref 0–0.2)
BASOPHILS NFR BLD AUTO: 0.4 % — SIGNIFICANT CHANGE UP (ref 0–2)
BILIRUB SERPL-MCNC: 0.3 MG/DL — SIGNIFICANT CHANGE UP (ref 0.2–1.2)
BUN SERPL-MCNC: 14 MG/DL — SIGNIFICANT CHANGE UP (ref 7–23)
CALCIUM SERPL-MCNC: 8.8 MG/DL — SIGNIFICANT CHANGE UP (ref 8.4–10.5)
CHLORIDE SERPL-SCNC: 96 MMOL/L — SIGNIFICANT CHANGE UP (ref 96–108)
CO2 SERPL-SCNC: 25 MMOL/L — SIGNIFICANT CHANGE UP (ref 22–31)
CREAT SERPL-MCNC: 0.79 MG/DL — SIGNIFICANT CHANGE UP (ref 0.5–1.3)
EOSINOPHIL # BLD AUTO: 0.1 K/UL — SIGNIFICANT CHANGE UP (ref 0–0.5)
EOSINOPHIL NFR BLD AUTO: 1.9 % — SIGNIFICANT CHANGE UP (ref 0–6)
GLUCOSE SERPL-MCNC: 105 MG/DL — HIGH (ref 70–99)
HCT VFR BLD CALC: 30.4 % — LOW (ref 39–50)
HGB BLD-MCNC: 10 G/DL — LOW (ref 13–17)
IMM GRANULOCYTES NFR BLD AUTO: 0.4 % — SIGNIFICANT CHANGE UP (ref 0–1.5)
LYMPHOCYTES # BLD AUTO: 1.29 K/UL — SIGNIFICANT CHANGE UP (ref 1–3.3)
LYMPHOCYTES # BLD AUTO: 24.6 % — SIGNIFICANT CHANGE UP (ref 13–44)
MAGNESIUM SERPL-MCNC: 2.1 MG/DL — SIGNIFICANT CHANGE UP (ref 1.6–2.6)
MCHC RBC-ENTMCNC: 31.6 PG — SIGNIFICANT CHANGE UP (ref 27–34)
MCHC RBC-ENTMCNC: 32.9 GM/DL — SIGNIFICANT CHANGE UP (ref 32–36)
MCV RBC AUTO: 96.2 FL — SIGNIFICANT CHANGE UP (ref 80–100)
MONOCYTES # BLD AUTO: 0.45 K/UL — SIGNIFICANT CHANGE UP (ref 0–0.9)
MONOCYTES NFR BLD AUTO: 8.6 % — SIGNIFICANT CHANGE UP (ref 2–14)
NEUTROPHILS # BLD AUTO: 3.36 K/UL — SIGNIFICANT CHANGE UP (ref 1.8–7.4)
NEUTROPHILS NFR BLD AUTO: 64.1 % — SIGNIFICANT CHANGE UP (ref 43–77)
PHOSPHATE SERPL-MCNC: 3.6 MG/DL — SIGNIFICANT CHANGE UP (ref 2.5–4.5)
PLATELET # BLD AUTO: 323 K/UL — SIGNIFICANT CHANGE UP (ref 150–400)
POTASSIUM SERPL-MCNC: 4.2 MMOL/L — SIGNIFICANT CHANGE UP (ref 3.5–5.3)
POTASSIUM SERPL-SCNC: 4.2 MMOL/L — SIGNIFICANT CHANGE UP (ref 3.5–5.3)
PROT SERPL-MCNC: 7.4 G/DL — SIGNIFICANT CHANGE UP (ref 6–8.3)
RBC # BLD: 3.16 M/UL — LOW (ref 4.2–5.8)
RBC # FLD: 13.8 % — SIGNIFICANT CHANGE UP (ref 10.3–14.5)
SODIUM SERPL-SCNC: 133 MMOL/L — LOW (ref 135–145)
WBC # BLD: 5.24 K/UL — SIGNIFICANT CHANGE UP (ref 3.8–10.5)
WBC # FLD AUTO: 5.24 K/UL — SIGNIFICANT CHANGE UP (ref 3.8–10.5)

## 2018-03-10 RX ORDER — HYDROMORPHONE HYDROCHLORIDE 2 MG/ML
2 INJECTION INTRAMUSCULAR; INTRAVENOUS; SUBCUTANEOUS ONCE
Qty: 0 | Refills: 0 | Status: DISCONTINUED | OUTPATIENT
Start: 2018-03-10 | End: 2018-03-10

## 2018-03-10 RX ADMIN — OXYCODONE HYDROCHLORIDE 10 MILLIGRAM(S): 5 TABLET ORAL at 15:17

## 2018-03-10 RX ADMIN — OXYCODONE HYDROCHLORIDE 10 MILLIGRAM(S): 5 TABLET ORAL at 11:18

## 2018-03-10 RX ADMIN — IMIPENEM AND CILASTATIN 250 MILLIGRAM(S): 250; 250 INJECTION, POWDER, FOR SOLUTION INTRAVENOUS at 22:46

## 2018-03-10 RX ADMIN — LOSARTAN POTASSIUM 50 MILLIGRAM(S): 100 TABLET, FILM COATED ORAL at 05:10

## 2018-03-10 RX ADMIN — Medication 1 TABLET(S): at 12:54

## 2018-03-10 RX ADMIN — OXYCODONE HYDROCHLORIDE 10 MILLIGRAM(S): 5 TABLET ORAL at 09:18

## 2018-03-10 RX ADMIN — Medication 3 MILLILITER(S): at 17:55

## 2018-03-10 RX ADMIN — OXYCODONE HYDROCHLORIDE 10 MILLIGRAM(S): 5 TABLET ORAL at 02:30

## 2018-03-10 RX ADMIN — IMIPENEM AND CILASTATIN 250 MILLIGRAM(S): 250; 250 INJECTION, POWDER, FOR SOLUTION INTRAVENOUS at 05:04

## 2018-03-10 RX ADMIN — OXYCODONE HYDROCHLORIDE 10 MILLIGRAM(S): 5 TABLET ORAL at 12:18

## 2018-03-10 RX ADMIN — OXYCODONE HYDROCHLORIDE 10 MILLIGRAM(S): 5 TABLET ORAL at 05:56

## 2018-03-10 RX ADMIN — HEPARIN SODIUM 5000 UNIT(S): 5000 INJECTION INTRAVENOUS; SUBCUTANEOUS at 21:09

## 2018-03-10 RX ADMIN — HEPARIN SODIUM 5000 UNIT(S): 5000 INJECTION INTRAVENOUS; SUBCUTANEOUS at 05:09

## 2018-03-10 RX ADMIN — HYDROMORPHONE HYDROCHLORIDE 2 MILLIGRAM(S): 2 INJECTION INTRAMUSCULAR; INTRAVENOUS; SUBCUTANEOUS at 10:45

## 2018-03-10 RX ADMIN — Medication 100 MILLIGRAM(S): at 12:54

## 2018-03-10 RX ADMIN — OXYCODONE HYDROCHLORIDE 10 MILLIGRAM(S): 5 TABLET ORAL at 08:18

## 2018-03-10 RX ADMIN — IMIPENEM AND CILASTATIN 250 MILLIGRAM(S): 250; 250 INJECTION, POWDER, FOR SOLUTION INTRAVENOUS at 14:17

## 2018-03-10 RX ADMIN — OXYCODONE HYDROCHLORIDE 10 MILLIGRAM(S): 5 TABLET ORAL at 18:55

## 2018-03-10 RX ADMIN — OXYCODONE HYDROCHLORIDE 10 MILLIGRAM(S): 5 TABLET ORAL at 22:00

## 2018-03-10 RX ADMIN — Medication 3 MILLILITER(S): at 12:54

## 2018-03-10 RX ADMIN — Medication 1 MILLIGRAM(S): at 12:54

## 2018-03-10 RX ADMIN — OXYCODONE HYDROCHLORIDE 10 MILLIGRAM(S): 5 TABLET ORAL at 05:10

## 2018-03-10 RX ADMIN — HEPARIN SODIUM 5000 UNIT(S): 5000 INJECTION INTRAVENOUS; SUBCUTANEOUS at 14:19

## 2018-03-10 RX ADMIN — Medication 3 MILLILITER(S): at 05:10

## 2018-03-10 RX ADMIN — OXYCODONE HYDROCHLORIDE 10 MILLIGRAM(S): 5 TABLET ORAL at 21:09

## 2018-03-10 RX ADMIN — HYDROMORPHONE HYDROCHLORIDE 2 MILLIGRAM(S): 2 INJECTION INTRAMUSCULAR; INTRAVENOUS; SUBCUTANEOUS at 11:13

## 2018-03-10 RX ADMIN — OXYCODONE HYDROCHLORIDE 10 MILLIGRAM(S): 5 TABLET ORAL at 01:48

## 2018-03-10 RX ADMIN — Medication 1 TABLET(S): at 08:18

## 2018-03-10 RX ADMIN — OXYCODONE HYDROCHLORIDE 10 MILLIGRAM(S): 5 TABLET ORAL at 17:53

## 2018-03-10 RX ADMIN — OXYCODONE HYDROCHLORIDE 10 MILLIGRAM(S): 5 TABLET ORAL at 14:17

## 2018-03-10 RX ADMIN — Medication 1 TABLET(S): at 17:53

## 2018-03-10 NOTE — PROGRESS NOTE ADULT - ASSESSMENT
· Assessment		  57M s/p R foot debridement and stravix graft application, staged procedure  - POD #1  - Graft site appears healthy, viable, stable, ready for VAC  - VAC to be applied, black granufoam  - WBC normalized  - IV abx per ID  - Pain improving controllable with PO meds

## 2018-03-10 NOTE — PROGRESS NOTE ADULT - SUBJECTIVE AND OBJECTIVE BOX
Patient is a 57y old  Male who presents with a chief complaint of Chest pain, confusion (22 Feb 2018 03:00)       INTERVAL HPI/OVERNIGHT EVENTS:  Patient seen and evaluated at bedside.  Pt is resting comfortable in NAD. Denies N/V/F/C.    Allergies    No Known Allergies    Intolerances        Vital Signs Last 24 Hrs  T(C): 36.8 (10 Mar 2018 04:22), Max: 37.2 (09 Mar 2018 16:36)  T(F): 98.3 (10 Mar 2018 04:22), Max: 98.9 (09 Mar 2018 16:36)  HR: 80 (10 Mar 2018 10:44) (76 - 98)  BP: 111/65 (10 Mar 2018 10:44) (111/65 - 136/76)  BP(mean): 93 (09 Mar 2018 15:00) (91 - 97)  RR: 18 (10 Mar 2018 04:22) (15 - 18)  SpO2: 93% (10 Mar 2018 08:50) (93% - 100%)    LABS:                        10.0   5.24  )-----------( 323      ( 10 Mar 2018 08:31 )             30.4     03-10    133<L>  |  96  |  14  ----------------------------<  105<H>  4.2   |  25  |  0.79    Ca    8.8      10 Mar 2018 06:49  Phos  3.6     03-10  Mg     2.1     03-10    TPro  7.4  /  Alb  2.2<L>  /  TBili  0.3  /  DBili  x   /  AST  43<H>  /  ALT  77<H>  /  AlkPhos  80  03-10    PT/INR - ( 09 Mar 2018 07:58 )   PT: 13.8 sec;   INR: 1.22 ratio         PTT - ( 09 Mar 2018 07:58 )  PTT:31.0 sec    CAPILLARY BLOOD GLUCOSE          Lower Extremity Physical Exam:  right dorsolateral foot surgical sites sutures intact, no dehiscence, skin staples around graft/adaptic intact, no dehiscence, no purulence, resolving erythema and swelling, graft taking well, no hematoma, medial foot sutures intact, well coapted, no dehiscence    RADIOLOGY & ADDITIONAL TESTS:

## 2018-03-10 NOTE — ANESTHESIA FOLLOW-UP NOTE - NSEVALATION_GEN_ALL_CORE
No apparent complications or complaints reguarding anesthesia care at this time
No apparent complications or complaints reguarding anesthesia care at this time/All questions were answered
No apparent complications or complaints reguarding anesthesia care at this time

## 2018-03-10 NOTE — PROGRESS NOTE ADULT - SUBJECTIVE AND OBJECTIVE BOX
Patient is a 57y old  Male who presents with a chief complaint of Chest pain, confusion (22 Feb 2018 03:00)       INTERVAL HPI/OVERNIGHT EVENTS:  Patient seen and evaluated at bedside.  Pt is resting comfortable in NAD. Denies N/V/F/C.  Pain rated at 6/10. POD #1    Allergies    No Known Allergies    Intolerances        Vital Signs Last 24 Hrs  T(C): 37.2 (11 Mar 2018 20:40), Max: 37.2 (11 Mar 2018 20:40)  T(F): 98.9 (11 Mar 2018 20:40), Max: 98.9 (11 Mar 2018 20:40)  HR: 91 (11 Mar 2018 20:40) (80 - 91)  BP: 110/73 (11 Mar 2018 20:40) (107/61 - 117/70)  BP(mean): --  RR: 18 (11 Mar 2018 20:40) (18 - 18)  SpO2: 96% (11 Mar 2018 20:40) (92% - 97%)    LABS:                        10.2   5.61  )-----------( 320      ( 11 Mar 2018 09:37 )             32.0     03-11    135  |  97  |  19  ----------------------------<  108<H>  4.2   |  27  |  0.82    Ca    9.1      11 Mar 2018 07:14  Phos  3.8     03-11  Mg     2.0     03-11    TPro  8.3  /  Alb  2.7<L>  /  TBili  0.2  /  DBili  x   /  AST  43<H>  /  ALT  70<H>  /  AlkPhos  92  03-11        CAPILLARY BLOOD GLUCOSE          Lower Extremity Physical Exam:  right dorsal foot surgical sites staples intact, no dehiscence, skin staples around graft/adaptic intact, no dehiscence, no purulence, resolving erythema and swelling, graft taking well, no hematoma, medial foot sutures intact, well coapted, no necrosis  RADIOLOGY & ADDITIONAL TESTS:

## 2018-03-10 NOTE — PROGRESS NOTE ADULT - ASSESSMENT
57M s/p R foot debridement and stravix graft application, stage procedure  - POD #1  - Graft site appears healthy, viable, stable, ready for VAC  - VAC to be applied, black granufoam  - WBC normalized  - IV abx per ID  - Pain improving controllable on PO  - Seen w/ attending

## 2018-03-10 NOTE — PROGRESS NOTE ADULT - SUBJECTIVE AND OBJECTIVE BOX
Subjective: pt seen and examined, no complaints on exam.   no events on tele     ALBUTerol/ipratropium for Nebulization 3 milliLiter(s) Nebulizer every 6 hours  folic acid 1 milliGRAM(s) Oral daily  heparin  Injectable 5000 Unit(s) SubCutaneous every 8 hours  HYDROmorphone  Injectable 0.5 milliGRAM(s) IV Push every 4 hours PRN  HYDROmorphone  Injectable 0.5 milliGRAM(s) IV Push every 10 minutes  imipenem/cilastatin  IVPB 1000 milliGRAM(s) IV Intermittent every 8 hours  lactobacillus acidophilus 1 Tablet(s) Oral three times a day with meals  lidocaine   Patch 1 Patch Transdermal daily  losartan 50 milliGRAM(s) Oral daily  multivitamin 1 Tablet(s) Oral daily  oxyCODONE    IR 10 milliGRAM(s) Oral every 3 hours PRN  sodium chloride 0.9%. 1000 milliLiter(s) IV Continuous <Continuous>  sodium chloride 0.9%. 1000 milliLiter(s) IV Continuous <Continuous>  thiamine 100 milliGRAM(s) Oral daily                            9.9    5.54  )-----------( 329      ( 09 Mar 2018 08:09 )             31.2       Hemoglobin: 9.9 g/dL (03-09 @ 08:09)  Hemoglobin: 10.8 g/dL (03-08 @ 07:35)  Hemoglobin: 10.5 g/dL (03-07 @ 07:36)  Hemoglobin: 10.7 g/dL (03-06 @ 06:00)  Hemoglobin: 11.0 g/dL (03-05 @ 06:39)      03-09    134<L>  |  100  |  16  ----------------------------<  107<H>  4.4   |  25  |  0.73    Ca    8.7      09 Mar 2018 06:03  Phos  3.9     03-09  Mg     2.0     03-09    TPro  7.3  /  Alb  2.2<L>  /  TBili  0.3  /  DBili  x   /  AST  58<H>  /  ALT  88<H>  /  AlkPhos  87  03-09    Creatinine Trend: 0.73<--, 0.81<--, 0.78<--, 0.78<--, 0.73<--, 0.78<--    COAGS:           T(C): 36.8 (03-10-18 @ 04:22), Max: 37.2 (03-09-18 @ 16:36)  HR: 76 (03-10-18 @ 04:22) (76 - 98)  BP: 115/65 (03-10-18 @ 04:22) (115/65 - 136/76)  RR: 18 (03-10-18 @ 04:22) (15 - 20)  SpO2: 100% (03-10-18 @ 04:22) (94% - 100%)  Wt(kg): --    I&O's Summary    08 Mar 2018 07:01  -  09 Mar 2018 07:00  --------------------------------------------------------  IN: 1800 mL / OUT: 3365 mL / NET: -1565 mL    09 Mar 2018 07:01  -  10 Mar 2018 05:30  --------------------------------------------------------  IN: 250 mL / OUT: 2600 mL / NET: -2350 mL      HEENT:   Normal oral mucosa, PERRL, EOMI	  Lymphatic: No obvious lymphadenopathy , no edema  Cardiovascular: Normal S1 S2, No JVD, 1/6 KITTY murmur, Peripheral pulses palpable 2+ bilaterally  Respiratory: Lungs clear to auscultation, normal effort 	  Gastrointestinal:  Soft, Non-tender, + BS	       TELEMETRY:  	  NSR     	  ECHO: < from: KELVIN w/TTE (w/3D Echo) (03.08.18 @ 07:21) >  Conclusions:  1. Mitral annular calcification, otherwise normal mitral  valve.  2. Thickened trileaflet aortic valve with normal opening.  Mild aortic regurgitation.  3. No left atrial or left atrial appendage thrombus.  4. Normal left ventricular systolic function.  5. Normal right ventricular size and function.  6. Contrast injection demonstrates no evidence of a patent  foramen ovale.  7. No evidence of valvular vegetation is seen.  *** Compared with echocardiogram of 2/26/2018, no  significant changes noted.    < end of copied text >  	      ASSESSMENT/PLAN: 	57y Male wiht pmhx of htn/obesity admitted with osteomyelitis s/p episode of palpitaitons/? NSVT  s/p right foot dorsal wound debridement with graft .     cont tele   KELVIN nl LV/ RV fx   cont abx  wound care. POD follow up    GI / DVT prophylaxis.   keep K>4, mag >2.0   no need for cardiac cath at present.  D/W Dr Dickerson Subjective: pt seen and examined, no complaints on exam.   no events on tele     ALBUTerol/ipratropium for Nebulization 3 milliLiter(s) Nebulizer every 6 hours  folic acid 1 milliGRAM(s) Oral daily  heparin  Injectable 5000 Unit(s) SubCutaneous every 8 hours  HYDROmorphone  Injectable 0.5 milliGRAM(s) IV Push every 4 hours PRN  HYDROmorphone  Injectable 0.5 milliGRAM(s) IV Push every 10 minutes  imipenem/cilastatin  IVPB 1000 milliGRAM(s) IV Intermittent every 8 hours  lactobacillus acidophilus 1 Tablet(s) Oral three times a day with meals  lidocaine   Patch 1 Patch Transdermal daily  losartan 50 milliGRAM(s) Oral daily  multivitamin 1 Tablet(s) Oral daily  oxyCODONE    IR 10 milliGRAM(s) Oral every 3 hours PRN  sodium chloride 0.9%. 1000 milliLiter(s) IV Continuous <Continuous>  sodium chloride 0.9%. 1000 milliLiter(s) IV Continuous <Continuous>  thiamine 100 milliGRAM(s) Oral daily                            9.9    5.54  )-----------( 329      ( 09 Mar 2018 08:09 )             31.2       Hemoglobin: 9.9 g/dL (03-09 @ 08:09)  Hemoglobin: 10.8 g/dL (03-08 @ 07:35)  Hemoglobin: 10.5 g/dL (03-07 @ 07:36)  Hemoglobin: 10.7 g/dL (03-06 @ 06:00)  Hemoglobin: 11.0 g/dL (03-05 @ 06:39)      03-09    134<L>  |  100  |  16  ----------------------------<  107<H>  4.4   |  25  |  0.73    Ca    8.7      09 Mar 2018 06:03  Phos  3.9     03-09  Mg     2.0     03-09    TPro  7.3  /  Alb  2.2<L>  /  TBili  0.3  /  DBili  x   /  AST  58<H>  /  ALT  88<H>  /  AlkPhos  87  03-09    Creatinine Trend: 0.73<--, 0.81<--, 0.78<--, 0.78<--, 0.73<--, 0.78<--    COAGS:           T(C): 36.8 (03-10-18 @ 04:22), Max: 37.2 (03-09-18 @ 16:36)  HR: 76 (03-10-18 @ 04:22) (76 - 98)  BP: 115/65 (03-10-18 @ 04:22) (115/65 - 136/76)  RR: 18 (03-10-18 @ 04:22) (15 - 20)  SpO2: 100% (03-10-18 @ 04:22) (94% - 100%)  Wt(kg): --    I&O's Summary    08 Mar 2018 07:01  -  09 Mar 2018 07:00  --------------------------------------------------------  IN: 1800 mL / OUT: 3365 mL / NET: -1565 mL    09 Mar 2018 07:01  -  10 Mar 2018 05:30  --------------------------------------------------------  IN: 250 mL / OUT: 2600 mL / NET: -2350 mL      HEENT:   Normal oral mucosa, PERRL, EOMI	  Lymphatic: No obvious lymphadenopathy , no edema  Cardiovascular: Normal S1 S2, No JVD, 1/6 KITTY murmur, Peripheral pulses palpable 2+ bilaterally  Respiratory: Lungs clear to auscultation, normal effort 	  Gastrointestinal:  Soft, Non-tender, + BS	       TELEMETRY:  	  NSR     	  ECHO: < from: KELVIN w/TTE (w/3D Echo) (03.08.18 @ 07:21) >  Conclusions:  1. Mitral annular calcification, otherwise normal mitral  valve.  2. Thickened trileaflet aortic valve with normal opening.  Mild aortic regurgitation.  3. No left atrial or left atrial appendage thrombus.  4. Normal left ventricular systolic function.  5. Normal right ventricular size and function.  6. Contrast injection demonstrates no evidence of a patent  foramen ovale.  7. No evidence of valvular vegetation is seen.  *** Compared with echocardiogram of 2/26/2018, no  significant changes noted.    < end of copied text >  	      ASSESSMENT/PLAN: 	57y Male wiht pmhx of htn/obesity admitted with osteomyelitis. EP called for brief episodes of PAT. Echo normal.  s/p right foot dorsal wound debridement with graft .     KELVIN nl LV/ RV fx   cont abx  wound care. POD follow up    GI / DVT prophylaxis.   keep K>4, mag >2.0

## 2018-03-10 NOTE — PROGRESS NOTE ADULT - SUBJECTIVE AND OBJECTIVE BOX
MEDICINE, PROGRESS NOTE [phone]    JUWAN DOMÍNGUEZ 57y [id number]    Patient seen and examined.  Patient is a 57y old  Male who presents with a chief complaint of Chest pain, confusion (22 Feb 2018 03:00)  pt has no current complaints.    PAST MEDICAL & SURGICAL HISTORY:  Sciatica of right side  Essential hypertension  No significant past surgical history    MEDICATIONS  (STANDING):  ALBUTerol/ipratropium for Nebulization 3 milliLiter(s) Nebulizer every 6 hours  folic acid 1 milliGRAM(s) Oral daily  heparin  Injectable 5000 Unit(s) SubCutaneous every 8 hours  HYDROmorphone  Injectable 0.5 milliGRAM(s) IV Push every 10 minutes  imipenem/cilastatin  IVPB 1000 milliGRAM(s) IV Intermittent every 8 hours  lactobacillus acidophilus 1 Tablet(s) Oral three times a day with meals  lidocaine   Patch 1 Patch Transdermal daily  losartan 50 milliGRAM(s) Oral daily  multivitamin 1 Tablet(s) Oral daily  sodium chloride 0.9%. 1000 milliLiter(s) (10 mL/Hr) IV Continuous <Continuous>  sodium chloride 0.9%. 1000 milliLiter(s) (75 mL/Hr) IV Continuous <Continuous>  thiamine 100 milliGRAM(s) Oral daily    MEDICATIONS  (PRN):  HYDROmorphone  Injectable 0.5 milliGRAM(s) IV Push every 4 hours PRN Moderate Pain (4 - 6)  oxyCODONE    IR 10 milliGRAM(s) Oral every 3 hours PRN Moderate and Severe Pain (4-10)    Allergies    No Known Allergies    Intolerances        PHYSICAL EXAM:  Constitutional: NAD  HEENT: Normocephalic, EOMI  Neck:  No JVD  Respiratory: CTA B/L, No wheezes  Cardiovascular: S1, S2, RRR, + systolic murmur  Gastrointestinal: BS+, soft, NT/ND  Extremities: No peripheral edema  Neurological: AAOX3, no focal deficits  Psychiatric: Normal mood, normal affect  : No Sosa    Vital Signs Last 24 Hrs  T(C): 36.6 (10 Mar 2018 13:37), Max: 37.2 (09 Mar 2018 16:36)  T(F): 97.8 (10 Mar 2018 13:37), Max: 98.9 (09 Mar 2018 16:36)  HR: 90 (10 Mar 2018 14:46) (76 - 98)  BP: 122/77 (10 Mar 2018 13:37) (111/65 - 136/76)  BP(mean): --  RR: 18 (10 Mar 2018 13:37) (18 - 18)  SpO2: 92% (10 Mar 2018 14:46) (92% - 100%)  I&O's Summary    09 Mar 2018 07:01  -  10 Mar 2018 07:00  --------------------------------------------------------  IN: 500 mL / OUT: 3200 mL / NET: -2700 mL    10 Mar 2018 06:01  -  10 Mar 2018 16:08  --------------------------------------------------------  IN: 770 mL / OUT: 1000 mL / NET: -230 mL        LABS:                        10.0   5.24  )-----------( 323      ( 10 Mar 2018 08:31 )             30.4     03-10    133<L>  |  96  |  14  ----------------------------<  105<H>  4.2   |  25  |  0.79    Ca    8.8      10 Mar 2018 06:49  Phos  3.6     03-10  Mg     2.1     03-10    TPro  7.4  /  Alb  2.2<L>  /  TBili  0.3  /  DBili  x   /  AST  43<H>  /  ALT  77<H>  /  AlkPhos  80  03-10        Magnesium, Serum: 2.1 mg/dL (03-10 @ 06:49)

## 2018-03-10 NOTE — PROGRESS NOTE ADULT - ASSESSMENT
Recent ankle sprain at work which got infected resulting in MSSA sepsis/cellulitis with lung embolization s/p OR debridement X 2 now with pseudomonas in OR culture    OM right foot/necrotizing fasciitis     hemoptysis - minimal - possibly secondary to being on hep drip with pulm septic emboli    ADA - resolved    Transaminitis - improving    Coagulopathy on admission - most likley due to dietary deficiency, holding off on any supplementation for now    Right peroneal vein dvt - on heparin prophy dose per heme    Pulm artery enlargement    Morbid obesity    SHIELA continue nocturnal bipap    PAT/ AIVR/13 beats wct - no further events    pleural effusion    Based on interview with pt and wife, pt is not an etoh abuser (neg CAGE, never had wd symptoms in his life, never been in rehab, has gone months without drinking with no problems.)    continue current care  continue vac  continue abx  picc line  PT f/u

## 2018-03-10 NOTE — PROGRESS NOTE ADULT - SUBJECTIVE AND OBJECTIVE BOX
S: no chest pain or sob       ALBUTerol/ipratropium for Nebulization 3 milliLiter(s) Nebulizer every 6 hours  folic acid 1 milliGRAM(s) Oral daily  heparin  Injectable 5000 Unit(s) SubCutaneous every 8 hours  HYDROmorphone  Injectable 0.5 milliGRAM(s) IV Push every 4 hours PRN  HYDROmorphone  Injectable 0.5 milliGRAM(s) IV Push every 10 minutes  imipenem/cilastatin  IVPB 1000 milliGRAM(s) IV Intermittent every 8 hours  lactobacillus acidophilus 1 Tablet(s) Oral three times a day with meals  lidocaine   Patch 1 Patch Transdermal daily  losartan 50 milliGRAM(s) Oral daily  multivitamin 1 Tablet(s) Oral daily  oxyCODONE    IR 10 milliGRAM(s) Oral every 3 hours PRN  sodium chloride 0.9%. 1000 milliLiter(s) IV Continuous <Continuous>  sodium chloride 0.9%. 1000 milliLiter(s) IV Continuous <Continuous>  thiamine 100 milliGRAM(s) Oral daily                            10.0   5.24  )-----------( 323      ( 10 Mar 2018 08:31 )             30.4       03-10    133<L>  |  96  |  14  ----------------------------<  105<H>  4.2   |  25  |  0.79    Ca    8.8      10 Mar 2018 06:49  Phos  3.6     03-10  Mg     2.1     03-10    TPro  7.4  /  Alb  2.2<L>  /  TBili  0.3  /  DBili  x   /  AST  43<H>  /  ALT  77<H>  /  AlkPhos  80  03-10            T(C): 36.8 (03-10-18 @ 04:22), Max: 37.2 (03-09-18 @ 16:36)  HR: 80 (03-10-18 @ 10:44) (76 - 98)  BP: 111/65 (03-10-18 @ 10:44) (111/65 - 136/76)  RR: 18 (03-10-18 @ 04:22) (15 - 18)  SpO2: 93% (03-10-18 @ 08:50) (93% - 100%)  Wt(kg): --    I&O's Summary    09 Mar 2018 07:01  -  10 Mar 2018 07:00  --------------------------------------------------------  IN: 500 mL / OUT: 3200 mL / NET: -2700 mL    10 Mar 2018 06:01  -  10 Mar 2018 12:05  --------------------------------------------------------  IN: 240 mL / OUT: 1000 mL / NET: -760 mL              Heart: normal S1, S2, RRR, no m/r/g  Lungs: cta b/l  Abd: soft nT,nD  Ext: no edema        TELEMETRY: SR	    ECG:  	  RADIOLOGY:   DIAGNOSTIC TESTING:  [ ] Echocardiogram:  [ ]  Catheterization:  [ ] Stress Test:    OTHER: 	      ASSESSMENT/PLAN:  57yMale with etoh abuse, HTN admitted with DVT/septic emboli, necrotizing fascitis who is being seen for chest pain/sob.     -KELVIN with no vegetations  -Pt. tolerated podiatry procedure well  -no need for urgent ischemic eval per IC Dr. Dickerson  -monitor tele, f/u eps    Reanna Laureano MD  [facility]  [address] [place] [address], [address]  [place] [address]  office: [phone]  pager: [phone]

## 2018-03-11 ENCOUNTER — TRANSCRIPTION ENCOUNTER (OUTPATIENT)
Age: 58
End: 2018-03-11

## 2018-03-11 LAB
ALBUMIN SERPL ELPH-MCNC: 2.7 G/DL — LOW (ref 3.3–5)
ALP SERPL-CCNC: 92 U/L — SIGNIFICANT CHANGE UP (ref 40–120)
ALT FLD-CCNC: 70 U/L RC — HIGH (ref 10–45)
ANION GAP SERPL CALC-SCNC: 11 MMOL/L — SIGNIFICANT CHANGE UP (ref 5–17)
AST SERPL-CCNC: 43 U/L — HIGH (ref 10–40)
BASOPHILS # BLD AUTO: 0.03 K/UL — SIGNIFICANT CHANGE UP (ref 0–0.2)
BASOPHILS NFR BLD AUTO: 0.5 % — SIGNIFICANT CHANGE UP (ref 0–2)
BILIRUB SERPL-MCNC: 0.2 MG/DL — SIGNIFICANT CHANGE UP (ref 0.2–1.2)
BUN SERPL-MCNC: 19 MG/DL — SIGNIFICANT CHANGE UP (ref 7–23)
CALCIUM SERPL-MCNC: 9.1 MG/DL — SIGNIFICANT CHANGE UP (ref 8.4–10.5)
CHLORIDE SERPL-SCNC: 97 MMOL/L — SIGNIFICANT CHANGE UP (ref 96–108)
CO2 SERPL-SCNC: 27 MMOL/L — SIGNIFICANT CHANGE UP (ref 22–31)
CREAT SERPL-MCNC: 0.82 MG/DL — SIGNIFICANT CHANGE UP (ref 0.5–1.3)
EOSINOPHIL # BLD AUTO: 0.1 K/UL — SIGNIFICANT CHANGE UP (ref 0–0.5)
EOSINOPHIL NFR BLD AUTO: 1.8 % — SIGNIFICANT CHANGE UP (ref 0–6)
GLUCOSE SERPL-MCNC: 108 MG/DL — HIGH (ref 70–99)
HCT VFR BLD CALC: 32 % — LOW (ref 39–50)
HGB BLD-MCNC: 10.2 G/DL — LOW (ref 13–17)
IMM GRANULOCYTES NFR BLD AUTO: 0.2 % — SIGNIFICANT CHANGE UP (ref 0–1.5)
LYMPHOCYTES # BLD AUTO: 1.56 K/UL — SIGNIFICANT CHANGE UP (ref 1–3.3)
LYMPHOCYTES # BLD AUTO: 27.8 % — SIGNIFICANT CHANGE UP (ref 13–44)
MAGNESIUM SERPL-MCNC: 2 MG/DL — SIGNIFICANT CHANGE UP (ref 1.6–2.6)
MCHC RBC-ENTMCNC: 31.8 PG — SIGNIFICANT CHANGE UP (ref 27–34)
MCHC RBC-ENTMCNC: 31.9 GM/DL — LOW (ref 32–36)
MCV RBC AUTO: 99.7 FL — SIGNIFICANT CHANGE UP (ref 80–100)
MONOCYTES # BLD AUTO: 0.43 K/UL — SIGNIFICANT CHANGE UP (ref 0–0.9)
MONOCYTES NFR BLD AUTO: 7.7 % — SIGNIFICANT CHANGE UP (ref 2–14)
NEUTROPHILS # BLD AUTO: 3.48 K/UL — SIGNIFICANT CHANGE UP (ref 1.8–7.4)
NEUTROPHILS NFR BLD AUTO: 62 % — SIGNIFICANT CHANGE UP (ref 43–77)
PHOSPHATE SERPL-MCNC: 3.8 MG/DL — SIGNIFICANT CHANGE UP (ref 2.5–4.5)
PLATELET # BLD AUTO: 320 K/UL — SIGNIFICANT CHANGE UP (ref 150–400)
POTASSIUM SERPL-MCNC: 4.2 MMOL/L — SIGNIFICANT CHANGE UP (ref 3.5–5.3)
POTASSIUM SERPL-SCNC: 4.2 MMOL/L — SIGNIFICANT CHANGE UP (ref 3.5–5.3)
PROT SERPL-MCNC: 8.3 G/DL — SIGNIFICANT CHANGE UP (ref 6–8.3)
RBC # BLD: 3.21 M/UL — LOW (ref 4.2–5.8)
RBC # FLD: 13.8 % — SIGNIFICANT CHANGE UP (ref 10.3–14.5)
SODIUM SERPL-SCNC: 135 MMOL/L — SIGNIFICANT CHANGE UP (ref 135–145)
WBC # BLD: 5.61 K/UL — SIGNIFICANT CHANGE UP (ref 3.8–10.5)
WBC # FLD AUTO: 5.61 K/UL — SIGNIFICANT CHANGE UP (ref 3.8–10.5)

## 2018-03-11 PROCEDURE — 71045 X-RAY EXAM CHEST 1 VIEW: CPT | Mod: 26

## 2018-03-11 RX ADMIN — OXYCODONE HYDROCHLORIDE 10 MILLIGRAM(S): 5 TABLET ORAL at 10:48

## 2018-03-11 RX ADMIN — OXYCODONE HYDROCHLORIDE 10 MILLIGRAM(S): 5 TABLET ORAL at 23:30

## 2018-03-11 RX ADMIN — Medication 1 TABLET(S): at 16:56

## 2018-03-11 RX ADMIN — OXYCODONE HYDROCHLORIDE 10 MILLIGRAM(S): 5 TABLET ORAL at 22:51

## 2018-03-11 RX ADMIN — LOSARTAN POTASSIUM 50 MILLIGRAM(S): 100 TABLET, FILM COATED ORAL at 04:18

## 2018-03-11 RX ADMIN — HEPARIN SODIUM 5000 UNIT(S): 5000 INJECTION INTRAVENOUS; SUBCUTANEOUS at 22:44

## 2018-03-11 RX ADMIN — Medication 3 MILLILITER(S): at 00:27

## 2018-03-11 RX ADMIN — Medication 3 MILLILITER(S): at 23:00

## 2018-03-11 RX ADMIN — IMIPENEM AND CILASTATIN 250 MILLIGRAM(S): 250; 250 INJECTION, POWDER, FOR SOLUTION INTRAVENOUS at 05:03

## 2018-03-11 RX ADMIN — Medication 1 MILLIGRAM(S): at 12:00

## 2018-03-11 RX ADMIN — IMIPENEM AND CILASTATIN 250 MILLIGRAM(S): 250; 250 INJECTION, POWDER, FOR SOLUTION INTRAVENOUS at 22:44

## 2018-03-11 RX ADMIN — OXYCODONE HYDROCHLORIDE 10 MILLIGRAM(S): 5 TABLET ORAL at 04:57

## 2018-03-11 RX ADMIN — Medication 100 MILLIGRAM(S): at 12:01

## 2018-03-11 RX ADMIN — Medication 3 MILLILITER(S): at 05:03

## 2018-03-11 RX ADMIN — Medication 3 MILLILITER(S): at 12:01

## 2018-03-11 RX ADMIN — OXYCODONE HYDROCHLORIDE 10 MILLIGRAM(S): 5 TABLET ORAL at 17:54

## 2018-03-11 RX ADMIN — Medication 1 TABLET(S): at 12:01

## 2018-03-11 RX ADMIN — OXYCODONE HYDROCHLORIDE 10 MILLIGRAM(S): 5 TABLET ORAL at 00:20

## 2018-03-11 RX ADMIN — IMIPENEM AND CILASTATIN 250 MILLIGRAM(S): 250; 250 INJECTION, POWDER, FOR SOLUTION INTRAVENOUS at 15:13

## 2018-03-11 RX ADMIN — OXYCODONE HYDROCHLORIDE 10 MILLIGRAM(S): 5 TABLET ORAL at 04:18

## 2018-03-11 RX ADMIN — Medication 1 TABLET(S): at 08:16

## 2018-03-11 RX ADMIN — HEPARIN SODIUM 5000 UNIT(S): 5000 INJECTION INTRAVENOUS; SUBCUTANEOUS at 05:03

## 2018-03-11 RX ADMIN — Medication 3 MILLILITER(S): at 17:28

## 2018-03-11 RX ADMIN — OXYCODONE HYDROCHLORIDE 10 MILLIGRAM(S): 5 TABLET ORAL at 09:48

## 2018-03-11 RX ADMIN — OXYCODONE HYDROCHLORIDE 10 MILLIGRAM(S): 5 TABLET ORAL at 01:00

## 2018-03-11 RX ADMIN — OXYCODONE HYDROCHLORIDE 10 MILLIGRAM(S): 5 TABLET ORAL at 16:54

## 2018-03-11 RX ADMIN — HEPARIN SODIUM 5000 UNIT(S): 5000 INJECTION INTRAVENOUS; SUBCUTANEOUS at 15:13

## 2018-03-11 NOTE — PROGRESS NOTE ADULT - SUBJECTIVE AND OBJECTIVE BOX
Subjective: pt seen and examined, no complaints on exam.   Tele with [facility] this Am         HEENT:   Normal oral mucosa, PERRL, EOMI	  Lymphatic: No obvious lymphadenopathy , no edema  Cardiovascular: Normal S1 S2, No JVD, 1/6 KITTY murmur, Peripheral pulses palpable 2+ bilaterally  Respiratory: Lungs clear to auscultation, normal effort 	  Gastrointestinal:  Soft, Non-tender, + BS	       TELEMETRY:  	  NSR     	  ECHO: < from: KELVIN w/TTE (w/3D Echo) (03.08.18 @ 07:21) >  Conclusions:  1. Mitral annular calcification, otherwise normal mitral  valve.  2. Thickened trileaflet aortic valve with normal opening.  Mild aortic regurgitation.  3. No left atrial or left atrial appendage thrombus.  4. Normal left ventricular systolic function.  5. Normal right ventricular size and function.  6. Contrast injection demonstrates no evidence of a patent  foramen ovale.  7. No evidence of valvular vegetation is seen.  *** Compared with echocardiogram of 2/26/2018, no  significant changes noted.    < end of copied text >  	      ASSESSMENT/PLAN: 	57y Male wiht pmhx of htn/obesity admitted with osteomyelitis. EP called for brief episodes of PAT. Echo normal.  s/p right foot dorsal wound debridement with graft .     KELVIN nl LV/ RV fx   cont abx  wound care. POD follow up    GI / DVT prophylaxis.   keep K>4, mag >2.0   no further EPS work up needed at present ,  pain management   D/W Dr Patel

## 2018-03-11 NOTE — PROGRESS NOTE ADULT - SUBJECTIVE AND OBJECTIVE BOX
S: no chest pain    ALBUTerol/ipratropium for Nebulization 3 milliLiter(s) Nebulizer every 6 hours  folic acid 1 milliGRAM(s) Oral daily  heparin  Injectable 5000 Unit(s) SubCutaneous every 8 hours  HYDROmorphone  Injectable 0.5 milliGRAM(s) IV Push every 4 hours PRN  HYDROmorphone  Injectable 0.5 milliGRAM(s) IV Push every 10 minutes  imipenem/cilastatin  IVPB 1000 milliGRAM(s) IV Intermittent every 8 hours  lactobacillus acidophilus 1 Tablet(s) Oral three times a day with meals  lidocaine   Patch 1 Patch Transdermal daily  losartan 50 milliGRAM(s) Oral daily  multivitamin 1 Tablet(s) Oral daily  oxyCODONE    IR 10 milliGRAM(s) Oral every 3 hours PRN  sodium chloride 0.9%. 1000 milliLiter(s) IV Continuous <Continuous>  sodium chloride 0.9%. 1000 milliLiter(s) IV Continuous <Continuous>  thiamine 100 milliGRAM(s) Oral daily                            10.2   5.61  )-----------( 320      ( 11 Mar 2018 09:37 )             32.0       03-11    135  |  97  |  19  ----------------------------<  108<H>  4.2   |  27  |  0.82    Ca    9.1      11 Mar 2018 07:14  Phos  3.8     03-11  Mg     2.0     03-11    TPro  8.3  /  Alb  2.7<L>  /  TBili  0.2  /  DBili  x   /  AST  43<H>  /  ALT  70<H>  /  AlkPhos  92  03-11            T(C): 36.6 (03-11-18 @ 12:26), Max: 37.1 (03-11-18 @ 04:48)  HR: 86 (03-11-18 @ 12:26) (70 - 90)  BP: 117/70 (03-11-18 @ 12:26) (107/61 - 136/72)  RR: 18 (03-11-18 @ 12:26) (18 - 18)  SpO2: 94% (03-11-18 @ 12:26) (92% - 97%)  Wt(kg): --    I&O's Summary    10 Mar 2018 06:01  -  11 Mar 2018 07:00  --------------------------------------------------------  IN: 1590 mL / OUT: 2700 mL / NET: -1110 mL    11 Mar 2018 07:01  -  11 Mar 2018 14:07  --------------------------------------------------------  IN: 600 mL / OUT: 900 mL / NET: -300 mL            Heart: normal S1, S2, RRR, no m/r/g  Lungs: cta b/l  Abd: soft nT,nD  Ext: no edema        TELEMETRY: SR	    ECG:  	  RADIOLOGY:   DIAGNOSTIC TESTING:  [ ] Echocardiogram:  [ ]  Catheterization:  [ ] Stress Test:    OTHER: 	      ASSESSMENT/PLAN:  57yMale with etoh abuse, HTN admitted with DVT/septic emboli, necrotizing fascitis who is being seen for chest pain/sob.     -KELVIN with no vegetations  -no clinical heart failure  -monitor tele  -f/u eps    Reanna Laureano MD  [facility]  [address] [place] [address], [address] [place] [address]  office: [phone]  pager: [phone]

## 2018-03-11 NOTE — PROGRESS NOTE ADULT - SUBJECTIVE AND OBJECTIVE BOX
Subjective:   	  MEDICATIONS:  MEDICATIONS  (STANDING):  ALBUTerol/ipratropium for Nebulization 3 milliLiter(s) Nebulizer every 6 hours  folic acid 1 milliGRAM(s) Oral daily  heparin  Injectable 5000 Unit(s) SubCutaneous every 8 hours  HYDROmorphone  Injectable 0.5 milliGRAM(s) IV Push every 10 minutes  imipenem/cilastatin  IVPB 1000 milliGRAM(s) IV Intermittent every 8 hours  lactobacillus acidophilus 1 Tablet(s) Oral three times a day with meals  lidocaine   Patch 1 Patch Transdermal daily  losartan 50 milliGRAM(s) Oral daily  multivitamin 1 Tablet(s) Oral daily  sodium chloride 0.9%. 1000 milliLiter(s) (10 mL/Hr) IV Continuous <Continuous>  sodium chloride 0.9%. 1000 milliLiter(s) (75 mL/Hr) IV Continuous <Continuous>  thiamine 100 milliGRAM(s) Oral daily      LABS:	 	    CARDIAC MARKERS:                                10.2   5.61  )-----------( 320      ( 11 Mar 2018 09:37 )             32.0     Hemoglobin: 10.2 g/dL (03-11 @ 09:37)  Hemoglobin: 10.0 g/dL (03-10 @ 08:31)  Hemoglobin: 9.9 g/dL (03-09 @ 08:09)  Hemoglobin: 10.8 g/dL (03-08 @ 07:35)  Hemoglobin: 10.5 g/dL (03-07 @ 07:36)      03-11    135  |  97  |  19  ----------------------------<  108<H>  4.2   |  27  |  0.82    Ca    9.1      11 Mar 2018 07:14  Phos  3.8     03-11  Mg     2.0     03-11    TPro  8.3  /  Alb  2.7<L>  /  TBili  0.2  /  DBili  x   /  AST  43<H>  /  ALT  70<H>  /  AlkPhos  92  03-11    Creatinine Trend: 0.82<--, 0.79<--, 0.73<--, 0.81<--, 0.78<--, 0.78<--    COAGS:       proBNP:   Lipid Profile:   HgA1c:   TSH:       PHYSICAL EXAM:  T(C): 37.2 (03-11-18 @ 20:40), Max: 37.2 (03-11-18 @ 20:40)  HR: 91 (03-11-18 @ 20:40) (80 - 91)  BP: 110/73 (03-11-18 @ 20:40) (107/61 - 117/70)  RR: 18 (03-11-18 @ 20:40) (18 - 18)  SpO2: 96% (03-11-18 @ 20:40) (92% - 97%)  Wt(kg): --  I&O's Summary    10 Mar 2018 06:01  -  11 Mar 2018 07:00  --------------------------------------------------------  IN: 1590 mL / OUT: 2700 mL / NET: -1110 mL    11 Mar 2018 07:01  -  11 Mar 2018 22:00  --------------------------------------------------------  IN: 850 mL / OUT: 2900 mL / NET: -2050 mL          HEENT:   Normal oral mucosa, PERRL, EOMI	  Lymphatic: No obvious lymphadenopathy , no edema  Cardiovascular: Normal S1 S2, No JVD, 1/6 KITTY murmur, Peripheral pulses palpable 2+ bilaterally  Respiratory: Lungs clear to auscultation, normal effort 	  Gastrointestinal:  Soft, Non-tender, + BS	  Skin: No rashes,  No cyanosis, warm to touch  Musculoskeletal: Normal range of motion, normal strength  Psychiatry:  Appropriate Mood & affect     TELEMETRY: 	    ECG:  	  RADIOLOGY:     DIAGNOSTIC TESTING:  [ ] Echocardiogram:  [ ]  Catheterization:  [ ] Stress Test:    OTHER: 	      ASSESSMENT/PLAN: 	57y Male Subjective:   	  MEDICATIONS:  MEDICATIONS  (STANDING):  ALBUTerol/ipratropium for Nebulization 3 milliLiter(s) Nebulizer every 6 hours  folic acid 1 milliGRAM(s) Oral daily  heparin  Injectable 5000 Unit(s) SubCutaneous every 8 hours  HYDROmorphone  Injectable 0.5 milliGRAM(s) IV Push every 10 minutes  imipenem/cilastatin  IVPB 1000 milliGRAM(s) IV Intermittent every 8 hours  lactobacillus acidophilus 1 Tablet(s) Oral three times a day with meals  lidocaine   Patch 1 Patch Transdermal daily  losartan 50 milliGRAM(s) Oral daily  multivitamin 1 Tablet(s) Oral daily  sodium chloride 0.9%. 1000 milliLiter(s) (10 mL/Hr) IV Continuous <Continuous>  sodium chloride 0.9%. 1000 milliLiter(s) (75 mL/Hr) IV Continuous <Continuous>  thiamine 100 milliGRAM(s) Oral daily      LABS:	 	    CARDIAC MARKERS:                                10.2   5.61  )-----------( 320      ( 11 Mar 2018 09:37 )             32.0     Hemoglobin: 10.2 g/dL (03-11 @ 09:37)  Hemoglobin: 10.0 g/dL (03-10 @ 08:31)  Hemoglobin: 9.9 g/dL (03-09 @ 08:09)  Hemoglobin: 10.8 g/dL (03-08 @ 07:35)  Hemoglobin: 10.5 g/dL (03-07 @ 07:36)      03-11    135  |  97  |  19  ----------------------------<  108<H>  4.2   |  27  |  0.82    Ca    9.1      11 Mar 2018 07:14  Phos  3.8     03-11  Mg     2.0     03-11    TPro  8.3  /  Alb  2.7<L>  /  TBili  0.2  /  DBili  x   /  AST  43<H>  /  ALT  70<H>  /  AlkPhos  92  03-11    Creatinine Trend: 0.82<--, 0.79<--, 0.73<--, 0.81<--, 0.78<--, 0.78<--        PHYSICAL EXAM:  T(C): 37.2 (03-11-18 @ 20:40), Max: 37.2 (03-11-18 @ 20:40)  HR: 91 (03-11-18 @ 20:40) (80 - 91)  BP: 110/73 (03-11-18 @ 20:40) (107/61 - 117/70)  RR: 18 (03-11-18 @ 20:40) (18 - 18)  SpO2: 96% (03-11-18 @ 20:40) (92% - 97%)  Wt(kg): --  I&O's Summary    10 Mar 2018 06:01  -  11 Mar 2018 07:00  --------------------------------------------------------  IN: 1590 mL / OUT: 2700 mL / NET: -1110 mL    11 Mar 2018 07:01  -  11 Mar 2018 22:00  --------------------------------------------------------  IN: 850 mL / OUT: 2900 mL / NET: -2050 mL          HEENT:   Normal oral mucosa, PERRL, EOMI	  Lymphatic: No obvious lymphadenopathy , no edema  Cardiovascular: Normal S1 S2, No JVD, 1/6 KITTY murmur, Peripheral pulses palpable 2+ bilaterally  Respiratory: Lungs clear to auscultation, normal effort 	  Gastrointestinal:  Soft, Non-tender, + BS	  Skin: No rashes,  No cyanosis, warm to touch  Musculoskeletal: Normal range of motion, normal strength  Psychiatry:  Appropriate Mood & affect     TELEMETRY: 	    ECG:  	  RADIOLOGY:     DIAGNOSTIC TESTING:  [ ] Echocardiogram:  [ ]  Catheterization:  [ ] Stress Test:    OTHER: 	      ASSESSMENT/PLAN: 	57y Male with pmhx of obesity admitted with ?cellulitis s/p episode of arrhtythmia  1) tele with no significant arrhythmis  2) no need for ischemic evaluation  3) patient with no NSVR-no need for cath  4) abx as per id

## 2018-03-11 NOTE — CHART NOTE - NSCHARTNOTEFT_GEN_A_CORE
3/11/18  :- Medicine NP notes :- Notified by RN that patient had PAT of 160 for 8.8sec. Pt seen at bedside. Asymptomatic. Denies chest pain, SOB, Palpitations  -Continue Telemonitoring  -Continue Losartan 50mg  -Continue hydration with IVF, NS @ 75ml/hr  -Will f/u Primary team in     ALYCE Ruiz-C  [phone]

## 2018-03-11 NOTE — PROGRESS NOTE ADULT - SUBJECTIVE AND OBJECTIVE BOX
Chief Complaint:  fu  History of Present Illness:  feeling better, no complaints today,    NO CP< SOB, cough  No N/V.D.C. no bleeding    MEDICATIONS  (STANDING):  ALBUTerol/ipratropium for Nebulization 3 milliLiter(s) Nebulizer every 6 hours  folic acid 1 milliGRAM(s) Oral daily  heparin  Injectable 5000 Unit(s) SubCutaneous every 8 hours  HYDROmorphone  Injectable 0.5 milliGRAM(s) IV Push every 10 minutes  imipenem/cilastatin  IVPB 1000 milliGRAM(s) IV Intermittent every 8 hours  lactobacillus acidophilus 1 Tablet(s) Oral three times a day with meals  lidocaine   Patch 1 Patch Transdermal daily  losartan 50 milliGRAM(s) Oral daily  multivitamin 1 Tablet(s) Oral daily  sodium chloride 0.9%. 1000 milliLiter(s) (10 mL/Hr) IV Continuous <Continuous>  sodium chloride 0.9%. 1000 milliLiter(s) (75 mL/Hr) IV Continuous <Continuous>  thiamine 100 milliGRAM(s) Oral daily    MEDICATIONS  (PRN):  HYDROmorphone  Injectable 0.5 milliGRAM(s) IV Push every 4 hours PRN Moderate Pain (4 - 6)  oxyCODONE    IR 10 milliGRAM(s) Oral every 3 hours PRN Moderate and Severe Pain (4-10)      Allergies    No Known Allergies    Intolerances        Vital Signs Last 24 Hrs  T(C): 37.1 (11 Mar 2018 04:48), Max: 37.1 (11 Mar 2018 04:48)  T(F): 98.7 (11 Mar 2018 04:48), Max: 98.7 (11 Mar 2018 04:48)  HR: 80 (11 Mar 2018 08:06) (70 - 90)  BP: 108/69 (11 Mar 2018 04:48) (107/61 - 136/72)  BP(mean): --  RR: 18 (11 Mar 2018 04:48) (18 - 18)  SpO2: 97% (11 Mar 2018 08:06) (92% - 97%)    PHYSICAL EXAM  General: adult in NAD, obese  HEENT: clear oropharynx, anicteric sclera, pink conjunctiva  Neck: supple  CV: normal S1/S2   Lungs: decreased BS, poor effort  Abdomen: soft non-tender non-distended, obese, positive bowel sounds  Ext: Right foot bandage    LABS:                          10.2   5.61  )-----------( 320      ( 11 Mar 2018 09:37 )             32.0         Mean Cell Volume : 99.7 fl  Mean Cell Hemoglobin : 31.8 pg  Mean Cell Hemoglobin Concentration : 31.9 gm/dL  Auto Neutrophil # : 3.48 K/uL  Auto Lymphocyte # : 1.56 K/uL  Auto Monocyte # : 0.43 K/uL  Auto Eosinophil # : 0.10 K/uL  Auto Basophil # : 0.03 K/uL  Auto Neutrophil % : 62.0 %  Auto Lymphocyte % : 27.8 %  Auto Monocyte % : 7.7 %  Auto Eosinophil % : 1.8 %  Auto Basophil % : 0.5 %      Serial CBC's  03-11 @ 09:37  Hct-32.0 / Hgb-10.2 / Plat-320 / RBC-3.21 / WBC-5.61  Serial CBC's  03-10 @ 08:31  Hct-30.4 / Hgb-10.0 / Plat-323 / RBC-3.16 / WBC-5.24  Serial CBC's  03-09 @ 08:09  Hct-31.2 / Hgb-9.9 / Plat-329 / RBC-3.20 / WBC-5.54  Serial CBC's  03-08 @ 07:35  Hct-32.9 / Hgb-10.8 / Plat-420 / RBC-3.38 / WBC-6.29      03-11    135  |  97  |  19  ----------------------------<  108<H>  4.2   |  27  |  0.82    Ca    9.1      11 Mar 2018 07:14  Phos  3.8     03-11  Mg     2.0     03-11    TPro  8.3  /  Alb  2.7<L>  /  TBili  0.2  /  DBili  x   /  AST  43<H>  /  ALT  70<H>  /  AlkPhos  92  03-11

## 2018-03-11 NOTE — PROVIDER CONTACT NOTE (OTHER) - ACTION/TREATMENT ORDERED:
DAGOBERTO Handy made aware; per DAGOBERTO Handy recheck potassium with 3/3/2018 AM lab. will continue to monitor
Continue to monitor, Obtain morning labs now.
DAGOBERTO Durham made aware; per DAGOBERTO Durham-to assess pt by bedside. will continue to monitor
Give Losartan at 4am.
PA aware, place patient back on bipap now, ABG ordered. Continuing to monitor patient
PA aware, will assess pt at bedside. Continuing to monitor patient
PA aware. Continuing to monitor patient
As per TAURUS Carroll, hold subcutaneous heparin dose for pre-op period. Will continue to monitor and maintain safety.
Continue to monitor and maintain safety.
Continue to monitor pt, previously schedule lab order this AM ([id number]).
DAGOBERTO Handy made aware; per DAGOBERTO Handy will continue to monitor.
NP contacted and aware. As per NP wound vac order to be initiated 2/27/18. PT/wound consult ordered. Will continue to monitor.
NP contacted and aware. Stat PTT ordered. Will continue to monitor.

## 2018-03-11 NOTE — PROGRESS NOTE ADULT - ASSESSMENT
57M with obesity, ETOH, SHIELA w/ R ankle sprain, developed MSSA cellulitis/ bacteremia, pulmonary septic emboli, R distal DVT now s/p OR, with mild coagulopathy    #coagulopathy- suspect secondary to vitamin K deficiency/ decreased PO intake over the week prior to admission with acute illness; Factor VII level sl low  - PT mixing study corrected confirming factor deficiency, no evidence of inhibitor - consistent with vitamin K deficiency  - INR sl elevated; no increased bleeding; no prior history of bleeding issues  - will continue to hold on FFP/vitamin K  - coags continue to improve    #R peroneal DVT- no prior history of VTE; provoked with recent injury and decreased mobility  - repeat US 2/27 and 3/7 with no evidence of propagation  - w/ septic emboli; was on heparin gtt, now off with +FOB  - continue DVT prophylaxis    # R ankle sprain/wound- now s/p OR- s/p return to OR, deep debridement  - pain control; wound care; s/p wound vac  - per Podiatry, s/p OR/graft Friday    #ID- MSSA bacteremia, KELVIN negative for vegetation; on Abx per ID  - enlarging septic emboli on CT chest    # Anemia   - mild, stable - due to acute / chronic illness

## 2018-03-11 NOTE — PROGRESS NOTE ADULT - ASSESSMENT
Recent ankle sprain at work which got infected resulting in MSSA sepsis/cellulitis with lung embolization s/p OR debridement X 2 now with pseudomonas in OR culture    OM right foot/necrotizing fasciitis     hemoptysis - minimal - possibly secondary to being on hep drip with pulm septic emboli    ADA - resolved    Transaminitis - improving    Coagulopathy on admission - most likley due to dietary deficiency, holding off on any supplementation for now    Right peroneal vein dvt - on heparin prophy dose per heme    Pulm artery enlargement    Morbid obesity    SHIELA continue nocturnal bipap    PAT/ AIVR/13 beats wct - no further events    pleural effusion    Based on interview with pt and wife, pt is not an etoh abuser (neg CAGE, never had wd symptoms in his life, never been in rehab, has gone months without drinking with no problems.)    continue current care  await podiatry f/u   f/u official cxr read for picc  continue abx

## 2018-03-11 NOTE — PROVIDER CONTACT NOTE (OTHER) - BACKGROUND
Admit Dx: chest pain, Right Foot wound from work injury s/p cellulitis.
Admitted with dx. CP, RLE DVT, RLE wound. PMH: HTN, DVT. Current pain management with Dilaudid via PCA pump.
Admitting diagnosis CP & infection of right leg/foot.
Patient admitted on 2/22/18 for acute DVT right lower extremity
Pt is admitted dx chest pain.
Pt is admitted for chest pain.
dx acute DVT RLE, chest pain, sprained ankle with cellulitis RLE
dx: cp, sob, confusion  hx: sciatica, HTN, DVT
dx: cp, sob, confusion  hx: sciatica, HTN, RLE DVT, RLE cellulitis
pt s/p I&D debridement of right foot.
pt s/p I&D debridement of right foot.
Patient admitted on 2/22/18 for chest pain and son. Cardiac enzymes are negative x 3. Patient on heparin gtt for RLE DVT.

## 2018-03-11 NOTE — PROVIDER CONTACT NOTE (OTHER) - ASSESSMENT
A&Ox4. /70, HR 70, No c/o distress. Asymptomatic.
O2 sat 87-89%, RR 18, pt states feels slightly sob but no worse than he has been feeling.
Patient ordered for subcutaneous heparin in the morning time prior to OR debridement and graft procedure.
Pt Aox4. VSS. Pt asleep at time of the event. pt denies chest palpitation, chest pain, sob
Pt Ax0x4. Denies chest pain. Asymptomatic and VSS.
Pt asymptomatic of increased BP. All other VSS.
Pt denies increased sob. RR 18, o2 sat 95% on 3L/NC humidified. Pt noted to have audible wheezing
Pt is A&Ox4, Denies chest pain, on BIPAP, VSS.
Pt sleeping at time of the episode, VSS, no change in LOC when awaken, denies CP/SOB. On daily Mg-phos lab schedule. 3/4/18 OV-V-Pn-Phos level WNL.
VSS.
PTT 38.6

## 2018-03-11 NOTE — PROGRESS NOTE ADULT - SUBJECTIVE AND OBJECTIVE BOX
MEDICINE, PROGRESS NOTE [phone]    JUWAN DOMÍNGUEZ 57y [id number]    Patient seen and examined.  Patient is a 57y old  Male who presents with a chief complaint of Chest pain, confusion (22 Feb 2018 03:00)  pt feels well. no new complaints. pain is controlled    PAST MEDICAL & SURGICAL HISTORY:  Sciatica of right side  Essential hypertension  No significant past surgical history    MEDICATIONS  (STANDING):  ALBUTerol/ipratropium for Nebulization 3 milliLiter(s) Nebulizer every 6 hours  folic acid 1 milliGRAM(s) Oral daily  heparin  Injectable 5000 Unit(s) SubCutaneous every 8 hours  HYDROmorphone  Injectable 0.5 milliGRAM(s) IV Push every 10 minutes  imipenem/cilastatin  IVPB 1000 milliGRAM(s) IV Intermittent every 8 hours  lactobacillus acidophilus 1 Tablet(s) Oral three times a day with meals  lidocaine   Patch 1 Patch Transdermal daily  losartan 50 milliGRAM(s) Oral daily  multivitamin 1 Tablet(s) Oral daily  sodium chloride 0.9%. 1000 milliLiter(s) (10 mL/Hr) IV Continuous <Continuous>  sodium chloride 0.9%. 1000 milliLiter(s) (75 mL/Hr) IV Continuous <Continuous>  thiamine 100 milliGRAM(s) Oral daily    MEDICATIONS  (PRN):  HYDROmorphone  Injectable 0.5 milliGRAM(s) IV Push every 4 hours PRN Moderate Pain (4 - 6)  oxyCODONE    IR 10 milliGRAM(s) Oral every 3 hours PRN Moderate and Severe Pain (4-10)    Allergies    No Known Allergies    Intolerances        PHYSICAL EXAM:  Constitutional: NAD  HEENT: Normocephalic, EOMI  Neck:  No JVD  Respiratory: CTA B/L, No wheezes  Cardiovascular: S1, S2, RRR, + systolic murmur  Gastrointestinal: BS+, soft, NT/ND  Extremities: No peripheral edema, right foot dressing with vac c/d/i  Neurological: AAOX3, no focal deficits  Psychiatric: Normal mood, normal affect  : No Sosa  PICC line left side    Vital Signs Last 24 Hrs  T(C): 36.6 (11 Mar 2018 12:26), Max: 37.1 (11 Mar 2018 04:48)  T(F): 97.9 (11 Mar 2018 12:26), Max: 98.7 (11 Mar 2018 04:48)  HR: 86 (11 Mar 2018 12:26) (70 - 89)  BP: 117/70 (11 Mar 2018 12:26) (107/61 - 136/72)  BP(mean): --  RR: 18 (11 Mar 2018 12:26) (18 - 18)  SpO2: 94% (11 Mar 2018 12:26) (92% - 97%)  I&O's Summary    10 Mar 2018 06:01  -  11 Mar 2018 07:00  --------------------------------------------------------  IN: 1590 mL / OUT: 2700 mL / NET: -1110 mL    11 Mar 2018 07:01  -  11 Mar 2018 20:36  --------------------------------------------------------  IN: 850 mL / OUT: 1300 mL / NET: -450 mL        LABS:                        10.2   5.61  )-----------( 320      ( 11 Mar 2018 09:37 )             32.0     03-11    135  |  97  |  19  ----------------------------<  108<H>  4.2   |  27  |  0.82    Ca    9.1      11 Mar 2018 07:14  Phos  3.8     03-11  Mg     2.0     03-11    TPro  8.3  /  Alb  2.7<L>  /  TBili  0.2  /  DBili  x   /  AST  43<H>  /  ALT  70<H>  /  AlkPhos  92  03-11  Magnesium, Serum: 2.0 mg/dL (03-11 @ 07:14)    < from: Xray Chest 1 View- PORTABLE-Urgent (03.11.18 @ 17:20) >  ******PRELIMINARY REPORT******    ******PRELIMINARY REPORT******          EXAM:  XR CHEST PORTABLE URGENT 1V                            PROCEDURE DATE:  03/11/2018      ******PRELIMINARY REPORT******    ******PRELIMINARY REPORT******              INTERPRETATION:  RUE with picc in SVC.              ******PRELIMINARY REPORT******    ******PRELIMINARY REPORT******          ELVA COHEN M.D., RADIOLOGY RESIDENT        < end of copied text >

## 2018-03-12 ENCOUNTER — TRANSCRIPTION ENCOUNTER (OUTPATIENT)
Age: 58
End: 2018-03-12

## 2018-03-12 LAB
ALBUMIN SERPL ELPH-MCNC: 2.7 G/DL — LOW (ref 3.3–5)
ALP SERPL-CCNC: 83 U/L — SIGNIFICANT CHANGE UP (ref 40–120)
ALT FLD-CCNC: 63 U/L RC — HIGH (ref 10–45)
ANION GAP SERPL CALC-SCNC: 10 MMOL/L — SIGNIFICANT CHANGE UP (ref 5–17)
AST SERPL-CCNC: 33 U/L — SIGNIFICANT CHANGE UP (ref 10–40)
BASOPHILS # BLD AUTO: 0.03 K/UL — SIGNIFICANT CHANGE UP (ref 0–0.2)
BASOPHILS NFR BLD AUTO: 0.5 % — SIGNIFICANT CHANGE UP (ref 0–2)
BILIRUB SERPL-MCNC: 0.3 MG/DL — SIGNIFICANT CHANGE UP (ref 0.2–1.2)
BUN SERPL-MCNC: 16 MG/DL — SIGNIFICANT CHANGE UP (ref 7–23)
CALCIUM SERPL-MCNC: 9.1 MG/DL — SIGNIFICANT CHANGE UP (ref 8.4–10.5)
CHLORIDE SERPL-SCNC: 97 MMOL/L — SIGNIFICANT CHANGE UP (ref 96–108)
CO2 SERPL-SCNC: 29 MMOL/L — SIGNIFICANT CHANGE UP (ref 22–31)
CREAT SERPL-MCNC: 0.79 MG/DL — SIGNIFICANT CHANGE UP (ref 0.5–1.3)
EOSINOPHIL # BLD AUTO: 0.13 K/UL — SIGNIFICANT CHANGE UP (ref 0–0.5)
EOSINOPHIL NFR BLD AUTO: 2.3 % — SIGNIFICANT CHANGE UP (ref 0–6)
GLUCOSE SERPL-MCNC: 98 MG/DL — SIGNIFICANT CHANGE UP (ref 70–99)
HCT VFR BLD CALC: 30.2 % — LOW (ref 39–50)
HGB BLD-MCNC: 9.8 G/DL — LOW (ref 13–17)
IMM GRANULOCYTES NFR BLD AUTO: 0.3 % — SIGNIFICANT CHANGE UP (ref 0–1.5)
LYMPHOCYTES # BLD AUTO: 1.43 K/UL — SIGNIFICANT CHANGE UP (ref 1–3.3)
LYMPHOCYTES # BLD AUTO: 24.8 % — SIGNIFICANT CHANGE UP (ref 13–44)
MAGNESIUM SERPL-MCNC: 2 MG/DL — SIGNIFICANT CHANGE UP (ref 1.6–2.6)
MCHC RBC-ENTMCNC: 31.3 PG — SIGNIFICANT CHANGE UP (ref 27–34)
MCHC RBC-ENTMCNC: 32.5 GM/DL — SIGNIFICANT CHANGE UP (ref 32–36)
MCV RBC AUTO: 96.5 FL — SIGNIFICANT CHANGE UP (ref 80–100)
MONOCYTES # BLD AUTO: 0.51 K/UL — SIGNIFICANT CHANGE UP (ref 0–0.9)
MONOCYTES NFR BLD AUTO: 8.9 % — SIGNIFICANT CHANGE UP (ref 2–14)
NEUTROPHILS # BLD AUTO: 3.64 K/UL — SIGNIFICANT CHANGE UP (ref 1.8–7.4)
NEUTROPHILS NFR BLD AUTO: 63.2 % — SIGNIFICANT CHANGE UP (ref 43–77)
PHOSPHATE SERPL-MCNC: 3.9 MG/DL — SIGNIFICANT CHANGE UP (ref 2.5–4.5)
PLATELET # BLD AUTO: 290 K/UL — SIGNIFICANT CHANGE UP (ref 150–400)
POTASSIUM SERPL-MCNC: 4.2 MMOL/L — SIGNIFICANT CHANGE UP (ref 3.5–5.3)
POTASSIUM SERPL-SCNC: 4.2 MMOL/L — SIGNIFICANT CHANGE UP (ref 3.5–5.3)
PROT SERPL-MCNC: 7.9 G/DL — SIGNIFICANT CHANGE UP (ref 6–8.3)
RBC # BLD: 3.13 M/UL — LOW (ref 4.2–5.8)
RBC # FLD: 13.8 % — SIGNIFICANT CHANGE UP (ref 10.3–14.5)
SODIUM SERPL-SCNC: 136 MMOL/L — SIGNIFICANT CHANGE UP (ref 135–145)
WBC # BLD: 5.76 K/UL — SIGNIFICANT CHANGE UP (ref 3.8–10.5)
WBC # FLD AUTO: 5.76 K/UL — SIGNIFICANT CHANGE UP (ref 3.8–10.5)

## 2018-03-12 PROCEDURE — 99232 SBSQ HOSP IP/OBS MODERATE 35: CPT

## 2018-03-12 PROCEDURE — 99233 SBSQ HOSP IP/OBS HIGH 50: CPT | Mod: GC

## 2018-03-12 RX ADMIN — Medication 1 TABLET(S): at 17:21

## 2018-03-12 RX ADMIN — OXYCODONE HYDROCHLORIDE 10 MILLIGRAM(S): 5 TABLET ORAL at 20:00

## 2018-03-12 RX ADMIN — HEPARIN SODIUM 5000 UNIT(S): 5000 INJECTION INTRAVENOUS; SUBCUTANEOUS at 05:08

## 2018-03-12 RX ADMIN — IMIPENEM AND CILASTATIN 250 MILLIGRAM(S): 250; 250 INJECTION, POWDER, FOR SOLUTION INTRAVENOUS at 05:08

## 2018-03-12 RX ADMIN — LOSARTAN POTASSIUM 50 MILLIGRAM(S): 100 TABLET, FILM COATED ORAL at 05:09

## 2018-03-12 RX ADMIN — Medication 3 MILLILITER(S): at 12:07

## 2018-03-12 RX ADMIN — OXYCODONE HYDROCHLORIDE 10 MILLIGRAM(S): 5 TABLET ORAL at 15:34

## 2018-03-12 RX ADMIN — Medication 1 MILLIGRAM(S): at 12:07

## 2018-03-12 RX ADMIN — OXYCODONE HYDROCHLORIDE 10 MILLIGRAM(S): 5 TABLET ORAL at 09:58

## 2018-03-12 RX ADMIN — Medication 1 TABLET(S): at 12:07

## 2018-03-12 RX ADMIN — IMIPENEM AND CILASTATIN 250 MILLIGRAM(S): 250; 250 INJECTION, POWDER, FOR SOLUTION INTRAVENOUS at 14:19

## 2018-03-12 RX ADMIN — HEPARIN SODIUM 5000 UNIT(S): 5000 INJECTION INTRAVENOUS; SUBCUTANEOUS at 22:14

## 2018-03-12 RX ADMIN — Medication 3 MILLILITER(S): at 17:21

## 2018-03-12 RX ADMIN — OXYCODONE HYDROCHLORIDE 10 MILLIGRAM(S): 5 TABLET ORAL at 05:40

## 2018-03-12 RX ADMIN — OXYCODONE HYDROCHLORIDE 10 MILLIGRAM(S): 5 TABLET ORAL at 16:34

## 2018-03-12 RX ADMIN — OXYCODONE HYDROCHLORIDE 10 MILLIGRAM(S): 5 TABLET ORAL at 19:25

## 2018-03-12 RX ADMIN — HEPARIN SODIUM 5000 UNIT(S): 5000 INJECTION INTRAVENOUS; SUBCUTANEOUS at 14:19

## 2018-03-12 RX ADMIN — OXYCODONE HYDROCHLORIDE 10 MILLIGRAM(S): 5 TABLET ORAL at 05:08

## 2018-03-12 RX ADMIN — IMIPENEM AND CILASTATIN 250 MILLIGRAM(S): 250; 250 INJECTION, POWDER, FOR SOLUTION INTRAVENOUS at 23:03

## 2018-03-12 RX ADMIN — OXYCODONE HYDROCHLORIDE 10 MILLIGRAM(S): 5 TABLET ORAL at 08:58

## 2018-03-12 RX ADMIN — Medication 100 MILLIGRAM(S): at 12:07

## 2018-03-12 RX ADMIN — Medication 3 MILLILITER(S): at 23:51

## 2018-03-12 RX ADMIN — Medication 1 TABLET(S): at 08:58

## 2018-03-12 RX ADMIN — Medication 3 MILLILITER(S): at 05:09

## 2018-03-12 NOTE — PHYSICAL THERAPY INITIAL EVALUATION ADULT - ADDITIONAL COMMENTS
Hx obtained from spouse at bedside. Pt resides in private home with spouse, about 3 steps to enter without handrail, pt can stay on first level. PTA independent in mobility and ADL's, owns no DME, (+)driving, (+)working as an .
Hx obtained from spouse at bedside. Pt resides in private home with spouse, about 3 steps to enter without handrail, pt can stay on first level. PTA independent in mobility and ADL's, owns no DME, (+)driving, (+)working as an .

## 2018-03-12 NOTE — PROGRESS NOTE ADULT - ASSESSMENT
57M with HTN and ETOH abuse admitted 2/19/18 with R foot/leg infection.  Course complicated by MSSA bacteremia, MRI foot with OM/abscess, R leg DVT and CTA suggestive of possible septic pulmonary emboli though TTE negative.  s/p debridement of the R foot by podiatry.  Awaiting graft.  OR culture with MSSA and PA.  BC only with MSSA which has cleared.    Recommend:  -for now, imipenem is okay  -change back to to cefazolin upon discharge and po ciprofloxacin  -PICC okay to place  -for graft friday    I will be away  returning 3/12/18.  ID available.  Please call [phone]. 57M with HTN and ETOH abuse admitted 2/19/18 with R foot/leg infection.  Course complicated by MSSA bacteremia, MRI foot with OM/abscess, R leg DVT and CTA suggestive of possible septic pulmonary emboli though TTE negative.  s/p debridement of the R foot by podiatry.  Awaiting graft.  OR culture with MSSA and PA.  BC only with MSSA which has cleared.    Recommend:  -imipenem for now  -change back to to cefazolin upon discharge and po ciprofloxacin

## 2018-03-12 NOTE — DISCHARGE NOTE ADULT - MEDICATION SUMMARY - MEDICATIONS TO CHANGE
I will SWITCH the dose or number of times a day I take the medications listed below when I get home from the hospital:    oxyCODONE 5 mg oral tablet  -- 1 tab(s) by mouth every 12 hours MDD:2 tabs  -- Caution federal law prohibits the transfer of this drug to any person other  than the person for whom it was prescribed.  It is very important that you take or use this exactly as directed.  Do not skip doses or discontinue unless directed by your doctor.  May cause drowsiness.  Alcohol may intensify this effect.  Use care when operating dangerous machinery.  This prescription cannot be refilled.  Using more of this medication than prescribed may cause serious breathing problems.    losartan  -- 100 milligram(s) by mouth once a day

## 2018-03-12 NOTE — PROGRESS NOTE ADULT - SUBJECTIVE AND OBJECTIVE BOX
EP ATTENDING    tele: NSR, no events    no palpitations, no syncope, no angina    ALBUTerol/ipratropium for Nebulization 3 milliLiter(s) Nebulizer every 6 hours  folic acid 1 milliGRAM(s) Oral daily  heparin  Injectable 5000 Unit(s) SubCutaneous every 8 hours  HYDROmorphone  Injectable 0.5 milliGRAM(s) IV Push every 4 hours PRN  HYDROmorphone  Injectable 0.5 milliGRAM(s) IV Push every 10 minutes  imipenem/cilastatin  IVPB 1000 milliGRAM(s) IV Intermittent every 8 hours  lactobacillus acidophilus 1 Tablet(s) Oral three times a day with meals  lidocaine   Patch 1 Patch Transdermal daily  losartan 50 milliGRAM(s) Oral daily  multivitamin 1 Tablet(s) Oral daily  oxyCODONE    IR 10 milliGRAM(s) Oral every 3 hours PRN  sodium chloride 0.9%. 1000 milliLiter(s) IV Continuous <Continuous>  sodium chloride 0.9%. 1000 milliLiter(s) IV Continuous <Continuous>  thiamine 100 milliGRAM(s) Oral daily                            9.8    5.76  )-----------( 290      ( 12 Mar 2018 09:09 )             30.2       03-12    136  |  97  |  16  ----------------------------<  98  4.2   |  29  |  0.79    Ca    9.1      12 Mar 2018 07:04  Phos  3.9     03-12  Mg     2.0     03-12    TPro  7.9  /  Alb  2.7<L>  /  TBili  0.3  /  DBili  x   /  AST  33  /  ALT  63<H>  /  AlkPhos  83  03-12      T(C): 37.4 (03-12-18 @ 13:46), Max: 37.4 (03-12-18 @ 13:46)  HR: 86 (03-12-18 @ 13:46) (82 - 91)  BP: 112/71 (03-12-18 @ 13:46) (110/73 - 121/74)  RR: 18 (03-12-18 @ 13:46) (18 - 19)  SpO2: 95% (03-12-18 @ 13:46) (95% - 98%)  Wt(kg): --    no JVD  RRR, no murmurs  CTAB  soft nt/nd  no c/c/e    repeat KELVIN: unremarkable, no evidence of endocarditis      A/P)  He is a pleasant 58 y/o male PMH HTN admitted with staph aureus bacteremia, likely from right ankle cellulitis/nec fasc. A KELVIN and TTE were negative for endocarditis. In this setting he developed asymptomatic episodes of paroxysmal atrial tachycardia. There has been no evidence of atrial fibrillation. He denies palpitations nor syncope.    -given his lack of symptoms and brief duration of his PAT episodes I would not start beta blockers  -workup of dyspnea and chest pain as per cardiology  -will follow  -management of DVT as per medicine      Dany Patel M.D., [facility]  Cardiac Electrophysiology  [facility]  [address] [place] [address], [address] [place] [address]  [email or web address]    office [phone]  pager [phone]

## 2018-03-12 NOTE — DISCHARGE NOTE ADULT - CARE PROVIDERS DIRECT ADDRESSES
,DirectAddress_Unknown,[email or web address] ,[email or web address],DirectAddress_Unknown,[email or web address] ,[email or web address],DirectAddress_Unknown,DirectAddress_Unknown,DirectAddress_Unknown

## 2018-03-12 NOTE — DISCHARGE NOTE ADULT - MEDICATION SUMMARY - MEDICATIONS TO STOP TAKING
I will STOP taking the medications listed below when I get home from the hospital:    losartan-hydrochlorothiazide 100mg-12.5mg oral tablet  -- 1 tab(s) by mouth once a day

## 2018-03-12 NOTE — DISCHARGE NOTE ADULT - CARE PLAN
Goal:	right foot serial surgery with graft  Assessment and plan of treatment:	WOUNDCARE: Apply adaptic over graft site, cover with black granufoam, cover suture site with dry 4x4 gauze. VAC setting at 125mmHg continuous. VAC change three times a week.  ANTIBIOTIC: Finish course of antibiotic as prescribed.  WEIGHTBEARING: Weight bearing as tolerated to right foot in CAM boot.  FOLLOW-UP: Follow-up with Dr. [facility]brook at [facility] within 1 week of discharge. Call [phone] for appointment. Principal Discharge DX:	Bacteremia  Goal:	right foot serial surgery with graft  Assessment and plan of treatment:	WOUNDCARE: Apply adaptic over graft site, cover with black granufoam, cover suture site with dry 4x4 gauze. VAC setting at 125mmHg continuous. VAC change three times a week.  ANTIBIOTIC: Finish course of antibiotic as prescribed.  WEIGHTBEARING: Weight bearing as tolerated to right foot in CAM boot.  FOLLOW-UP: Follow-up with Dr. Westbrook at [facility] within 1 week of discharge. Call [phone] for appointment. Principal Discharge DX:	Bacteremia  Goal:	right foot serial surgery with graft  Assessment and plan of treatment:	WOUNDCARE: Apply adaptic over graft site, cover with black granufoam, cover suture site with dry 4x4 gauze. VAC setting at 125mmHg continuous. VAC change three times a week.  ANTIBIOTIC: Finish course of antibiotic as prescribed.  WEIGHTBEARING: Weight bearing as tolerated to right foot in CAM boot.  FOLLOW-UP: Follow-up with Dr. Westbrook at [facility] within 1 week of discharge. Call [phone] for appointment.  Secondary Diagnosis:	Acute deep vein thrombosis (DVT) of popliteal vein of right lower extremity  Assessment and plan of treatment:	Continue Heparin SQ during rehab course  Repeat lower extremity doppler in 1-2 weeks. Principal Discharge DX:	Bacteremia  Goal:	right foot serial surgery with graft  Assessment and plan of treatment:	WOUNDCARE: Apply adaptic over graft site, cover with black granufoam, cover suture site with dry 4x4 gauze. VAC setting at 125mmHg continuous. VAC change three times a week.  ANTIBIOTIC: Finish course of antibiotic as prescribed.  WEIGHTBEARING: Weight bearing as tolerated to right foot in CAM boot.  FOLLOW-UP: Follow-up with Dr. Westbrook at [facility] within 1 week of discharge. Call [phone] for appointment.   Continue IV antibiotics through 4/12/18.  Weekly CBC, CMP, ESR and CRP.  Secondary Diagnosis:	Acute deep vein thrombosis (DVT) of popliteal vein of right lower extremity  Assessment and plan of treatment:	Continue Heparin SQ during rehab course  Repeat lower extremity doppler in 1-2 weeks.  Secondary Diagnosis:	Acute kidney injury  Assessment and plan of treatment:	Now improved  Avoid taking (NSAIDs) - (ex: Ibuprofen, Advil, Celebrex, Naprosyn)  Avoid taking any nephrotoxic agents (can harm kidneys) - Intravenous contrast for diagnostic testing, combination cold medications.  Have all medications adjusted for your renal function by your Health Care Provider.  Blood pressure control is important.  Take all medication as prescribed.  Secondary Diagnosis:	SHIELA (obstructive sleep apnea)  Secondary Diagnosis:	Transaminitis  Assessment and plan of treatment:	Now resolved.  Secondary Diagnosis:	Essential hypertension  Assessment and plan of treatment:	Low salt diet  Activity as tolerated.  Take all medication as prescribed.  Follow up with your medical doctor for routine blood pressure monitoring at your next visit.  Notify your doctor if you have any of the following symptoms:   Dizziness, Lightheadedness, Blurry vision, Headache, Chest pain, Shortness of breath Principal Discharge DX:	Bacteremia  Goal:	right foot serial surgery with graft  Assessment and plan of treatment:	WOUNDCARE: Apply adaptic over graft site, cover with black granufoam, cover suture site with dry 4x4 gauze. VAC setting at 125mmHg continuous. VAC change three times a week.  ANTIBIOTIC: Finish course of antibiotic as prescribed.  WEIGHTBEARING: Weight bearing as tolerated to right foot in CAM boot.  FOLLOW-UP: Follow-up with Dr. Westbrook at [facility] within 1 week of discharge. Call [phone] for appointment.   Continue IV antibiotics through 4/12/18.  Weekly CBC, CMP, ESR and CRP.  Secondary Diagnosis:	Acute deep vein thrombosis (DVT) of popliteal vein of right lower extremity  Assessment and plan of treatment:	Continue Heparin SQ during rehab course  Repeat lower extremity doppler in 4-5 days.  Secondary Diagnosis:	Acute kidney injury  Assessment and plan of treatment:	Now improved  Avoid taking (NSAIDs) - (ex: Ibuprofen, Advil, Celebrex, Naprosyn)  Avoid taking any nephrotoxic agents (can harm kidneys) - Intravenous contrast for diagnostic testing, combination cold medications.  Have all medications adjusted for your renal function by your Health Care Provider.  Blood pressure control is important.  Take all medication as prescribed.  Secondary Diagnosis:	SHIELA (obstructive sleep apnea)  Secondary Diagnosis:	Transaminitis  Assessment and plan of treatment:	Now resolved.  Check weekly LFT's. Fax results to Fax results to Dr. Cabello, ID, at [phone].  Secondary Diagnosis:	Essential hypertension  Assessment and plan of treatment:	Low salt diet  Activity as tolerated.  Take all medication as prescribed.  Follow up with your medical doctor for routine blood pressure monitoring at your next visit.  Notify your doctor if you have any of the following symptoms:   Dizziness, Lightheadedness, Blurry vision, Headache, Chest pain, Shortness of breath  Secondary Diagnosis:	Osteomyelitis of ankle or foot, acute, right  Assessment and plan of treatment:	FOLLOW-UP: Follow-up with Dr. Westbrook at [facility] in 1 week of discharge, on Thursday 3/22/18. Call [phone] for appointment.   Continue IV antibiotics through 4/12/18.  Weekly CBC, CMP, ESR and CRP.   Fax results to HEIDE Scherer, at [phone] Principal Discharge DX:	Bacteremia  Goal:	right foot serial surgery with graft  Assessment and plan of treatment:	WOUNDCARE: Apply extra large adaptic (1-inch margin all around) over graft site with 1-inch  (do not disturb graft/adaptic secured on to wound), cover with black granufoam, cover suture site with dry 4x4 gauze. VAC setting at 125mmHg continuous. VAC change three times a week.  ANTIBIOTIC: Finish course of antibiotic as prescribed.  WEIGHTBEARING: Weight bearing as tolerated to right heel in CAM boot with walker.  FOLLOW-UP: Follow-up with Dr. Westbrook at [facility] within 1 week of discharge. Call [phone] for appointment.   Continue IV antibiotics through 4/12/18.  Weekly CBC, CMP, ESR and CRP.  Secondary Diagnosis:	Acute deep vein thrombosis (DVT) of popliteal vein of right lower extremity  Assessment and plan of treatment:	Continue Heparin SQ during rehab course  Repeat lower extremity doppler in 4-5 days.  Secondary Diagnosis:	Acute kidney injury  Assessment and plan of treatment:	Now improved  Avoid taking (NSAIDs) - (ex: Ibuprofen, Advil, Celebrex, Naprosyn)  Avoid taking any nephrotoxic agents (can harm kidneys) - Intravenous contrast for diagnostic testing, combination cold medications.  Have all medications adjusted for your renal function by your Health Care Provider.  Blood pressure control is important.  Take all medication as prescribed.  Secondary Diagnosis:	SHIELA (obstructive sleep apnea)  Secondary Diagnosis:	Transaminitis  Assessment and plan of treatment:	Now resolved.  Check weekly LFT's. Fax results to Fax results to Dr. Cabello, ID, at [phone].  Secondary Diagnosis:	Essential hypertension  Assessment and plan of treatment:	Low salt diet  Activity as tolerated.  Take all medication as prescribed.  Follow up with your medical doctor for routine blood pressure monitoring at your next visit.  Notify your doctor if you have any of the following symptoms:   Dizziness, Lightheadedness, Blurry vision, Headache, Chest pain, Shortness of breath  Secondary Diagnosis:	Osteomyelitis of ankle or foot, acute, right  Assessment and plan of treatment:	FOLLOW-UP: Follow-up with Dr. Wsetbrook at [facility] in 1 week of discharge, on Thursday 3/22/18. Call [phone] for appointment.   Continue IV antibiotics through 4/12/18.  Weekly CBC, CMP, ESR and CRP.   Fax results to Dr. Cabello, ID, at [phone] Principal Discharge DX:	Bacteremia  Goal:	right foot serial surgery with graft  Assessment and plan of treatment:	WOUNDCARE: Apply extra large adaptic (1-inch margin all around) over graft site with 1-inch  (do not disturb graft/adaptic secured on to wound), cover with black granufoam, cover suture site with dry 4x4 gauze. VAC setting at 125mmHg continuous. VAC change three times a week.  ANTIBIOTIC: Finish course of antibiotic as prescribed.  WEIGHTBEARING: Weight bearing as tolerated to right heel in CAM boot with walker.  FOLLOW-UP: Follow-up with Dr. Westbrook at [facility] within 1 week of discharge. Call [phone] for appointment.   Continue IV antibiotics through 4/12/18.  Weekly CBC, CMP, ESR and CRP.  Secondary Diagnosis:	Acute deep vein thrombosis (DVT) of popliteal vein of right lower extremity  Assessment and plan of treatment:	Continue Heparin SQ during rehab course  Repeat lower extremity doppler in 4-5 days.  Secondary Diagnosis:	Acute kidney injury  Assessment and plan of treatment:	Now improved  Avoid taking (NSAIDs) - (ex: Ibuprofen, Advil, Celebrex, Naprosyn)  Avoid taking any nephrotoxic agents (can harm kidneys) - Intravenous contrast for diagnostic testing, combination cold medications.  Have all medications adjusted for your renal function by your Health Care Provider.  Blood pressure control is important.  Take all medication as prescribed.  Secondary Diagnosis:	SHIELA (obstructive sleep apnea)  Assessment and plan of treatment:	Nocturnal CPAP  Secondary Diagnosis:	Transaminitis  Assessment and plan of treatment:	Now resolved.  Check weekly LFT's. Fax results to Fax results to Dr. Cabello, ID, at [phone].  Secondary Diagnosis:	Essential hypertension  Assessment and plan of treatment:	Low salt diet  Activity as tolerated.  Take all medication as prescribed.  Follow up with your medical doctor for routine blood pressure monitoring at your next visit.  Notify your doctor if you have any of the following symptoms:   Dizziness, Lightheadedness, Blurry vision, Headache, Chest pain, Shortness of breath  Secondary Diagnosis:	Necrotizing fasciitis, ankle and foot  Assessment and plan of treatment:	FOLLOW-UP: Follow-up with Dr. Westbrook at [facility] in 1 week of discharge, on Thursday 3/22/18. Call [phone] for appointment.   Continue IV antibiotics through 4/12/18.  Weekly CBC, CMP, ESR and CRP.   Fax results to Dr. Cabello, ID, at [phone] Principal Discharge DX:	Bacteremia  Goal:	right foot serial surgery with graft  Assessment and plan of treatment:	WOUNDCARE: Apply extra large adaptic (1-inch margin all around) over graft site with 1-inch  (do not disturb graft/adaptic secured on to wound), cover with black granufoam, cover suture site with dry 4x4 gauze. VAC setting at 125mmHg continuous. VAC change three times a week.  ANTIBIOTIC: Finish course of antibiotic as prescribed.  WEIGHTBEARING: Weight bearing as tolerated to right heel in CAM boot with walker.  FOLLOW-UP: Follow-up with Dr. [facility]brook at [facility] within 1 week of discharge. Call [phone] for appointment. Keep follow-up appointment for Thursday 3/22/18 9:30AM at [address] [place] [address], [place] [address]  Continue IV antibiotics through 4/12/18.  Weekly CBC, CMP, ESR and CRP.  Secondary Diagnosis:	Acute deep vein thrombosis (DVT) of popliteal vein of right lower extremity  Assessment and plan of treatment:	Continue Heparin SQ during rehab course  Repeat lower extremity doppler in 4-5 days.  Secondary Diagnosis:	Acute kidney injury  Assessment and plan of treatment:	Now improved  Avoid taking (NSAIDs) - (ex: Ibuprofen, Advil, Celebrex, Naprosyn)  Avoid taking any nephrotoxic agents (can harm kidneys) - Intravenous contrast for diagnostic testing, combination cold medications.  Have all medications adjusted for your renal function by your Health Care Provider.  Blood pressure control is important.  Take all medication as prescribed.  Secondary Diagnosis:	SHIELA (obstructive sleep apnea)  Assessment and plan of treatment:	Nocturnal CPAP  Secondary Diagnosis:	Transaminitis  Assessment and plan of treatment:	Now resolved.  Check weekly LFT's. Fax results to Fax results to Dr. Cabello, ID, at [phone].  Secondary Diagnosis:	Essential hypertension  Assessment and plan of treatment:	Low salt diet  Activity as tolerated.  Take all medication as prescribed.  Follow up with your medical doctor for routine blood pressure monitoring at your next visit.  Notify your doctor if you have any of the following symptoms:   Dizziness, Lightheadedness, Blurry vision, Headache, Chest pain, Shortness of breath  Secondary Diagnosis:	Necrotizing fasciitis, ankle and foot  Assessment and plan of treatment:	FOLLOW-UP: Follow-up with Dr. [facility]brook at [facility] in 1 week of discharge, on Thursday 3/22/18. Call [phone] for appointment.   Continue IV antibiotics through 4/12/18.  Weekly CBC, CMP, ESR and CRP.   Fax results to Dr. Cabello, ID, at [phone] Principal Discharge DX:	Bacteremia  Goal:	right foot serial surgery with graft  Assessment and plan of treatment:	WOUNDCARE: Apply extra large adaptic (1-inch margin all around) over graft site with 1-inch  (do not disturb graft/adaptic secured on to wound), cover with black granufoam, cover suture site with dry 4x4 gauze. VAC setting at 125mmHg continuous. VAC change three times a week.  ANTIBIOTIC: Finish course of antibiotic as prescribed.  WEIGHTBEARING: Weight bearing as tolerated to right heel in CAM boot with walker on the right.  FOLLOW-UP: Follow-up with Dr. [facility]brook at [facility] within 1 week of discharge. Call [phone] for appointment. Keep follow-up appointment for Thursday 3/22/18 9:30AM at [address] [place] [address], [place] [address]  Continue IV antibiotics through 4/12/18.  Weekly CBC, CMP, ESR and CRP.  Secondary Diagnosis:	Acute deep vein thrombosis (DVT) of popliteal vein of right lower extremity  Assessment and plan of treatment:	Continue Heparin SQ during rehab course  Repeat lower extremity doppler in 4-5 days.  Secondary Diagnosis:	Acute kidney injury  Assessment and plan of treatment:	Now improved  Avoid taking (NSAIDs) - (ex: Ibuprofen, Advil, Celebrex, Naprosyn)  Avoid taking any nephrotoxic agents (can harm kidneys) - Intravenous contrast for diagnostic testing, combination cold medications.  Have all medications adjusted for your renal function by your Health Care Provider.  Blood pressure control is important.  Take all medication as prescribed.  Secondary Diagnosis:	SHIELA (obstructive sleep apnea)  Assessment and plan of treatment:	Nocturnal CPAP  Secondary Diagnosis:	Transaminitis  Assessment and plan of treatment:	Now resolved.  Check weekly LFT's. Fax results to Fax results to Dr. Cabello, ID, at [phone].  Secondary Diagnosis:	Essential hypertension  Assessment and plan of treatment:	Low salt diet  Activity as tolerated.  Take all medication as prescribed.  Follow up with your medical doctor for routine blood pressure monitoring at your next visit.  Notify your doctor if you have any of the following symptoms:   Dizziness, Lightheadedness, Blurry vision, Headache, Chest pain, Shortness of breath  Secondary Diagnosis:	Necrotizing fasciitis, ankle and foot  Assessment and plan of treatment:	FOLLOW-UP: Follow-up with Dr. [facility]brook at [facility] in 1 week of discharge, on Thursday 3/22/18. Call [phone] for appointment.   Continue IV antibiotics through 4/12/18.  Weekly CBC, CMP, ESR and CRP.   Fax results to Dr. Cabello, ID, at [phone]

## 2018-03-12 NOTE — PROGRESS NOTE ADULT - SUBJECTIVE AND OBJECTIVE BOX
MEDICINE, PROGRESS NOTE [phone]    JUWAN DOMÍNGUEZ 57y [id number]    Patient seen and examined.  Patient is a 57y old  Male who presents with a chief complaint of Chest pain, confusion (22 Feb 2018 03:00)  Pt has no current complaints. Pt feels better. Still with difficulty catching his breath when ambulating.    PAST MEDICAL & SURGICAL HISTORY:  Sciatica of right side  Essential hypertension  No significant past surgical history    MEDICATIONS  (STANDING):  ALBUTerol/ipratropium for Nebulization 3 milliLiter(s) Nebulizer every 6 hours  heparin  Injectable 5000 Unit(s) SubCutaneous every 8 hours  imipenem/cilastatin  IVPB 1000 milliGRAM(s) IV Intermittent every 8 hours  lactobacillus acidophilus 1 Tablet(s) Oral three times a day with meals  losartan 50 milliGRAM(s) Oral daily  multivitamin 1 Tablet(s) Oral daily  sodium chloride 0.9%. 1000 milliLiter(s) (10 mL/Hr) IV Continuous <Continuous>    MEDICATIONS  (PRN):  oxyCODONE    IR 10 milliGRAM(s) Oral every 3 hours PRN Moderate and Severe Pain (4-10)    Allergies    No Known Allergies    Intolerances        PHYSICAL EXAM:  Constitutional: NAD  HEENT: Normocephalic, EOMI  Neck:  No JVD  Respiratory: CTA B/L, No wheezes  Cardiovascular: S1, S2, RRR, + systolic murmur  Gastrointestinal: BS+, soft, NT/ND  Extremities: dec peripheral edema right lower extrem, + wound vac to foot  Neurological: AAOX3, no focal deficits  Psychiatric: Normal mood, normal affect  : No Sosa    Vital Signs Last 24 Hrs  T(C): 37.4 (12 Mar 2018 13:46), Max: 37.4 (12 Mar 2018 13:46)  T(F): 99.3 (12 Mar 2018 13:46), Max: 99.3 (12 Mar 2018 13:46)  HR: 86 (12 Mar 2018 13:46) (82 - 91)  BP: 112/71 (12 Mar 2018 13:46) (110/73 - 121/74)  BP(mean): --  RR: 18 (12 Mar 2018 13:46) (18 - 19)  SpO2: 95% (12 Mar 2018 13:46) (95% - 98%)  I&O's Summary    11 Mar 2018 07:01  -  12 Mar 2018 07:00  --------------------------------------------------------  IN: 1350 mL / OUT: 4555 mL / NET: -3205 mL    12 Mar 2018 07:01  -  12 Mar 2018 15:12  --------------------------------------------------------  IN: 250 mL / OUT: 1650 mL / NET: -1400 mL        LABS:                        9.8    5.76  )-----------( 290      ( 12 Mar 2018 09:09 )             30.2     03-12    136  |  97  |  16  ----------------------------<  98  4.2   |  29  |  0.79    Ca    9.1      12 Mar 2018 07:04  Phos  3.9     03-12  Mg     2.0     03-12    TPro  7.9  /  Alb  2.7<L>  /  TBili  0.3  /  DBili  x   /  AST  33  /  ALT  63<H>  /  AlkPhos  83  03-12  Magnesium, Serum: 2.0 mg/dL (03-12 @ 07:04)

## 2018-03-12 NOTE — DISCHARGE NOTE ADULT - CARE PROVIDER_API CALL
Pietro,   Phone: [phone]  Fax: (   )    -    July Cabello (MD), Infectious Disease; Internal Medicine  [address] [place] [address]  Phone: [phone]  Fax: [phone] July Cabello), Infectious Disease; Internal Medicine  [address] [place] [address]  Phone: [phone]  Fax: [phone]    Pietro,   Phone: [phone]  Fax: (   )    -    Carlos Miranda), Internal Medicine  [address] [place] [address]  Phone: [phone]  Fax: [phone] July Cabello), Infectious Disease; Internal Medicine  [address] [place] [address]  Phone: [phone]  Fax: [phone]    Carlos Miranda), Internal Medicine  [address] [place] [address]  [address]  [place] [address]  Phone: [phone]  Fax: [phone]    Pietro,   Phone: [phone]  Fax: (   )    -    Dania Evans), Cardiovascular Disease; Internal Medicine  [address] [facility] [address]  [place] [address]  Phone: [phone]  Fax: [phone]

## 2018-03-12 NOTE — PROGRESS NOTE ADULT - SUBJECTIVE AND OBJECTIVE BOX
CHIEF COMPLAINT:    Interval Events:    REVIEW OF SYSTEMS:  Constitutional:   Eyes:  ENT:  CV:  Resp:  GI:  :  MSK:  Integumentary:  Neurological:  Psychiatric:  Endocrine:  Hematologic/Lymphatic:  Allergic/Immunologic:  [ ] All other systems negative  [ ] Unable to assess ROS because ________    OBJECTIVE:  ICU Vital Signs Last 24 Hrs  T(C): 37.4 (12 Mar 2018 13:46), Max: 37.4 (12 Mar 2018 13:46)  T(F): 99.3 (12 Mar 2018 13:46), Max: 99.3 (12 Mar 2018 13:46)  HR: 92 (12 Mar 2018 15:33) (82 - 92)  BP: 116/70 (12 Mar 2018 15:33) (110/73 - 121/74)  BP(mean): --  ABP: --  ABP(mean): --  RR: 18 (12 Mar 2018 13:46) (18 - 19)  SpO2: 95% (12 Mar 2018 13:46) (95% - 98%)        03-11 @ 07:01 - 03-12 @ 07:00  --------------------------------------------------------  IN: 1350 mL / OUT: 4555 mL / NET: -3205 mL    03-12 @ 07:01  - 03-12 @ 16:12  --------------------------------------------------------  IN: 250 mL / OUT: 1650 mL / NET: -1400 mL      CAPILLARY BLOOD GLUCOSE          PHYSICAL EXAM:  General:   HEENT:   Lymph Nodes:  Neck:   Respiratory:   Cardiovascular:   Abdomen:   Extremities:   Skin:   Neurological:  Psychiatry:    HOSPITAL MEDICATIONS:  MEDICATIONS  (STANDING):  ALBUTerol/ipratropium for Nebulization 3 milliLiter(s) Nebulizer every 6 hours  heparin  Injectable 5000 Unit(s) SubCutaneous every 8 hours  imipenem/cilastatin  IVPB 1000 milliGRAM(s) IV Intermittent every 8 hours  lactobacillus acidophilus 1 Tablet(s) Oral three times a day with meals  losartan 50 milliGRAM(s) Oral daily  multivitamin 1 Tablet(s) Oral daily  sodium chloride 0.9%. 1000 milliLiter(s) (10 mL/Hr) IV Continuous <Continuous>    MEDICATIONS  (PRN):  oxyCODONE    IR 10 milliGRAM(s) Oral every 3 hours PRN Moderate and Severe Pain (4-10)      LABS:                        9.8    5.76  )-----------( 290      ( 12 Mar 2018 09:09 )             30.2     03-12    136  |  97  |  16  ----------------------------<  98  4.2   |  29  |  0.79    Ca    9.1      12 Mar 2018 07:04  Phos  3.9     03-12  Mg     2.0     03-12    TPro  7.9  /  Alb  2.7<L>  /  TBili  0.3  /  DBili  x   /  AST  33  /  ALT  63<H>  /  AlkPhos  83  03-12              MICROBIOLOGY:     RADIOLOGY:  [ ] Reviewed and interpreted by me    PULMONARY FUNCTION TESTS:    EKG: CHIEF COMPLAINT: confusion; fevers; cellulitis     Interval Events:  patient is doing well. mild dyspnea on exertion. otherwise, no more hemoptysis. no fevers. no chest pain. the leg is slowly improving as well.     REVIEW OF SYSTEMS:  Constitutional: [x] negative [ ] fevers [ ] chills [ ] weight loss [ ] weight gain  HEENT: [ x] negative [ ] dry eyes [ ] eye irritation [ ] postnasal drip [ ] nasal congestion  CV: [x] negative  [ ] chest pain [ ] orthopnea [ ] palpitations [ ] murmur  GI: [x ] negative [ ] nausea [ ] vomiting [ ] diarrhea [ ] constipation [ ] abd pain [ ] dysphagia   : [x ] negative [ ] dysuria [ ] nocturia [ ] hematuria [ ] increased urinary frequency  Musculoskeletal: [ ] negative [x ] back pain [ ] myalgias [ ] arthralgias [ ] fracture  Skin: [x ] negative [ ] rash [ ] itch  Neurological: [x ] negative [ ] headache [ ] dizziness [ ] syncope [ ] weakness [ ] numbness  Psychiatric: [x ] negative [ ] anxiety [ ] depression  Endocrine: x ] negative [ ] diabetes [ ] thyroid problem  Hematologic/Lymphatic: [x ] negative [ ] anemia [ ] bleeding problem  Allergic/Immunologic: [x ] negative [ ] itchy eyes [ ] nasal discharge [ ] hives [ ] angioedema  [x] All other systems negative  [ ] Unable to assess ROS because ________    OBJECTIVE:  ICU Vital Signs Last 24 Hrs  T(C): 37.4 (12 Mar 2018 13:46), Max: 37.4 (12 Mar 2018 13:46)  T(F): 99.3 (12 Mar 2018 13:46), Max: 99.3 (12 Mar 2018 13:46)  HR: 92 (12 Mar 2018 15:33) (82 - 92)  BP: 116/70 (12 Mar 2018 15:33) (110/73 - 121/74)  RR: 18 (12 Mar 2018 13:46) (18 - 19)  SpO2: 95% (12 Mar 2018 13:46) (95% - 98%)        03-11 @ 07:01  -  03-12 @ 07:00  --------------------------------------------------------  IN: 1350 mL / OUT: 4555 mL / NET: -3205 mL    03-12 @ 07:01  -  03-12 @ 16:12  --------------------------------------------------------  IN: 250 mL / OUT: 1650 mL / NET: -1400 mL    PHYSICAL EXAM:  General: obese. on RA. sitting up in a chair.   HEENT: clear OP.  Lymph Nodes: no cervical LAD  Respiratory: mild inspiratory crackles at the bases; otherwise clear  Cardiovascular: nl rate and reg rhythm. nl s1, s2.  Abdomen: soft. nt. nd.   Extremities: +RLE wrapped. swelling improve.   Neurological: AOx3    HOSPITAL MEDICATIONS:  MEDICATIONS  (STANDING):  ALBUTerol/ipratropium for Nebulization 3 milliLiter(s) Nebulizer every 6 hours  heparin  Injectable 5000 Unit(s) SubCutaneous every 8 hours  imipenem/cilastatin  IVPB 1000 milliGRAM(s) IV Intermittent every 8 hours  lactobacillus acidophilus 1 Tablet(s) Oral three times a day with meals  losartan 50 milliGRAM(s) Oral daily  multivitamin 1 Tablet(s) Oral daily  sodium chloride 0.9%. 1000 milliLiter(s) (10 mL/Hr) IV Continuous <Continuous>    MEDICATIONS  (PRN):  oxyCODONE    IR 10 milliGRAM(s) Oral every 3 hours PRN Moderate and Severe Pain (4-10)      LABS:                        9.8    5.76  )-----------( 290      ( 12 Mar 2018 09:09 )             30.2     03-12    136  |  97  |  16  ----------------------------<  98  4.2   |  29  |  0.79    Ca    9.1      12 Mar 2018 07:04  Phos  3.9     03-12  Mg     2.0     03-12    TPro  7.9  /  Alb  2.7<L>  /  TBili  0.3  /  DBili  x   /  AST  33  /  ALT  63<H>  /  AlkPhos  83  03-12    MICROBIOLOGY:   MSSA bacteremia -- resolved.     RADIOLOGY:  [x] Reviewed and interpreted by me

## 2018-03-12 NOTE — PROGRESS NOTE ADULT - ASSESSMENT
Recent ankle sprain at work which got infected resulting in MSSA sepsis/cellulitis with lung embolization s/p OR debridement X 2 now with pseudomonas in OR culture    OM right foot/necrotizing fasciitis     hemoptysis - minimal - possibly secondary to being on hep drip with pulm septic emboli    ADA - resolved    Transaminitis - improving    Coagulopathy on admission - most likley due to dietary deficiency, holding off on any supplementation for now    Right peroneal vein dvt - on heparin prophy dose per heme    Pulm artery enlargement    Morbid obesity    SHIELA continue nocturnal bipap    PAT/ AIVR/13 beats wct - no further events    pleural effusion    Based on interview with pt and wife, pt is not an etoh abuser (neg CAGE, never had wd symptoms in his life, never been in rehab, has gone months without drinking with no problems.)    continue current care  continue wound vac  repeat lower extremity dopplers  d/c all iv pain meds  will switch to ancef /cipro upon discharge to rehab  pulm follow up of worsening lung lesions with cavitation  heel weightbearing right foot  pt will need out pt PFT and sleep study

## 2018-03-12 NOTE — DISCHARGE NOTE ADULT - MEDICATION SUMMARY - MEDICATIONS TO TAKE
I will START or STAY ON the medications listed below when I get home from the hospital:    oxyCODONE 10 mg oral tablet  -- 1 tab(s) by mouth every 3 hours, As needed, Moderate and Severe Pain (4-10)  -- Indication: For Pain    losartan 50 mg oral tablet  -- 1 tab(s) by mouth once a day  -- Indication: For Essential hypertension    heparin 5000 units/mL injectable solution  -- 5000 unit(s) subcutaneous every 8 hours  -- Indication: For DVT    ipratropium-albuterol 0.5 mg-2.5 mg/3 mLinhalation solution  -- 3 milliliter(s) inhaled every 6 hours  -- Indication: For SHIELA (obstructive sleep apnea)    ceFAZolin 2 g intravenous injection  -- 2 gram(s) intravenous in 50 mL NS  infuse over 30 minutes every 8 hours till 4/12/18  -- Indication: For Bacteremia    lactobacillus acidophilus oral capsule  -- 1 cap(s) by mouth 3 times a day  -- Indication: For Supplement    Cipro 750 mg oral tablet  -- 1 tab(s) by mouth every 12 hours till 4/12/18  -- Indication: For Bacteremia    Multiple Vitamins oral tablet  -- 1 tab(s) by mouth once a day  -- Indication: For Supplement I will START or STAY ON the medications listed below when I get home from the hospital:    oxyCODONE 10 mg oral tablet  -- 1 tab(s) by mouth every 3 hours, As needed, Moderate and Severe Pain (4-10)  -- Indication: For Pain    losartan 50 mg oral tablet  -- 1 tab(s) by mouth once a day  -- Indication: For Essential hypertension    heparin 5000 units/mL injectable solution  -- 5000 unit(s) subcutaneous every 8 hours  -- Indication: For DVT    ipratropium-albuterol 0.5 mg-2.5 mg/3 mLinhalation solution  -- 3 milliliter(s) inhaled every 6 hours, As Needed  -- Indication: For As needed for shortness of breath    ceFAZolin 2 g intravenous injection  -- 2 gram(s) intravenous in 50 mL NS  infuse over 30 minutes every 8 hours till 4/12/18  -- Indication: For Bacteremia    lactobacillus acidophilus oral capsule  -- 1 cap(s) by mouth 3 times a day  -- Indication: For Supplement    Cipro 750 mg oral tablet  -- 1 tab(s) by mouth every 12 hours till 4/12/18  -- Indication: For Bacteremia    Multiple Vitamins oral tablet  -- 1 tab(s) by mouth once a day  -- Indication: For Supplement

## 2018-03-12 NOTE — PHYSICAL THERAPY INITIAL EVALUATION ADULT - DID THE PATIENT HAVE SURGERY?
yes/s/p right foot dorsal wound debridement with versajet and stravix graft application, right foot medial wound delayed primary closure

## 2018-03-12 NOTE — PHYSICAL THERAPY INITIAL EVALUATION ADULT - PLANNED THERAPY INTERVENTIONS, PT EVAL
wound care
balance training/gait training/bed mobility training/transfer training/GOAL: Pt will negotiate 3 steps with axillary crutches independently in 2 weeks/strengthening
gait training/transfer training/stair training/strengthening/balance training/bed mobility training

## 2018-03-12 NOTE — DISCHARGE NOTE ADULT - PATIENT PORTAL LINK FT
You can access the [facility] Patient Portal, offered by [facility], by registering with the following website: [email or web address]

## 2018-03-12 NOTE — PHYSICAL THERAPY INITIAL EVALUATION ADULT - IMPAIRMENTS FOUND, PT EVAL
integumentary integrity
muscle strength/gait, locomotion, and balance
muscle strength/gait, locomotion, and balance

## 2018-03-12 NOTE — DISCHARGE NOTE ADULT - HOSPITAL COURSE
56 y/o male PMH HTN BIB family for chest pain. As per wife pt had a recent ankle sprain on pain meds that are not helping, has not had a drink in 10 days and normally drinks few drinks everyday, pw weakness, diaphoresis and chest pain.Pt states he did not have cp, but wife felt he was short of breath at the time. Ankle sprain found to be infected with MSSA sepsis/cellulitis with lung embolization s/p OR debridement X 2 now with pseudomonas in OR culture.   OM right foot/necrotizing fasciitis  Hemoptysis - minimal - possibly secondary to being on hep drip with pulm septic emboli  ADA - resolved  Transaminitis - improving  Coagulopathy on admission - most likley due to dietary deficiency, holding off on any supplementation for now  Right peroneal vein dvt - on heparin prophy dose per heme  new soleal vein dvt - repeat dopplers in rehab, continue prophylactic heparin  Pulm artery enlargement  Morbid obesity  SHIELA continue nocturnal bipap  pleural effusion  d/c planning to rehab.  Follow up as above

## 2018-03-12 NOTE — PROGRESS NOTE ADULT - SUBJECTIVE AND OBJECTIVE BOX
CC:  Right foot pain    F/U:  mssa bacteremia, RLE infection     Interval History/ROS:   f/u BC negative.  PICC placed.  no fever/chills.  Rest of ROS otherwise negative    Allergies  No Known Allergies    ANTIMICROBIALS:          PHYSICAL EXAM:  General: lying in bed  HEAD/EYES: anicteric  ENT:  supple  Cardiovascular:   S1, S2; no murmur  Respiratory:  clear bilaterally  GI:  soft, non-tender, normal bowel sounds  :  no guido  Musculoskeletal:  R foot - with VAC; no erythema of the RLE. no swelling  Neurologic:  grossly non-focal  Skin:  no rash  Psychiatric:  appropriate affect  Vascular:  no phlebitis R and L PIV                        10.7   8.36  )-----------( 478      ( 06 Mar 2018 06:00 )             34.2 03-06    135  |  97  |  18  ----------------------------<  129  4.6   |  25  |  0.78  Ca    9.0      06 Mar 2018 03:45Phos  4.1     03-06Mg     2.0     03-06  TPro  7.7  /  Alb  2.4  /  TBili  0.3  /  DBili  x   /  AST  112  /  ALT  129  /  AlkPhos  88  03-06    MICROBIOLOGY:  Culture - Blood (03.03.18 @ 05:23)  No growth to date.    Culture - Blood (03.02.18 @ 19:09):   No growth to date.    Culture - Tissue with Gram Stain (03.02.18 @ 00:55)   Moderate Pseudomonas aeruginosa - I-aztreonam; all else sensitive  Numerous Staphylococcus aureus    Culture - Blood in AM (02.27.18 @ 13:42)  Growth in anaerobic bottle: Staphylococcus aureus    Culture - Blood in AM (02.27.18 @ 13:41)  Growth in anaerobic bottle: Staphylococcus aureus Susceptibility to follow.    Culture - Surgical Swab (02.25.18 @ 22:01)    -  Cefazolin: S <=4    Specimen Source: .Surgical Swab RIGHT FOOT PUS  Moderate Staphylococcus aureus    Culture - Abscess with Gram Stain (02.25.18 @ 08:39)    -  Oxacillin: S 0.5    Specimen Source: .Abscess right foot  Few Staphylococcus aureus    Culture - Blood in AM (02.24.18 @ 05:15)  Growth in aerobic and anaerobic bottles: Staphylococcus aureus  See previous culture [id number]    Culture - Blood (02.24.18 @ 00:59)  Growth in aerobic and anaerobic bottles: Staphylococcus aureus  See previous culture [id number]    Culture - Blood (02.22.18 @ 12:56)  Growth in aerobic and anaerobic bottles: Staphylococcus aureus  See previous culture  # [id number]    Culture - Blood (02.22.18 @ 12:56)  Growth in anaerobic bottle:  Specimen Source: .Blood Blood-Peripheral  Staphylococcus aureus    -  Cefazolin: S <=4    RADIOLOGY:  MR Tib/Fib w/wo IV Cont, Right (02.28.18 @ 23:11)   1.  Soft tissue wound over the dorsolateral aspect of the foot. 2 residual abscesses are seen tracking from the site of the wound, one posterior, and one along the plantar surface of the foot.  2.  Diffuse osteomyelitis of the midfoot involving the metatarsals, cuneiforms, cuboid, and navicular.  3.  Myositis of the deep posterior compartment musculature of the leg.  4.  Nonenhancing edema is seen tracking along the superficial and deep fascial layers of the posterior compartment of the leg. Given other findings, necrotizing fasciitis cannot be excluded.    VA Duplex Lower Ext Vein Scan, Right (02.27.18 @ 15:13)  IMPRESSION:  Persistent thrombosis of a single right peroneal vein without evidence of propagation since prior venous ultrasound from 2/22/2018.    CT Lower Extremity w/ IV Cont, Right (02.22.18 @ 21:56)   Impression:  Diffuse cellulitis throughout the right lower leg as outlined above with cutaneous bleb along the dorsal lateral aspect of the foot. There is no subcutaneous air to suggest necrotizing fasciitis.    Ill-defined fluid attenuation within the region of the extensor digitorum brevis musculature and within the region of the abductor hallucis muscle which may be related to muscle strain or myositis. Developing fluid collections are also a consideration in the appropriate clinical context.  No peripherally enhancing fluid collection is noted on the current examination.  Intramuscular fullness and edema within the myofascial planes about the calf musculature likely related to patient's known right peroneal deep vein thrombosis.  No CT evidence of osteomyelitis.  Osteochondral lesion along the medial aspect of the talar dome.    CT Angio Chest w/ IV Cont (02.22.18 @ 00:25)   IMPRESSION:    1.  No main, or lobar pulmonary embolus. Evaluation of the segmental and subsegmental branches is limited secondary to artifact.  2.  Multiple groundglass and nodular opacities with a peripheral and lower lobe distribution could be secondary to infection (? Either septic emboli or fungal infection rather than a community acquired pneumonia) rather than malignancy. CC:  Right foot pain    F/U:  mssa bacteremia, RLE infection     Interval History/ROS:   f/u BC negative.  PICC placed.  no fever/chills.  Rest of ROS otherwise negative    Allergies  No Known Allergies    ANTIMICROBIALS:    imipenem/cilastatin  IVPB 1000 every 8 hours    MEDICATIONS  (STANDING):  ALBUTerol/ipratropium for Nebulization 3 every 6 hours  heparin  Injectable 5000 every 8 hours  HYDROmorphone  Injectable 0.5 every 10 minutes  losartan 50 daily    Vital Signs Last 24 Hrs  T(F): 98.1 (03-12-18 @ 05:08), Max: 98.9 (03-11-18 @ 20:40)  HR: 86 (03-12-18 @ 10:35)  BP: 121/74 (03-12-18 @ 05:08)  RR: 19 (03-12-18 @ 05:08)  SpO2: 96% (03-12-18 @ 10:35) (94% - 98%)  Wt(kg): --    PHYSICAL EXAM:  General: lying in bed  HEAD/EYES: anicteric  ENT:  supple  Cardiovascular:   S1, S2; no murmur  Respiratory:  clear bilaterally  GI:  soft, non-tender, normal bowel sounds  :  no guido  Musculoskeletal:  R foot - with VAC; no erythema of the RLE. no swelling  Neurologic:  grossly non-focal  Skin:  no rash  Psychiatric:  appropriate affect  Vascular:  PICC Right c/d/i                             9.8    5.76  )-----------( 290      ( 12 Mar 2018 09:09 )             30.2 03-12    136  |  97  |  16  ----------------------------<  98  4.2   |  29  |  0.79  Ca    9.1      12 Mar 2018 07:04Phos  3.9     03-12Mg     2.0     03-12  TPro  7.9  /  Alb  2.7  /  TBili  0.3  /  DBili  x   /  AST  33  /  ALT  63  /  AlkPhos  83  03-12    MICROBIOLOGY:  Culture - Blood (03.03.18 @ 05:23)  No growth to date.    Culture - Blood (03.02.18 @ 19:09):   No growth to date.    Culture - Tissue with Gram Stain (03.02.18 @ 00:55)   Moderate Pseudomonas aeruginosa - I-aztreonam; all else sensitive  Numerous Staphylococcus aureus    Culture - Blood in AM (02.27.18 @ 13:42)  Growth in anaerobic bottle: Staphylococcus aureus    Culture - Blood in AM (02.27.18 @ 13:41)  Growth in anaerobic bottle: Staphylococcus aureus Susceptibility to follow.    Culture - Surgical Swab (02.25.18 @ 22:01)    -  Cefazolin: S <=4    Specimen Source: .Surgical Swab RIGHT FOOT PUS  Moderate Staphylococcus aureus    Culture - Abscess with Gram Stain (02.25.18 @ 08:39)    -  Oxacillin: S 0.5    Specimen Source: .Abscess right foot  Few Staphylococcus aureus    Culture - Blood in AM (02.24.18 @ 05:15)  Growth in aerobic and anaerobic bottles: Staphylococcus aureus  See previous culture [id number]    Culture - Blood (02.24.18 @ 00:59)  Growth in aerobic and anaerobic bottles: Staphylococcus aureus  See previous culture [id number]    Culture - Blood (02.22.18 @ 12:56)  Growth in aerobic and anaerobic bottles: Staphylococcus aureus  See previous culture  # [id number]    Culture - Blood (02.22.18 @ 12:56)  Growth in anaerobic bottle:  Specimen Source: .Blood Blood-Peripheral  Staphylococcus aureus    -  Cefazolin: S <=4    RADIOLOGY:  MR Tib/Fib w/wo IV Cont, Right (02.28.18 @ 23:11)   1.  Soft tissue wound over the dorsolateral aspect of the foot. 2 residual abscesses are seen tracking from the site of the wound, one posterior, and one along the plantar surface of the foot.  2.  Diffuse osteomyelitis of the midfoot involving the metatarsals, cuneiforms, cuboid, and navicular.  3.  Myositis of the deep posterior compartment musculature of the leg.  4.  Nonenhancing edema is seen tracking along the superficial and deep fascial layers of the posterior compartment of the leg. Given other findings, necrotizing fasciitis cannot be excluded.    VA Duplex Lower Ext Vein Scan, Right (02.27.18 @ 15:13)  IMPRESSION:  Persistent thrombosis of a single right peroneal vein without evidence of propagation since prior venous ultrasound from 2/22/2018.    CT Lower Extremity w/ IV Cont, Right (02.22.18 @ 21:56)   Impression:  Diffuse cellulitis throughout the right lower leg as outlined above with cutaneous bleb along the dorsal lateral aspect of the foot. There is no subcutaneous air to suggest necrotizing fasciitis.    Ill-defined fluid attenuation within the region of the extensor digitorum brevis musculature and within the region of the abductor hallucis muscle which may be related to muscle strain or myositis. Developing fluid collections are also a consideration in the appropriate clinical context.  No peripherally enhancing fluid collection is noted on the current examination.  Intramuscular fullness and edema within the myofascial planes about the calf musculature likely related to patient's known right peroneal deep vein thrombosis.  No CT evidence of osteomyelitis.  Osteochondral lesion along the medial aspect of the talar dome.    CT Angio Chest w/ IV Cont (02.22.18 @ 00:25)   IMPRESSION:    1.  No main, or lobar pulmonary embolus. Evaluation of the segmental and subsegmental branches is limited secondary to artifact.  2.  Multiple groundglass and nodular opacities with a peripheral and lower lobe distribution could be secondary to infection (? Either septic emboli or fungal infection rather than a community acquired pneumonia) rather than malignancy.

## 2018-03-12 NOTE — PHYSICAL THERAPY INITIAL EVALUATION ADULT - GENERAL OBSERVATIONS, REHAB EVAL
Pt rec'ed supine in bed, call dior in reach, RN/Agatha medicating pt for pain, dressing to L foot I&D wounds (dorsal & lateral) taken down by Podiatry Attending Dr. Keller & Podiatry Resident/ Landon at bedside.
Pt rec'd in bed, 3L NC, +tele, mother at bedside, and NAD.
Pt rec'd in bed, NAD, +pulse ox and tele monitoring, R LE dressing CDI and wound VAC inplace.

## 2018-03-12 NOTE — PROGRESS NOTE ADULT - SUBJECTIVE AND OBJECTIVE BOX
Chief Complaint: fu    History of Present Illness: feels improved, pain in foot controlled, no f/c, sl cough- no hemoptysis, no chest pain, no n/v/abd pain, appetite good, no bleeding, feels some GLEZ      MEDICATIONS  (STANDING):  ALBUTerol/ipratropium for Nebulization 3 milliLiter(s) Nebulizer every 6 hours  folic acid 1 milliGRAM(s) Oral daily  heparin  Injectable 5000 Unit(s) SubCutaneous every 8 hours  HYDROmorphone  Injectable 0.5 milliGRAM(s) IV Push every 10 minutes  imipenem/cilastatin  IVPB 1000 milliGRAM(s) IV Intermittent every 8 hours  lactobacillus acidophilus 1 Tablet(s) Oral three times a day with meals  lidocaine   Patch 1 Patch Transdermal daily  losartan 50 milliGRAM(s) Oral daily  multivitamin 1 Tablet(s) Oral daily  sodium chloride 0.9%. 1000 milliLiter(s) (10 mL/Hr) IV Continuous <Continuous>  sodium chloride 0.9%. 1000 milliLiter(s) (75 mL/Hr) IV Continuous <Continuous>  thiamine 100 milliGRAM(s) Oral daily    MEDICATIONS  (PRN):  HYDROmorphone  Injectable 0.5 milliGRAM(s) IV Push every 4 hours PRN Moderate Pain (4 - 6)  oxyCODONE    IR 10 milliGRAM(s) Oral every 3 hours PRN Moderate and Severe Pain (4-10)      Allergies    No Known Allergies    Intolerances        Vital Signs Last 24 Hrs  T(C): 36.7 (12 Mar 2018 05:08), Max: 37.2 (11 Mar 2018 20:40)  T(F): 98.1 (12 Mar 2018 05:08), Max: 98.9 (11 Mar 2018 20:40)  HR: 86 (12 Mar 2018 10:35) (82 - 91)  BP: 121/74 (12 Mar 2018 05:08) (110/73 - 121/74)  BP(mean): --  RR: 19 (12 Mar 2018 05:08) (18 - 19)  SpO2: 96% (12 Mar 2018 10:35) (96% - 98%)    PHYSICAL EXAM  General: adult in NAD  HEENT: clear oropharynx, anicteric sclera, pink conjunctiva  Neck: supple  CV: normal S1/S2 with no murmur rubs or gallops  Lungs: clear to auscultation, no wheezes, no rales  Abdomen: soft non-tender non-distended, obese, positive bowel sounds  Ext: Right foot bandage    LABS:                          9.8    5.76  )-----------( 290      ( 12 Mar 2018 09:09 )             30.2         Mean Cell Volume : 96.5 fl  Mean Cell Hemoglobin : 31.3 pg  Mean Cell Hemoglobin Concentration : 32.5 gm/dL  Auto Neutrophil # : 3.64 K/uL  Auto Lymphocyte # : 1.43 K/uL  Auto Monocyte # : 0.51 K/uL  Auto Eosinophil # : 0.13 K/uL  Auto Basophil # : 0.03 K/uL  Auto Neutrophil % : 63.2 %  Auto Lymphocyte % : 24.8 %  Auto Monocyte % : 8.9 %  Auto Eosinophil % : 2.3 %  Auto Basophil % : 0.5 %      Serial CBC's  03-12 @ 09:09  Hct-30.2 / Hgb-9.8 / Plat-290 / RBC-3.13 / WBC-5.76  Serial CBC's  03-11 @ 09:37  Hct-32.0 / Hgb-10.2 / Plat-320 / RBC-3.21 / WBC-5.61  Serial CBC's  03-10 @ 08:31  Hct-30.4 / Hgb-10.0 / Plat-323 / RBC-3.16 / WBC-5.24  Serial CBC's  03-09 @ 08:09  Hct-31.2 / Hgb-9.9 / Plat-329 / RBC-3.20 / WBC-5.54      03-12    136  |  97  |  16  ----------------------------<  98  4.2   |  29  |  0.79    Ca    9.1      12 Mar 2018 07:04  Phos  3.9     03-12  Mg     2.0     03-12    TPro  7.9  /  Alb  2.7<L>  /  TBili  0.3  /  DBili  x   /  AST  33  /  ALT  63<H>  /  AlkPhos  83  03-12

## 2018-03-12 NOTE — PROGRESS NOTE ADULT - SUBJECTIVE AND OBJECTIVE BOX
Subjective:  pt seen and examined, no complaints on exam.  no chest pain on exam ,.     ALBUTerol/ipratropium for Nebulization 3 milliLiter(s) Nebulizer every 6 hours  folic acid 1 milliGRAM(s) Oral daily  heparin  Injectable 5000 Unit(s) SubCutaneous every 8 hours  HYDROmorphone  Injectable 0.5 milliGRAM(s) IV Push every 4 hours PRN  HYDROmorphone  Injectable 0.5 milliGRAM(s) IV Push every 10 minutes  imipenem/cilastatin  IVPB 1000 milliGRAM(s) IV Intermittent every 8 hours  lactobacillus acidophilus 1 Tablet(s) Oral three times a day with meals  lidocaine   Patch 1 Patch Transdermal daily  losartan 50 milliGRAM(s) Oral daily  multivitamin 1 Tablet(s) Oral daily  oxyCODONE    IR 10 milliGRAM(s) Oral every 3 hours PRN  sodium chloride 0.9%. 1000 milliLiter(s) IV Continuous <Continuous>  sodium chloride 0.9%. 1000 milliLiter(s) IV Continuous <Continuous>  thiamine 100 milliGRAM(s) Oral daily                            10.2   5.61  )-----------( 320      ( 11 Mar 2018 09:37 )             32.0       Hemoglobin: 10.2 g/dL (03-11 @ 09:37)  Hemoglobin: 10.0 g/dL (03-10 @ 08:31)  Hemoglobin: 9.9 g/dL (03-09 @ 08:09)  Hemoglobin: 10.8 g/dL (03-08 @ 07:35)  Hemoglobin: 10.5 g/dL (03-07 @ 07:36)      03-11    135  |  97  |  19  ----------------------------<  108<H>  4.2   |  27  |  0.82    Ca    9.1      11 Mar 2018 07:14  Phos  3.8     03-11  Mg     2.0     03-11    TPro  8.3  /  Alb  2.7<L>  /  TBili  0.2  /  DBili  x   /  AST  43<H>  /  ALT  70<H>  /  AlkPhos  92  03-11    Creatinine Trend: 0.82<--, 0.79<--, 0.73<--, 0.81<--, 0.78<--, 0.78<--    COAGS:           T(C): 36.7 (03-12-18 @ 05:08), Max: 37.2 (03-11-18 @ 20:40)  HR: 91 (03-12-18 @ 06:54) (80 - 91)  BP: 121/74 (03-12-18 @ 05:08) (110/73 - 121/74)  RR: 19 (03-12-18 @ 05:08) (18 - 19)  SpO2: 96% (03-12-18 @ 06:54) (94% - 98%)  Wt(kg): --    I&O's Summary    11 Mar 2018 07:01  -  12 Mar 2018 07:00  --------------------------------------------------------  IN: 1350 mL / OUT: 4555 mL / NET: -3205 mL        HEENT:   Normal oral mucosa, PERRL, EOMI	  Lymphatic: No obvious lymphadenopathy , no edema  Cardiovascular: Normal S1 S2, No JVD, 1/6 KITTY murmur, Peripheral pulses palpable 2+ bilaterally  Respiratory: Lungs clear to auscultation, normal effort 	  Gastrointestinal:  Soft, Non-tender, + BS	  Skin: No rashes,  No cyanosis, warm to touch  Musculoskeletal: Normal range of motion, normal strength  Psychiatry:  Appropriate Mood & affect     TELEMETRY: 	  nsr   ECG:  	  RADIOLOGY:     DIAGNOSTIC TESTING:  [ ] Echocardiogram: < from: KELVIN w/TTE (w/3D Echo) (03.08.18 @ 07:21) >  1. Mitral annular calcification, otherwise normal mitral  valve.  2. Thickened trileaflet aortic valve with normal opening.  Mild aortic regurgitation.  3. No left atrial or left atrial appendage thrombus.  4. Normal left ventricular systolic function.  5. Normal right ventricular size and function.  6. Contrast injection demonstrates no evidence of a patent  foramen ovale.  7. No evidence of valvular vegetation is seen.  *** Compared with echocardiogram of 2/26/2018, no  significant changes noted.    < end of copied text >    [ ]  Catheterization:  [ ] Stress Test:    OTHER: 	      ASSESSMENT/PLAN: 	57y Male with pmhx of obesity admitted with ?cellulitis s/p episode of arrhtythmia    1) tele with no significant arrhythmis  2) no need for ischemic evaluation  3) patient with no NSVR-no need for cath  4) abx as per id  5)  GI / DVT prophylaxis, keep K>4, mag >2.0   D/W dr Dickerson

## 2018-03-12 NOTE — DISCHARGE NOTE ADULT - PLAN OF CARE
right foot serial surgery with graft WOUNDCARE: Apply adaptic over graft site, cover with black granufoam, cover suture site with dry 4x4 gauze. VAC setting at 125mmHg continuous. VAC change three times a week.  ANTIBIOTIC: Finish course of antibiotic as prescribed.  WEIGHTBEARING: Weight bearing as tolerated to right foot in CAM boot.  FOLLOW-UP: [facility] week of discharge. Call [phone] for appointment. Continue Heparin SQ during rehab course  Repeat lower extremity doppler in 1-2 weeks. WOUNDCARE: Apply adaptic over graft site, cover with black granufoam, cover suture site with dry 4x4 gauze. VAC setting at 125mmHg continuous. VAC change three times a week.  ANTIBIOTIC: Finish course of antibiotic as prescribed.  WEIGHTBEARING: Weight bearing as tolerated to right foot in CAM boot.  FOLLOW-UP: Follow-up with Dr. [facility] within 1 week of discharge. Call [phone] for appointment.   Continue IV antibiotics through 4/12/18.  Weekly CBC, CMP, ESR and CRP. Now improved  Avoid taking (NSAIDs) - (ex: Ibuprofen, Advil, Celebrex, Naprosyn)  Avoid taking any nephrotoxic agents (can harm kidneys) - Intravenous contrast for diagnostic testing, combination cold medications.  Have all medications adjusted for your renal function by your Health Care Provider.  Blood pressure control is important.  Take all medication as prescribed. Now resolved. Low salt diet  Activity as tolerated.  Take all medication as prescribed.  Follow up with your medical doctor for routine blood pressure monitoring at your next visit.  Notify your doctor if you have any of the following symptoms:   Dizziness, Lightheadedness, Blurry vision, Headache, Chest pain, Shortness of breath Continue Heparin SQ during rehab course  Repeat lower extremity doppler in 4-5 days. Now resolved.  Check weekly LFT's. Fax results to Fax results to Dr. Cabello, ID, at [phone]. FOLLOW-UP: Follow-up with Dr. [facility] in 1 week of discharge, on Thursday 3/22/18. Call [phone] for appointment.   Continue IV antibiotics through 4/12/18.  Weekly CBC, CMP, ESR and CRP.   Fax results to Dr. Cabello, ID, at [phone] WOUNDCARE: Apply extra large adaptic (1-inch margin all around) over graft site with 1-inch  (do not disturb graft/adaptic secured on to wound), cover with black granufoam, cover suture site with dry 4x4 gauze. VAC setting at 125mmHg continuous. VAC change three times a week.  ANTIBIOTIC: Finish course of antibiotic as prescribed.  WEIGHTBEARING: Weight bearing as tolerated to right heel in CAM boot with walker.  FOLLOW-UP: Follow-up with Dr. [facility] within 1 week of discharge. Call [phone] for appointment.   Continue IV antibiotics through 4/12/18.  Weekly CBC, CMP, ESR and CRP. Nocturnal CPAP WOUNDCARE: Apply extra large adaptic (1-inch margin all around) over graft site with 1-inch  (do not disturb graft/adaptic secured on to wound), cover with black granufoam, cover suture site with dry 4x4 gauze. VAC setting at 125mmHg continuous. VAC change three times a week.  ANTIBIOTIC: Finish course of antibiotic as prescribed.  WEIGHTBEARING: Weight bearing as tolerated to right heel in CAM boot with walker.  FOLLOW-UP: Follow-up with Dr[facility] within 1 week of discharge. Call [phone] for appointment. Keep follow-up appointment for Thursday 3/22/18 9:30AM at [address] [place] [address] [address], [place] [address]  Continue IV antibiotics through 4/12/18.  Weekly CBC, CMP, ESR and CRP. WOUNDCARE: Apply extra large adaptic (1-inch margin all around) over graft site with 1-inch  (do not disturb graft/adaptic secured on to wound), cover with black granufoam, cover suture site with dry 4x4 gauze. VAC setting at 125mmHg continuous. VAC change three times a week.  ANTIBIOTIC: Finish course of antibiotic as prescribed.  WEIGHTBEARING: Weight bearing as tolerated to right heel in CAM boot with walker on the right.  FOLLOW-UP: Follow-up with Dr. [facility] within 1 week of discharge. Call [phone] for appointment. Keep follow-up appointment for Thursday 3/22/18 9:30AM at [address] [place] [address], [place] [address]  Continue IV antibiotics through 4/12/18.  Weekly CBC, CMP, ESR and CRP.

## 2018-03-12 NOTE — PROGRESS NOTE ADULT - ASSESSMENT
57 M with SHIELA (on CPAP qhs, non-compliant), obesity, and daily EtOH use with recent ankle sprain 2/13 presented with chest pain and AMS x3-4 days with worsening RLL cellulitis / nec fasciitis. CT findings consistent with septic emboli. Found to have MSSA bacteremia with pseudomonas in the wound as well.     s/p multiple surgical debridements with necrosis and abscess formation visualized now with wound vac. Wound is improving. Bacteremia is cleared on imipemen. KELVIN was negative for any vegetation. Repeat CT shows mild enlarging of the emboli and small cavitation. The worsening CT findings is not concerning given the significant clinical improvement. The CT/radiographic findings may take 4-6 wks to resolve. He does not have any more hemoptysis.    - therefore, c/w with abx as per ID.    - he needs a rpt CT in 4-6 wks.   - should get PFTs at that time as well.  - also needs an outpt sleep study [does have SHIELA -- but has been non-compliant with CPAP -- unclear settings].   - he can follow up with Dr. Tirado [facility] when he gets out of rehab:   [address]  [place], [place]   [phone].   - please give him this info on discharge.     call us for any further questions.   thank you.     Tom Armstrong MD   Pulmonary Fellow.

## 2018-03-12 NOTE — PROGRESS NOTE ADULT - SUBJECTIVE AND OBJECTIVE BOX
Patient is a 57y old  Male who presents with a chief complaint of Chest pain, confusion (22 Feb 2018 03:00)       INTERVAL HPI/OVERNIGHT EVENTS:  Patient seen and evaluated at bedside.  Pt is resting comfortable in NAD. Denies N/V/F/C.  Pain rated at X/10    Allergies    No Known Allergies    Intolerances        Vital Signs Last 24 Hrs  T(C): 36.7 (12 Mar 2018 05:08), Max: 37.2 (11 Mar 2018 20:40)  T(F): 98.1 (12 Mar 2018 05:08), Max: 98.9 (11 Mar 2018 20:40)  HR: 91 (12 Mar 2018 06:54) (80 - 91)  BP: 121/74 (12 Mar 2018 05:08) (110/73 - 121/74)  BP(mean): --  RR: 19 (12 Mar 2018 05:08) (18 - 19)  SpO2: 96% (12 Mar 2018 06:54) (94% - 98%)    LABS:                        10.2   5.61  )-----------( 320      ( 11 Mar 2018 09:37 )             32.0     03-11    135  |  97  |  19  ----------------------------<  108<H>  4.2   |  27  |  0.82    Ca    9.1      11 Mar 2018 07:14  Phos  3.8     03-11  Mg     2.0     03-11    TPro  8.3  /  Alb  2.7<L>  /  TBili  0.2  /  DBili  x   /  AST  43<H>  /  ALT  70<H>  /  AlkPhos  92  03-11        CAPILLARY BLOOD GLUCOSE          Lower Extremity Physical Exam:  right dorsal foot surgical sites staples intact, no dehiscence, skin staples around graft/adaptic intact, no dehiscence, no purulence, resolving erythema and swelling, graft taking well, no hematoma, medial foot sutures intact, well coapted, no necrosis

## 2018-03-12 NOTE — PROGRESS NOTE ADULT - SUBJECTIVE AND OBJECTIVE BOX
Patient denies CP, Breathing comfortably ROS (-)    ALBUTerol/ipratropium for Nebulization 3 milliLiter(s) Nebulizer every 6 hours  folic acid 1 milliGRAM(s) Oral daily  heparin  Injectable 5000 Unit(s) SubCutaneous every 8 hours  HYDROmorphone  Injectable 0.5 milliGRAM(s) IV Push every 4 hours PRN  HYDROmorphone  Injectable 0.5 milliGRAM(s) IV Push every 10 minutes  imipenem/cilastatin  IVPB 1000 milliGRAM(s) IV Intermittent every 8 hours  lactobacillus acidophilus 1 Tablet(s) Oral three times a day with meals  lidocaine   Patch 1 Patch Transdermal daily  losartan 50 milliGRAM(s) Oral daily  multivitamin 1 Tablet(s) Oral daily  oxyCODONE    IR 10 milliGRAM(s) Oral every 3 hours PRN  sodium chloride 0.9%. 1000 milliLiter(s) IV Continuous <Continuous>  sodium chloride 0.9%. 1000 milliLiter(s) IV Continuous <Continuous>  thiamine 100 milliGRAM(s) Oral daily                            9.8    5.76  )-----------( 290      ( 12 Mar 2018 09:09 )             30.2       Hemoglobin: 9.8 g/dL (03-12 @ 09:09)  Hemoglobin: 10.2 g/dL (03-11 @ 09:37)  Hemoglobin: 10.0 g/dL (03-10 @ 08:31)  Hemoglobin: 9.9 g/dL (03-09 @ 08:09)  Hemoglobin: 10.8 g/dL (03-08 @ 07:35)      03-12    136  |  97  |  16  ----------------------------<  98  4.2   |  29  |  0.79    Ca    9.1      12 Mar 2018 07:04  Phos  3.9     03-12  Mg     2.0     03-12    TPro  7.9  /  Alb  2.7<L>  /  TBili  0.3  /  DBili  x   /  AST  33  /  ALT  63<H>  /  AlkPhos  83  03-12    Creatinine Trend: 0.79<--, 0.82<--, 0.79<--, 0.73<--, 0.81<--, 0.78<--    COAGS:           T(C): 37.4 (03-12-18 @ 13:46), Max: 37.4 (03-12-18 @ 13:46)  HR: 86 (03-12-18 @ 13:46) (82 - 91)  BP: 112/71 (03-12-18 @ 13:46) (110/73 - 121/74)  RR: 18 (03-12-18 @ 13:46) (18 - 19)  SpO2: 95% (03-12-18 @ 13:46) (95% - 98%)  Wt(kg): --    I&O's Summary    11 Mar 2018 07:01  -  12 Mar 2018 07:00  --------------------------------------------------------  IN: 1350 mL / OUT: 4555 mL / NET: -3205 mL    12 Mar 2018 07:01  -  12 Mar 2018 14:17  --------------------------------------------------------  IN: 0 mL / OUT: 1150 mL / NET: -1150 mL        Gen: Appears well in NAD  HEENT:  (-)icterus (-)pallor  CV: N S1 S2 1/6 KITTY (+)2 Pulses B/l  Resp:  Clear to ausculatation B/L, normal effort  GI: (+) BS Soft, NT, ND  Lymph:  (-)Edema, (-)obvious lymphadenopathy  Skin: Warm to touch, Normal turgor  Psych: Appropriate mood and affect        TELEMETRY: SR	        ASSESSMENT/PLAN:  57yMale with etoh abuse, HTN admitted with DVT/septic emboli, necrotizing fascitis who is being seen for chest pain/sob.     -KELVIN with no vegetations  -no clinical heart failure  - No further PAT  - consultants appreciated    Blair Benjamin MD, FACC  [facility], [facility]  [address] [place] [address]  PHONE:  [phone]  BEEPER : [phone]

## 2018-03-12 NOTE — PROGRESS NOTE ADULT - ASSESSMENT
57M with obesity, ETOH, SHIELA w/ R ankle sprain, developed MSSA cellulitis/ bacteremia, pulmonary septic emboli, R distal DVT now s/p OR, with mild coagulopathy    #coagulopathy- suspect secondary to vitamin K deficiency/ decreased PO intake over the week prior to admission with acute illness; Factor VII level sl low  - PT mixing study corrected confirming factor deficiency, no evidence of inhibitor - consistent with vitamin K deficiency  - INR sl elevated; no increased bleeding; no prior history of bleeding issues  - will continue to hold on FFP/vitamin K  - coags improved    #R peroneal DVT- no prior history of VTE; provoked with recent injury and decreased mobility  - repeat US 2/27 with no evidence of propagation, repeat US 3/7 with no evidence of propogation  - w/ septic emboli; was on heparin gtt, now off with +FOB  - continue DVT prophylaxis  - plan to repeat US this week prior to discharge    # R ankle sprain/wound- now s/p OR- s/p return to OR, deep debridement  - pain control; wound care; s/p wound vac  - s/p OR/graft 3/9; per podiatry    #ID- MSSA bacteremia, KELVIN negative for vegetation; on Abx per ID  - enlarging septic emboli on CT chest    # Anemia   - mild, stable - due to acute / chronic illness    d/w NP

## 2018-03-12 NOTE — DISCHARGE NOTE ADULT - ADDITIONAL INSTRUCTIONS
Make appointments to follow up with your out patient physicians.  Bring all discharge paperwork including discharge medication list to your follow up appointments. Make appointments to follow up with your out patient physicians.  Bring all discharge paperwork including discharge medication list to your follow up appointments.  Call Dr. Westbrook at the wound care center for a follow up appointment on 3/22/18  Call Dr. Cabello, ID, upon discharge, for a follow up appointment.  Ask for fist available appointment with Dr. Cabello. Make appointments to follow up with your out patient physicians.  Bring all discharge paperwork including discharge medication list to your follow up appointments.  Call Dr. Westbrook at the wound care center for a follow up appointment on 3/22/18  Follow up with Dr. Miranda upon discharge from rehab  Call Dr. Cabello, ID, upon discharge, for a follow up appointment.  Ask for fist available appointment with Dr. Cabello. Make appointments to follow up with your out patient physicians.  Bring all discharge paperwork including discharge medication list to your follow up appointments.  Call Dr. Westbrook at the wound care center for a follow up appointment on 3/22/18  Follow up with Dr. Miranda upon discharge from rehab  Call Dr. Cabello, ID, upon discharge, for a follow up appointment.  Ask for fist available appointment with Dr. Cabello.  You have an appointment scheduled with Dr Evans (cardiology) on 3/28 at 1245 pm

## 2018-03-12 NOTE — PHYSICAL THERAPY INITIAL EVALUATION ADULT - PRECAUTIONS/LIMITATIONS, REHAB EVAL
CT LE: Diffuse cellulitis throughout the right lower leg as outlined above with cutaneous bleb along the dorsal lateral aspect of the foot. There is no subcutaneous air to suggest necrotizing fasciitis. Ill-defined fluid attenuation within the region of the extensor digitorum brevis musculature and within the region of the abductor hallucis muscle which may be related to muscle strain or myositis. Developing fluid collections are also a consideration in the appropriate clinical context. No peripherally enhancing fluid collection is noted on the current examination.Intramuscular fullness and edema within the myofascial planes about the calf musculature likely related to patient's known right peroneal deep vein thrombosis.No CT evidence of osteomyelitis.Osteochondral lesion along the medial aspect of the talar dome.CT ABDOMEN/PELVIS: Redemonstration of bilateral pulmonary nodules/masses in the visualized lung bases. Small right pleural effusion with adjacent passive atelectasis.Right renal cysts. Otherwise, no acute intra-abdominal or intrapelvic process.CTH: Normal non-contrast CT of the brain. Left parietal extra calvarial soft tissue swelling. No acute stroke. MRI revealed OM right foot/necrotizing fasciitis

## 2018-03-12 NOTE — PROGRESS NOTE ADULT - ASSESSMENT
POD#3   Umbilical graft to right foot dorsal wound (sx #3)  approx 25cm x 10 cm  VAc removed this Am  Medial and post lateral wounds coapting  Graft sites are viable and intact. Adaptic is secure  D/c planning for out pt management at wound center  Surgery # 4 will be grafting either STSG or skin equivalent as out patient.    Order CAmboot for heel touch and walker on right   P/T evaluation needed.  Continue IV ABX  Home VAc will be required.

## 2018-03-12 NOTE — PHYSICAL THERAPY INITIAL EVALUATION ADULT - NS ASR WT BEARING DETAIL RLE
weight-bearing as tolerated/through heel weight-bearing as tolerated through heel in CAM boot/weight-bearing as tolerated through heel in CAM boot at all times as per DPM [place]/weight-bearing as tolerated

## 2018-03-12 NOTE — DISCHARGE NOTE ADULT - SECONDARY DIAGNOSIS.
Acute deep vein thrombosis (DVT) of popliteal vein of right lower extremity Acute kidney injury SHIELA (obstructive sleep apnea) Transaminitis Essential hypertension Osteomyelitis of ankle or foot, acute, right Necrotizing fasciitis, ankle and foot

## 2018-03-13 LAB
ALBUMIN SERPL ELPH-MCNC: 2.6 G/DL — LOW (ref 3.3–5)
ALP SERPL-CCNC: 89 U/L — SIGNIFICANT CHANGE UP (ref 40–120)
ALT FLD-CCNC: 61 U/L RC — HIGH (ref 10–45)
ANION GAP SERPL CALC-SCNC: 11 MMOL/L — SIGNIFICANT CHANGE UP (ref 5–17)
AST SERPL-CCNC: 35 U/L — SIGNIFICANT CHANGE UP (ref 10–40)
BASOPHILS # BLD AUTO: 0.02 K/UL — SIGNIFICANT CHANGE UP (ref 0–0.2)
BASOPHILS NFR BLD AUTO: 0.4 % — SIGNIFICANT CHANGE UP (ref 0–2)
BILIRUB SERPL-MCNC: 0.3 MG/DL — SIGNIFICANT CHANGE UP (ref 0.2–1.2)
BUN SERPL-MCNC: 17 MG/DL — SIGNIFICANT CHANGE UP (ref 7–23)
CALCIUM SERPL-MCNC: 9.2 MG/DL — SIGNIFICANT CHANGE UP (ref 8.4–10.5)
CHLORIDE SERPL-SCNC: 98 MMOL/L — SIGNIFICANT CHANGE UP (ref 96–108)
CO2 SERPL-SCNC: 27 MMOL/L — SIGNIFICANT CHANGE UP (ref 22–31)
CREAT SERPL-MCNC: 0.77 MG/DL — SIGNIFICANT CHANGE UP (ref 0.5–1.3)
EOSINOPHIL # BLD AUTO: 0.12 K/UL — SIGNIFICANT CHANGE UP (ref 0–0.5)
EOSINOPHIL NFR BLD AUTO: 2.3 % — SIGNIFICANT CHANGE UP (ref 0–6)
GLUCOSE SERPL-MCNC: 99 MG/DL — SIGNIFICANT CHANGE UP (ref 70–99)
HCT VFR BLD CALC: 32.6 % — LOW (ref 39–50)
HGB BLD-MCNC: 10.7 G/DL — LOW (ref 13–17)
IMM GRANULOCYTES NFR BLD AUTO: 0.2 % — SIGNIFICANT CHANGE UP (ref 0–1.5)
LYMPHOCYTES # BLD AUTO: 1.45 K/UL — SIGNIFICANT CHANGE UP (ref 1–3.3)
LYMPHOCYTES # BLD AUTO: 27.4 % — SIGNIFICANT CHANGE UP (ref 13–44)
MAGNESIUM SERPL-MCNC: 2 MG/DL — SIGNIFICANT CHANGE UP (ref 1.6–2.6)
MCHC RBC-ENTMCNC: 31.8 PG — SIGNIFICANT CHANGE UP (ref 27–34)
MCHC RBC-ENTMCNC: 32.8 GM/DL — SIGNIFICANT CHANGE UP (ref 32–36)
MCV RBC AUTO: 97 FL — SIGNIFICANT CHANGE UP (ref 80–100)
MONOCYTES # BLD AUTO: 0.37 K/UL — SIGNIFICANT CHANGE UP (ref 0–0.9)
MONOCYTES NFR BLD AUTO: 7 % — SIGNIFICANT CHANGE UP (ref 2–14)
NEUTROPHILS # BLD AUTO: 3.32 K/UL — SIGNIFICANT CHANGE UP (ref 1.8–7.4)
NEUTROPHILS NFR BLD AUTO: 62.7 % — SIGNIFICANT CHANGE UP (ref 43–77)
PHOSPHATE SERPL-MCNC: 4.1 MG/DL — SIGNIFICANT CHANGE UP (ref 2.5–4.5)
PLATELET # BLD AUTO: 284 K/UL — SIGNIFICANT CHANGE UP (ref 150–400)
POTASSIUM SERPL-MCNC: 4.3 MMOL/L — SIGNIFICANT CHANGE UP (ref 3.5–5.3)
POTASSIUM SERPL-SCNC: 4.3 MMOL/L — SIGNIFICANT CHANGE UP (ref 3.5–5.3)
PROT SERPL-MCNC: 7.9 G/DL — SIGNIFICANT CHANGE UP (ref 6–8.3)
RBC # BLD: 3.36 M/UL — LOW (ref 4.2–5.8)
RBC # FLD: 14.1 % — SIGNIFICANT CHANGE UP (ref 10.3–14.5)
SODIUM SERPL-SCNC: 136 MMOL/L — SIGNIFICANT CHANGE UP (ref 135–145)
WBC # BLD: 5.29 K/UL — SIGNIFICANT CHANGE UP (ref 3.8–10.5)
WBC # FLD AUTO: 5.29 K/UL — SIGNIFICANT CHANGE UP (ref 3.8–10.5)

## 2018-03-13 PROCEDURE — 93971 EXTREMITY STUDY: CPT | Mod: 26

## 2018-03-13 PROCEDURE — 99232 SBSQ HOSP IP/OBS MODERATE 35: CPT

## 2018-03-13 RX ORDER — POTASSIUM CHLORIDE 20 MEQ
1 PACKET (EA) ORAL
Qty: 0 | Refills: 0 | COMMUNITY

## 2018-03-13 RX ORDER — LOSARTAN POTASSIUM 100 MG/1
1 TABLET, FILM COATED ORAL
Qty: 0 | Refills: 0 | COMMUNITY
Start: 2018-03-13

## 2018-03-13 RX ORDER — LACTOBACILLUS ACIDOPHILUS 100MM CELL
1 CAPSULE ORAL
Qty: 0 | Refills: 0 | COMMUNITY
Start: 2018-03-13

## 2018-03-13 RX ORDER — CEFAZOLIN SODIUM 1 G
2 VIAL (EA) INJECTION
Qty: 0 | Refills: 0 | COMMUNITY

## 2018-03-13 RX ORDER — LOSARTAN/HYDROCHLOROTHIAZIDE 100MG-25MG
1 TABLET ORAL
Qty: 0 | Refills: 0 | COMMUNITY

## 2018-03-13 RX ADMIN — Medication 3 MILLILITER(S): at 06:10

## 2018-03-13 RX ADMIN — Medication 3 MILLILITER(S): at 11:47

## 2018-03-13 RX ADMIN — Medication 3 MILLILITER(S): at 17:07

## 2018-03-13 RX ADMIN — IMIPENEM AND CILASTATIN 250 MILLIGRAM(S): 250; 250 INJECTION, POWDER, FOR SOLUTION INTRAVENOUS at 05:20

## 2018-03-13 RX ADMIN — HEPARIN SODIUM 5000 UNIT(S): 5000 INJECTION INTRAVENOUS; SUBCUTANEOUS at 21:16

## 2018-03-13 RX ADMIN — OXYCODONE HYDROCHLORIDE 10 MILLIGRAM(S): 5 TABLET ORAL at 01:00

## 2018-03-13 RX ADMIN — Medication 1 TABLET(S): at 11:47

## 2018-03-13 RX ADMIN — OXYCODONE HYDROCHLORIDE 10 MILLIGRAM(S): 5 TABLET ORAL at 22:26

## 2018-03-13 RX ADMIN — OXYCODONE HYDROCHLORIDE 10 MILLIGRAM(S): 5 TABLET ORAL at 13:14

## 2018-03-13 RX ADMIN — OXYCODONE HYDROCHLORIDE 10 MILLIGRAM(S): 5 TABLET ORAL at 08:05

## 2018-03-13 RX ADMIN — Medication 3 MILLILITER(S): at 23:58

## 2018-03-13 RX ADMIN — IMIPENEM AND CILASTATIN 250 MILLIGRAM(S): 250; 250 INJECTION, POWDER, FOR SOLUTION INTRAVENOUS at 22:34

## 2018-03-13 RX ADMIN — LOSARTAN POTASSIUM 50 MILLIGRAM(S): 100 TABLET, FILM COATED ORAL at 06:11

## 2018-03-13 RX ADMIN — HEPARIN SODIUM 5000 UNIT(S): 5000 INJECTION INTRAVENOUS; SUBCUTANEOUS at 13:14

## 2018-03-13 RX ADMIN — OXYCODONE HYDROCHLORIDE 10 MILLIGRAM(S): 5 TABLET ORAL at 21:15

## 2018-03-13 RX ADMIN — OXYCODONE HYDROCHLORIDE 10 MILLIGRAM(S): 5 TABLET ORAL at 00:10

## 2018-03-13 RX ADMIN — OXYCODONE HYDROCHLORIDE 10 MILLIGRAM(S): 5 TABLET ORAL at 14:03

## 2018-03-13 RX ADMIN — IMIPENEM AND CILASTATIN 250 MILLIGRAM(S): 250; 250 INJECTION, POWDER, FOR SOLUTION INTRAVENOUS at 13:14

## 2018-03-13 RX ADMIN — Medication 1 TABLET(S): at 17:07

## 2018-03-13 RX ADMIN — HEPARIN SODIUM 5000 UNIT(S): 5000 INJECTION INTRAVENOUS; SUBCUTANEOUS at 05:20

## 2018-03-13 RX ADMIN — OXYCODONE HYDROCHLORIDE 10 MILLIGRAM(S): 5 TABLET ORAL at 06:11

## 2018-03-13 RX ADMIN — Medication 1 TABLET(S): at 08:08

## 2018-03-13 NOTE — PROGRESS NOTE ADULT - SUBJECTIVE AND OBJECTIVE BOX
Subjective:   	  MEDICATIONS:  MEDICATIONS  (STANDING):  ALBUTerol/ipratropium for Nebulization 3 milliLiter(s) Nebulizer every 6 hours  heparin  Injectable 5000 Unit(s) SubCutaneous every 8 hours  imipenem/cilastatin  IVPB 1000 milliGRAM(s) IV Intermittent every 8 hours  lactobacillus acidophilus 1 Tablet(s) Oral three times a day with meals  losartan 50 milliGRAM(s) Oral daily  multivitamin 1 Tablet(s) Oral daily  sodium chloride 0.9%. 1000 milliLiter(s) (10 mL/Hr) IV Continuous <Continuous>      LABS:	 	    CARDIAC MARKERS:                                10.7   5.29  )-----------( 284      ( 13 Mar 2018 07:36 )             32.6     Hemoglobin: 10.7 g/dL (03-13 @ 07:36)  Hemoglobin: 9.8 g/dL (03-12 @ 09:09)  Hemoglobin: 10.2 g/dL (03-11 @ 09:37)  Hemoglobin: 10.0 g/dL (03-10 @ 08:31)  Hemoglobin: 9.9 g/dL (03-09 @ 08:09)      03-13    136  |  98  |  17  ----------------------------<  99  4.3   |  27  |  0.77    Ca    9.2      13 Mar 2018 07:00  Phos  4.1     03-13  Mg     2.0     03-13    TPro  7.9  /  Alb  2.6<L>  /  TBili  0.3  /  DBili  x   /  AST  35  /  ALT  61<H>  /  AlkPhos  89  03-13    Creatinine Trend: 0.77<--, 0.79<--, 0.82<--, 0.79<--, 0.73<--, 0.81<--    COAGS:       proBNP:   Lipid Profile:   HgA1c:   TSH:       PHYSICAL EXAM:  T(C): 36.8 (03-13-18 @ 11:29), Max: 36.9 (03-13-18 @ 04:30)  HR: 90 (03-13-18 @ 17:08) (76 - 97)  BP: 121/83 (03-13-18 @ 11:29) (102/67 - 123/79)  RR: 18 (03-13-18 @ 11:29) (17 - 18)  SpO2: 97% (03-13-18 @ 17:08) (95% - 100%)  Wt(kg): --  I&O's Summary    12 Mar 2018 07:01  -  13 Mar 2018 07:00  --------------------------------------------------------  IN: 2010 mL / OUT: 4200 mL / NET: -2190 mL    13 Mar 2018 07:01  -  13 Mar 2018 17:34  --------------------------------------------------------  IN: 770 mL / OUT: 1950 mL / NET: -1180 mL          HEENT:   Normal oral mucosa, PERRL, EOMI	  Lymphatic: No obvious lymphadenopathy , no edema  Cardiovascular: Normal S1 S2, No JVD, 1/6 KITTY murmur, Peripheral pulses palpable 2+ bilaterally  Respiratory: Lungs clear to auscultation, normal effort 	  Gastrointestinal:  Soft, Non-tender, + BS	  Skin: No rashes,  No cyanosis, warm to touch  Musculoskeletal: Normal range of motion, normal strength  Psychiatry:  Appropriate Mood & affect     TELEMETRY: 	    ECG:  	  RADIOLOGY:     DIAGNOSTIC TESTING:  [ ] Echocardiogram:  [ ]  Catheterization:  [ ] Stress Test:    OTHER: 	      ASSESSMENT/PLAN: 	57y Male

## 2018-03-13 NOTE — PROGRESS NOTE ADULT - ASSESSMENT
57M with HTN and ETOH abuse admitted 2/19/18 with R foot/leg infection.  Course complicated by MSSA bacteremia, MRI foot with OM/abscess, R leg DVT and CTA suggestive of possible septic pulmonary emboli though TTE negative.  s/p debridement of the R foot by podiatry.  OR culture with MSSA and PA.  BC only with MSSA which has cleared.  d/c planning    Recommend:  -change back to to cefazolin 2g iv q8  -add ciprofloxacin 750 mg po q12  -antibiotics to continue thru 4/12/18  -weekly - cbc, cmp, esr, crp   -patient to go to rehab

## 2018-03-13 NOTE — PROGRESS NOTE ADULT - SUBJECTIVE AND OBJECTIVE BOX
CC:  Right foot pain    F/U:  mssa bacteremia, RLE infection     Interval History/ROS:   all recent BC negative.  PICC placed.  no fever/chills.  Rest of ROS otherwise negative    Allergies  No Known Allergies    ANTIMICROBIALS:    imipenem/cilastatin  IVPB 1000 every 8 hours    MEDICATIONS  (STANDING):  ALBUTerol/ipratropium for Nebulization 3 every 6 hours  heparin  Injectable 5000 every 8 hours  losartan 50 daily    Vital Signs Last 24 Hrs  T(F): 98.2 (03-13-18 @ 11:29), Max: 99.3 (03-12-18 @ 13:46)  HR: 97 (03-13-18 @ 11:29)  BP: 121/83 (03-13-18 @ 11:29)  RR: 18 (03-13-18 @ 11:29)  SpO2: 95% (03-13-18 @ 11:29) (95% - 100%)  Wt(kg): --    PHYSICAL EXAM:  General: lying in bed  HEAD/EYES: anicteric  ENT:  supple  Cardiovascular:   S1, S2; no murmur  Respiratory:  clear bilaterally  GI:  soft, non-tender, normal bowel sounds  :  no guido  Musculoskeletal:  R foot - with VAC; no erythema of the RLE. no swelling  Neurologic:  grossly non-focal  Skin:  no rash  Psychiatric:  appropriate affect  Vascular:  PICC Right c/d/i                        10.7   5.29  )-----------( 284      ( 13 Mar 2018 07:36 )             32.6 03-13    136  |  98  |  17  ----------------------------<  99  4.3   |  27  |  0.77  Ca    9.2      13 Mar 2018 07:00Phos  4.1     03-13Mg     2.0     03-13  TPro  7.9  /  Alb  2.6  /  TBili  0.3  /  DBili  x   /  AST  35  /  ALT  61  /  AlkPhos  89  03-13    MICROBIOLOGY:  Culture - Blood (03.03.18 @ 05:23)  No growth to date.    Culture - Blood (03.02.18 @ 19:09):   No growth to date.    Culture - Tissue with Gram Stain (03.02.18 @ 00:55)   Moderate Pseudomonas aeruginosa - I-aztreonam; all else sensitive  Numerous Staphylococcus aureus    Culture - Blood in AM (02.27.18 @ 13:42)  Growth in anaerobic bottle: Staphylococcus aureus    Culture - Blood in AM (02.27.18 @ 13:41)  Growth in anaerobic bottle: Staphylococcus aureus Susceptibility to follow.    Culture - Surgical Swab (02.25.18 @ 22:01)    -  Cefazolin: S <=4    Specimen Source: .Surgical Swab RIGHT FOOT PUS  Moderate Staphylococcus aureus    Culture - Abscess with Gram Stain (02.25.18 @ 08:39)    -  Oxacillin: S 0.5    Specimen Source: .Abscess right foot  Few Staphylococcus aureus    Culture - Blood in AM (02.24.18 @ 05:15)  Growth in aerobic and anaerobic bottles: Staphylococcus aureus  See previous culture [id number]    Culture - Blood (02.24.18 @ 00:59)  Growth in aerobic and anaerobic bottles: Staphylococcus aureus  See previous culture [id number]    Culture - Blood (02.22.18 @ 12:56)< from: [facility] Duplex Lower Ext Vein Scan, Right (03.13.18 @ 10:18) >    Growth in aerobic and anaerobic bottles: Staphylococcus aureus  See previous culture  # [id number]    Culture - Blood (02.22.18 @ 12:56)  Growth in anaerobic bottle:  Specimen Source: .Blood Blood-Peripheral  Staphylococcus aureus    -  Cefazolin: S <=4    RADIOLOGY:  3/13/18 - RLE duplex - IMPRESSION:  Persistent DVT involving the right peroneal vein.  New acute thrombus within the right soleal vein.    CT Angio Chest w/ IV Cont (03.07.18 @ 17:33)   IMPRESSION: Multiple scattered foci of nodular consolidation have increased in size and density from 2/22/2018, with one nodule demonstrating early cavitation. Given the provided clinical history these nodules most likely represent septic emboli.    MR Tib/Fib w/wo IV Cont, Right (02.28.18 @ 23:11)   1.  Soft tissue wound over the dorsolateral aspect of the foot. 2 residual abscesses are seen tracking from the site of the wound, one posterior, and one along the plantar surface of the foot.  2.  Diffuse osteomyelitis of the midfoot involving the metatarsals, cuneiforms, cuboid, and navicular.  3.  Myositis of the deep posterior compartment musculature of the leg.  4.  Nonenhancing edema is seen tracking along the superficial and deep fascial layers of the posterior compartment of the leg. Given other findings, necrotizing fasciitis cannot be excluded.    [facility] Duplex Lower Ext Vein Scan, Right (02.27.18 @ 15:13)  IMPRESSION:  Persistent thrombosis of a single right peroneal vein without evidence of propagation since prior venous ultrasound from 2/22/2018.    CT Lower Extremity w/ IV Cont, Right (02.22.18 @ 21:56)   Impression:  Diffuse cellulitis throughout the right lower leg as outlined above with cutaneous bleb along the dorsal lateral aspect of the foot. There is no subcutaneous air to suggest necrotizing fasciitis.    Ill-defined fluid attenuation within the region of the extensor digitorum brevis musculature and within the region of the abductor hallucis muscle which may be related to muscle strain or myositis. Developing fluid collections are also a consideration in the appropriate clinical context.  No peripherally enhancing fluid collection is noted on the current examination.  Intramuscular fullness and edema within the myofascial planes about the calf musculature likely related to patient's known right peroneal deep vein thrombosis.  No CT evidence of osteomyelitis.  Osteochondral lesion along the medial aspect of the talar dome.    CT Angio Chest w/ IV Cont (02.22.18 @ 00:25)   IMPRESSION:    1.  No main, or lobar pulmonary embolus. Evaluation of the segmental and subsegmental branches is limited secondary to artifact.  2.  Multiple groundglass and nodular opacities with a peripheral and lower lobe distribution could be secondary to infection (? Either septic emboli or fungal infection rather than a community acquired pneumonia) rather than malignancy.

## 2018-03-13 NOTE — CHART NOTE - NSCHARTNOTEFT_GEN_A_CORE
MEDICINE NP NOTE  Spectra [phone]      Received phone call from vascular regarding RLE doppler that showed Persistent DVT involving the right peroneal vein.  New acute thrombus within the right soleal vein.  Results discussed with Dr Miranda.  No further interventions at this time.  Will continue to monitor.

## 2018-03-13 NOTE — PROGRESS NOTE ADULT - ASSESSMENT
57M with obesity, ETOH, SHIELA w/ R ankle sprain, developed MSSA cellulitis/ bacteremia, pulmonary septic emboli, R distal DVT now s/p OR, with mild coagulopathy    #coagulopathy- suspect secondary to vitamin K deficiency/ decreased PO intake over the week prior to admission with acute illness; Factor VII level sl low  - PT mixing study corrected confirming factor deficiency, no evidence of inhibitor - consistent with vitamin K deficiency  - INR sl elevated; no increased bleeding; no prior history of bleeding issues  - will continue to hold on FFP/vitamin K  - coags improved    #R peroneal DVT- no prior history of VTE; provoked with recent injury and decreased mobility  - repeat US 2/27 with no evidence of propagation, repeat US 3/7 with no evidence of propogation  - w/ septic emboli; was on heparin gtt, now off with +FOB  - repeat duplex 3/13 with persistent peroneal dvt and new soleal dvt-- plan to continue dvt prophylaxis, would continue at Rehab, will need fu Duplex 1-2 weeks    # R ankle sprain/wound- now s/p OR- s/p return to OR, deep debridement  - pain control; wound care; s/p wound vac  - s/p OR/graft 3/9; per podiatry    #ID- MSSA bacteremia, KELVIN negative for vegetation; on Abx per ID  - enlarging septic emboli on CT chest    # Anemia- normocytic, nl on admission- secondary to acute illness/post op  - Hg stable    d/w Dr. Miranda  d/w NP

## 2018-03-13 NOTE — PROGRESS NOTE ADULT - SUBJECTIVE AND OBJECTIVE BOX
ALBUTerol/ipratropium for Nebulization 3 milliLiter(s) Nebulizer every 6 hours  heparin  Injectable 5000 Unit(s) SubCutaneous every 8 hours  imipenem/cilastatin  IVPB 1000 milliGRAM(s) IV Intermittent every 8 hours  lactobacillus acidophilus 1 Tablet(s) Oral three times a day with meals  losartan 50 milliGRAM(s) Oral daily  multivitamin 1 Tablet(s) Oral daily  oxyCODONE    IR 10 milliGRAM(s) Oral every 3 hours PRN  sodium chloride 0.9%. 1000 milliLiter(s) IV Continuous <Continuous>                            10.7   5.29  )-----------( 284      ( 13 Mar 2018 07:36 )             32.6       03-13    136  |  98  |  17  ----------------------------<  99  4.3   |  27  |  0.77    Ca    9.2      13 Mar 2018 07:00  Phos  4.1     03-13  Mg     2.0     03-13    TPro  7.9  /  Alb  2.6<L>  /  TBili  0.3  /  DBili  x   /  AST  35  /  ALT  61<H>  /  AlkPhos  89  03-13            T(C): 36.9 (03-13-18 @ 04:30), Max: 37.4 (03-12-18 @ 13:46)  HR: 83 (03-13-18 @ 05:48) (76 - 95)  BP: 123/79 (03-13-18 @ 04:30) (102/67 - 123/79)  RR: 17 (03-13-18 @ 04:30) (17 - 18)  SpO2: 97% (03-13-18 @ 05:48) (95% - 100%)  Wt(kg): --          repeat KELVIN: unremarkable, no evidence of endocarditis    A/P)  He is a pleasant 56 y/o male PMH HTN admitted with staph aureus bacteremia, likely from right ankle cellulitis/nec fasc. A KELVIN and TTE were negative for endocarditis. In this setting he developed asymptomatic episodes of paroxysmal atrial tachycardia. There has been no evidence of atrial fibrillation. He denies palpitations nor syncope.    -given his lack of symptoms and brief duration of his PAT episodes I would not start beta blockers  -workup of dyspnea and chest pain as per cardiology  -management of DVT as per medicine  -consider d/c tele

## 2018-03-13 NOTE — PROGRESS NOTE ADULT - SUBJECTIVE AND OBJECTIVE BOX
MEDICINE, PROGRESS NOTE [phone]    JUWAN DOMÍNGUEZ 57y [id number]    Patient seen and examined.  Patient is a 57y old  Male who presents with a chief complaint of Right foot wound (12 Mar 2018 09:42)  Pt has no new complaints.     PAST MEDICAL & SURGICAL HISTORY:  Sciatica of right side  Essential hypertension  No significant past surgical history    MEDICATIONS  (STANDING):  ALBUTerol/ipratropium for Nebulization 3 milliLiter(s) Nebulizer every 6 hours  heparin  Injectable 5000 Unit(s) SubCutaneous every 8 hours  imipenem/cilastatin  IVPB 1000 milliGRAM(s) IV Intermittent every 8 hours  lactobacillus acidophilus 1 Tablet(s) Oral three times a day with meals  losartan 50 milliGRAM(s) Oral daily  multivitamin 1 Tablet(s) Oral daily  sodium chloride 0.9%. 1000 milliLiter(s) (10 mL/Hr) IV Continuous <Continuous>    MEDICATIONS  (PRN):  oxyCODONE    IR 10 milliGRAM(s) Oral every 3 hours PRN Moderate and Severe Pain (4-10)    Allergies    No Known Allergies    Intolerances        PHYSICAL EXAM:  Constitutional: NAD  HEENT: Normocephalic, EOMI  Neck:  No JVD  Respiratory: CTA B/L, No wheezes  Cardiovascular: S1, S2, RRR, + systolic murmur  Gastrointestinal: BS+, soft, NT/ND  Extremities: No peripheral edema  Neurological: AAOX3, no focal deficits  Psychiatric: Normal mood, normal affect  : No Sosa    Vital Signs Last 24 Hrs  T(C): 36.8 (13 Mar 2018 11:29), Max: 36.9 (13 Mar 2018 04:30)  T(F): 98.2 (13 Mar 2018 11:29), Max: 98.4 (13 Mar 2018 04:30)  HR: 90 (13 Mar 2018 17:08) (76 - 97)  BP: 121/83 (13 Mar 2018 11:29) (102/67 - 123/79)  BP(mean): --  RR: 18 (13 Mar 2018 11:29) (17 - 18)  SpO2: 97% (13 Mar 2018 17:08) (95% - 100%)  I&O's Summary    12 Mar 2018 07:01  -  13 Mar 2018 07:00  --------------------------------------------------------  IN: 2010 mL / OUT: 4200 mL / NET: -2190 mL    13 Mar 2018 07:01  -  13 Mar 2018 17:36  --------------------------------------------------------  IN: 770 mL / OUT: 1950 mL / NET: -1180 mL        LABS:                        10.7   5.29  )-----------( 284      ( 13 Mar 2018 07:36 )             32.6     03-13    136  |  98  |  17  ----------------------------<  99  4.3   |  27  |  0.77    Ca    9.2      13 Mar 2018 07:00  Phos  4.1     03-13  Mg     2.0     03-13    TPro  7.9  /  Alb  2.6<L>  /  TBili  0.3  /  DBili  x   /  AST  35  /  ALT  61<H>  /  AlkPhos  89  03-13        Magnesium, Serum: 2.0 mg/dL (03-13 @ 07:00)

## 2018-03-13 NOTE — CHART NOTE - NSCHARTNOTEFT_GEN_A_CORE
Malnutrition Follow-up  Chart reviewed, events noted.     Source: Patient [x]    Family [ ]     other [x] Comprehensive review of medical records     Diet :   DASH     Patient reports his appetite and po intake is very good. No N+V. Last BM was yesterday. Reviewed heart healthy and healthy wt education. Pt also reports consuming adequate protein for healing.      PO intake:   [x]   %     Source for PO intake [x] Patient [ ] family [x] chart [ ] staff [ ] other     Enteral /Parenteral Nutrition: n/a       Current Weight: Weight (kg): 113.4 (03-09 @ 10:17)-  2/22: 269.1 pounds -> 2/28: 272 pounds-> 3/2: 249.3 pounds (bed)- recommend confirming standing or wt via zeroed bed scale as feasible as large fluctuation noted.       Pertinent Medications: MEDICATIONS  (STANDING):  ALBUTerol/ipratropium for Nebulization 3 milliLiter(s) Nebulizer every 6 hours  heparin  Injectable 5000 Unit(s) SubCutaneous every 8 hours  imipenem/cilastatin  IVPB 1000 milliGRAM(s) IV Intermittent every 8 hours  lactobacillus acidophilus 1 Tablet(s) Oral three times a day with meals  losartan 50 milliGRAM(s) Oral daily  multivitamin 1 Tablet(s) Oral daily  sodium chloride 0.9%. 1000 milliLiter(s) (10 mL/Hr) IV Continuous <Continuous>    MEDICATIONS  (PRN):  oxyCODONE    IR 10 milliGRAM(s) Oral every 3 hours PRN Moderate and Severe Pain (4-10)    Pertinent Labs:  03-13 Na136 mmol/L Glu 99 mg/dL K+ 4.3 mmol/L Cr  0.77 mg/dL BUN 17 mg/dL 03-13 Phos 4.1 mg/dL 03-13 Alb 2.6 g/dL<L> 02-23 [id number] 5.6 % 02-22 Chol 86 mg/dL LDL 32 mg/dL HDL 37 mg/dL<L> Trig 87 mg/dL      Skin: non pitting right foot edema; right dorsal foot wound with VAC, right lateral foot wound.     Estimated Needs:   [x] no change since previous assessment        Previous Nutrition Diagnosis:         [x] Malnutrition (acute-severe)- Now resolved as pt has good po intake (consuming 100% of meals or meal) for 2-3 weeks.    [x] Morbid Obesity- ongoing            Nutrition Education provided for Morbid Obesity (heart healthy and achieving healthy wt)          New Nutrition Diagnosis: [ x] not applicable     Interventions/Recommend  1) Continue DASH diet.   2) Encourage po intake with nutrient dense foods.   3) Provide food preferences as able.   4) Monitor weight, lab values, skin, po intake and GI tolerance.   5) Continue to provide nutrition education/reinforcement as able.      Monitoring and Evaluation:     follow up per protocol    RD to remain available for further nutritional interventions as indicated.   Yenni Phillip, MS, RD, CDN #[phone].

## 2018-03-13 NOTE — PROGRESS NOTE ADULT - SUBJECTIVE AND OBJECTIVE BOX
Chief Complaint: fu    History of Present Illness: no new complaints; no f/c, no cp, denies dyspnea, no n/v/abd pain, foot pain controlled, appetite good      MEDICATIONS  (STANDING):  ALBUTerol/ipratropium for Nebulization 3 milliLiter(s) Nebulizer every 6 hours  heparin  Injectable 5000 Unit(s) SubCutaneous every 8 hours  imipenem/cilastatin  IVPB 1000 milliGRAM(s) IV Intermittent every 8 hours  lactobacillus acidophilus 1 Tablet(s) Oral three times a day with meals  losartan 50 milliGRAM(s) Oral daily  multivitamin 1 Tablet(s) Oral daily  sodium chloride 0.9%. 1000 milliLiter(s) (10 mL/Hr) IV Continuous <Continuous>    MEDICATIONS  (PRN):  oxyCODONE    IR 10 milliGRAM(s) Oral every 3 hours PRN Moderate and Severe Pain (4-10)      Allergies    No Known Allergies    Intolerances        Vital Signs Last 24 Hrs  T(C): 36.8 (13 Mar 2018 11:29), Max: 36.9 (13 Mar 2018 04:30)  T(F): 98.2 (13 Mar 2018 11:29), Max: 98.4 (13 Mar 2018 04:30)  HR: 97 (13 Mar 2018 11:29) (76 - 97)  BP: 121/83 (13 Mar 2018 11:29) (102/67 - 123/79)  BP(mean): --  RR: 18 (13 Mar 2018 11:29) (17 - 18)  SpO2: 95% (13 Mar 2018 11:29) (95% - 100%)    PHYSICAL EXAM  General: adult in NAD  HEENT: clear oropharynx, anicteric sclera, pink conjunctiva  Neck: supple  CV: normal S1/S2   Lungs: decreased BS  Abdomen: soft non-tender non-distended, obese, positive bowel sounds  Ext: R foot bandaged/wound vac    LABS:                          10.7   5.29  )-----------( 284      ( 13 Mar 2018 07:36 )             32.6         Mean Cell Volume : 97.0 fl  Mean Cell Hemoglobin : 31.8 pg  Mean Cell Hemoglobin Concentration : 32.8 gm/dL  Auto Neutrophil # : 3.32 K/uL  Auto Lymphocyte # : 1.45 K/uL  Auto Monocyte # : 0.37 K/uL  Auto Eosinophil # : 0.12 K/uL  Auto Basophil # : 0.02 K/uL  Auto Neutrophil % : 62.7 %  Auto Lymphocyte % : 27.4 %  Auto Monocyte % : 7.0 %  Auto Eosinophil % : 2.3 %  Auto Basophil % : 0.4 %      Serial CBC's  03-13 @ 07:36  Hct-32.6 / Hgb-10.7 / Plat-284 / RBC-3.36 / WBC-5.29  Serial CBC's  03-12 @ 09:09  Hct-30.2 / Hgb-9.8 / Plat-290 / RBC-3.13 / WBC-5.76  Serial CBC's  03-11 @ 09:37  Hct-32.0 / Hgb-10.2 / Plat-320 / RBC-3.21 / WBC-5.61  Serial CBC's  03-10 @ 08:31  Hct-30.4 / Hgb-10.0 / Plat-323 / RBC-3.16 / WBC-5.24      03-13    136  |  98  |  17  ----------------------------<  99  4.3   |  27  |  0.77    Ca    9.2      13 Mar 2018 07:00  Phos  4.1     03-13  Mg     2.0     03-13    TPro  7.9  /  Alb  2.6<L>  /  TBili  0.3  /  DBili  x   /  AST  35  /  ALT  61<H>  /  AlkPhos  89  03-13      Radiology:        < from: [facility] Duplex Lower Ext Vein Scan, Right (03.13.18 @ 10:18) >  IMPRESSION:     Persistent DVT involving the right peroneal vein.  New acute thrombus   within the right soleal vein.

## 2018-03-13 NOTE — PROGRESS NOTE ADULT - ASSESSMENT
Recent ankle sprain at work which got infected resulting in MSSA sepsis/cellulitis with lung embolization s/p OR debridement X 2 now with pseudomonas in OR culture    OM right foot/necrotizing fasciitis     hemoptysis - minimal - possibly secondary to being on hep drip with pulm septic emboli    ADA - resolved    Transaminitis - improving    Coagulopathy on admission - most likley due to dietary deficiency, holding off on any supplementation for now    Right peroneal vein dvt - on heparin prophy dose per heme  new soleal vein dvt - discussed with dr garza, repeat dopplers in rehab, continue prophylactic heparin    Pulm artery enlargement    Morbid obesity    SHIELA continue nocturnal bipap    PAT/ AIVR/13 beats wct - no further events    pleural effusion    Based on interview with pt and wife, pt is not an etoh abuser (neg CAGE, never had wd symptoms in his life, never been in rehab, has gone months without drinking with no problems.)    continue current care  stop bipap and see how pt does as he is clinically much more stable  d/c planning to rehab once vac taken down lizz and wound reassessed.

## 2018-03-13 NOTE — PROGRESS NOTE ADULT - SUBJECTIVE AND OBJECTIVE BOX
Subjective:  pt seen and examined,   tele stable , no arrythmia    ALBUTerol/ipratropium for Nebulization 3 milliLiter(s) Nebulizer every 6 hours  heparin  Injectable 5000 Unit(s) SubCutaneous every 8 hours  imipenem/cilastatin  IVPB 1000 milliGRAM(s) IV Intermittent every 8 hours  lactobacillus acidophilus 1 Tablet(s) Oral three times a day with meals  losartan 50 milliGRAM(s) Oral daily  multivitamin 1 Tablet(s) Oral daily  oxyCODONE    IR 10 milliGRAM(s) Oral every 3 hours PRN  sodium chloride 0.9%. 1000 milliLiter(s) IV Continuous <Continuous>                            9.8    5.76  )-----------( 290      ( 12 Mar 2018 09:09 )             30.2       Hemoglobin: 9.8 g/dL (03-12 @ 09:09)  Hemoglobin: 10.2 g/dL (03-11 @ 09:37)  Hemoglobin: 10.0 g/dL (03-10 @ 08:31)  Hemoglobin: 9.9 g/dL (03-09 @ 08:09)  Hemoglobin: 10.8 g/dL (03-08 @ 07:35)      03-12    136  |  97  |  16  ----------------------------<  98  4.2   |  29  |  0.79    Ca    9.1      12 Mar 2018 07:04  Phos  3.9     03-12  Mg     2.0     03-12    TPro  7.9  /  Alb  2.7<L>  /  TBili  0.3  /  DBili  x   /  AST  33  /  ALT  63<H>  /  AlkPhos  83  03-12    Creatinine Trend: 0.79<--, 0.82<--, 0.79<--, 0.73<--, 0.81<--, 0.78<--    COAGS:           T(C): 36.9 (03-13-18 @ 04:30), Max: 37.4 (03-12-18 @ 13:46)  HR: 83 (03-13-18 @ 05:48) (76 - 95)  BP: 123/79 (03-13-18 @ 04:30) (102/67 - 123/79)  RR: 17 (03-13-18 @ 04:30) (17 - 18)  SpO2: 97% (03-13-18 @ 05:48) (95% - 100%)  Wt(kg): --    I&O's Summary    12 Mar 2018 07:01  -  13 Mar 2018 07:00  --------------------------------------------------------  IN: 2010 mL / OUT: 4200 mL / NET: -2190 mL      HEENT:   Normal oral mucosa, PERRL, EOMI	  Lymphatic: No obvious lymphadenopathy , no edema  Cardiovascular: Normal S1 S2, No JVD, 1/6 KITTY murmur, Peripheral pulses palpable 2+ bilaterally  Respiratory: Lungs clear to auscultation, normal effort 	  Gastrointestinal:  Soft, Non-tender, + BS	  Skin: No rashes,  No cyanosis, warm to touch  Musculoskeletal: Normal range of motion, normal strength  Psychiatry:  Appropriate Mood & affect     TELEMETRY: 	  nsr   ECG:  	  RADIOLOGY:     DIAGNOSTIC TESTING:  [ ] Echocardiogram: < from: KELVIN w/TTE (w/3D Echo) (03.08.18 @ 07:21) >  1. Mitral annular calcification, otherwise normal mitral  valve.  2. Thickened trileaflet aortic valve with normal opening.  Mild aortic regurgitation.  3. No left atrial or left atrial appendage thrombus.  4. Normal left ventricular systolic function.  5. Normal right ventricular size and function.  6. Contrast injection demonstrates no evidence of a patent  foramen ovale.  7. No evidence of valvular vegetation is seen.  *** Compared with echocardiogram of 2/26/2018, no  significant changes noted.    < end of copied text >    [ ]  Catheterization:  [ ] Stress Test:    OTHER: 	      ASSESSMENT/PLAN: 	57y Male with pmhx of obesity admitted with ?cellulitis s/p episode of arrhtythmia    1) tele with no significant arrhythmis  2) no need for ischemic evaluation  3) patient with no NSVR-no need for cath  4) abx as per id  5) Pulm follow up -  +/- sleep study  6)  GI / DVT prophylaxis, keep K>4, mag >2.0   D/W dr Dickerson

## 2018-03-14 PROBLEM — Z00.00 ENCOUNTER FOR PREVENTIVE HEALTH EXAMINATION: Status: ACTIVE | Noted: 2018-03-14

## 2018-03-14 LAB
ALBUMIN SERPL ELPH-MCNC: 3 G/DL — LOW (ref 3.3–5)
ALP SERPL-CCNC: 96 U/L — SIGNIFICANT CHANGE UP (ref 40–120)
ALT FLD-CCNC: 54 U/L RC — HIGH (ref 10–45)
ANION GAP SERPL CALC-SCNC: 11 MMOL/L — SIGNIFICANT CHANGE UP (ref 5–17)
AST SERPL-CCNC: 28 U/L — SIGNIFICANT CHANGE UP (ref 10–40)
BASOPHILS # BLD AUTO: 0.02 K/UL — SIGNIFICANT CHANGE UP (ref 0–0.2)
BASOPHILS NFR BLD AUTO: 0.3 % — SIGNIFICANT CHANGE UP (ref 0–2)
BILIRUB SERPL-MCNC: 0.2 MG/DL — SIGNIFICANT CHANGE UP (ref 0.2–1.2)
BUN SERPL-MCNC: 15 MG/DL — SIGNIFICANT CHANGE UP (ref 7–23)
CALCIUM SERPL-MCNC: 9.4 MG/DL — SIGNIFICANT CHANGE UP (ref 8.4–10.5)
CHLORIDE SERPL-SCNC: 97 MMOL/L — SIGNIFICANT CHANGE UP (ref 96–108)
CO2 SERPL-SCNC: 28 MMOL/L — SIGNIFICANT CHANGE UP (ref 22–31)
CREAT SERPL-MCNC: 0.72 MG/DL — SIGNIFICANT CHANGE UP (ref 0.5–1.3)
EOSINOPHIL # BLD AUTO: 0.13 K/UL — SIGNIFICANT CHANGE UP (ref 0–0.5)
EOSINOPHIL NFR BLD AUTO: 2.1 % — SIGNIFICANT CHANGE UP (ref 0–6)
GLUCOSE SERPL-MCNC: 97 MG/DL — SIGNIFICANT CHANGE UP (ref 70–99)
HCT VFR BLD CALC: 31.6 % — LOW (ref 39–50)
HGB BLD-MCNC: 10.3 G/DL — LOW (ref 13–17)
IMM GRANULOCYTES NFR BLD AUTO: 0.3 % — SIGNIFICANT CHANGE UP (ref 0–1.5)
LYMPHOCYTES # BLD AUTO: 1.25 K/UL — SIGNIFICANT CHANGE UP (ref 1–3.3)
LYMPHOCYTES # BLD AUTO: 20.6 % — SIGNIFICANT CHANGE UP (ref 13–44)
MAGNESIUM SERPL-MCNC: 2 MG/DL — SIGNIFICANT CHANGE UP (ref 1.6–2.6)
MCHC RBC-ENTMCNC: 31.6 PG — SIGNIFICANT CHANGE UP (ref 27–34)
MCHC RBC-ENTMCNC: 32.6 GM/DL — SIGNIFICANT CHANGE UP (ref 32–36)
MCV RBC AUTO: 96.9 FL — SIGNIFICANT CHANGE UP (ref 80–100)
MONOCYTES # BLD AUTO: 0.52 K/UL — SIGNIFICANT CHANGE UP (ref 0–0.9)
MONOCYTES NFR BLD AUTO: 8.6 % — SIGNIFICANT CHANGE UP (ref 2–14)
NEUTROPHILS # BLD AUTO: 4.13 K/UL — SIGNIFICANT CHANGE UP (ref 1.8–7.4)
NEUTROPHILS NFR BLD AUTO: 68.1 % — SIGNIFICANT CHANGE UP (ref 43–77)
PHOSPHATE SERPL-MCNC: 3.8 MG/DL — SIGNIFICANT CHANGE UP (ref 2.5–4.5)
PLATELET # BLD AUTO: 299 K/UL — SIGNIFICANT CHANGE UP (ref 150–400)
POTASSIUM SERPL-MCNC: 4.2 MMOL/L — SIGNIFICANT CHANGE UP (ref 3.5–5.3)
POTASSIUM SERPL-SCNC: 4.2 MMOL/L — SIGNIFICANT CHANGE UP (ref 3.5–5.3)
PROT SERPL-MCNC: 8.1 G/DL — SIGNIFICANT CHANGE UP (ref 6–8.3)
RBC # BLD: 3.26 M/UL — LOW (ref 4.2–5.8)
RBC # FLD: 14.1 % — SIGNIFICANT CHANGE UP (ref 10.3–14.5)
SODIUM SERPL-SCNC: 136 MMOL/L — SIGNIFICANT CHANGE UP (ref 135–145)
WBC # BLD: 6.07 K/UL — SIGNIFICANT CHANGE UP (ref 3.8–10.5)
WBC # FLD AUTO: 6.07 K/UL — SIGNIFICANT CHANGE UP (ref 3.8–10.5)

## 2018-03-14 PROCEDURE — 99232 SBSQ HOSP IP/OBS MODERATE 35: CPT

## 2018-03-14 RX ORDER — IPRATROPIUM/ALBUTEROL SULFATE 18-103MCG
3 AEROSOL WITH ADAPTER (GRAM) INHALATION
Qty: 0 | Refills: 0 | COMMUNITY
Start: 2018-03-14

## 2018-03-14 RX ORDER — OXYCODONE HYDROCHLORIDE 5 MG/1
1 TABLET ORAL
Qty: 0 | Refills: 0 | COMMUNITY
Start: 2018-03-14

## 2018-03-14 RX ADMIN — HEPARIN SODIUM 5000 UNIT(S): 5000 INJECTION INTRAVENOUS; SUBCUTANEOUS at 22:46

## 2018-03-14 RX ADMIN — Medication 3 MILLILITER(S): at 11:10

## 2018-03-14 RX ADMIN — OXYCODONE HYDROCHLORIDE 10 MILLIGRAM(S): 5 TABLET ORAL at 04:57

## 2018-03-14 RX ADMIN — Medication 1 TABLET(S): at 11:10

## 2018-03-14 RX ADMIN — OXYCODONE HYDROCHLORIDE 10 MILLIGRAM(S): 5 TABLET ORAL at 09:01

## 2018-03-14 RX ADMIN — HEPARIN SODIUM 5000 UNIT(S): 5000 INJECTION INTRAVENOUS; SUBCUTANEOUS at 13:07

## 2018-03-14 RX ADMIN — Medication 1 TABLET(S): at 16:41

## 2018-03-14 RX ADMIN — OXYCODONE HYDROCHLORIDE 10 MILLIGRAM(S): 5 TABLET ORAL at 13:41

## 2018-03-14 RX ADMIN — HEPARIN SODIUM 5000 UNIT(S): 5000 INJECTION INTRAVENOUS; SUBCUTANEOUS at 05:55

## 2018-03-14 RX ADMIN — Medication 3 MILLILITER(S): at 16:41

## 2018-03-14 RX ADMIN — OXYCODONE HYDROCHLORIDE 10 MILLIGRAM(S): 5 TABLET ORAL at 13:07

## 2018-03-14 RX ADMIN — Medication 3 MILLILITER(S): at 05:55

## 2018-03-14 RX ADMIN — Medication 1 TABLET(S): at 07:51

## 2018-03-14 RX ADMIN — OXYCODONE HYDROCHLORIDE 10 MILLIGRAM(S): 5 TABLET ORAL at 07:52

## 2018-03-14 RX ADMIN — LOSARTAN POTASSIUM 50 MILLIGRAM(S): 100 TABLET, FILM COATED ORAL at 05:55

## 2018-03-14 RX ADMIN — IMIPENEM AND CILASTATIN 250 MILLIGRAM(S): 250; 250 INJECTION, POWDER, FOR SOLUTION INTRAVENOUS at 05:54

## 2018-03-14 RX ADMIN — OXYCODONE HYDROCHLORIDE 10 MILLIGRAM(S): 5 TABLET ORAL at 18:45

## 2018-03-14 RX ADMIN — OXYCODONE HYDROCHLORIDE 10 MILLIGRAM(S): 5 TABLET ORAL at 06:25

## 2018-03-14 RX ADMIN — Medication 1 TABLET(S): at 11:11

## 2018-03-14 RX ADMIN — IMIPENEM AND CILASTATIN 250 MILLIGRAM(S): 250; 250 INJECTION, POWDER, FOR SOLUTION INTRAVENOUS at 22:46

## 2018-03-14 RX ADMIN — IMIPENEM AND CILASTATIN 250 MILLIGRAM(S): 250; 250 INJECTION, POWDER, FOR SOLUTION INTRAVENOUS at 13:07

## 2018-03-14 NOTE — PROGRESS NOTE ADULT - ASSESSMENT
Recent ankle sprain at work which got infected resulting in MSSA sepsis/cellulitis with lung embolization s/p OR debridement X 2 now with pseudomonas in OR culture    OM right foot/necrotizing fasciitis     hemoptysis - minimal - possibly secondary to being on hep drip with pulm septic emboli    ADA - resolved    Transaminitis - improving    Coagulopathy on admission - most likley due to dietary deficiency, holding off on any supplementation for now    Right peroneal vein dvt - on heparin prophy dose per heme  new soleal vein dvt - discussed with dr garza, repeat dopplers in rehab, continue prophylactic heparin    Pulm artery enlargement    Morbid obesity    SHIELA continue nocturnal bipap    PAT/ AIVR/13 beats wct - no further events    pleural effusion    Based on interview with pt and wife, pt is not an etoh abuser (neg CAGE, never had wd symptoms in his life, never been in rehab, has gone months without drinking with no problems.)    continue current  care  d/c to rehab lizz  wound vac to be reapplied lizz  abx to be switched at rehab  discussed with abi Huggins

## 2018-03-14 NOTE — PROGRESS NOTE ADULT - SUBJECTIVE AND OBJECTIVE BOX
EP ATTENDING    tele: NSR, no events    no palpitations, no syncope, no angina    ALBUTerol/ipratropium for Nebulization 3 milliLiter(s) Nebulizer every 6 hours  heparin  Injectable 5000 Unit(s) SubCutaneous every 8 hours  imipenem/cilastatin  IVPB 1000 milliGRAM(s) IV Intermittent every 8 hours  lactobacillus acidophilus 1 Tablet(s) Oral three times a day with meals  losartan 50 milliGRAM(s) Oral daily  multivitamin 1 Tablet(s) Oral daily  oxyCODONE    IR 10 milliGRAM(s) Oral every 3 hours PRN  sodium chloride 0.9%. 1000 milliLiter(s) IV Continuous <Continuous>                            10.3   6.07  )-----------( 299      ( 14 Mar 2018 09:37 )             31.6       03-14    136  |  97  |  15  ----------------------------<  97  4.2   |  28  |  0.72    Ca    9.4      14 Mar 2018 07:17  Phos  3.8     03-14  Mg     2.0     03-14    TPro  8.1  /  Alb  3.0<L>  /  TBili  0.2  /  DBili  x   /  AST  28  /  ALT  54<H>  /  AlkPhos  96  03-14      T(C): 36.7 (03-14-18 @ 04:42), Max: 37.1 (03-13-18 @ 20:45)  HR: 80 (03-14-18 @ 10:02) (77 - 97)  BP: 117/77 (03-14-18 @ 05:55) (117/77 - 121/83)  RR: 19 (03-14-18 @ 04:42) (18 - 19)  SpO2: 98% (03-14-18 @ 10:02) (94% - 98%)  Wt(kg): --    no JVD  RRR, no murmurs  CTAB  soft nt/nd  no c/c/e    repeat KELVIN: unremarkable, no evidence of endocarditis      A/P)  He is a pleasant 56 y/o male PMH HTN admitted with staph aureus bacteremia, likely from right ankle cellulitis/nec fasc. A KELVIN and TTE were negative for endocarditis. In this setting he developed asymptomatic episodes of paroxysmal atrial tachycardia. There has been no evidence of atrial fibrillation. He denies palpitations nor syncope.    -given his lack of symptoms and brief duration of his PAT episodes I would not start beta blockers  -workup of dyspnea and chest pain as per cardiology  -will follow  -management of DVT as per medicine      Dany Patel M.D., [facility]  Cardiac Electrophysiology  [facility]  [address] [place] [address], [address] [place] [address]  [email or web address]    office [phone]  pager [phone]

## 2018-03-14 NOTE — PROGRESS NOTE ADULT - SUBJECTIVE AND OBJECTIVE BOX
Patient denies CP, Breathing comfortably ROS (-)    ALBUTerol/ipratropium for Nebulization 3 milliLiter(s) Nebulizer every 6 hours  heparin  Injectable 5000 Unit(s) SubCutaneous every 8 hours  imipenem/cilastatin  IVPB 1000 milliGRAM(s) IV Intermittent every 8 hours  lactobacillus acidophilus 1 Tablet(s) Oral three times a day with meals  losartan 50 milliGRAM(s) Oral daily  multivitamin 1 Tablet(s) Oral daily  oxyCODONE    IR 10 milliGRAM(s) Oral every 3 hours PRN  sodium chloride 0.9%. 1000 milliLiter(s) IV Continuous <Continuous>                            10.3   6.07  )-----------( 299      ( 14 Mar 2018 09:37 )             31.6       Hemoglobin: 10.3 g/dL (03-14 @ 09:37)  Hemoglobin: 10.7 g/dL (03-13 @ 07:36)  Hemoglobin: 9.8 g/dL (03-12 @ 09:09)  Hemoglobin: 10.2 g/dL (03-11 @ 09:37)  Hemoglobin: 10.0 g/dL (03-10 @ 08:31)      03-14    136  |  97  |  15  ----------------------------<  97  4.2   |  28  |  0.72    Ca    9.4      14 Mar 2018 07:17  Phos  3.8     03-14  Mg     2.0     03-14    TPro  8.1  /  Alb  3.0<L>  /  TBili  0.2  /  DBili  x   /  AST  28  /  ALT  54<H>  /  AlkPhos  96  03-14    Creatinine Trend: 0.72<--, 0.77<--, 0.79<--, 0.82<--, 0.79<--, 0.73<--    COAGS:           T(C): 36.6 (03-14-18 @ 11:16), Max: 37.1 (03-13-18 @ 20:45)  HR: 85 (03-14-18 @ 11:16) (77 - 96)  BP: 117/76 (03-14-18 @ 11:16) (117/76 - 119/73)  RR: 18 (03-14-18 @ 11:16) (18 - 19)  SpO2: 95% (03-14-18 @ 11:16) (94% - 98%)  Wt(kg): --    I&O's Summary    13 Mar 2018 07:01  -  14 Mar 2018 07:00  --------------------------------------------------------  IN: 1030 mL / OUT: 3745 mL / NET: -2715 mL    14 Mar 2018 07:01  -  14 Mar 2018 15:12  --------------------------------------------------------  IN: 810 mL / OUT: 300 mL / NET: 510 mL        Gen: Appears well in NAD  HEENT:  (-)icterus (-)pallor  CV: N S1 S2 1/6 KITTY (+)2 Pulses B/l  Resp:  Clear to ausculatation B/L, normal effort  GI: (+) BS Soft, NT, ND  Lymph:  (-)Edema, (-)obvious lymphadenopathy  Skin: Warm to touch, Normal turgor  Psych: Appropriate mood and affect        TELEMETRY: SR	        ASSESSMENT/PLAN:  57yMale with etoh abuse, HTN admitted with DVT/septic emboli, necrotizing fascitis who is being seen for chest pain/sob.     - KELVIN with no vegetations  - no clinical heart failure  - No further PAT  - heme f/u regarding DVTs   - Abx per ID  - D/C planning per med  - Can f/u with us in 1-2 weeks upon D/C      Blair Benjamin MD, FACC  [facility], [facility]  [address] [place] [address]  PHONE:  [phone]  BEEPER : [phone]

## 2018-03-14 NOTE — PROGRESS NOTE ADULT - SUBJECTIVE AND OBJECTIVE BOX
MEDICINE, PROGRESS NOTE [phone]    JUWAN DOMÍNGUEZ 57y [id number]    Patient seen and examined.  Patient is a 57y old  Male who presents with a chief complaint of Right foot wound (12 Mar 2018 09:42)  pt has no new complaints.    PAST MEDICAL & SURGICAL HISTORY:  Sciatica of right side  Essential hypertension  No significant past surgical history    MEDICATIONS  (STANDING):  ALBUTerol/ipratropium for Nebulization 3 milliLiter(s) Nebulizer every 6 hours  heparin  Injectable 5000 Unit(s) SubCutaneous every 8 hours  imipenem/cilastatin  IVPB 1000 milliGRAM(s) IV Intermittent every 8 hours  lactobacillus acidophilus 1 Tablet(s) Oral three times a day with meals  losartan 50 milliGRAM(s) Oral daily  multivitamin 1 Tablet(s) Oral daily  sodium chloride 0.9%. 1000 milliLiter(s) (10 mL/Hr) IV Continuous <Continuous>    MEDICATIONS  (PRN):  oxyCODONE    IR 10 milliGRAM(s) Oral every 3 hours PRN Moderate and Severe Pain (4-10)    Allergies    No Known Allergies    Intolerances        PHYSICAL EXAM:  Constitutional: NAD  HEENT: Normocephalic, EOMI  Neck:  No JVD  Respiratory: CTA B/L, No wheezes  Cardiovascular: S1, S2, RRR, + systolic murmur  Gastrointestinal: BS+, soft, NT/ND  Extremities: No peripheral edema, + vac right le  Neurological: AAOX3, no focal deficits  Psychiatric: Normal mood, normal affect  : No Sosa    Vital Signs Last 24 Hrs  T(C): 36.6 (14 Mar 2018 11:16), Max: 37.1 (13 Mar 2018 20:45)  T(F): 97.8 (14 Mar 2018 11:16), Max: 98.8 (13 Mar 2018 20:45)  HR: 85 (14 Mar 2018 11:16) (77 - 96)  BP: 117/76 (14 Mar 2018 11:16) (117/76 - 119/73)  BP(mean): --  RR: 18 (14 Mar 2018 11:16) (18 - 19)  SpO2: 95% (14 Mar 2018 11:16) (94% - 98%)  I&O's Summary    13 Mar 2018 07:01  -  14 Mar 2018 07:00  --------------------------------------------------------  IN: 1030 mL / OUT: 3745 mL / NET: -2715 mL    14 Mar 2018 07:01  -  14 Mar 2018 19:05  --------------------------------------------------------  IN: 810 mL / OUT: 300 mL / NET: 510 mL        LABS:                        10.3   6.07  )-----------( 299      ( 14 Mar 2018 09:37 )             31.6     03-14    136  |  97  |  15  ----------------------------<  97  4.2   |  28  |  0.72    Ca    9.4      14 Mar 2018 07:17  Phos  3.8     03-14  Mg     2.0     03-14    TPro  8.1  /  Alb  3.0<L>  /  TBili  0.2  /  DBili  x   /  AST  28  /  ALT  54<H>  /  AlkPhos  96  03-14    Magnesium, Serum: 2.0 mg/dL (03-14 @ 07:17)

## 2018-03-14 NOTE — PROGRESS NOTE ADULT - SUBJECTIVE AND OBJECTIVE BOX
CC:  Right foot pain    F/U:  mssa bacteremia, RLE infection     Interval History/ROS:   all recent BC negative.  PICC placed.  no fever/chills.  awaiting d/c to rehab.  Rest of ROS otherwise negative    Allergies  No Known Allergies    ANTIMICROBIALS:    imipenem/cilastatin  IVPB 1000 every 8 hours    MEDICATIONS  (STANDING):  ALBUTerol/ipratropium for Nebulization 3 every 6 hours  heparin  Injectable 5000 every 8 hours  losartan 50 daily    Vital Signs Last 24 Hrs  T(F): 97.8 (03-14-18 @ 11:16), Max: 98.8 (03-13-18 @ 20:45)  HR: 85 (03-14-18 @ 11:16)  BP: 117/76 (03-14-18 @ 11:16)  RR: 18 (03-14-18 @ 11:16)  SpO2: 95% (03-14-18 @ 11:16) (94% - 98%)  Wt(kg): --    PHYSICAL EXAM:  General: lying in bed  HEAD/EYES: anicteric  ENT:  supple  Cardiovascular:   S1, S2; no murmur  Respiratory:  clear bilaterally  GI:  soft, non-tender, normal bowel sounds  :  no guido  Musculoskeletal:  R foot - with VAC; no erythema of the RLE. no swelling  Neurologic:  grossly non-focal  Skin:  no rash  Psychiatric:  appropriate affect  Vascular:  PICC Right c/d/i                                 10.3   6.07  )-----------( 299      ( 14 Mar 2018 09:37 )             31.6 03-14    136  |  97  |  15  ----------------------------<  97  4.2   |  28  |  0.72  Ca    9.4      14 Mar 2018 07:17Phos  3.8     03-14Mg     2.0     03-14  TPro  8.1  /  Alb  3.0  /  TBili  0.2  /  DBili  x   /  AST  28  /  ALT  54  /  AlkPhos  96  03-14    MICROBIOLOGY:  Culture - Blood (03.03.18 @ 05:23)  No growth to date.    Culture - Blood (03.02.18 @ 19:09):   No growth to date.    Culture - Tissue with Gram Stain (03.02.18 @ 00:55)   Moderate Pseudomonas aeruginosa - I-aztreonam; all else sensitive  Numerous Staphylococcus aureus    Culture - Blood in AM (02.27.18 @ 13:42)  Growth in anaerobic bottle: Staphylococcus aureus    Culture - Blood in AM (02.27.18 @ 13:41)  Growth in anaerobic bottle: Staphylococcus aureus Susceptibility to follow.    Culture - Surgical Swab (02.25.18 @ 22:01)    -  Cefazolin: S <=4    Specimen Source: .Surgical Swab RIGHT FOOT PUS  Moderate Staphylococcus aureus    Culture - Abscess with Gram Stain (02.25.18 @ 08:39)    -  Oxacillin: S 0.5    Specimen Source: .Abscess right foot  Few Staphylococcus aureus    Culture - Blood in AM (02.24.18 @ 05:15)  Growth in aerobic and anaerobic bottles: Staphylococcus aureus  See previous culture [id number]    Culture - Blood (02.24.18 @ 00:59)  Growth in aerobic and anaerobic bottles: Staphylococcus aureus  See previous culture [id number]    Culture - Blood (02.22.18 @ 12:56)< from: [facility] Duplex Lower Ext Vein Scan, Right (03.13.18 @ 10:18) >    Growth in aerobic and anaerobic bottles: Staphylococcus aureus  See previous culture  # [id number]    Culture - Blood (02.22.18 @ 12:56)  Growth in anaerobic bottle:  Specimen Source: .Blood Blood-Peripheral  Staphylococcus aureus    -  Cefazolin: S <=4    RADIOLOGY:  3/13/18 - RLE duplex - IMPRESSION:  Persistent DVT involving the right peroneal vein.  New acute thrombus within the right soleal vein.    CT Angio Chest w/ IV Cont (03.07.18 @ 17:33)   IMPRESSION: Multiple scattered foci of nodular consolidation have increased in size and density from 2/22/2018, with one nodule demonstrating early cavitation. Given the provided clinical history these nodules most likely represent septic emboli.    MR Tib/Fib w/wo IV Cont, Right (02.28.18 @ 23:11)   1.  Soft tissue wound over the dorsolateral aspect of the foot. 2 residual abscesses are seen tracking from the site of the wound, one posterior, and one along the plantar surface of the foot.  2.  Diffuse osteomyelitis of the midfoot involving the metatarsals, cuneiforms, cuboid, and navicular.  3.  Myositis of the deep posterior compartment musculature of the leg.  4.  Nonenhancing edema is seen tracking along the superficial and deep fascial layers of the posterior compartment of the leg. Given other findings, necrotizing fasciitis cannot be excluded.    VA Duplex Lower Ext Vein Scan, Right (02.27.18 @ 15:13)  IMPRESSION:  Persistent thrombosis of a single right peroneal vein without evidence of propagation since prior venous ultrasound from 2/22/2018.    CT Lower Extremity w/ IV Cont, Right (02.22.18 @ 21:56)   Impression:  Diffuse cellulitis throughout the right lower leg as outlined above with cutaneous bleb along the dorsal lateral aspect of the foot. There is no subcutaneous air to suggest necrotizing fasciitis.    Ill-defined fluid attenuation within the region of the extensor digitorum brevis musculature and within the region of the abductor hallucis muscle which may be related to muscle strain or myositis. Developing fluid collections are also a consideration in the appropriate clinical context.  No peripherally enhancing fluid collection is noted on the current examination.  Intramuscular fullness and edema within the myofascial planes about the calf musculature likely related to patient's known right peroneal deep vein thrombosis.  No CT evidence of osteomyelitis.  Osteochondral lesion along the medial aspect of the talar dome.    CT Angio Chest w/ IV Cont (02.22.18 @ 00:25)   IMPRESSION:    1.  No main, or lobar pulmonary embolus. Evaluation of the segmental and subsegmental branches is limited secondary to artifact.  2.  Multiple groundglass and nodular opacities with a peripheral and lower lobe distribution could be secondary to infection (? Either septic emboli or fungal infection rather than a community acquired pneumonia) rather than malignancy.

## 2018-03-14 NOTE — PROGRESS NOTE ADULT - ASSESSMENT
POD#5   Umbilical graft to right foot dorsal wound (sx #3)  approx 25cm x 10 cm  VAc removed this AM, to be reapplied today  Medial and post lateral wounds coapting  Graft sites are viable and intact. Adaptic is secure  D/c planning for out pt management at wound center  Surgery # 4 will be grafting either STSG or skin equivalent as out patient.    WBAT for heel touch in CAM Boot and walker on right   P/T evaluation appreciated  Continue IV ABX  Home VAc will be required.

## 2018-03-14 NOTE — PROGRESS NOTE ADULT - SUBJECTIVE AND OBJECTIVE BOX
Patient is a 57y old  Male who presents with a chief complaint of Right foot wound (12 Mar 2018 09:42)       INTERVAL HPI/OVERNIGHT EVENTS:  Patient seen and evaluated at bedside.  Pt is resting comfortable in NAD. Denies N/V/F/C.  Pain rated at X/10    Allergies    No Known Allergies    Intolerances        Vital Signs Last 24 Hrs  T(C): 36.7 (14 Mar 2018 04:42), Max: 37.1 (13 Mar 2018 20:45)  T(F): 98 (14 Mar 2018 04:42), Max: 98.8 (13 Mar 2018 20:45)  HR: 80 (14 Mar 2018 10:02) (77 - 97)  BP: 117/77 (14 Mar 2018 05:55) (117/77 - 121/83)  BP(mean): --  RR: 19 (14 Mar 2018 04:42) (18 - 19)  SpO2: 98% (14 Mar 2018 10:02) (94% - 98%)    LABS:                        10.7   5.29  )-----------( 284      ( 13 Mar 2018 07:36 )             32.6     03-14    136  |  97  |  15  ----------------------------<  97  4.2   |  28  |  0.72    Ca    9.4      14 Mar 2018 07:17  Phos  3.8     03-14  Mg     2.0     03-14    TPro  8.1  /  Alb  3.0<L>  /  TBili  0.2  /  DBili  x   /  AST  28  /  ALT  54<H>  /  AlkPhos  96  03-14        CAPILLARY BLOOD GLUCOSE          Lower Extremity Physical Exam:  right dorsal foot surgical sites staples intact, no dehiscence, skin staples around graft/adaptic intact, no dehiscence, no purulence, resolving erythema and swelling, graft taking well, no hematoma, medial foot sutures intact, well coapted, no necrosis    RADIOLOGY & ADDITIONAL TESTS:

## 2018-03-14 NOTE — PROGRESS NOTE ADULT - SUBJECTIVE AND OBJECTIVE BOX
Patient with no anginal chest pain or shortness of breath  No palpitations      MEDICATIONS  (STANDING):  ALBUTerol/ipratropium for Nebulization 3 milliLiter(s) Nebulizer every 6 hours  heparin  Injectable 5000 Unit(s) SubCutaneous every 8 hours  imipenem/cilastatin  IVPB 1000 milliGRAM(s) IV Intermittent every 8 hours  lactobacillus acidophilus 1 Tablet(s) Oral three times a day with meals  losartan 50 milliGRAM(s) Oral daily  multivitamin 1 Tablet(s) Oral daily  sodium chloride 0.9%. 1000 milliLiter(s) (10 mL/Hr) IV Continuous <Continuous>    MEDICATIONS  (PRN):  oxyCODONE    IR 10 milliGRAM(s) Oral every 3 hours PRN Moderate and Severe Pain (4-10)      LABS:                        10.7   5.29  )-----------( 284      ( 13 Mar 2018 07:36 )             32.6     Hemoglobin: 10.7 g/dL (03-13 @ 07:36)  Hemoglobin: 9.8 g/dL (03-12 @ 09:09)  Hemoglobin: 10.2 g/dL (03-11 @ 09:37)  Hemoglobin: 10.0 g/dL (03-10 @ 08:31)    03-14    136  |  97  |  15  ----------------------------<  97  4.2   |  28  |  0.72    Ca    9.4      14 Mar 2018 07:17  Phos  3.8     03-14  Mg     2.0     03-14    TPro  8.1  /  Alb  3.0<L>  /  TBili  0.2  /  DBili  x   /  AST  28  /  ALT  54<H>  /  AlkPhos  96  03-14    Creatinine Trend: 0.72<--, 0.77<--, 0.79<--, 0.82<--, 0.79<--, 0.73<--           PHYSICAL EXAM  Vital Signs Last 24 Hrs  T(C): 36.7 (14 Mar 2018 04:42), Max: 37.1 (13 Mar 2018 20:45)  T(F): 98 (14 Mar 2018 04:42), Max: 98.8 (13 Mar 2018 20:45)  HR: 80 (14 Mar 2018 10:02) (77 - 97)  BP: 117/77 (14 Mar 2018 05:55) (117/77 - 121/83)  BP(mean): --  RR: 19 (14 Mar 2018 04:42) (18 - 19)  SpO2: 98% (14 Mar 2018 10:02) (94% - 98%)    S1S2 RRR  CTABL  SOFT NT ND  WARM DRY DSD at RLE   ALERT    TELEMETRY: 	  nsr no events       DIAGNOSTIC TESTING:  [ ] Echocardiogram: < from: KELVIN w/TTE (w/3D Echo) (03.08.18 @ 07:21) >  1. Mitral annular calcification, otherwise normal mitral  valve.  2. Thickened trileaflet aortic valve with normal opening.  Mild aortic regurgitation.  3. No left atrial or left atrial appendage thrombus.  4. Normal left ventricular systolic function.  5. Normal right ventricular size and function.  6. Contrast injection demonstrates no evidence of a patent  foramen ovale.  7. No evidence of valvular vegetation is seen.  *** Compared with echocardiogram of 2/26/2018, no  significant changes noted.      [ ]  Catheterization: n/a  [ ] Stress Test:  negative as outpatient 2016      ASSESSMENT/PLAN: 	56 yo M with history of HTN, no known CAD/MI, admitted initially 2/22/18 with chest pain and confusion. He ruled out for ACS and was found to have  Staph aureus bacteremia likely from right ankle cellulitis and necrotizing fascitis.   TTE with grossly normal LV function and KELVIN revealed no evidence of endocarditis. He has subsequently also been diagnosed RLE DVT with possible septic emboli on CTA chest.   He was placed on IV abx and is on heparin subq q8h.   On 03/05/18 the patient had an apparent episode of NSVT however upon further review with electrophysiology, it appears to have all been PAT. He has no history of syncope or near syncope. Prior to admit he was ambulating without anginal symptoms.  In the last 2 years he has undergone a reportedly normal NST with an outside cardiologist.      -- tele with no significant arrhythmis  -- no need for ischemic evaluation as patient with no NSVT   -- abx as per id  -- GI / DVT prophylaxis, keep K>4, mag >2.0   -- care per medicine  -- upon dc follow up with Dr Aranda for cardiology     Claudia Mckinney [facility]  O:  [phone]  P: [phone] Patient with no anginal chest pain or shortness of breath  No palpitations      MEDICATIONS  (STANDING):  ALBUTerol/ipratropium for Nebulization 3 milliLiter(s) Nebulizer every 6 hours  heparin  Injectable 5000 Unit(s) SubCutaneous every 8 hours  imipenem/cilastatin  IVPB 1000 milliGRAM(s) IV Intermittent every 8 hours  lactobacillus acidophilus 1 Tablet(s) Oral three times a day with meals  losartan 50 milliGRAM(s) Oral daily  multivitamin 1 Tablet(s) Oral daily  sodium chloride 0.9%. 1000 milliLiter(s) (10 mL/Hr) IV Continuous <Continuous>    MEDICATIONS  (PRN):  oxyCODONE    IR 10 milliGRAM(s) Oral every 3 hours PRN Moderate and Severe Pain (4-10)      LABS:                        10.7   5.29  )-----------( 284      ( 13 Mar 2018 07:36 )             32.6     Hemoglobin: 10.7 g/dL (03-13 @ 07:36)  Hemoglobin: 9.8 g/dL (03-12 @ 09:09)  Hemoglobin: 10.2 g/dL (03-11 @ 09:37)  Hemoglobin: 10.0 g/dL (03-10 @ 08:31)    03-14    136  |  97  |  15  ----------------------------<  97  4.2   |  28  |  0.72    Ca    9.4      14 Mar 2018 07:17  Phos  3.8     03-14  Mg     2.0     03-14    TPro  8.1  /  Alb  3.0<L>  /  TBili  0.2  /  DBili  x   /  AST  28  /  ALT  54<H>  /  AlkPhos  96  03-14    Creatinine Trend: 0.72<--, 0.77<--, 0.79<--, 0.82<--, 0.79<--, 0.73<--           PHYSICAL EXAM  Vital Signs Last 24 Hrs  T(C): 36.7 (14 Mar 2018 04:42), Max: 37.1 (13 Mar 2018 20:45)  T(F): 98 (14 Mar 2018 04:42), Max: 98.8 (13 Mar 2018 20:45)  HR: 80 (14 Mar 2018 10:02) (77 - 97)  BP: 117/77 (14 Mar 2018 05:55) (117/77 - 121/83)  BP(mean): --  RR: 19 (14 Mar 2018 04:42) (18 - 19)  SpO2: 98% (14 Mar 2018 10:02) (94% - 98%)    S1S2 RRR  CTABL  SOFT NT ND  WARM DRY DSD at RLE   ALERT    TELEMETRY: 	  nsr no events       DIAGNOSTIC TESTING:  [ ] Echocardiogram: < from: KELVIN w/TTE (w/3D Echo) (03.08.18 @ 07:21) >  1. Mitral annular calcification, otherwise normal mitral  valve.  2. Thickened trileaflet aortic valve with normal opening.  Mild aortic regurgitation.  3. No left atrial or left atrial appendage thrombus.  4. Normal left ventricular systolic function.  5. Normal right ventricular size and function.  6. Contrast injection demonstrates no evidence of a patent  foramen ovale.  7. No evidence of valvular vegetation is seen.  *** Compared with echocardiogram of 2/26/2018, no  significant changes noted.      [ ]  Catheterization: n/a  [ ] Stress Test:  negative as outpatient 2016      ASSESSMENT/PLAN: 	56 yo M with history of HTN, no known CAD/MI, admitted initially 2/22/18 with chest pain and confusion. He ruled out for ACS and was found to have  Staph aureus bacteremia likely from right ankle cellulitis and necrotizing fascitis.   TTE with grossly normal LV function and KELVIN revealed no evidence of endocarditis. He has subsequently also been diagnosed RLE DVT with possible septic emboli on CTA chest.   He was placed on IV abx and is on heparin subq q8h.   On 03/05/18 the patient had an apparent episode of NSVT however upon further review with electrophysiology, it appears to have all been PAT. He has no history of syncope or near syncope. Prior to admit he was ambulating without anginal symptoms.  In the last 2 years he has undergone a reportedly normal NST with an outside cardiologist.      -- tele with no significant arrhythmis  -- no need for ischemic evaluation as patient with no NSVT   -- abx as per id  -- GI / DVT prophylaxis, keep K>4, mag >2.0   -- care per medicine  -- upon dc follow up with Dr Aranda for cardiology  3/28 @1245pm    Claudia Mckinney [facility]  O:  [phone]  P: [phone]

## 2018-03-14 NOTE — PROGRESS NOTE ADULT - ASSESSMENT
57M with HTN and ETOH abuse admitted 2/19/18 with R foot/leg infection.  Course complicated by MSSA bacteremia, MRI foot with OM/abscess, R leg DVT and CTA suggestive of possible septic pulmonary emboli though TTE negative.  s/p debridement of the R foot by podiatry.  OR culture with MSSA and PA.  BC only with MSSA which has cleared.  d/c planning    Recommend:  -change back to to cefazolin 2g iv q8  -add ciprofloxacin 750 mg po q12  -antibiotics to continue thru 4/12/18  -weekly - cbc, cmp, esr, crp   -patient to go to rehab    I have discussed plan of care with consulting team (NP on 6t)

## 2018-03-15 LAB
ALBUMIN SERPL ELPH-MCNC: 2.9 G/DL — LOW (ref 3.3–5)
ALP SERPL-CCNC: 84 U/L — SIGNIFICANT CHANGE UP (ref 40–120)
ALT FLD-CCNC: 45 U/L RC — SIGNIFICANT CHANGE UP (ref 10–45)
ANION GAP SERPL CALC-SCNC: 12 MMOL/L — SIGNIFICANT CHANGE UP (ref 5–17)
AST SERPL-CCNC: 26 U/L — SIGNIFICANT CHANGE UP (ref 10–40)
BASOPHILS # BLD AUTO: 0.02 K/UL — SIGNIFICANT CHANGE UP (ref 0–0.2)
BASOPHILS NFR BLD AUTO: 0.3 % — SIGNIFICANT CHANGE UP (ref 0–2)
BILIRUB DIRECT SERPL-MCNC: <0.1 MG/DL — SIGNIFICANT CHANGE UP (ref 0–0.2)
BILIRUB INDIRECT FLD-MCNC: >0.2 MG/DL — SIGNIFICANT CHANGE UP (ref 0.2–1)
BILIRUB SERPL-MCNC: 0.3 MG/DL — SIGNIFICANT CHANGE UP (ref 0.2–1.2)
BUN SERPL-MCNC: 13 MG/DL — SIGNIFICANT CHANGE UP (ref 7–23)
CALCIUM SERPL-MCNC: 9.3 MG/DL — SIGNIFICANT CHANGE UP (ref 8.4–10.5)
CHLORIDE SERPL-SCNC: 99 MMOL/L — SIGNIFICANT CHANGE UP (ref 96–108)
CO2 SERPL-SCNC: 26 MMOL/L — SIGNIFICANT CHANGE UP (ref 22–31)
CREAT SERPL-MCNC: 0.76 MG/DL — SIGNIFICANT CHANGE UP (ref 0.5–1.3)
CRP SERPL-MCNC: 4.1 MG/DL — HIGH (ref 0–0.4)
EOSINOPHIL # BLD AUTO: 0.16 K/UL — SIGNIFICANT CHANGE UP (ref 0–0.5)
EOSINOPHIL NFR BLD AUTO: 2.7 % — SIGNIFICANT CHANGE UP (ref 0–6)
ERYTHROCYTE [SEDIMENTATION RATE] IN BLOOD: 61 MM/HR — HIGH (ref 0–20)
GLUCOSE SERPL-MCNC: 98 MG/DL — SIGNIFICANT CHANGE UP (ref 70–99)
HCT VFR BLD CALC: 32.4 % — LOW (ref 39–50)
HGB BLD-MCNC: 9.9 G/DL — LOW (ref 13–17)
IMM GRANULOCYTES NFR BLD AUTO: 0.3 % — SIGNIFICANT CHANGE UP (ref 0–1.5)
LYMPHOCYTES # BLD AUTO: 1.58 K/UL — SIGNIFICANT CHANGE UP (ref 1–3.3)
LYMPHOCYTES # BLD AUTO: 26.8 % — SIGNIFICANT CHANGE UP (ref 13–44)
MAGNESIUM SERPL-MCNC: 2 MG/DL — SIGNIFICANT CHANGE UP (ref 1.6–2.6)
MCHC RBC-ENTMCNC: 29.8 PG — SIGNIFICANT CHANGE UP (ref 27–34)
MCHC RBC-ENTMCNC: 30.6 GM/DL — LOW (ref 32–36)
MCV RBC AUTO: 97.6 FL — SIGNIFICANT CHANGE UP (ref 80–100)
MONOCYTES # BLD AUTO: 0.53 K/UL — SIGNIFICANT CHANGE UP (ref 0–0.9)
MONOCYTES NFR BLD AUTO: 9 % — SIGNIFICANT CHANGE UP (ref 2–14)
NEUTROPHILS # BLD AUTO: 3.59 K/UL — SIGNIFICANT CHANGE UP (ref 1.8–7.4)
NEUTROPHILS NFR BLD AUTO: 60.9 % — SIGNIFICANT CHANGE UP (ref 43–77)
PHOSPHATE SERPL-MCNC: 3.9 MG/DL — SIGNIFICANT CHANGE UP (ref 2.5–4.5)
PLATELET # BLD AUTO: 288 K/UL — SIGNIFICANT CHANGE UP (ref 150–400)
POTASSIUM SERPL-MCNC: 4.4 MMOL/L — SIGNIFICANT CHANGE UP (ref 3.5–5.3)
POTASSIUM SERPL-SCNC: 4.4 MMOL/L — SIGNIFICANT CHANGE UP (ref 3.5–5.3)
PROT SERPL-MCNC: 7.8 G/DL — SIGNIFICANT CHANGE UP (ref 6–8.3)
RBC # BLD: 3.32 M/UL — LOW (ref 4.2–5.8)
RBC # FLD: 14 % — SIGNIFICANT CHANGE UP (ref 10.3–14.5)
SODIUM SERPL-SCNC: 137 MMOL/L — SIGNIFICANT CHANGE UP (ref 135–145)
WBC # BLD: 5.9 K/UL — SIGNIFICANT CHANGE UP (ref 3.8–10.5)
WBC # FLD AUTO: 5.9 K/UL — SIGNIFICANT CHANGE UP (ref 3.8–10.5)

## 2018-03-15 RX ADMIN — OXYCODONE HYDROCHLORIDE 10 MILLIGRAM(S): 5 TABLET ORAL at 15:39

## 2018-03-15 RX ADMIN — IMIPENEM AND CILASTATIN 250 MILLIGRAM(S): 250; 250 INJECTION, POWDER, FOR SOLUTION INTRAVENOUS at 21:43

## 2018-03-15 RX ADMIN — OXYCODONE HYDROCHLORIDE 10 MILLIGRAM(S): 5 TABLET ORAL at 01:30

## 2018-03-15 RX ADMIN — Medication 3 MILLILITER(S): at 00:06

## 2018-03-15 RX ADMIN — OXYCODONE HYDROCHLORIDE 10 MILLIGRAM(S): 5 TABLET ORAL at 16:20

## 2018-03-15 RX ADMIN — HEPARIN SODIUM 5000 UNIT(S): 5000 INJECTION INTRAVENOUS; SUBCUTANEOUS at 05:07

## 2018-03-15 RX ADMIN — Medication 1 TABLET(S): at 17:44

## 2018-03-15 RX ADMIN — OXYCODONE HYDROCHLORIDE 10 MILLIGRAM(S): 5 TABLET ORAL at 00:17

## 2018-03-15 RX ADMIN — HEPARIN SODIUM 5000 UNIT(S): 5000 INJECTION INTRAVENOUS; SUBCUTANEOUS at 21:44

## 2018-03-15 RX ADMIN — Medication 3 MILLILITER(S): at 23:00

## 2018-03-15 RX ADMIN — IMIPENEM AND CILASTATIN 250 MILLIGRAM(S): 250; 250 INJECTION, POWDER, FOR SOLUTION INTRAVENOUS at 15:30

## 2018-03-15 RX ADMIN — Medication 1 TABLET(S): at 08:09

## 2018-03-15 RX ADMIN — OXYCODONE HYDROCHLORIDE 10 MILLIGRAM(S): 5 TABLET ORAL at 08:47

## 2018-03-15 RX ADMIN — Medication 1 TABLET(S): at 13:20

## 2018-03-15 RX ADMIN — LOSARTAN POTASSIUM 50 MILLIGRAM(S): 100 TABLET, FILM COATED ORAL at 05:08

## 2018-03-15 RX ADMIN — Medication 3 MILLILITER(S): at 05:07

## 2018-03-15 RX ADMIN — IMIPENEM AND CILASTATIN 250 MILLIGRAM(S): 250; 250 INJECTION, POWDER, FOR SOLUTION INTRAVENOUS at 05:07

## 2018-03-15 RX ADMIN — OXYCODONE HYDROCHLORIDE 10 MILLIGRAM(S): 5 TABLET ORAL at 23:26

## 2018-03-15 RX ADMIN — OXYCODONE HYDROCHLORIDE 10 MILLIGRAM(S): 5 TABLET ORAL at 08:08

## 2018-03-15 RX ADMIN — HEPARIN SODIUM 5000 UNIT(S): 5000 INJECTION INTRAVENOUS; SUBCUTANEOUS at 13:20

## 2018-03-15 RX ADMIN — Medication 1 TABLET(S): at 13:19

## 2018-03-15 RX ADMIN — Medication 3 MILLILITER(S): at 17:44

## 2018-03-15 RX ADMIN — Medication 3 MILLILITER(S): at 13:19

## 2018-03-15 NOTE — PROGRESS NOTE ADULT - SUBJECTIVE AND OBJECTIVE BOX
EP     tele: off tele     no palpitations, no syncope, no angina      MEDICATIONS  (STANDING):  ALBUTerol/ipratropium for Nebulization 3 milliLiter(s) Nebulizer every 6 hours  heparin  Injectable 5000 Unit(s) SubCutaneous every 8 hours  imipenem/cilastatin  IVPB 1000 milliGRAM(s) IV Intermittent every 8 hours  lactobacillus acidophilus 1 Tablet(s) Oral three times a day with meals  losartan 50 milliGRAM(s) Oral daily  multivitamin 1 Tablet(s) Oral daily  sodium chloride 0.9%. 1000 milliLiter(s) (10 mL/Hr) IV Continuous <Continuous>    MEDICATIONS  (PRN):  oxyCODONE    IR 10 milliGRAM(s) Oral every 3 hours PRN Moderate and Severe Pain (4-10)      LABS:                        9.9    5.90  )-----------( 288      ( 15 Mar 2018 07:47 )             32.4     Hemoglobin: 9.9 g/dL (03-15 @ 07:47)  Hemoglobin: 10.3 g/dL (03-14 @ 09:37)  Hemoglobin: 10.7 g/dL (03-13 @ 07:36)  Hemoglobin: 9.8 g/dL (03-12 @ 09:09)  Hemoglobin: 10.2 g/dL (03-11 @ 09:37)    03-15    137  |  99  |  13  ----------------------------<  98  4.4   |  26  |  0.76    Ca    9.3      15 Mar 2018 05:24  Phos  3.9     03-15  Mg     2.0     03-15    TPro  7.8  /  Alb  2.9<L>  /  TBili  0.3  /  DBili  <0.1  /  AST  26  /  ALT  45  /  AlkPhos  84  03-15    Creatinine Trend: 0.76<--, 0.72<--, 0.77<--, 0.79<--, 0.82<--, 0.79<--           PHYSICAL EXAM  Vital Signs Last 24 Hrs  T(C): 36.7 (15 Mar 2018 04:14), Max: 36.7 (15 Mar 2018 00:22)  T(F): 98.1 (15 Mar 2018 04:14), Max: 98.1 (15 Mar 2018 04:14)  HR: 88 (15 Mar 2018 05:05) (87 - 95)  BP: 110/75 (15 Mar 2018 05:05) (110/69 - 115/76)  BP(mean): --  RR: 18 (15 Mar 2018 04:14) (18 - 18)  SpO2: 95% (15 Mar 2018 04:14) (94% - 98%)    no JVD  RRR, no murmurs  CTAB  soft nt/nd  no c/c/e    repeat KELVIN: unremarkable, no evidence of endocarditis      A/P)  He is a pleasant 58 y/o male PMH HTN admitted with staph aureus bacteremia, likely from right ankle cellulitis/nec fasc. A KELVIN and TTE were negative for endocarditis. In this setting he developed asymptomatic episodes of paroxysmal atrial tachycardia. There has been no evidence of atrial fibrillation. He denies palpitations nor syncope.    -given his lack of symptoms and brief duration of his PAT episodes  would not start beta blockers  -management of DVT as per medicine  - pending dc to rehab   - follow up with Dr Aranda for cardiology upon dc March 28 at 1245pm    Claudia Mckinney [facility]  [address] [place] [address]  [facility]   O:  [phone]  P: [phone]

## 2018-03-15 NOTE — PROGRESS NOTE ADULT - SUBJECTIVE AND OBJECTIVE BOX
MEDICINE, PROGRESS NOTE [phone]    JUWAN DOMÍNGUEZ 57y [id number]    Patient seen and examined.  Patient is a 57y old  Male who presents with a chief complaint of Right foot wound (12 Mar 2018 09:42)  Pt feels ok. Frustrated over social issues.    PAST MEDICAL & SURGICAL HISTORY:  Sciatica of right side  Essential hypertension  No significant past surgical history    MEDICATIONS  (STANDING):  ALBUTerol/ipratropium for Nebulization 3 milliLiter(s) Nebulizer every 6 hours  heparin  Injectable 5000 Unit(s) SubCutaneous every 8 hours  imipenem/cilastatin  IVPB 1000 milliGRAM(s) IV Intermittent every 8 hours  lactobacillus acidophilus 1 Tablet(s) Oral three times a day with meals  losartan 50 milliGRAM(s) Oral daily  multivitamin 1 Tablet(s) Oral daily  sodium chloride 0.9%. 1000 milliLiter(s) (10 mL/Hr) IV Continuous <Continuous>    MEDICATIONS  (PRN):  oxyCODONE    IR 10 milliGRAM(s) Oral every 3 hours PRN Moderate and Severe Pain (4-10)    Allergies    No Known Allergies    Intolerances        PHYSICAL EXAM:  Constitutional: NAD  HEENT: Normocephalic, EOMI  Neck:  No JVD  Respiratory: CTA B/L, No wheezes  Cardiovascular: S1, S2, RRR, + systolic murmur  Gastrointestinal: BS+, soft, NT/ND  Extremities: No peripheral edema, + vac right foot  Neurological: AAOX3, no focal deficits  Psychiatric: Normal mood, normal affect  : No Sosa    Vital Signs Last 24 Hrs  T(C): 36.9 (15 Mar 2018 11:46), Max: 36.9 (15 Mar 2018 11:46)  T(F): 98.4 (15 Mar 2018 11:46), Max: 98.4 (15 Mar 2018 11:46)  HR: 90 (15 Mar 2018 11:46) (87 - 95)  BP: 109/70 (15 Mar 2018 11:46) (109/70 - 115/76)  BP(mean): --  RR: 18 (15 Mar 2018 11:46) (18 - 18)  SpO2: 95% (15 Mar 2018 11:46) (94% - 98%)  I&O's Summary    14 Mar 2018 07:01  -  15 Mar 2018 07:00  --------------------------------------------------------  IN: 1360 mL / OUT: 820 mL / NET: 540 mL    15 Mar 2018 07:01  -  15 Mar 2018 19:38  --------------------------------------------------------  IN: 860 mL / OUT: 700 mL / NET: 160 mL        LABS:                        9.9    5.90  )-----------( 288      ( 15 Mar 2018 07:47 )             32.4     03-15    137  |  99  |  13  ----------------------------<  98  4.4   |  26  |  0.76    Ca    9.3      15 Mar 2018 05:24  Phos  3.9     03-15  Mg     2.0     03-15    TPro  7.8  /  Alb  2.9<L>  /  TBili  0.3  /  DBili  <0.1  /  AST  26  /  ALT  45  /  AlkPhos  84  03-15        Magnesium, Serum: 2.0 mg/dL (03-15 @ 05:24)

## 2018-03-15 NOTE — PROGRESS NOTE ADULT - SUBJECTIVE AND OBJECTIVE BOX
Patient denies CP, Breathing comfortably ROS (-)    ALBUTerol/ipratropium for Nebulization 3 milliLiter(s) Nebulizer every 6 hours  heparin  Injectable 5000 Unit(s) SubCutaneous every 8 hours  imipenem/cilastatin  IVPB 1000 milliGRAM(s) IV Intermittent every 8 hours  lactobacillus acidophilus 1 Tablet(s) Oral three times a day with meals  losartan 50 milliGRAM(s) Oral daily  multivitamin 1 Tablet(s) Oral daily  oxyCODONE    IR 10 milliGRAM(s) Oral every 3 hours PRN  sodium chloride 0.9%. 1000 milliLiter(s) IV Continuous <Continuous>                            9.9    5.90  )-----------( 288      ( 15 Mar 2018 07:47 )             32.4       Hemoglobin: 9.9 g/dL (03-15 @ 07:47)  Hemoglobin: 10.3 g/dL (03-14 @ 09:37)  Hemoglobin: 10.7 g/dL (03-13 @ 07:36)  Hemoglobin: 9.8 g/dL (03-12 @ 09:09)  Hemoglobin: 10.2 g/dL (03-11 @ 09:37)      03-15    137  |  99  |  13  ----------------------------<  98  4.4   |  26  |  0.76    Ca    9.3      15 Mar 2018 05:24  Phos  3.9     03-15  Mg     2.0     03-15    TPro  7.8  /  Alb  2.9<L>  /  TBili  0.3  /  DBili  <0.1  /  AST  26  /  ALT  45  /  AlkPhos  84  03-15    Creatinine Trend: 0.76<--, 0.72<--, 0.77<--, 0.79<--, 0.82<--, 0.79<--    COAGS:           T(C): 36.9 (03-15-18 @ 11:46), Max: 36.9 (03-15-18 @ 11:46)  HR: 90 (03-15-18 @ 11:46) (87 - 95)  BP: 109/70 (03-15-18 @ 11:46) (109/70 - 115/76)  RR: 18 (03-15-18 @ 11:46) (18 - 18)  SpO2: 95% (03-15-18 @ 11:46) (94% - 98%)  Wt(kg): --    I&O's Summary    14 Mar 2018 07:01  -  15 Mar 2018 07:00  --------------------------------------------------------  IN: 1360 mL / OUT: 820 mL / NET: 540 mL    15 Mar 2018 07:01  -  15 Mar 2018 16:20  --------------------------------------------------------  IN: 560 mL / OUT: 700 mL / NET: -140 mL          Gen: Appears well in NAD  HEENT:  (-)icterus (-)pallor  CV: N S1 S2 1/6 KITTY (+)2 Pulses B/l  Resp:  Clear to ausculatation B/L, normal effort  GI: (+) BS Soft, NT, ND  Lymph:  (-)Edema, (-)obvious lymphadenopathy  Skin: Warm to touch, Normal turgor  Psych: Appropriate mood and affect        TELEMETRY: off        ASSESSMENT/PLAN:  57yMale with etoh abuse, HTN admitted with DVT/septic emboli, necrotizing fascitis who is being seen for chest pain/sob.     - KELVIN with no vegetations  - no clinical heart failure  - No further PAT  - heme f/u regarding DVTs   - Abx per ID  - Can f/u with us in 1-2 weeks upon D/C  - D/C planning in progress      Blair Benjamin MD, [facility]  [facility]  [address] [place] [address]  PHONE:  [phone]  BEEPER : [phone]

## 2018-03-15 NOTE — PROGRESS NOTE ADULT - SUBJECTIVE AND OBJECTIVE BOX
Subjective:   	  MEDICATIONS:  MEDICATIONS  (STANDING):  ALBUTerol/ipratropium for Nebulization 3 milliLiter(s) Nebulizer every 6 hours  heparin  Injectable 5000 Unit(s) SubCutaneous every 8 hours  imipenem/cilastatin  IVPB 1000 milliGRAM(s) IV Intermittent every 8 hours  lactobacillus acidophilus 1 Tablet(s) Oral three times a day with meals  losartan 50 milliGRAM(s) Oral daily  multivitamin 1 Tablet(s) Oral daily  sodium chloride 0.9%. 1000 milliLiter(s) (10 mL/Hr) IV Continuous <Continuous>      LABS:	 	    CARDIAC MARKERS:                                9.9    5.90  )-----------( 288      ( 15 Mar 2018 07:47 )             32.4     Hemoglobin: 9.9 g/dL (03-15 @ 07:47)  Hemoglobin: 10.3 g/dL (03-14 @ 09:37)  Hemoglobin: 10.7 g/dL (03-13 @ 07:36)  Hemoglobin: 9.8 g/dL (03-12 @ 09:09)  Hemoglobin: 10.2 g/dL (03-11 @ 09:37)      03-15    137  |  99  |  13  ----------------------------<  98  4.4   |  26  |  0.76    Ca    9.3      15 Mar 2018 05:24  Phos  3.9     03-15  Mg     2.0     03-15    TPro  7.8  /  Alb  2.9<L>  /  TBili  0.3  /  DBili  <0.1  /  AST  26  /  ALT  45  /  AlkPhos  84  03-15    Creatinine Trend: 0.76<--, 0.72<--, 0.77<--, 0.79<--, 0.82<--, 0.79<--    COAGS:       proBNP:   Lipid Profile:   HgA1c:   TSH:       PHYSICAL EXAM:  T(C): 36.9 (03-15-18 @ 11:46), Max: 36.9 (03-15-18 @ 11:46)  HR: 90 (03-15-18 @ 11:46) (87 - 95)  BP: 109/70 (03-15-18 @ 11:46) (109/70 - 115/76)  RR: 18 (03-15-18 @ 11:46) (18 - 18)  SpO2: 95% (03-15-18 @ 11:46) (94% - 98%)  Wt(kg): --  I&O's Summary    14 Mar 2018 07:01  -  15 Mar 2018 07:00  --------------------------------------------------------  IN: 1360 mL / OUT: 820 mL / NET: 540 mL    15 Mar 2018 07:01  -  15 Mar 2018 16:37  --------------------------------------------------------  IN: 560 mL / OUT: 700 mL / NET: -140 mL          HEENT:   Normal oral mucosa, PERRL, EOMI	  Lymphatic: No obvious lymphadenopathy , no edema  Cardiovascular: Normal S1 S2, No JVD, 1/6 KITTY murmur, Peripheral pulses palpable 2+ bilaterally  Respiratory: Lungs clear to auscultation, normal effort 	  Gastrointestinal:  Soft, Non-tender, + BS	  Skin: No rashes,  No cyanosis, warm to touch  Musculoskeletal: Normal range of motion, normal strength  Psychiatry:  Appropriate Mood & affect     TELEMETRY: 	    ECG:  	  RADIOLOGY:     DIAGNOSTIC TESTING:  [ ] Echocardiogram:  [ ]  Catheterization:  [ ] Stress Test:    OTHER: 	      ASSESSMENT/PLAN: 	57y Male Subjective:   	denies chest pain or shortness of breath   MEDICATIONS:  MEDICATIONS  (STANDING):  ALBUTerol/ipratropium for Nebulization 3 milliLiter(s) Nebulizer every 6 hours  heparin  Injectable 5000 Unit(s) SubCutaneous every 8 hours  imipenem/cilastatin  IVPB 1000 milliGRAM(s) IV Intermittent every 8 hours  lactobacillus acidophilus 1 Tablet(s) Oral three times a day with meals  losartan 50 milliGRAM(s) Oral daily  multivitamin 1 Tablet(s) Oral daily  sodium chloride 0.9%. 1000 milliLiter(s) (10 mL/Hr) IV Continuous <Continuous>      LABS:	 	    CARDIAC MARKERS:                                9.9    5.90  )-----------( 288      ( 15 Mar 2018 07:47 )             32.4     Hemoglobin: 9.9 g/dL (03-15 @ 07:47)  Hemoglobin: 10.3 g/dL (03-14 @ 09:37)  Hemoglobin: 10.7 g/dL (03-13 @ 07:36)  Hemoglobin: 9.8 g/dL (03-12 @ 09:09)  Hemoglobin: 10.2 g/dL (03-11 @ 09:37)      03-15    137  |  99  |  13  ----------------------------<  98  4.4   |  26  |  0.76    Ca    9.3      15 Mar 2018 05:24  Phos  3.9     03-15  Mg     2.0     03-15    TPro  7.8  /  Alb  2.9<L>  /  TBili  0.3  /  DBili  <0.1  /  AST  26  /  ALT  45  /  AlkPhos  84  03-15    Creatinine Trend: 0.76<--, 0.72<--, 0.77<--, 0.79<--, 0.82<--, 0.79<--    COAGS:       proBNP:   Lipid Profile:   HgA1c:   TSH:       PHYSICAL EXAM:  T(C): 36.9 (03-15-18 @ 11:46), Max: 36.9 (03-15-18 @ 11:46)  HR: 90 (03-15-18 @ 11:46) (87 - 95)  BP: 109/70 (03-15-18 @ 11:46) (109/70 - 115/76)  RR: 18 (03-15-18 @ 11:46) (18 - 18)  SpO2: 95% (03-15-18 @ 11:46) (94% - 98%)  Wt(kg): --  I&O's Summary    14 Mar 2018 07:01  -  15 Mar 2018 07:00  --------------------------------------------------------  IN: 1360 mL / OUT: 820 mL / NET: 540 mL    15 Mar 2018 07:01  -  15 Mar 2018 16:37  --------------------------------------------------------  IN: 560 mL / OUT: 700 mL / NET: -140 mL          	a  Cardiovascular: Normal S1 S2, No JVD, 1/6 KITTY murmur,   Respiratory: Lungs clear to auscultation, normal effort 	  Gastrointestinal:  Soft, Non-tender, + BS	  extremities no clubbing, cyanosis or edema Bl LE's     Peripheral pulses palpable 2+ bilaterally    TELEMETRY: none	    ECG:  	  RADIOLOGY:       DIAGNOSTIC TESTING:  [ ] Echocardiogram: < from: KELVIN w/TTE (w/3D Echo) (03.08.18 @ 07:21) >  1. Mitral annular calcification, otherwise normal mitral  valve.  2. Thickened trileaflet aortic valve with normal opening.  Mild aortic regurgitation.  3. No left atrial or left atrial appendage thrombus.  4. Normal left ventricular systolic function.  5. Normal right ventricular size and function.  6. Contrast injection demonstrates no evidence of a patent  foramen ovale.  7. No evidence of valvular vegetation is seen.  *** Compared with echocardiogram of 2/26/2018, no  significant changes noted.      [ ]  Catheterization: n/a  [ ] Stress Test:  negative as outpatient 2016  	      ASSESSMENT/PLAN: 	57y Male  with history of HTN, no known CAD/MI, admitted initially 2/22/18 with chest pain and confusion. He ruled out for ACS and was found to have  Staph aureus bacteremia likely from right ankle cellulitis and necrotizing fascitis.   TTE with grossly normal LV function and KELVIN revealed no evidence of endocarditis. He has subsequently also been diagnosed RLE DVT with possible septic emboli on CTA chest.   He was placed on IV abx and is on heparin subq q8h.   On 03/05/18 the patient had an apparent episode of NSVT however upon further review with electrophysiology, it appears to have all been PAT. He has no history of syncope or near syncope. Prior to admit he was ambulating without anginal symptoms.  In the last 2 years he has undergone a reportedly normal NST with an outside cardiologist.      -- tele with no significant arrhythmias  -- no need for ischemic evaluation as patient with no NSVT   -- abx as per id  -- GI / DVT prophylaxis, keep K>4, mag >2.0   -- care per medicine  -- upon dc follow up with Dr Aranda for cardiology  3/28 @1245pm

## 2018-03-15 NOTE — PROGRESS NOTE ADULT - ASSESSMENT
Recent ankle sprain at work which got infected resulting in MSSA sepsis/cellulitis with lung embolization s/p OR debridement X 2 now with pseudomonas in OR culture    OM right foot/necrotizing fasciitis     hemoptysis - minimal - possibly secondary to being on hep drip with pulm septic emboli    AAD - resolved    Transaminitis - improving    Coagulopathy on admission - most likley due to dietary deficiency, holding off on any supplementation for now    Right peroneal vein dvt - on heparin prophy dose per heme  new soleal vein dvt - discussed with dr garza, repeat dopplers in rehab, continue prophylactic heparin    Pulm artery enlargement    Morbid obesity    SHIELA continue nocturnal bipap    PAT/ AIVR/13 beats wct - no further events    pleural effusion    Based on interview with pt and wife, pt is not an etoh abuser (neg CAGE, never had wd symptoms in his life, never been in rehab, has gone months without drinking with no problems.)    continue current  care  weight bearing per podiatry  d/c planning to rehab

## 2018-03-16 VITALS
TEMPERATURE: 98 F | DIASTOLIC BLOOD PRESSURE: 69 MMHG | SYSTOLIC BLOOD PRESSURE: 108 MMHG | HEART RATE: 85 BPM | RESPIRATION RATE: 18 BRPM | OXYGEN SATURATION: 95 %

## 2018-03-16 LAB
ALBUMIN SERPL ELPH-MCNC: 3 G/DL — LOW (ref 3.3–5)
ALP SERPL-CCNC: 97 U/L — SIGNIFICANT CHANGE UP (ref 40–120)
ALT FLD-CCNC: 38 U/L RC — SIGNIFICANT CHANGE UP (ref 10–45)
ANION GAP SERPL CALC-SCNC: 8 MMOL/L — SIGNIFICANT CHANGE UP (ref 5–17)
AST SERPL-CCNC: 26 U/L — SIGNIFICANT CHANGE UP (ref 10–40)
BASOPHILS # BLD AUTO: 0.04 K/UL — SIGNIFICANT CHANGE UP (ref 0–0.2)
BASOPHILS NFR BLD AUTO: 0.6 % — SIGNIFICANT CHANGE UP (ref 0–2)
BILIRUB SERPL-MCNC: 0.3 MG/DL — SIGNIFICANT CHANGE UP (ref 0.2–1.2)
BUN SERPL-MCNC: 16 MG/DL — SIGNIFICANT CHANGE UP (ref 7–23)
CALCIUM SERPL-MCNC: 9.6 MG/DL — SIGNIFICANT CHANGE UP (ref 8.4–10.5)
CHLORIDE SERPL-SCNC: 99 MMOL/L — SIGNIFICANT CHANGE UP (ref 96–108)
CO2 SERPL-SCNC: 29 MMOL/L — SIGNIFICANT CHANGE UP (ref 22–31)
CREAT SERPL-MCNC: 0.83 MG/DL — SIGNIFICANT CHANGE UP (ref 0.5–1.3)
EOSINOPHIL # BLD AUTO: 0.19 K/UL — SIGNIFICANT CHANGE UP (ref 0–0.5)
EOSINOPHIL NFR BLD AUTO: 3 % — SIGNIFICANT CHANGE UP (ref 0–6)
GLUCOSE SERPL-MCNC: 86 MG/DL — SIGNIFICANT CHANGE UP (ref 70–99)
HCT VFR BLD CALC: 32.1 % — LOW (ref 39–50)
HGB BLD-MCNC: 10.1 G/DL — LOW (ref 13–17)
IMM GRANULOCYTES NFR BLD AUTO: 0.5 % — SIGNIFICANT CHANGE UP (ref 0–1.5)
LYMPHOCYTES # BLD AUTO: 1.6 K/UL — SIGNIFICANT CHANGE UP (ref 1–3.3)
LYMPHOCYTES # BLD AUTO: 25 % — SIGNIFICANT CHANGE UP (ref 13–44)
MAGNESIUM SERPL-MCNC: 2 MG/DL — SIGNIFICANT CHANGE UP (ref 1.6–2.6)
MCHC RBC-ENTMCNC: 31.5 GM/DL — LOW (ref 32–36)
MCHC RBC-ENTMCNC: 31.6 PG — SIGNIFICANT CHANGE UP (ref 27–34)
MCV RBC AUTO: 100.3 FL — HIGH (ref 80–100)
MONOCYTES # BLD AUTO: 0.56 K/UL — SIGNIFICANT CHANGE UP (ref 0–0.9)
MONOCYTES NFR BLD AUTO: 8.8 % — SIGNIFICANT CHANGE UP (ref 2–14)
NEUTROPHILS # BLD AUTO: 3.98 K/UL — SIGNIFICANT CHANGE UP (ref 1.8–7.4)
NEUTROPHILS NFR BLD AUTO: 62.1 % — SIGNIFICANT CHANGE UP (ref 43–77)
PHOSPHATE SERPL-MCNC: 4.1 MG/DL — SIGNIFICANT CHANGE UP (ref 2.5–4.5)
PLATELET # BLD AUTO: 291 K/UL — SIGNIFICANT CHANGE UP (ref 150–400)
POTASSIUM SERPL-MCNC: 4.4 MMOL/L — SIGNIFICANT CHANGE UP (ref 3.5–5.3)
POTASSIUM SERPL-SCNC: 4.4 MMOL/L — SIGNIFICANT CHANGE UP (ref 3.5–5.3)
PROT SERPL-MCNC: 8 G/DL — SIGNIFICANT CHANGE UP (ref 6–8.3)
RBC # BLD: 3.2 M/UL — LOW (ref 4.2–5.8)
RBC # FLD: 14.3 % — SIGNIFICANT CHANGE UP (ref 10.3–14.5)
SODIUM SERPL-SCNC: 136 MMOL/L — SIGNIFICANT CHANGE UP (ref 135–145)
WBC # BLD: 6.4 K/UL — SIGNIFICANT CHANGE UP (ref 3.8–10.5)
WBC # FLD AUTO: 6.4 K/UL — SIGNIFICANT CHANGE UP (ref 3.8–10.5)

## 2018-03-16 PROCEDURE — 82947 ASSAY GLUCOSE BLOOD QUANT: CPT

## 2018-03-16 PROCEDURE — 88305 TISSUE EXAM BY PATHOLOGIST: CPT

## 2018-03-16 PROCEDURE — 81001 URINALYSIS AUTO W/SCOPE: CPT

## 2018-03-16 PROCEDURE — 87581 M.PNEUMON DNA AMP PROBE: CPT

## 2018-03-16 PROCEDURE — 97161 PT EVAL LOW COMPLEX 20 MIN: CPT

## 2018-03-16 PROCEDURE — 85652 RBC SED RATE AUTOMATED: CPT

## 2018-03-16 PROCEDURE — 80061 LIPID PANEL: CPT

## 2018-03-16 PROCEDURE — 76376 3D RENDER W/INTRP POSTPROCES: CPT

## 2018-03-16 PROCEDURE — 70450 CT HEAD/BRAIN W/O DYE: CPT

## 2018-03-16 PROCEDURE — 80048 BASIC METABOLIC PNL TOTAL CA: CPT

## 2018-03-16 PROCEDURE — 85730 THROMBOPLASTIN TIME PARTIAL: CPT

## 2018-03-16 PROCEDURE — 97164 PT RE-EVAL EST PLAN CARE: CPT

## 2018-03-16 PROCEDURE — 82550 ASSAY OF CK (CPK): CPT

## 2018-03-16 PROCEDURE — 36600 WITHDRAWAL OF ARTERIAL BLOOD: CPT

## 2018-03-16 PROCEDURE — 85014 HEMATOCRIT: CPT

## 2018-03-16 PROCEDURE — 87389 HIV-1 AG W/HIV-1&-2 AB AG IA: CPT

## 2018-03-16 PROCEDURE — 94640 AIRWAY INHALATION TREATMENT: CPT

## 2018-03-16 PROCEDURE — 83605 ASSAY OF LACTIC ACID: CPT

## 2018-03-16 PROCEDURE — 83036 HEMOGLOBIN GLYCOSYLATED A1C: CPT

## 2018-03-16 PROCEDURE — 80076 HEPATIC FUNCTION PANEL: CPT

## 2018-03-16 PROCEDURE — 84443 ASSAY THYROID STIM HORMONE: CPT

## 2018-03-16 PROCEDURE — 99261: CPT

## 2018-03-16 PROCEDURE — 87486 CHLMYD PNEUM DNA AMP PROBE: CPT

## 2018-03-16 PROCEDURE — 82977 ASSAY OF GGT: CPT

## 2018-03-16 PROCEDURE — 87205 SMEAR GRAM STAIN: CPT

## 2018-03-16 PROCEDURE — 87102 FUNGUS ISOLATION CULTURE: CPT

## 2018-03-16 PROCEDURE — 85610 PROTHROMBIN TIME: CPT

## 2018-03-16 PROCEDURE — 87798 DETECT AGENT NOS DNA AMP: CPT

## 2018-03-16 PROCEDURE — 85732 THROMBOPLASTIN TIME PARTIAL: CPT

## 2018-03-16 PROCEDURE — 36569 INSJ PICC 5 YR+ W/O IMAGING: CPT

## 2018-03-16 PROCEDURE — 82272 OCCULT BLD FECES 1-3 TESTS: CPT

## 2018-03-16 PROCEDURE — 97605 NEG PRS WND THER DME<=50SQCM: CPT

## 2018-03-16 PROCEDURE — 93005 ELECTROCARDIOGRAM TRACING: CPT

## 2018-03-16 PROCEDURE — 83735 ASSAY OF MAGNESIUM: CPT

## 2018-03-16 PROCEDURE — 87116 MYCOBACTERIA CULTURE: CPT

## 2018-03-16 PROCEDURE — 87150 DNA/RNA AMPLIFIED PROBE: CPT

## 2018-03-16 PROCEDURE — 87086 URINE CULTURE/COLONY COUNT: CPT

## 2018-03-16 PROCEDURE — 80307 DRUG TEST PRSMV CHEM ANLYZR: CPT

## 2018-03-16 PROCEDURE — 85384 FIBRINOGEN ACTIVITY: CPT

## 2018-03-16 PROCEDURE — 97116 GAIT TRAINING THERAPY: CPT

## 2018-03-16 PROCEDURE — 84295 ASSAY OF SERUM SODIUM: CPT

## 2018-03-16 PROCEDURE — 97530 THERAPEUTIC ACTIVITIES: CPT

## 2018-03-16 PROCEDURE — 85230 CLOT FACTOR VII PROCONVERTIN: CPT

## 2018-03-16 PROCEDURE — 87186 SC STD MICRODIL/AGAR DIL: CPT

## 2018-03-16 PROCEDURE — 97602 WOUND(S) CARE NON-SELECTIVE: CPT

## 2018-03-16 PROCEDURE — 97606 NEG PRS WND THER DME>50 SQCM: CPT

## 2018-03-16 PROCEDURE — 86901 BLOOD TYPING SEROLOGIC RH(D): CPT

## 2018-03-16 PROCEDURE — 82962 GLUCOSE BLOOD TEST: CPT

## 2018-03-16 PROCEDURE — 85670 THROMBIN TIME PLASMA: CPT

## 2018-03-16 PROCEDURE — 80053 COMPREHEN METABOLIC PANEL: CPT

## 2018-03-16 PROCEDURE — 86140 C-REACTIVE PROTEIN: CPT

## 2018-03-16 PROCEDURE — 82803 BLOOD GASES ANY COMBINATION: CPT

## 2018-03-16 PROCEDURE — 93312 ECHO TRANSESOPHAGEAL: CPT

## 2018-03-16 PROCEDURE — 84132 ASSAY OF SERUM POTASSIUM: CPT

## 2018-03-16 PROCEDURE — 73630 X-RAY EXAM OF FOOT: CPT

## 2018-03-16 PROCEDURE — 82248 BILIRUBIN DIRECT: CPT

## 2018-03-16 PROCEDURE — C1889: CPT

## 2018-03-16 PROCEDURE — 86900 BLOOD TYPING SEROLOGIC ABO: CPT

## 2018-03-16 PROCEDURE — 93970 EXTREMITY STUDY: CPT

## 2018-03-16 PROCEDURE — C8929: CPT

## 2018-03-16 PROCEDURE — 93306 TTE W/DOPPLER COMPLETE: CPT

## 2018-03-16 PROCEDURE — A9585: CPT

## 2018-03-16 PROCEDURE — 80202 ASSAY OF VANCOMYCIN: CPT

## 2018-03-16 PROCEDURE — 74177 CT ABD & PELVIS W/CONTRAST: CPT

## 2018-03-16 PROCEDURE — 73701 CT LOWER EXTREMITY W/DYE: CPT

## 2018-03-16 PROCEDURE — 82330 ASSAY OF CALCIUM: CPT

## 2018-03-16 PROCEDURE — 82553 CREATINE MB FRACTION: CPT

## 2018-03-16 PROCEDURE — 94660 CPAP INITIATION&MGMT: CPT

## 2018-03-16 PROCEDURE — 87015 SPECIMEN INFECT AGNT CONCNTJ: CPT

## 2018-03-16 PROCEDURE — 87206 SMEAR FLUORESCENT/ACID STAI: CPT

## 2018-03-16 PROCEDURE — 93320 DOPPLER ECHO COMPLETE: CPT

## 2018-03-16 PROCEDURE — 87324 CLOSTRIDIUM AG IA: CPT

## 2018-03-16 PROCEDURE — 71275 CT ANGIOGRAPHY CHEST: CPT

## 2018-03-16 PROCEDURE — 73720 MRI LWR EXTREMITY W/O&W/DYE: CPT

## 2018-03-16 PROCEDURE — 87075 CULTR BACTERIA EXCEPT BLOOD: CPT

## 2018-03-16 PROCEDURE — 85611 PROTHROMBIN TEST: CPT

## 2018-03-16 PROCEDURE — 87040 BLOOD CULTURE FOR BACTERIA: CPT

## 2018-03-16 PROCEDURE — 99285 EMERGENCY DEPT VISIT HI MDM: CPT | Mod: 25

## 2018-03-16 PROCEDURE — 87070 CULTURE OTHR SPECIMN AEROBIC: CPT

## 2018-03-16 PROCEDURE — 84100 ASSAY OF PHOSPHORUS: CPT

## 2018-03-16 PROCEDURE — 87045 FECES CULTURE AEROBIC BACT: CPT

## 2018-03-16 PROCEDURE — C1751: CPT

## 2018-03-16 PROCEDURE — 87449 NOS EACH ORGANISM AG IA: CPT

## 2018-03-16 PROCEDURE — 93971 EXTREMITY STUDY: CPT

## 2018-03-16 PROCEDURE — 82435 ASSAY OF BLOOD CHLORIDE: CPT

## 2018-03-16 PROCEDURE — 85027 COMPLETE CBC AUTOMATED: CPT

## 2018-03-16 PROCEDURE — 87633 RESP VIRUS 12-25 TARGETS: CPT

## 2018-03-16 PROCEDURE — 87046 STOOL CULTR AEROBIC BACT EA: CPT

## 2018-03-16 PROCEDURE — 80074 ACUTE HEPATITIS PANEL: CPT

## 2018-03-16 PROCEDURE — 71045 X-RAY EXAM CHEST 1 VIEW: CPT

## 2018-03-16 PROCEDURE — 93325 DOPPLER ECHO COLOR FLOW MAPG: CPT

## 2018-03-16 PROCEDURE — 84484 ASSAY OF TROPONIN QUANT: CPT

## 2018-03-16 PROCEDURE — 86850 RBC ANTIBODY SCREEN: CPT

## 2018-03-16 RX ORDER — HYDROMORPHONE HYDROCHLORIDE 2 MG/ML
2 INJECTION INTRAMUSCULAR; INTRAVENOUS; SUBCUTANEOUS ONCE
Qty: 0 | Refills: 0 | Status: DISCONTINUED | OUTPATIENT
Start: 2018-03-16 | End: 2018-03-16

## 2018-03-16 RX ADMIN — OXYCODONE HYDROCHLORIDE 10 MILLIGRAM(S): 5 TABLET ORAL at 00:30

## 2018-03-16 RX ADMIN — OXYCODONE HYDROCHLORIDE 10 MILLIGRAM(S): 5 TABLET ORAL at 07:03

## 2018-03-16 RX ADMIN — Medication 1 TABLET(S): at 09:53

## 2018-03-16 RX ADMIN — Medication 1 TABLET(S): at 12:52

## 2018-03-16 RX ADMIN — IMIPENEM AND CILASTATIN 250 MILLIGRAM(S): 250; 250 INJECTION, POWDER, FOR SOLUTION INTRAVENOUS at 05:06

## 2018-03-16 RX ADMIN — HEPARIN SODIUM 5000 UNIT(S): 5000 INJECTION INTRAVENOUS; SUBCUTANEOUS at 12:51

## 2018-03-16 RX ADMIN — Medication 3 MILLILITER(S): at 12:51

## 2018-03-16 RX ADMIN — Medication 3 MILLILITER(S): at 05:06

## 2018-03-16 RX ADMIN — LOSARTAN POTASSIUM 50 MILLIGRAM(S): 100 TABLET, FILM COATED ORAL at 05:06

## 2018-03-16 RX ADMIN — HYDROMORPHONE HYDROCHLORIDE 2 MILLIGRAM(S): 2 INJECTION INTRAMUSCULAR; INTRAVENOUS; SUBCUTANEOUS at 10:18

## 2018-03-16 RX ADMIN — HEPARIN SODIUM 5000 UNIT(S): 5000 INJECTION INTRAVENOUS; SUBCUTANEOUS at 05:06

## 2018-03-16 NOTE — PROGRESS NOTE ADULT - SUBJECTIVE AND OBJECTIVE BOX
EP ATTENDING    no palpitations, no syncope, no angina    ALBUTerol/ipratropium for Nebulization 3 milliLiter(s) Nebulizer every 6 hours  heparin  Injectable 5000 Unit(s) SubCutaneous every 8 hours  imipenem/cilastatin  IVPB 1000 milliGRAM(s) IV Intermittent every 8 hours  lactobacillus acidophilus 1 Tablet(s) Oral three times a day with meals  losartan 50 milliGRAM(s) Oral daily  multivitamin 1 Tablet(s) Oral daily  oxyCODONE    IR 10 milliGRAM(s) Oral every 3 hours PRN  sodium chloride 0.9%. 1000 milliLiter(s) IV Continuous <Continuous>                            9.9    5.90  )-----------( 288      ( 15 Mar 2018 07:47 )             32.4       03-16    136  |  99  |  16  ----------------------------<  86  4.4   |  29  |  0.83    Ca    9.6      16 Mar 2018 06:49  Phos  4.1     03-16  Mg     2.0     03-16    TPro  8.0  /  Alb  3.0<L>  /  TBili  0.3  /  DBili  x   /  AST  26  /  ALT  38  /  AlkPhos  97  03-16        T(C): 36.7 (03-16-18 @ 04:53), Max: 36.9 (03-15-18 @ 11:46)  HR: 85 (03-16-18 @ 04:53) (85 - 105)  BP: 122/75 (03-16-18 @ 04:53) (109/70 - 122/75)  RR: 18 (03-16-18 @ 04:53) (18 - 18)  SpO2: 94% (03-16-18 @ 04:53) (94% - 95%)  Wt(kg): --               no JVD  RRR, no murmurs  CTAB  soft nt/nd  no c/c/e    repeat KELVIN: unremarkable, no evidence of endocarditis      A/P)  He is a pleasant 56 y/o male PMH HTN admitted with staph aureus bacteremia, likely from right ankle cellulitis/nec fasc. A KELVIN and TTE were negative for endocarditis. In this setting he developed asymptomatic episodes of paroxysmal atrial tachycardia. There has been no evidence of atrial fibrillation. He denies palpitations nor syncope.    -given his lack of symptoms and brief duration of his PAT episodes I would not start beta blockers  -management of DVT as per medicine  -d/c planning   -f/u with [facility] as scheduled  -no further inpatient EP workup needed      Dany Patel M.D., [facility]  Cardiac Electrophysiology  [facility]  [address] [facility], [address] [place] [address]  [email or web address]    office [phone]  pager [phone]

## 2018-03-16 NOTE — PROGRESS NOTE ADULT - ASSESSMENT
Recent ankle sprain at work which got infected resulting in MSSA sepsis/cellulitis with lung embolization s/p OR debridement X 2 now with pseudomonas in OR culture    OM right foot/necrotizing fasciitis     hemoptysis - minimal - possibly secondary to being on hep drip with pulm septic emboli    ADA - resolved    Transaminitis - improving    Coagulopathy on admission - most likley due to dietary deficiency, holding off on any supplementation for now    Right peroneal vein dvt - on heparin prophy dose per heme  new soleal vein dvt - discussed with dr garza, repeat dopplers in rehab, continue prophylactic heparin    Pulm artery enlargement    Morbid obesity    SHIELA continue nocturnal bipap    PAT/ AIVR/13 beats wct - no further events    pleural effusion    Based on interview with pt and wife, pt is not an etoh abuser (neg CAGE, never had wd symptoms in his life, never been in rehab, has gone months without drinking with no problems.)    continue current care  d/c to rehab  endorsed case to dr valdivia who will follow the pt in rehab.

## 2018-03-16 NOTE — PROGRESS NOTE ADULT - SUBJECTIVE AND OBJECTIVE BOX
Patient with no anginal chest pain or shortness of breath  ROS otherwise Neg      MEDICATIONS  (STANDING):  ALBUTerol/ipratropium for Nebulization 3 milliLiter(s) Nebulizer every 6 hours  heparin  Injectable 5000 Unit(s) SubCutaneous every 8 hours  imipenem/cilastatin  IVPB 1000 milliGRAM(s) IV Intermittent every 8 hours  lactobacillus acidophilus 1 Tablet(s) Oral three times a day with meals  losartan 50 milliGRAM(s) Oral daily  multivitamin 1 Tablet(s) Oral daily  sodium chloride 0.9%. 1000 milliLiter(s) (10 mL/Hr) IV Continuous <Continuous>    MEDICATIONS  (PRN):  oxyCODONE    IR 10 milliGRAM(s) Oral every 3 hours PRN Moderate and Severe Pain (4-10)      LABS:                        9.9    5.90  )-----------( 288      ( 15 Mar 2018 07:47 )             32.4     Hemoglobin: 9.9 g/dL (03-15 @ 07:47)  Hemoglobin: 10.3 g/dL (03-14 @ 09:37)  Hemoglobin: 10.7 g/dL (03-13 @ 07:36)  Hemoglobin: 9.8 g/dL (03-12 @ 09:09)  Hemoglobin: 10.2 g/dL (03-11 @ 09:37)    03-16    136  |  99  |  16  ----------------------------<  86  4.4   |  29  |  0.83    Ca    9.6      16 Mar 2018 06:49  Phos  4.1     03-16  Mg     2.0     03-16    TPro  8.0  /  Alb  3.0<L>  /  TBili  0.3  /  DBili  x   /  AST  26  /  ALT  38  /  AlkPhos  97  03-16    Creatinine Trend: 0.83<--, 0.76<--, 0.72<--, 0.77<--, 0.79<--, 0.82<--           PHYSICAL EXAM  Vital Signs Last 24 Hrs  T(C): 36.7 (16 Mar 2018 04:53), Max: 36.9 (15 Mar 2018 11:46)  T(F): 98 (16 Mar 2018 04:53), Max: 98.4 (15 Mar 2018 11:46)  HR: 85 (16 Mar 2018 04:53) (85 - 105)  BP: 122/75 (16 Mar 2018 04:53) (109/70 - 122/75)  BP(mean): --  RR: 18 (16 Mar 2018 04:53) (18 - 18)  SpO2: 94% (16 Mar 2018 04:53) (94% - 95%)      Cardiovascular: Normal S1 S2, No JVD, 1/6 KITTY murmur,   Respiratory: Lungs clear to auscultation, normal effort 	  Gastrointestinal:  Soft, Non-tender, + BS	  extremities no clubbing, cyanosis or edema Bl LE's  dsd at RLE   Peripheral pulses palpable 2+ bilaterally    TELEMETRY: none	    ECG:  	  RADIOLOGY:       DIAGNOSTIC TESTING:  [ ] Echocardiogram: < from: KELVIN w/TTE (w/3D Echo) (03.08.18 @ 07:21) >  1. Mitral annular calcification, otherwise normal mitral  valve.  2. Thickened trileaflet aortic valve with normal opening.  Mild aortic regurgitation.  3. No left atrial or left atrial appendage thrombus.  4. Normal left ventricular systolic function.  5. Normal right ventricular size and function.  6. Contrast injection demonstrates no evidence of a patent  foramen ovale.  7. No evidence of valvular vegetation is seen.  *** Compared with echocardiogram of 2/26/2018, no  significant changes noted.      [ ]  Catheterization: n/a  [ ] Stress Test:  negative as outpatient 2016  	      ASSESSMENT/PLAN: 	57y Male  with history of HTN, no known CAD/MI, admitted initially 2/22/18 with chest pain and confusion. He ruled out for ACS and was found to have  Staph aureus bacteremia likely from right ankle cellulitis and necrotizing fascitis.   TTE with grossly normal LV function and KELVIN revealed no evidence of endocarditis. He has subsequently also been diagnosed RLE DVT with possible septic emboli on CTA chest.   He was placed on IV abx and is on heparin subq q8h.   On 03/05/18 the patient had an apparent episode of NSVT however upon further review with electrophysiology, it appears to have all been PAT. He has no history of syncope or near syncope. Prior to admit he was ambulating without anginal symptoms.  In the last 2 years he has undergone a reportedly normal NST with an outside cardiologist.      -- tele with no significant arrhythmias  -- no need for ischemic evaluation as patient with no NSVT   -- GI / DVT prophylaxis,   - keep K>4, mag >2.0   -- care per medicine  -- upon dc follow up with Dr Aranda for cardiology  3/28 @1245pm  -- dc planning to rehab per primary team     Claudia Mckinney [facility]  O:  [phone]  P: [phone]

## 2018-03-16 NOTE — PROGRESS NOTE ADULT - SUBJECTIVE AND OBJECTIVE BOX
MEDICINE, PROGRESS NOTE [phone]    JUWAN DOMÍNGUEZ 57y [id number]    Patient was seen and examined.  Patient is a 57y old  Male who presents with a chief complaint of Right foot wound (12 Mar 2018 09:42)  Pt had no new complaints.    PAST MEDICAL & SURGICAL HISTORY:  Sciatica of right side  Essential hypertension  No significant past surgical history    MEDICATIONS  (STANDING):  ALBUTerol/ipratropium for Nebulization 3 milliLiter(s) Nebulizer every 6 hours  heparin  Injectable 5000 Unit(s) SubCutaneous every 8 hours  imipenem/cilastatin  IVPB 1000 milliGRAM(s) IV Intermittent every 8 hours  lactobacillus acidophilus 1 Tablet(s) Oral three times a day with meals  losartan 50 milliGRAM(s) Oral daily  multivitamin 1 Tablet(s) Oral daily  sodium chloride 0.9%. 1000 milliLiter(s) (10 mL/Hr) IV Continuous <Continuous>    MEDICATIONS  (PRN):  oxyCODONE    IR 10 milliGRAM(s) Oral every 3 hours PRN Moderate and Severe Pain (4-10)    Allergies    No Known Allergies    Intolerances        PHYSICAL EXAM:  Constitutional: NAD  HEENT: Normocephalic, EOMI  Neck:  No JVD  Respiratory: CTA B/L, No wheezes  Cardiovascular: S1, S2, RRR, + systolic murmur  Gastrointestinal: BS+, soft, NT/ND  Extremities: No peripheral edema  Neurological: AAOX3, no focal deficits  Psychiatric: Normal mood, normal affect  : No Sosa    Vital Signs Last 24 Hrs  T(C): 36.7 (16 Mar 2018 11:59), Max: 36.8 (15 Mar 2018 20:24)  T(F): 98 (16 Mar 2018 11:59), Max: 98.2 (15 Mar 2018 20:24)  HR: 85 (16 Mar 2018 11:59) (85 - 105)  BP: 108/69 (16 Mar 2018 11:59) (108/69 - 128/74)  BP(mean): --  RR: 18 (16 Mar 2018 11:59) (18 - 18)  SpO2: 95% (16 Mar 2018 11:59) (94% - 95%)  I&O's Summary    15 Mar 2018 07:01  -  16 Mar 2018 07:00  --------------------------------------------------------  IN: 1590 mL / OUT: 2480 mL / NET: -890 mL    16 Mar 2018 07:01  -  16 Mar 2018 15:37  --------------------------------------------------------  IN: 520 mL / OUT: 1200 mL / NET: -680 mL        LABS:                        10.1   6.40  )-----------( 291      ( 16 Mar 2018 07:38 )             32.1     03-16    136  |  99  |  16  ----------------------------<  86  4.4   |  29  |  0.83    Ca    9.6      16 Mar 2018 06:49  Phos  4.1     03-16  Mg     2.0     03-16    TPro  8.0  /  Alb  3.0<L>  /  TBili  0.3  /  DBili  x   /  AST  26  /  ALT  38  /  AlkPhos  97  03-16    Magnesium, Serum: 2.0 mg/dL (03-16 @ 06:49)

## 2018-03-16 NOTE — CHART NOTE - NSCHARTNOTEFT_GEN_A_CORE
MEDICINE NP NOTE  Spectra [phone]    Patient to be discharged to rehab today.  Medications reviewed, revised and reconciled.  Antibiotics changed as per ID recommendations.  Patient to remain on SQ heparin during rehab stay with repeat LE dopplers in 4-5 days.  Discussed with Dr Miranda.  Patient to be discharged to [place] rehab.

## 2018-03-16 NOTE — PROGRESS NOTE ADULT - ATTENDING COMMENTS
EP ATTENDING    Agree with above. No further inpatient EP workup needed.
KELVIN with no vegetations  no need for ischemic evaluation  IV abx as per id  pain control  stress test as outpatient
KEVLIN noted  no need for urgent ischemic evaluation  podiatry follow up  clinically not in chf
Mr. Echavarria has positive blood cultures showing gram positive cocci in clusters.  ID still pending.  He is febrile, BP is stable , easily arousable.  On lung exam he has end expiratory wheezing which appears to be referred from upper airway.  He is diaphoretic.  RLE is erythematous, slightly improved.    CK is improving as is creatinine.  Echo shows overall normal function but was limited for evaluating the right side.  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  Recommend KELVIN when patient is more stable.  Continue antibiotics as per ID, IV heparin.  continue bronchodilators.  Would not start steroids at this time.  He is comfortable with good oxygen saturation.
patient with no chest pain  no significant arrythmia  stress test as outpatient  no need for invasive ischemic evaluation
Agree with above assessment and plan as outlined above.    - D/C planning to rehab per med  - F/u with us in office    Blair Benjamin MD, FACC  [facility], [facility]  [address] [place] [address]  PHONE:  [phone]  BEEPER : [phone]
Asked about Heparin bolus by patient's wife. Will defer to primary team. Discussed with medicine NP.
I have seen and examined the patient. I agree with the above history, physical exam, and plan of care except for as detailed below.    Cellulits, septic emboli. Worsening respiratory status, tachypnea with increased WOB. Improved on BiPAP.    - Contniue BiPAP as needed  - Trial of diuresis  - Agree with imaging of spine as patient w/staph bacteremia, septic emboli, and back pain
EP ATTENDING    Agree with above. No further inpatient EP workup needed.
Mr. Echavarria is markedly improved clinically.  Cellulitis is resolving, he is afebrile and breathing comfortably.  Most recent CT reviewed- opacities appear larger, with some cavitation.  Chest CT appearance can lag behind clinical appearance.  He is being treated appropriate given posistive blood culures.  Would continue antibiotic course as per ID recommendations.  F/U CT Chest in 4-6 weeks.  AGree with remaining plan as outlined above.
pt seen and examined ---d/w the team      agree with the assessment and plan of the pulmonary fellow  ----No pulmonary c/i to surgery tomorrow. As per ARISCAT criteria, pt is at low-intermediate risk for pulmonary complication. If using general anesthesia would extubate to BIPAP.
I have seen and examined the patient. I agree with the above history, physical exam, and plan of care except for as detailed below.    Cellulits, septic emboli. S/p debridement today. Respiratory status significantly improved. Appears comfortable off BiPAP    - BiPAP PRN  - Diuresis as needed  - Bronchodilators PRN
pt seen and examined at the bedside. pt with MSSA bacteremia with septic emboli, going for KELVIN to r/o infective endocarditis, now clinically improving, off oxygen , saturating well on room air. cont BIPAP at night for SHIELA/OHS. cont abx as per ID.
Extensively discussed with wife and family at bedside, updated on full plan of care
Extensively discussed with wife today updated on full plan of care

## 2018-03-16 NOTE — PROGRESS NOTE ADULT - ASSESSMENT
POD#7   Umbilical graft to right foot dorsal wound (sx #3)  approx 25cm x 10 cm  VAC removed this AM, to be reapplied today  Medial and post lateral wounds coapting  Graft sites are viable and intact. Adaptic is secure  D/c planning for out pt management at wound center  Surgery # 4 will be grafting either STSG or skin equivalent as out patient.    WBAT for heel touch in CAM Boot and walker on right   P/T evaluation appreciated  Continue IV ABX  Home VAc will be required.

## 2018-03-16 NOTE — PROGRESS NOTE ADULT - SUBJECTIVE AND OBJECTIVE BOX
Patient is a 57y old  Male who presents with a chief complaint of Right foot wound (12 Mar 2018 09:42)     INTERVAL HPI/OVERNIGHT EVENTS:  Patient seen and evaluated at bedside.  Pt is resting comfortable in NAD. Denies N/V/F/C.  Pain rated at 0/10    Allergies    No Known Allergies    Intolerances    Vital Signs Last 24 Hrs  T(C): 36.7 (16 Mar 2018 04:53), Max: 36.9 (15 Mar 2018 11:46)  T(F): 98 (16 Mar 2018 04:53), Max: 98.4 (15 Mar 2018 11:46)  HR: 85 (16 Mar 2018 04:53) (85 - 105)  BP: 122/75 (16 Mar 2018 04:53) (109/70 - 122/75)  BP(mean): --  RR: 18 (16 Mar 2018 04:53) (18 - 18)  SpO2: 94% (16 Mar 2018 04:53) (94% - 95%)    LABS:                        10.1   6.40  )-----------( 291      ( 16 Mar 2018 07:38 )             32.1     03-16    136  |  99  |  16  ----------------------------<  86  4.4   |  29  |  0.83    Ca    9.6      16 Mar 2018 06:49  Phos  4.1     03-16  Mg     2.0     03-16    TPro  8.0  /  Alb  3.0<L>  /  TBili  0.3  /  DBili  x   /  AST  26  /  ALT  38  /  AlkPhos  97  03-16    CAPILLARY BLOOD GLUCOSE    Lower Extremity Physical Exam:  right dorsal foot surgical sites staples intact, no dehiscence, skin staples around graft/adaptic intact, no dehiscence, no purulence, resolving erythema and swelling, graft taking well, no hematoma, medial foot sutures intact, well coapted, no necrosis    RADIOLOGY & ADDITIONAL TESTS:

## 2018-03-22 ENCOUNTER — APPOINTMENT (OUTPATIENT)
Dept: WOUND CARE | Facility: CLINIC | Age: 58
End: 2018-03-22
Payer: OTHER MISCELLANEOUS

## 2018-03-22 PROCEDURE — 99213 OFFICE O/P EST LOW 20 MIN: CPT | Mod: 24

## 2018-03-26 ENCOUNTER — APPOINTMENT (OUTPATIENT)
Dept: INFECTIOUS DISEASE | Facility: CLINIC | Age: 58
End: 2018-03-26
Payer: COMMERCIAL

## 2018-03-26 VITALS
OXYGEN SATURATION: 96 % | SYSTOLIC BLOOD PRESSURE: 110 MMHG | BODY MASS INDEX: 40.97 KG/M2 | DIASTOLIC BLOOD PRESSURE: 73 MMHG | TEMPERATURE: 98.2 F | HEART RATE: 97 BPM | WEIGHT: 240 LBS | HEIGHT: 64 IN

## 2018-03-26 DIAGNOSIS — Z87.891 PERSONAL HISTORY OF NICOTINE DEPENDENCE: ICD-10-CM

## 2018-03-26 DIAGNOSIS — Z82.49 FAMILY HISTORY OF ISCHEMIC HEART DISEASE AND OTHER DISEASES OF THE CIRCULATORY SYSTEM: ICD-10-CM

## 2018-03-26 PROCEDURE — 99214 OFFICE O/P EST MOD 30 MIN: CPT

## 2018-03-31 LAB
CULTURE RESULTS: SIGNIFICANT CHANGE UP
SPECIMEN SOURCE: SIGNIFICANT CHANGE UP

## 2018-04-05 ENCOUNTER — APPOINTMENT (OUTPATIENT)
Dept: WOUND CARE | Facility: CLINIC | Age: 58
End: 2018-04-05
Payer: OTHER MISCELLANEOUS

## 2018-04-05 PROCEDURE — 99213 OFFICE O/P EST LOW 20 MIN: CPT

## 2018-04-19 ENCOUNTER — APPOINTMENT (OUTPATIENT)
Dept: WOUND CARE | Facility: CLINIC | Age: 58
End: 2018-04-19
Payer: OTHER MISCELLANEOUS

## 2018-04-19 PROCEDURE — 11043 DBRDMT MUSC&/FSCA 1ST 20/<: CPT | Mod: 58

## 2018-04-19 PROCEDURE — 11046 DBRDMT MUSC&/FSCA EA ADDL: CPT | Mod: 58

## 2018-04-21 LAB
CULTURE RESULTS: SIGNIFICANT CHANGE UP
SPECIMEN SOURCE: SIGNIFICANT CHANGE UP

## 2018-04-25 ENCOUNTER — APPOINTMENT (OUTPATIENT)
Dept: CT IMAGING | Facility: CLINIC | Age: 58
End: 2018-04-25
Payer: COMMERCIAL

## 2018-04-25 ENCOUNTER — OUTPATIENT (OUTPATIENT)
Dept: OUTPATIENT SERVICES | Facility: HOSPITAL | Age: 58
LOS: 1 days | End: 2018-04-25
Payer: SELF-PAY

## 2018-04-25 DIAGNOSIS — Z00.8 ENCOUNTER FOR OTHER GENERAL EXAMINATION: ICD-10-CM

## 2018-04-25 PROCEDURE — 71250 CT THORAX DX C-: CPT

## 2018-04-25 PROCEDURE — 71250 CT THORAX DX C-: CPT | Mod: 26

## 2018-04-26 ENCOUNTER — APPOINTMENT (OUTPATIENT)
Dept: WOUND CARE | Facility: CLINIC | Age: 58
End: 2018-04-26
Payer: OTHER MISCELLANEOUS

## 2018-04-26 PROCEDURE — 11043 DBRDMT MUSC&/FSCA 1ST 20/<: CPT | Mod: 58

## 2018-05-01 ENCOUNTER — APPOINTMENT (OUTPATIENT)
Dept: PLASTIC SURGERY | Facility: CLINIC | Age: 58
End: 2018-05-01

## 2018-05-01 ENCOUNTER — APPOINTMENT (OUTPATIENT)
Dept: PLASTIC SURGERY | Facility: CLINIC | Age: 58
End: 2018-05-01
Payer: OTHER MISCELLANEOUS

## 2018-05-01 VITALS
OXYGEN SATURATION: 97 % | HEART RATE: 98 BPM | SYSTOLIC BLOOD PRESSURE: 113 MMHG | HEIGHT: 64 IN | WEIGHT: 245 LBS | DIASTOLIC BLOOD PRESSURE: 76 MMHG | BODY MASS INDEX: 41.83 KG/M2

## 2018-05-01 PROCEDURE — 99203 OFFICE O/P NEW LOW 30 MIN: CPT

## 2018-05-03 ENCOUNTER — APPOINTMENT (OUTPATIENT)
Dept: WOUND CARE | Facility: CLINIC | Age: 58
End: 2018-05-03
Payer: COMMERCIAL

## 2018-05-03 PROCEDURE — 99213 OFFICE O/P EST LOW 20 MIN: CPT

## 2018-05-10 ENCOUNTER — OUTPATIENT (OUTPATIENT)
Dept: OUTPATIENT SERVICES | Facility: HOSPITAL | Age: 58
LOS: 1 days | End: 2018-05-10
Payer: COMMERCIAL

## 2018-05-10 VITALS
HEART RATE: 95 BPM | WEIGHT: 205.91 LBS | SYSTOLIC BLOOD PRESSURE: 120 MMHG | TEMPERATURE: 98 F | DIASTOLIC BLOOD PRESSURE: 80 MMHG | RESPIRATION RATE: 16 BRPM | OXYGEN SATURATION: 98 % | HEIGHT: 64 IN

## 2018-05-10 DIAGNOSIS — Z98.890 OTHER SPECIFIED POSTPROCEDURAL STATES: Chronic | ICD-10-CM

## 2018-05-10 DIAGNOSIS — I10 ESSENTIAL (PRIMARY) HYPERTENSION: ICD-10-CM

## 2018-05-10 DIAGNOSIS — I26.99 OTHER PULMONARY EMBOLISM WITHOUT ACUTE COR PULMONALE: ICD-10-CM

## 2018-05-10 DIAGNOSIS — S91.301S UNSPECIFIED OPEN WOUND, RIGHT FOOT, SEQUELA: ICD-10-CM

## 2018-05-10 DIAGNOSIS — G47.33 OBSTRUCTIVE SLEEP APNEA (ADULT) (PEDIATRIC): ICD-10-CM

## 2018-05-10 DIAGNOSIS — Z90.49 ACQUIRED ABSENCE OF OTHER SPECIFIED PARTS OF DIGESTIVE TRACT: Chronic | ICD-10-CM

## 2018-05-10 LAB
BUN SERPL-MCNC: 17 MG/DL — SIGNIFICANT CHANGE UP (ref 7–23)
CALCIUM SERPL-MCNC: 9.8 MG/DL — SIGNIFICANT CHANGE UP (ref 8.4–10.5)
CHLORIDE SERPL-SCNC: 97 MMOL/L — LOW (ref 98–107)
CO2 SERPL-SCNC: 27 MMOL/L — SIGNIFICANT CHANGE UP (ref 22–31)
CREAT SERPL-MCNC: 0.93 MG/DL — SIGNIFICANT CHANGE UP (ref 0.5–1.3)
GLUCOSE SERPL-MCNC: 96 MG/DL — SIGNIFICANT CHANGE UP (ref 70–99)
HCT VFR BLD CALC: 42.5 % — SIGNIFICANT CHANGE UP (ref 39–50)
HGB BLD-MCNC: 13.9 G/DL — SIGNIFICANT CHANGE UP (ref 13–17)
MCHC RBC-ENTMCNC: 29.8 PG — SIGNIFICANT CHANGE UP (ref 27–34)
MCHC RBC-ENTMCNC: 32.7 % — SIGNIFICANT CHANGE UP (ref 32–36)
MCV RBC AUTO: 91.2 FL — SIGNIFICANT CHANGE UP (ref 80–100)
NRBC # FLD: 0 — SIGNIFICANT CHANGE UP
PLATELET # BLD AUTO: 348 K/UL — SIGNIFICANT CHANGE UP (ref 150–400)
PMV BLD: 8.7 FL — SIGNIFICANT CHANGE UP (ref 7–13)
POTASSIUM SERPL-MCNC: 4 MMOL/L — SIGNIFICANT CHANGE UP (ref 3.5–5.3)
POTASSIUM SERPL-SCNC: 4 MMOL/L — SIGNIFICANT CHANGE UP (ref 3.5–5.3)
RBC # BLD: 4.66 M/UL — SIGNIFICANT CHANGE UP (ref 4.2–5.8)
RBC # FLD: 13 % — SIGNIFICANT CHANGE UP (ref 10.3–14.5)
SODIUM SERPL-SCNC: 138 MMOL/L — SIGNIFICANT CHANGE UP (ref 135–145)
WBC # BLD: 6.28 K/UL — SIGNIFICANT CHANGE UP (ref 3.8–10.5)
WBC # FLD AUTO: 6.28 K/UL — SIGNIFICANT CHANGE UP (ref 3.8–10.5)

## 2018-05-10 PROCEDURE — 93010 ELECTROCARDIOGRAM REPORT: CPT

## 2018-05-10 RX ORDER — CEFAZOLIN SODIUM 1 G
2 VIAL (EA) INJECTION
Qty: 0 | Refills: 0 | COMMUNITY

## 2018-05-10 RX ORDER — CIPROFLOXACIN LACTATE 400MG/40ML
1 VIAL (ML) INTRAVENOUS
Qty: 0 | Refills: 0 | COMMUNITY

## 2018-05-10 RX ORDER — HEPARIN SODIUM 5000 [USP'U]/ML
5000 INJECTION INTRAVENOUS; SUBCUTANEOUS
Qty: 0 | Refills: 0 | COMMUNITY

## 2018-05-10 RX ORDER — LOSARTAN POTASSIUM 100 MG/1
100 TABLET, FILM COATED ORAL
Qty: 0 | Refills: 0 | COMMUNITY

## 2018-05-10 NOTE — H&P PST ADULT - PROBLEM SELECTOR PLAN 3
hx of DVT and PE, s/p pulm follow up and repeat CT scan,   last pulmonary note and CT report from Dr. Christianson requested

## 2018-05-10 NOTE — H&P PST ADULT - NEGATIVE ENMT SYMPTOMS
no throat pain/no dysphagia/no hearing difficulty/no ear pain/no nasal congestion/no nasal obstruction/no nasal discharge

## 2018-05-10 NOTE — H&P PST ADULT - PROBLEM SELECTOR PLAN 1
Pt scheduled for Split Thickness Skin Graft on 05/21/18  Preop instructions provided. Pt verbalized understanding.   Pepcid for GI prophylaxis provided

## 2018-05-10 NOTE — H&P PST ADULT - VISION (WITH CORRECTIVE LENSES IF THE PATIENT USUALLY WEARS THEM):
Normal vision: sees adequately in most situations; can see medication labels, newsprint/Distance glasses

## 2018-05-10 NOTE — H&P PST ADULT - HISTORY OF PRESENT ILLNESS
57 y.o .male with hx of HTN, right foot injury @ work, reports "got infected", s/p 3 surgeries, pt reports wound remains open, presents to PST for evaluation for Split Thickness Skin Graft on 05/21/18 57 y.o .male with hx of HTN, right foot injury @ work in February 2018, reports right foot infection, s/p debridement x3, pt reports wound remains open, presents to PST for evaluation for Split Thickness Skin Graft on 05/21/18  pt reports developed DVT x 2 in right leg, CTA suggest possible septic PE, TTE negative 57 y.o .male with hx of HTN, right foot injury @ work in February 2018, reports right foot infection, complicated by bacteremia, s/p debridement x3, pt reports pour wound healing, presents to PST for evaluation for Split Thickness Skin Graft on 05/21/18  pt reports developed DVT x 2 in right leg, CTA suggest possible septic PE, TTE negative, states remained on SQ heparin x 6 weeks;

## 2018-05-10 NOTE — H&P PST ADULT - VASCULAR DETAILS
unable to assess pedal pulse right lower extremity, good right posterior tibial pulse, able to wiggle toes, normal toe color and temperature

## 2018-05-10 NOTE — H&P PST ADULT - NEGATIVE GENERAL GENITOURINARY SYMPTOMS
no flank pain R/no flank pain L/no dysuria/normal urinary frequency/no bladder infections/no renal colic

## 2018-05-10 NOTE — H&P PST ADULT - MUSCULOSKELETAL COMMENTS
right foot pain, ambulates with cane right lower extremity edema, decreased ROM right foot, ambulates with cane; foot stabilizer in place

## 2018-05-10 NOTE — H&P PST ADULT - PMH
DVT (deep venous thrombosis)  s/p right leg injury, 02/2018  Essential hypertension    Pulmonary embolism  s/p right leg injury, 2/2018, tx with heparin x 1 months  Sciatica of right side    Unspecified open wound, right foot, sequela DVT (deep venous thrombosis)  s/p right leg injury, 02/2018  CTA suggestive of "possible PE", TTE negative  Essential hypertension    Sciatica of right side    Unspecified open wound, right foot, sequela DVT (deep venous thrombosis)  s/p right leg injury, 02/2018  CTA suggestive of "possible PE", TTE negative  Essential hypertension    SHIELA (obstructive sleep apnea)    Sciatica of right side    Unspecified open wound, right foot, sequela

## 2018-05-17 ENCOUNTER — APPOINTMENT (OUTPATIENT)
Dept: WOUND CARE | Facility: CLINIC | Age: 58
End: 2018-05-17
Payer: COMMERCIAL

## 2018-05-17 PROCEDURE — 99213 OFFICE O/P EST LOW 20 MIN: CPT

## 2018-05-21 ENCOUNTER — OUTPATIENT (OUTPATIENT)
Dept: OUTPATIENT SERVICES | Facility: HOSPITAL | Age: 58
LOS: 1 days | Discharge: HOME | End: 2018-05-21
Payer: COMMERCIAL

## 2018-05-21 VITALS
SYSTOLIC BLOOD PRESSURE: 124 MMHG | HEART RATE: 95 BPM | RESPIRATION RATE: 17 BRPM | TEMPERATURE: 98 F | WEIGHT: 205.91 LBS | DIASTOLIC BLOOD PRESSURE: 83 MMHG | HEIGHT: 64 IN | OXYGEN SATURATION: 95 %

## 2018-05-21 VITALS
RESPIRATION RATE: 19 BRPM | DIASTOLIC BLOOD PRESSURE: 76 MMHG | SYSTOLIC BLOOD PRESSURE: 123 MMHG | OXYGEN SATURATION: 97 % | HEART RATE: 84 BPM | TEMPERATURE: 98 F

## 2018-05-21 DIAGNOSIS — Z90.49 ACQUIRED ABSENCE OF OTHER SPECIFIED PARTS OF DIGESTIVE TRACT: Chronic | ICD-10-CM

## 2018-05-21 DIAGNOSIS — Z98.890 OTHER SPECIFIED POSTPROCEDURAL STATES: Chronic | ICD-10-CM

## 2018-05-21 DIAGNOSIS — S91.301S UNSPECIFIED OPEN WOUND, RIGHT FOOT, SEQUELA: ICD-10-CM

## 2018-05-21 PROCEDURE — 15100 SPLT AGRFT T/A/L 1ST 100SQCM: CPT

## 2018-05-21 RX ORDER — SODIUM CHLORIDE 9 MG/ML
1000 INJECTION, SOLUTION INTRAVENOUS
Qty: 0 | Refills: 0 | Status: DISCONTINUED | OUTPATIENT
Start: 2018-05-21 | End: 2018-05-21

## 2018-05-21 RX ORDER — OXYCODONE HYDROCHLORIDE 5 MG/1
5 TABLET ORAL ONCE
Qty: 0 | Refills: 0 | Status: DISCONTINUED | OUTPATIENT
Start: 2018-05-21 | End: 2018-05-22

## 2018-05-21 RX ORDER — OXYCODONE HYDROCHLORIDE 5 MG/1
10 TABLET ORAL ONCE
Qty: 0 | Refills: 0 | Status: DISCONTINUED | OUTPATIENT
Start: 2018-05-21 | End: 2018-05-22

## 2018-05-21 RX ORDER — FENTANYL CITRATE 50 UG/ML
25 INJECTION INTRAVENOUS
Qty: 0 | Refills: 0 | Status: DISCONTINUED | OUTPATIENT
Start: 2018-05-21 | End: 2018-05-22

## 2018-05-21 RX ORDER — FENTANYL CITRATE 50 UG/ML
50 INJECTION INTRAVENOUS
Qty: 0 | Refills: 0 | Status: DISCONTINUED | OUTPATIENT
Start: 2018-05-21 | End: 2018-05-22

## 2018-05-21 RX ORDER — ONDANSETRON 8 MG/1
4 TABLET, FILM COATED ORAL ONCE
Qty: 0 | Refills: 0 | Status: DISCONTINUED | OUTPATIENT
Start: 2018-05-21 | End: 2018-05-22

## 2018-05-21 RX ORDER — SODIUM CHLORIDE 9 MG/ML
1000 INJECTION, SOLUTION INTRAVENOUS
Qty: 0 | Refills: 0 | Status: DISCONTINUED | OUTPATIENT
Start: 2018-05-21 | End: 2018-05-22

## 2018-05-21 RX ORDER — OXYCODONE HYDROCHLORIDE 5 MG/1
0 TABLET ORAL
Qty: 0 | Refills: 0 | COMMUNITY

## 2018-05-21 RX ADMIN — SODIUM CHLORIDE 50 MILLILITER(S): 9 INJECTION, SOLUTION INTRAVENOUS at 14:17

## 2018-05-21 RX ADMIN — SODIUM CHLORIDE 30 MILLILITER(S): 9 INJECTION, SOLUTION INTRAVENOUS at 12:30

## 2018-05-21 NOTE — ASU PATIENT PROFILE, ADULT - PMH
DVT (deep venous thrombosis)  s/p right leg injury, 02/2018  CTA suggestive of "possible PE", TTE negative  Essential hypertension    SHIELA (obstructive sleep apnea)    Sciatica of right side    Unspecified open wound, right foot, sequela

## 2018-05-21 NOTE — ASU PATIENT PROFILE, ADULT - VISION (WITH CORRECTIVE LENSES IF THE PATIENT USUALLY WEARS THEM):
Distance glasses/Normal vision: sees adequately in most situations; can see medication labels, newsprint

## 2018-05-21 NOTE — ASU DISCHARGE PLAN (ADULT/PEDIATRIC). - NOTIFY
Numbness, color, or temperature change to extremity/Unable to Urinate/Bleeding that does not stop/Pain not relieved by Medications/Fever greater than 101/Persistent Nausea and Vomiting

## 2018-05-21 NOTE — BRIEF OPERATIVE NOTE - PROCEDURE
<<-----Click on this checkbox to enter Procedure Application of autologous split-thickness skin graft (STSG) to foot  05/21/2018    Active  [id number]

## 2018-05-21 NOTE — ASU DISCHARGE PLAN (ADULT/PEDIATRIC). - SPECIAL INSTRUCTIONS
Resume normal diet. Avoid straining, exercise, or heavy lifting.    Take medications as instructed by prescriptions.    Skin graft donor site: do not disturb it. Do not change the dressings. There will be bleeding. Reinforce with tegaderms and cover with ABD if necessary.     You have a skin graft on your right foot. There is a VAC bolster dressing in place. Do not remove it. Dr. Cabello will remove it in the office.     Please sponge bathe only until your first follow-up appointment.     Follow-up with primary care doctor as well.     Call [phone] and return to the ED for chest pain, shortness of breath, significant increase in pain, or significant change in color of surgical sites.

## 2018-05-21 NOTE — ASU DISCHARGE PLAN (ADULT/PEDIATRIC). - MEDICATION SUMMARY - MEDICATIONS TO CHANGE
I will SWITCH the dose or number of times a day I take the medications listed below when I get home from the hospital:    oxyCODONE 10 mg oral tablet, extended release

## 2018-05-21 NOTE — ASU DISCHARGE PLAN (ADULT/PEDIATRIC). - NURSING INSTRUCTIONS
You received intravenous toradol (NSAID) in the operating room at 1pm. You may take additional NSAIDs (advil/aleve/ibuprofen/motrin) after 7pm.

## 2018-05-21 NOTE — ASU DISCHARGE PLAN (ADULT/PEDIATRIC). - ITEMS TO FOLLOWUP WITH YOUR PHYSICIAN'S
Please follow up with Dr. Cabello within 1 week of discharge from the hospital. You may call [phone] to schedule an appointment.

## 2018-05-29 ENCOUNTER — APPOINTMENT (OUTPATIENT)
Dept: PLASTIC SURGERY | Facility: CLINIC | Age: 58
End: 2018-05-29
Payer: COMMERCIAL

## 2018-05-29 VITALS
HEIGHT: 64 IN | OXYGEN SATURATION: 97 % | WEIGHT: 245 LBS | HEART RATE: 93 BPM | BODY MASS INDEX: 41.83 KG/M2 | SYSTOLIC BLOOD PRESSURE: 121 MMHG | DIASTOLIC BLOOD PRESSURE: 81 MMHG

## 2018-05-29 PROCEDURE — 99024 POSTOP FOLLOW-UP VISIT: CPT

## 2018-05-29 RX ORDER — BISMUTH TRIBROMOPH/PETROLATUM 5"X9"
BANDAGE TOPICAL
Qty: 1 | Refills: 0 | Status: COMPLETED | OUTPATIENT
Start: 2018-05-29 | End: 2018-05-29

## 2018-06-07 ENCOUNTER — APPOINTMENT (OUTPATIENT)
Dept: WOUND CARE | Facility: CLINIC | Age: 58
End: 2018-06-07
Payer: OTHER MISCELLANEOUS

## 2018-06-07 PROCEDURE — 99212 OFFICE O/P EST SF 10 MIN: CPT

## 2018-06-19 ENCOUNTER — APPOINTMENT (OUTPATIENT)
Dept: PLASTIC SURGERY | Facility: CLINIC | Age: 58
End: 2018-06-19
Payer: COMMERCIAL

## 2018-06-19 VITALS
TEMPERATURE: 98 F | SYSTOLIC BLOOD PRESSURE: 113 MMHG | DIASTOLIC BLOOD PRESSURE: 75 MMHG | OXYGEN SATURATION: 98 % | HEART RATE: 90 BPM | HEIGHT: 64 IN | BODY MASS INDEX: 41.83 KG/M2 | WEIGHT: 245 LBS

## 2018-06-19 PROCEDURE — 99024 POSTOP FOLLOW-UP VISIT: CPT

## 2018-06-22 ENCOUNTER — APPOINTMENT (OUTPATIENT)
Dept: RADIOLOGY | Facility: CLINIC | Age: 58
End: 2018-06-22
Payer: OTHER MISCELLANEOUS

## 2018-06-22 ENCOUNTER — OUTPATIENT (OUTPATIENT)
Dept: OUTPATIENT SERVICES | Facility: HOSPITAL | Age: 58
LOS: 1 days | End: 2018-06-22
Payer: COMMERCIAL

## 2018-06-22 DIAGNOSIS — Z98.890 OTHER SPECIFIED POSTPROCEDURAL STATES: Chronic | ICD-10-CM

## 2018-06-22 DIAGNOSIS — Z00.8 ENCOUNTER FOR OTHER GENERAL EXAMINATION: ICD-10-CM

## 2018-06-22 DIAGNOSIS — Z90.49 ACQUIRED ABSENCE OF OTHER SPECIFIED PARTS OF DIGESTIVE TRACT: Chronic | ICD-10-CM

## 2018-06-22 PROCEDURE — 73630 X-RAY EXAM OF FOOT: CPT | Mod: 26,RT

## 2018-06-22 PROCEDURE — 73630 X-RAY EXAM OF FOOT: CPT

## 2018-06-24 ENCOUNTER — TRANSCRIPTION ENCOUNTER (OUTPATIENT)
Age: 58
End: 2018-06-24

## 2018-06-28 ENCOUNTER — APPOINTMENT (OUTPATIENT)
Dept: WOUND CARE | Facility: CLINIC | Age: 58
End: 2018-06-28
Payer: OTHER MISCELLANEOUS

## 2018-06-28 VITALS
RESPIRATION RATE: 16 BRPM | BODY MASS INDEX: 41.83 KG/M2 | TEMPERATURE: 98 F | WEIGHT: 245 LBS | HEART RATE: 88 BPM | SYSTOLIC BLOOD PRESSURE: 129 MMHG | HEIGHT: 64 IN | DIASTOLIC BLOOD PRESSURE: 90 MMHG

## 2018-06-28 PROCEDURE — 99213 OFFICE O/P EST LOW 20 MIN: CPT

## 2018-06-28 RX ORDER — LOSARTAN POTASSIUM 100 MG/1
TABLET, FILM COATED ORAL
Refills: 0 | Status: COMPLETED | COMMUNITY

## 2018-06-28 RX ORDER — OXYCODONE 5 MG/1
5 TABLET ORAL
Qty: 6 | Refills: 0 | Status: COMPLETED | COMMUNITY
Start: 2018-02-14

## 2018-06-28 RX ORDER — OXYCODONE AND ACETAMINOPHEN 10; 325 MG/1; MG/1
10-325 TABLET ORAL
Qty: 60 | Refills: 0 | Status: COMPLETED | COMMUNITY
Start: 2018-02-15

## 2018-07-02 ENCOUNTER — APPOINTMENT (OUTPATIENT)
Dept: WOUND CARE | Facility: CLINIC | Age: 58
End: 2018-07-02
Payer: OTHER MISCELLANEOUS

## 2018-07-02 PROCEDURE — 99213 OFFICE O/P EST LOW 20 MIN: CPT

## 2018-07-03 ENCOUNTER — APPOINTMENT (OUTPATIENT)
Dept: MRI IMAGING | Facility: CLINIC | Age: 58
End: 2018-07-03

## 2018-07-03 ENCOUNTER — OUTPATIENT (OUTPATIENT)
Dept: OUTPATIENT SERVICES | Facility: HOSPITAL | Age: 58
LOS: 1 days | End: 2018-07-03
Payer: COMMERCIAL

## 2018-07-03 DIAGNOSIS — Z00.8 ENCOUNTER FOR OTHER GENERAL EXAMINATION: ICD-10-CM

## 2018-07-03 DIAGNOSIS — Z98.890 OTHER SPECIFIED POSTPROCEDURAL STATES: Chronic | ICD-10-CM

## 2018-07-03 DIAGNOSIS — Z90.49 ACQUIRED ABSENCE OF OTHER SPECIFIED PARTS OF DIGESTIVE TRACT: Chronic | ICD-10-CM

## 2018-07-03 PROCEDURE — 73720 MRI LWR EXTREMITY W/O&W/DYE: CPT

## 2018-07-03 PROCEDURE — A9585: CPT

## 2018-07-03 PROCEDURE — 73720 MRI LWR EXTREMITY W/O&W/DYE: CPT | Mod: 26,RT

## 2018-07-05 ENCOUNTER — APPOINTMENT (OUTPATIENT)
Dept: WOUND CARE | Facility: CLINIC | Age: 58
End: 2018-07-05

## 2018-07-06 ENCOUNTER — APPOINTMENT (OUTPATIENT)
Dept: INFECTIOUS DISEASE | Facility: CLINIC | Age: 58
End: 2018-07-06
Payer: COMMERCIAL

## 2018-07-06 VITALS
HEIGHT: 64 IN | TEMPERATURE: 98.2 F | OXYGEN SATURATION: 94 % | WEIGHT: 243 LBS | SYSTOLIC BLOOD PRESSURE: 126 MMHG | BODY MASS INDEX: 41.48 KG/M2 | RESPIRATION RATE: 6 BRPM | HEART RATE: 70 BPM | DIASTOLIC BLOOD PRESSURE: 77 MMHG

## 2018-07-06 DIAGNOSIS — R78.81 BACTEREMIA: ICD-10-CM

## 2018-07-06 DIAGNOSIS — M86.10 OTHER ACUTE OSTEOMYELITIS, UNSPECIFIED SITE: ICD-10-CM

## 2018-07-06 PROCEDURE — 99214 OFFICE O/P EST MOD 30 MIN: CPT

## 2018-07-06 RX ORDER — PETROLATUM,WHITE 41 %
OINTMENT (GRAM) TOPICAL
Qty: 1 | Refills: 0 | Status: DISCONTINUED | OUTPATIENT
Start: 2018-05-29 | End: 2018-07-06

## 2018-07-06 RX ORDER — BECAPLERMIN 100 UG/G
0.01 GEL TOPICAL
Qty: 1 | Refills: 2 | Status: DISCONTINUED | COMMUNITY
Start: 2018-04-19 | End: 2018-07-06

## 2018-07-06 RX ORDER — CEFAZOLIN SODIUM 1 G
INTRAVENOUS SOLUTION, PIGGYBACK (EA) INTRAVENOUS
Refills: 0 | Status: DISCONTINUED | COMMUNITY
End: 2018-07-06

## 2018-07-06 RX ORDER — CIPROFLOXACIN HYDROCHLORIDE 500 MG/1
TABLET, FILM COATED ORAL
Refills: 0 | Status: DISCONTINUED | COMMUNITY
End: 2018-07-06

## 2018-07-06 RX ORDER — OXYCODONE AND ACETAMINOPHEN 5; 325 MG/1; MG/1
5-325 TABLET ORAL
Qty: 30 | Refills: 0 | Status: DISCONTINUED | COMMUNITY
Start: 2018-05-21

## 2018-07-09 LAB
INR PPP: 1.15 RATIO
PT BLD: 13 SEC

## 2018-07-09 NOTE — PROGRESS NOTE ADULT - PROVIDER SPECIALTY LIST ADULT
Podiatry Writer spoke with patient who states that she would like to leave. Writer explained that she would need to speak with the provider about that. Pt states that she is going to sign a 12 hour. Writer notified provider.

## 2018-07-10 ENCOUNTER — INPATIENT (INPATIENT)
Facility: HOSPITAL | Age: 58
LOS: 3 days | Discharge: HOME | DRG: 478 | End: 2018-07-14
Attending: INTERNAL MEDICINE | Admitting: INTERNAL MEDICINE
Payer: COMMERCIAL

## 2018-07-10 ENCOUNTER — TRANSCRIPTION ENCOUNTER (OUTPATIENT)
Age: 58
End: 2018-07-10

## 2018-07-10 VITALS
TEMPERATURE: 98 F | OXYGEN SATURATION: 95 % | RESPIRATION RATE: 20 BRPM | WEIGHT: 240.08 LBS | HEIGHT: 64 IN | DIASTOLIC BLOOD PRESSURE: 79 MMHG | HEART RATE: 93 BPM | SYSTOLIC BLOOD PRESSURE: 124 MMHG

## 2018-07-10 DIAGNOSIS — Z90.49 ACQUIRED ABSENCE OF OTHER SPECIFIED PARTS OF DIGESTIVE TRACT: Chronic | ICD-10-CM

## 2018-07-10 DIAGNOSIS — M86.671 OTHER CHRONIC OSTEOMYELITIS, RIGHT ANKLE AND FOOT: ICD-10-CM

## 2018-07-10 DIAGNOSIS — Z98.890 OTHER SPECIFIED POSTPROCEDURAL STATES: Chronic | ICD-10-CM

## 2018-07-10 LAB
ALBUMIN SERPL ELPH-MCNC: 4.8 G/DL — SIGNIFICANT CHANGE UP (ref 3.3–5)
ALP SERPL-CCNC: 93 U/L — SIGNIFICANT CHANGE UP (ref 40–120)
ALT FLD-CCNC: 110 U/L — HIGH (ref 10–45)
ANION GAP SERPL CALC-SCNC: 15 MMOL/L — SIGNIFICANT CHANGE UP (ref 5–17)
AST SERPL-CCNC: 41 U/L — HIGH (ref 10–40)
BASOPHILS # BLD AUTO: 0.1 K/UL — SIGNIFICANT CHANGE UP (ref 0–0.2)
BASOPHILS NFR BLD AUTO: 1.2 % — SIGNIFICANT CHANGE UP (ref 0–2)
BILIRUB SERPL-MCNC: 0.4 MG/DL — SIGNIFICANT CHANGE UP (ref 0.2–1.2)
BUN SERPL-MCNC: 14 MG/DL — SIGNIFICANT CHANGE UP (ref 7–23)
CALCIUM SERPL-MCNC: 10.4 MG/DL — SIGNIFICANT CHANGE UP (ref 8.4–10.5)
CHLORIDE SERPL-SCNC: 101 MMOL/L — SIGNIFICANT CHANGE UP (ref 96–108)
CO2 SERPL-SCNC: 25 MMOL/L — SIGNIFICANT CHANGE UP (ref 22–31)
CREAT SERPL-MCNC: 0.93 MG/DL — SIGNIFICANT CHANGE UP (ref 0.5–1.3)
EOSINOPHIL # BLD AUTO: 0.1 K/UL — SIGNIFICANT CHANGE UP (ref 0–0.5)
EOSINOPHIL NFR BLD AUTO: 1.5 % — SIGNIFICANT CHANGE UP (ref 0–6)
GAS PNL BLDV: SIGNIFICANT CHANGE UP
GLUCOSE SERPL-MCNC: 86 MG/DL — SIGNIFICANT CHANGE UP (ref 70–99)
HCT VFR BLD CALC: 47.2 % — SIGNIFICANT CHANGE UP (ref 39–50)
HGB BLD-MCNC: 16.5 G/DL — SIGNIFICANT CHANGE UP (ref 13–17)
LYMPHOCYTES # BLD AUTO: 2 K/UL — SIGNIFICANT CHANGE UP (ref 1–3.3)
LYMPHOCYTES # BLD AUTO: 32.3 % — SIGNIFICANT CHANGE UP (ref 13–44)
MCHC RBC-ENTMCNC: 31.9 PG — SIGNIFICANT CHANGE UP (ref 27–34)
MCHC RBC-ENTMCNC: 35 GM/DL — SIGNIFICANT CHANGE UP (ref 32–36)
MCV RBC AUTO: 90.9 FL — SIGNIFICANT CHANGE UP (ref 80–100)
MONOCYTES # BLD AUTO: 0.7 K/UL — SIGNIFICANT CHANGE UP (ref 0–0.9)
MONOCYTES NFR BLD AUTO: 11 % — SIGNIFICANT CHANGE UP (ref 2–14)
NEUTROPHILS # BLD AUTO: 3.4 K/UL — SIGNIFICANT CHANGE UP (ref 1.8–7.4)
NEUTROPHILS NFR BLD AUTO: 54 % — SIGNIFICANT CHANGE UP (ref 43–77)
PLATELET # BLD AUTO: 275 K/UL — SIGNIFICANT CHANGE UP (ref 150–400)
POTASSIUM SERPL-MCNC: 4.3 MMOL/L — SIGNIFICANT CHANGE UP (ref 3.5–5.3)
POTASSIUM SERPL-SCNC: 4.3 MMOL/L — SIGNIFICANT CHANGE UP (ref 3.5–5.3)
PROT SERPL-MCNC: 8.4 G/DL — HIGH (ref 6–8.3)
RBC # BLD: 5.19 M/UL — SIGNIFICANT CHANGE UP (ref 4.2–5.8)
RBC # FLD: 13 % — SIGNIFICANT CHANGE UP (ref 10.3–14.5)
SODIUM SERPL-SCNC: 141 MMOL/L — SIGNIFICANT CHANGE UP (ref 135–145)
WBC # BLD: 6.3 K/UL — SIGNIFICANT CHANGE UP (ref 3.8–10.5)
WBC # FLD AUTO: 6.3 K/UL — SIGNIFICANT CHANGE UP (ref 3.8–10.5)

## 2018-07-10 PROCEDURE — 99223 1ST HOSP IP/OBS HIGH 75: CPT

## 2018-07-10 PROCEDURE — 99285 EMERGENCY DEPT VISIT HI MDM: CPT

## 2018-07-10 RX ORDER — CEFAZOLIN SODIUM 1 G
2000 VIAL (EA) INJECTION EVERY 8 HOURS
Qty: 0 | Refills: 0 | Status: DISCONTINUED | OUTPATIENT
Start: 2018-07-11 | End: 2018-07-13

## 2018-07-10 RX ORDER — CEFAZOLIN SODIUM 1 G
VIAL (EA) INJECTION
Qty: 0 | Refills: 0 | Status: DISCONTINUED | OUTPATIENT
Start: 2018-07-10 | End: 2018-07-13

## 2018-07-10 RX ORDER — ENOXAPARIN SODIUM 100 MG/ML
40 INJECTION SUBCUTANEOUS DAILY
Qty: 0 | Refills: 0 | Status: DISCONTINUED | OUTPATIENT
Start: 2018-07-10 | End: 2018-07-14

## 2018-07-10 RX ORDER — CEFAZOLIN SODIUM 1 G
2000 VIAL (EA) INJECTION ONCE
Qty: 0 | Refills: 0 | Status: COMPLETED | OUTPATIENT
Start: 2018-07-10 | End: 2018-07-10

## 2018-07-10 RX ADMIN — Medication 100 MILLIGRAM(S): at 20:44

## 2018-07-10 NOTE — ED PROVIDER NOTE - PHYSICAL EXAMINATION
Pt in NAD, A&O x3. CN 2-12 grossly intact. Head NCAT, sclera white without injection, PERRLA, EOMI. Nares patent, turbinates without edema or erythema, no polyps or septal deviation. No auricular tenderness. Mouth and pharynx without erythema or exudates, +white mucous on posterior pharynx, uvula midline, no tonsillar enlargement. Trachea midline, no lymphadenopathy. No retractions or chest wall deformities. No chest wall tenderness, CTA anterior and posterior b/l, no wheezes, rales, or rhonchi. RRR, clear distinct S1, S2, no S3, S4, murmurs, gallops, or rubs. Abdomen without obvious deformities, lesions, or pulsations. Abdomen soft, non-tender, no organomegaly or masses. No CVAT b/l. 2+ carotid, radial, and ulnar pulses b/l. No edema or tenderness of the lower extremities. Pt and wife wishe to leave his boot on his R foot and leg for fear of further infection.

## 2018-07-10 NOTE — PATIENT PROFILE ADULT. - AS SC BRADEN SENSORY
(4) no impairment pt a&ox3 bib mother @ bedside c/o cough and sob, mom reports fever @ home. Pt received on nebulizer tx, rr even and unlabored, lungs cta benny. maex4, in no apparent distress, cap refill <2sec, afebrile @ this time. mother educated on hospital policy regarding remaining @ bedside, mother verbalized udnrstanding. Updated on poc, will continue to monitor and reassess
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
## 2018-07-10 NOTE — CONSULT NOTE ADULT - SUBJECTIVE AND OBJECTIVE BOX
Patient is a 57y old  Male who presents with a chief complaint of plan for PICC line    HPI: 58 y/o M with h/o HTN, SHIELA, DVT and osteomyelitis of R foot s/p debridement and foot surgery x3 in February and March 2018, after being found to have sepsis with a foot wound pt was admitted and placed on IV abx, Foot surgeon and wound care appointments with Dr. Westbrook and Infectious Dz f/u with Dr. Cabello. 1 week ago pt went to a wound care f/u appt and was found to have some drainage from his skin graft, culture+, started on PO Keflex by Dr. Westbrook. Pt got an outpt MRI showing chronic osteomyelitis with abscess and sinus tract, he was seen by Dr. Cabello who recommended that the pt get a PICC line. Pt has been awaiting insurance approval and appointment for PICC line outpt since 7/6/18 and earlier today was told by Dr. Cabello to go to the hospital for PICC line placement rather than waiting longer. Pt has mild pain of his R foot, Foot and distal leg is in a boot, he has been NWB with crutches as per Dr. Cabello. Blood Cx 7/6/18 negative. Pt denies fever, chills, HA, chest/abdo/back pain, n/v/d/c, other rashes or joint pain, cough, or SOB.      PAST MEDICAL & SURGICAL HISTORY:  SHIELA (obstructive sleep apnea)  DVT (deep venous thrombosis): s/p right leg injury, 02/2018  CTA suggestive of &quot;possible PE&quot;, TTE negative  Unspecified open wound, right foot, sequela  Sciatica of right side  Essential hypertension  Status post appendectomy  H/O foot surgery: right foot x 3, 2018      MEDICATIONS  (STANDING):  ceFAZolin   IVPB        MEDICATIONS  (PRN):      Allergies    No Known Allergies    Intolerances        VITALS:    Vital Signs Last 24 Hrs  T(C): 36.8 (10 Jul 2018 21:00), Max: 36.8 (10 Jul 2018 21:00)  T(F): 98.3 (10 Jul 2018 21:00), Max: 98.3 (10 Jul 2018 21:00)  HR: 85 (10 Jul 2018 21:00) (85 - 93)  BP: 130/82 (10 Jul 2018 21:00) (124/79 - 130/82)  BP(mean): --  RR: 20 (10 Jul 2018 21:00) (20 - 20)  SpO2: 96% (10 Jul 2018 21:00) (95% - 96%)    LABS:                          16.5   6.3   )-----------( 275      ( 10 Jul 2018 20:24 )             47.2       07-10    141  |  101  |  14  ----------------------------<  86  4.3   |  25  |  0.93    Ca    10.4      10 Jul 2018 20:24    TPro  8.4<H>  /  Alb  4.8  /  TBili  0.4  /  DBili  x   /  AST  41<H>  /  ALT  110<H>  /  AlkPhos  93  07-10      CAPILLARY BLOOD GLUCOSE              LOWER EXTREMITY PHYSICAL EXAM:  Vascular: 2/4 DP/PT b/l, CFT <3 s to toes, b/l feet nl warmth  Neuro: Epicritic sensation absent to left foot  Msk: right foot s/p multiple I&Ds  Skin: R foot dorsal foot s/p multiple debridements with synthetic grafts, and split thickness skin grafts, now with dorsal medial border of skin graft w/opening, no purulent drainage, no surrounding erythema, wound probes to [facility]    RADIOLOGY & ADDITIONAL STUDIES:   < from: MR Foot w/wo IV Cont, Right (07.03.18 @ 17:27) >  EXAM:  MR FOOT WAW IC RT        PROCEDURE DATE:  07/03/2018           INTERPRETATION:    RIGHT FOOT MRI WITH AND WITHOUT CONTRAST    CLINICAL INFORMATION: Prior midfoot osteomyelitis with ulcer.  TECHNIQUE: Multiplanar, multisequence MRI was obtained of the right foot   with and without contrast. 10 cc of Gadavist was administered   intravenously. Comparison is made to prior MRI from 2/28/2018.    FINDINGS:    There is dorsal ulceration with a tract of peripheral enhancing fluid   that tracks from the region of dorsal ulceration to the second TMT joint.   There is marked subjacent soft tissue edema and heterogeneous enhancement   without a discretely encapsulated fluid collection to suggest an abscess.    Evaluation of the bone marrow demonstrates diffuse low signal marrow   within the second, third, fourth and fifth metatarsals with sparing of   only the metatarsal heads. There is low T1 signal within the cuneiforms   and the navicular bone and in the distal aspect of the cuboid. At each of   these affected bones there is prominent bone marrow edema and   enhancement. There is erosive change at each of these bones with   progressive disorganization and dislocation consistent with Charcot   arthropathy with superimposed osteomyelitis. The overall appearance is   worse than was seen on the prior study.    There is mild tibiotalar arthrosis with a moderate-sized enhancing joint   effusion compatible with synovitis. There is no osteomyelitis noted at   the tibiotalar joint.     There is susceptibility artifact in the lateral hindfoot likely related   to the staple seen on prior radiographs.    IMPRESSION:Ulceration at the dorsum of the midfoot with a sinus track   extending to the second TMT joint with marked surrounding soft tissue   edema compatible with cellulitis. Extensive Charcot arthropathy and   osteomyelitis within the midfoot that has progressed compared to the   prior study as detailed above.                   ARNEL JACKSON M.D., ATTENDING RADIOLOGIST [phone]  This document has been electronically signed. Jul 5 2018  2:38PM    < end of copied text >

## 2018-07-10 NOTE — H&P ADULT - HISTORY OF PRESENT ILLNESS
58 y/o M with h/o HTN, SHIELA, DVT and osteomyelitis of R foot s/p debridement and foot surgery x3 in February and March 2018, after being found to have sepsis with a foot wound pt was admitted and placed on IV abx, Foot surgeon and wound care appointments with Dr. Westbrook and Infectious Dz f/u with Dr. Cabello. 1 week ago pt went to a wound care f/u appt and was found to have some drainage from his skin graft, culture+, started on PO Keflex by Dr. Westbrook. Pt got an outpt MRI showing chronic osteomyelitis with abscess and sinus tract, he was seen by Dr. Cabello who recommended that the pt get a PICC line. Pt has been awaiting insurance approval and appointment for PICC line outpt since 7/6/18 and earlier today was told by Dr. Cabello to go to the hospital for PICC line placement rather than waiting longer. Pt has mild pain of his R foot, Foot and distal leg is in a boot, he has been NWB with crutches as per Dr. Cabello. Blood Cx 7/6/18 negative. Pt denies fever, chills, HA, chest/abdo/back pain, n/v/d/c, other rashes or joint pain, cough, or SOB. 56 y/o M with h/o HTN, SHIELA, DVT and osteomyelitis of R foot s/p debridement and foot surgery x3 in February and March 2018, complicated by MSSA/pseudomal sepsis with pulmonary infectious emboli/masses, s/p skin graft May 2018 now presented with drainage from R dorsum foot/skin graft wound for 10 days to Podiatry (Dr. Westbrook) and ID (Dr. Cabello), evaluated with X ray initially, concerning for recurrent OM, outpt MRI done, showing acute on chronic OM with cellulitis and sinus tract, treated with PO Keflex for 1wk w/o improvement, being sent in for IV Abx.  Currently c/o mild pain of his R foot, has been using a R foot/distal leg boot, and NWB RLE with crutches since. Wound Cx +, Blood Cx 7/6/18 negative. Pt denies fever, chills, HA, chest/abdo/back pain, n/v/d/c, other rashes or joint pain, cough, or SOB.

## 2018-07-10 NOTE — CONSULT NOTE ADULT - ASSESSMENT
58 yo M s/p multiple debridements 2/2 to nec fasc  - Pt seen and examined  - Dorsal foot wound, with no signs of active infection, however wound re-opened and is probing to bone  - Plan for PICC line per ID, Dr. Cabello's recommendations  - No acute podiatric intervention at this time  - Wound flusehd and cultured  - Wound dressed w/ dry sterile dressing, will continue aquacel on the floor  - D/w attending

## 2018-07-10 NOTE — ED ADULT TRIAGE NOTE - CHIEF COMPLAINT QUOTE
Pt has R foot injury 2/13, was admitted 2/21 for sepsis, pt had multiple debridement surgeries/wound vac for osteomyelitis, has stem cells placed, d/c in March to Rehab due to being weak, stayed at rehab 1 month, d/c home in April.  Pt stopped abx when he left Rehab on 4/13. Pt had to get a skin graft once stem celled had taken.   Pt here now to get PICC line for IV abx for new abscess. Pt being followed by ID Dr. Cabello.  PCP Dr. Raudel Miranda

## 2018-07-10 NOTE — ED PROVIDER NOTE - ATTENDING CONTRIBUTION TO CARE
Attending MD Redman.  Agree with above.  Pt is a 57 yr old male who has HTN and chronic osteomyelitis of R foot.  Hx of: On feb 13th pt sustained an injury at work with small break in skin/R foot strain.  Seen in ED and had XR and referred for outpt MRI and developed systemic sxs, came back to ED and found to be septic with osteomyelitis of R foot.  Admitted and s/p 3 foot surgeries by Dr. Westbrook.  Pt managed by wound care/foot surgeon.  Pt has been performing rehab x 1 mo.  Follows with Dr. Cabello of ID as outpt.  At wound care clinic recently pt found to have drainage from skin graft site.  Cx grew + gram stain.  Pt is now s/p repeat MRI.  He has been on Keflex.  Now this last Thursday MRI read as chronic osteomyelitis.  Dr. Cabello read with deep abscess with sinus tract draining to skin.  Pt has been pending picc line since last Friday.  Unable to get outpt picc, sent to ED for IV abxs and placement of picc inpt.  Blood cx last Friday with no growth.  No current systemic sxs.

## 2018-07-10 NOTE — H&P ADULT - PROBLEM SELECTOR PLAN 1
recent MRI with worsening of chronic OM with sinus tract/abscess, refractory to oral antibiotics  - will treat with IV Ancef  - PICC line eval and placement in am  - Podiatry eval appreciated  - ID eval in am

## 2018-07-10 NOTE — H&P ADULT - EXTREMITIES COMMENTS
muscle wasting of R calf, R foot swelling with dressing in place.  1cm R dorsum ulcer with yellow drainage (per patient) - requested not to open dressing placed by podiatry

## 2018-07-10 NOTE — ED PROVIDER NOTE - PROGRESS NOTE DETAILS
I spoke with Infectious dz, who was able to review Dr. Cabello's note. Dr. Cabello wanted Ancef 2g IV q8hrs. ID will see pt in the morning. Pt relates that he was told he would be admitted under Dr. Carlos Miranda. I paged Dr. Miranda. I spoke with Infectious dz, who was able to review Dr. Cabello's note. Dr. Cabello wanted Ancef 2g IV q8hrs. ID will see pt in the morning. Pt relates that he was told he would be admitted under Dr. Carlos Miranda. I paged Dr. Miranda. -Santi Jung PA-C I was contacted by Dr. Miranda, told that his admissions are going to Dr. Cates. Attending MD Redman.  PT updated re: plan of care. Ancef administered per ID recs.  Stable for admission and endorsed to Dr. Cates.

## 2018-07-10 NOTE — ED ADULT NURSE NOTE - OBJECTIVE STATEMENT
57 y.o M arrived to ED pmh HTN, osteomyelitis R foot. Pt A&Ox3. Pt states on 2/13/18 he sustained injury to R foot while at work as . unsure if he sustained any breaks in skin at the time. presented to [facility] ED at that time, was discharged instructed to followup with PMD.  PMD scheduled him for MRI. prior to receiving MRI pt became "delirious and having fevers", came back to ED found to be septic and admitted to hospital - suspected sepsis from osteomylitis R foot. Had several procedures on R foot and received IV ABX. also found at that time to have "an infection in his lungs" per pt wife. pt recently had skin care appt for R foot (has a skin graft there) - sample taken from graft showed infection, pt referred for MRI and prescribed keflex. had MRI about 6 days ago which was read by his internist showing osteomylitis who then referred him to ID MD who wants pt to have a PICC line re-inserted for IV ABX (pt needed PICC line previously for treatment of IV ABX for R foot). pt has been waiting for approval for PICC line to be inserted outpatient, but is worried he may develop sepsis again if waits too long so presented to ED. pt states he had blood cultures performed 5 days ago which were negative. upon assessment respirations nonlabored, pt in no acute distress, neuro intact, able to move all extremities. arrived to ED with R foot in boot. Denies CP, SOB, N/V/D, fevers, chills. Safety and comfort provided/maintained. Spouse at bedside.

## 2018-07-10 NOTE — ED PROVIDER NOTE - OBJECTIVE STATEMENT
56 y/o M with h/o HTN, SHIELA, DVT and osteomyelitis of R foot s/p debridement and foot surgery x3 in February and March 2018, after being found to have sepsis with a foot wound pt was admitted and placed on IV abx, Foot surgeon and wound care appointments with Dr. Westbrook and Infectious Dz f/u with Dr. Cabello. L 58 y/o M with h/o HTN, SHIELA, DVT and osteomyelitis of R foot s/p debridement and foot surgery x3 in February and March 2018, after being found to have sepsis with a foot wound pt was admitted and placed on IV abx, Foot surgeon and wound care appointments with Dr. Westbrook and Infectious Dz f/u with Dr. Cabello. 1 week ago pt went to a wound care f/u appt and was found to have some drainage from his skin graft, culture+, started on PO Keflex by Dr. Westbrook. Pt got an outpt MRI showing chronic osteomyelitis with abscess and sinus tract, he was seen by Dr. Cabello who recommended that the pt get a PICC line. Pt has been awaiting insurance approval and appointment for PICC line outpt since 7/6/18 and earlier today was told by Dr. Cabello to go to the hospital for PICC line placement rather than waiting longer. Pt has mild pain of his R foot, Foot and distal leg is in a boot, he has been NWB with crutches as per Dr. Cabello. Bllod Cx 7/6/18 negative. Pt denies fever, chills, HA, chest/abdo/back pain, n/v/d/c, other rashes or joint pain, cough, or SOB. 56 y/o M with h/o HTN, SHIELA, DVT and osteomyelitis of R foot s/p debridement and foot surgery x3 in February and March 2018, after being found to have sepsis with a foot wound pt was admitted and placed on IV abx, Foot surgeon and wound care appointments with Dr. Westbrook and Infectious Dz f/u with Dr. Cabello. 1 week ago pt went to a wound care f/u appt and was found to have some drainage from his skin graft, culture+, started on PO Keflex by Dr. Westbrook. Pt got an outpt MRI showing chronic osteomyelitis with abscess and sinus tract, he was seen by Dr. Cabello who recommended that the pt get a PICC line. Pt has been awaiting insurance approval and appointment for PICC line outpt since 7/6/18 and earlier today was told by Dr. Cabello to go to the hospital for PICC line placement rather than waiting longer. Pt has mild pain of his R foot, Foot and distal leg is in a boot, he has been NWB with crutches as per Dr. Cabello. Blood Cx 7/6/18 negative. Pt denies fever, chills, HA, chest/abdo/back pain, n/v/d/c, other rashes or joint pain, cough, or SOB.

## 2018-07-10 NOTE — H&P ADULT - FAMILY HISTORY
Father  Still living? Unknown  Family history of prostate cancer, Age at diagnosis: Age Unknown  Family history of cirrhosis of liver, Age at diagnosis: Age Unknown     Mother  Still living? Unknown  Family history of hypertension, Age at diagnosis: Age Unknown

## 2018-07-10 NOTE — H&P ADULT - ASSESSMENT
56 y/o M with h/o HTN, SHIELA, RLE DVT and osteomyelitis of R foot complicated by MSSA/pseudomal sepsis with pulmonary infectious emboli/masses, s/p 3 foot sx and skin graft Feb-May 2018, now p/w drainage from R dorsum wound refractory to oral Abx, admitted for recurrent OM/cellulitis with abscess and sinus tract requiring IV Abx.

## 2018-07-10 NOTE — H&P ADULT - NSHPLABSRESULTS_GEN_ALL_CORE
Labs personally reviewed  MRI report personally reviewed  EKG tracing personally reviewed Labs personally reviewed  R foot Xray and MRI report personally reviewed  EKG tracing personally reviewed

## 2018-07-11 ENCOUNTER — TRANSCRIPTION ENCOUNTER (OUTPATIENT)
Age: 58
End: 2018-07-11

## 2018-07-11 DIAGNOSIS — G47.33 OBSTRUCTIVE SLEEP APNEA (ADULT) (PEDIATRIC): ICD-10-CM

## 2018-07-11 DIAGNOSIS — M86.271 SUBACUTE OSTEOMYELITIS, RIGHT ANKLE AND FOOT: ICD-10-CM

## 2018-07-11 DIAGNOSIS — Z29.9 ENCOUNTER FOR PROPHYLACTIC MEASURES, UNSPECIFIED: ICD-10-CM

## 2018-07-11 DIAGNOSIS — I10 ESSENTIAL (PRIMARY) HYPERTENSION: ICD-10-CM

## 2018-07-11 DIAGNOSIS — I82.409 ACUTE EMBOLISM AND THROMBOSIS OF UNSPECIFIED DEEP VEINS OF UNSPECIFIED LOWER EXTREMITY: ICD-10-CM

## 2018-07-11 LAB
ANION GAP SERPL CALC-SCNC: 12 MMOL/L — SIGNIFICANT CHANGE UP (ref 5–17)
APTT BLD: 35.3 SEC — SIGNIFICANT CHANGE UP (ref 27.5–37.4)
BASOPHILS # BLD AUTO: 0.02 K/UL — SIGNIFICANT CHANGE UP (ref 0–0.2)
BASOPHILS NFR BLD AUTO: 0.4 % — SIGNIFICANT CHANGE UP (ref 0–2)
BUN SERPL-MCNC: 15 MG/DL — SIGNIFICANT CHANGE UP (ref 7–23)
CALCIUM SERPL-MCNC: 9.8 MG/DL — SIGNIFICANT CHANGE UP (ref 8.4–10.5)
CHLORIDE SERPL-SCNC: 102 MMOL/L — SIGNIFICANT CHANGE UP (ref 96–108)
CO2 SERPL-SCNC: 28 MMOL/L — SIGNIFICANT CHANGE UP (ref 22–31)
CREAT SERPL-MCNC: 1.05 MG/DL — SIGNIFICANT CHANGE UP (ref 0.5–1.3)
EOSINOPHIL # BLD AUTO: 0.09 K/UL — SIGNIFICANT CHANGE UP (ref 0–0.5)
EOSINOPHIL NFR BLD AUTO: 1.8 % — SIGNIFICANT CHANGE UP (ref 0–6)
GLUCOSE SERPL-MCNC: 105 MG/DL — HIGH (ref 70–99)
HCT VFR BLD CALC: 43.4 % — SIGNIFICANT CHANGE UP (ref 39–50)
HGB BLD-MCNC: 14.7 G/DL — SIGNIFICANT CHANGE UP (ref 13–17)
IMM GRANULOCYTES NFR BLD AUTO: 0.2 % — SIGNIFICANT CHANGE UP (ref 0–1.5)
INR BLD: 1.11 RATIO — SIGNIFICANT CHANGE UP (ref 0.88–1.16)
LYMPHOCYTES # BLD AUTO: 1.43 K/UL — SIGNIFICANT CHANGE UP (ref 1–3.3)
LYMPHOCYTES # BLD AUTO: 28.8 % — SIGNIFICANT CHANGE UP (ref 13–44)
MCHC RBC-ENTMCNC: 30.1 PG — SIGNIFICANT CHANGE UP (ref 27–34)
MCHC RBC-ENTMCNC: 33.9 GM/DL — SIGNIFICANT CHANGE UP (ref 32–36)
MCV RBC AUTO: 88.8 FL — SIGNIFICANT CHANGE UP (ref 80–100)
MONOCYTES # BLD AUTO: 0.69 K/UL — SIGNIFICANT CHANGE UP (ref 0–0.9)
MONOCYTES NFR BLD AUTO: 13.9 % — SIGNIFICANT CHANGE UP (ref 2–14)
NEUTROPHILS # BLD AUTO: 2.72 K/UL — SIGNIFICANT CHANGE UP (ref 1.8–7.4)
NEUTROPHILS NFR BLD AUTO: 54.9 % — SIGNIFICANT CHANGE UP (ref 43–77)
PLATELET # BLD AUTO: 225 K/UL — SIGNIFICANT CHANGE UP (ref 150–400)
POTASSIUM SERPL-MCNC: 4.6 MMOL/L — SIGNIFICANT CHANGE UP (ref 3.5–5.3)
POTASSIUM SERPL-SCNC: 4.6 MMOL/L — SIGNIFICANT CHANGE UP (ref 3.5–5.3)
PROTHROM AB SERPL-ACNC: 12.6 SEC — SIGNIFICANT CHANGE UP (ref 10–13.1)
RBC # BLD: 4.89 M/UL — SIGNIFICANT CHANGE UP (ref 4.2–5.8)
RBC # FLD: 13.9 % — SIGNIFICANT CHANGE UP (ref 10.3–14.5)
SODIUM SERPL-SCNC: 142 MMOL/L — SIGNIFICANT CHANGE UP (ref 135–145)
WBC # BLD: 4.96 K/UL — SIGNIFICANT CHANGE UP (ref 3.8–10.5)
WBC # FLD AUTO: 4.96 K/UL — SIGNIFICANT CHANGE UP (ref 3.8–10.5)

## 2018-07-11 PROCEDURE — 93010 ELECTROCARDIOGRAM REPORT: CPT

## 2018-07-11 PROCEDURE — 99233 SBSQ HOSP IP/OBS HIGH 50: CPT | Mod: GC

## 2018-07-11 RX ORDER — LOSARTAN POTASSIUM 100 MG/1
1 TABLET, FILM COATED ORAL
Qty: 0 | Refills: 0 | COMMUNITY

## 2018-07-11 RX ORDER — CEFAZOLIN SODIUM 1 G
2 VIAL (EA) INJECTION
Qty: 336 | Refills: 0 | OUTPATIENT
Start: 2018-07-11 | End: 2018-09-04

## 2018-07-11 RX ORDER — LIDOCAINE HCL 20 MG/ML
20 VIAL (ML) INJECTION ONCE
Qty: 0 | Refills: 0 | Status: COMPLETED | OUTPATIENT
Start: 2018-07-11 | End: 2018-07-11

## 2018-07-11 RX ORDER — LOSARTAN POTASSIUM 100 MG/1
50 TABLET, FILM COATED ORAL DAILY
Qty: 0 | Refills: 0 | Status: DISCONTINUED | OUTPATIENT
Start: 2018-07-11 | End: 2018-07-14

## 2018-07-11 RX ORDER — BECAPLERMIN 100 UG/G
1.25 GEL TOPICAL
Qty: 0 | Refills: 0 | COMMUNITY

## 2018-07-11 RX ADMIN — Medication 20 MILLILITER(S): at 10:00

## 2018-07-11 RX ADMIN — Medication 100 MILLIGRAM(S): at 05:49

## 2018-07-11 RX ADMIN — ENOXAPARIN SODIUM 40 MILLIGRAM(S): 100 INJECTION SUBCUTANEOUS at 14:56

## 2018-07-11 RX ADMIN — Medication 100 MILLIGRAM(S): at 21:39

## 2018-07-11 RX ADMIN — Medication 100 MILLIGRAM(S): at 14:55

## 2018-07-11 RX ADMIN — LOSARTAN POTASSIUM 50 MILLIGRAM(S): 100 TABLET, FILM COATED ORAL at 05:49

## 2018-07-11 NOTE — CONSULT NOTE ADULT - SUBJECTIVE AND OBJECTIVE BOX
HPI:  56 y/o M with h/o HTN, ETOH use d/o, SHIELA, DVT and OM of R foot s/p debridement and foot surgery x3 (2/2018) c/b MSSA bacteremia with pulmonary infectious emboli/masses, and nec fasc s/p skin graft (5/2018) now presented from o/p ID for recurrent R foot drainage concerning for acute on chronic OM. He was evaluated by Wound care on 6/28, and found to have with purulent drainage at the dorsum of R foot concerning for cellulitis. He received PO keflex x10d. Wound cx +MSSA with MRI showing sinus tract from 2nd TMt to ulcer of dorsum and OM. He was sent to ID for further evaluation, who recommended admission for PICC and IV abx.     Of note, He completed 6wks cefazolin and cipro on 4/12/2018 and underwent successful skin graft in 5/2018.     in the ED, VS T98, Hr 93, /79, RR20 spO2 95RA. He was started on cefazolin 1gq8. Blood cultures and wounds cultures were drawn.     PAST MEDICAL & SURGICAL HISTORY:  SHIELA (obstructive sleep apnea)  DVT (deep venous thrombosis): s/p right leg injury, 02/2018  CTA suggestive of &quot;possible PE&quot;, TTE negative  Unspecified open wound, right foot, sequela  Sciatica of right side  Essential hypertension  Status post appendectomy  H/O foot surgery: right foot x 3, 2018      Allergies  No Known Allergies        ANTIMICROBIALS:  ceFAZolin   IVPB 2000 every 8 hours  ceFAZolin   IVPB        OTHER MEDS: MEDICATIONS  (STANDING):  enoxaparin Injectable 40 daily  losartan 50 daily      SOCIAL HISTORY:  [ ] etoh [ ] tobacco [ ] former smoker [ ] IVDU    FAMILY HISTORY:  Family history of cirrhosis of liver (Father): Father  Family history of prostate cancer (Father): Father  Family history of hypertension (Mother): Mother      REVIEW OF SYSTEMS  [  ] ROS unobtainable because:    [  ] All other systems negative except as noted below:	    Constitutional:  [ ] fever [ ] chills  [ ] weight loss  [ ] weakness  Skin:  [ ] rash [ ] phlebitis	  Eyes: [ ] icterus [ ] pain  [ ] discharge	  ENMT: [ ] sore throat  [ ] thrush [ ] ulcers [ ] exudates  Respiratory: [ ] dyspnea [ ] hemoptysis [ ] cough [ ] sputum	  Cardiovascular:  [ ] chest pain [ ] palpitations [ ] edema	  Gastrointestinal:  [ ] nausea [ ] vomiting [ ] diarrhea [ ] constipation [ ] pain	  Genitourinary:  [ ] dysuria [ ] frequency [ ] hematuria [ ] discharge [ ] flank pain  [ ] incontinence  Musculoskeletal:  [ ] myalgias [ ] arthralgias [ ] arthritis  [ ] back pain  Neurological:  [ ] headache [ ] seizures  [ ] confusion/altered mental status  Psychiatric:  [ ] anxiety [ ] depression	  Hematology/Lymphatics:  [ ] lymphadenopathy  Endocrine:  [ ] adrenal [ ] thyroid  Allergic/Immunologic:	 [ ] transplant [ ] seasonal    Vital Signs Last 24 Hrs  T(F): 97.6 (07-11-18 @ 05:31), Max: 98.3 (07-10-18 @ 21:00)    Vital Signs Last 24 Hrs  HR: 63 (07-11-18 @ 05:31) (63 - 93)  BP: 116/74 (07-11-18 @ 05:31) (116/74 - 130/82)  RR: 20 (07-11-18 @ 05:31)  SpO2: 98% (07-11-18 @ 05:31) (95% - 98%)  Wt(kg): --    PHYSICAL EXAM:  General: non-toxic  HEAD/EYES: anicteric, PERRL  ENT:  supple  Cardiovascular:   S1, S2  Respiratory:  clear bilaterally  GI:  soft, non-tender, normal bowel sounds  :  no CVA tenderness   Musculoskeletal:  no synovitis  Neurologic:  grossly non-focal  Skin:  no rash  Lymph: no lymphadenopathy  Psychiatric:  appropriate affect  Vascular:  no phlebitis                                16.5   6.3   )-----------( 275      ( 10 Jul 2018 20:24 )             47.2       07-11    142  |  102  |  15  ----------------------------<  105<H>  4.6   |  28  |  1.05    Ca    9.8      11 Jul 2018 07:55    TPro  8.4<H>  /  Alb  4.8  /  TBili  0.4  /  DBili  x   /  AST  41<H>  /  ALT  110<H>  /  AlkPhos  93  07-10          MICROBIOLOGY:  Blood cx on O/p   7/6 - NG x2     Wound Cx:        Verified:06/30/2018 17:05       Moderate Staphylococcus aureus       Few Corynebacterium species         ***Susceptibility***                                 Staphylococcus                                 aureus                                   MDIL      MINT                                 ________  ______       Ampicillin/Sulbactam      <=8/4     S       Cefazolin                 <=4       S       Clindamycin               0.5       S       Erythromycin              0.5       S       Gentamicin(1)             <=1       S       Oxacillin                 0.5       S       Penicillin                >8        R       Rifampin(1)               <=1       S       Trimethoprim/Sulfa        <=0.5/9   S       Tetracycline              <=1       S       Vancomycin                2         S    v            RADIOLOGY:   MR Foot w/wo IV Cont, Right (07.03.18 @ 17:27)   IMPRESSION:Ulceration at the dorsum of the midfoot with a sinus track   extending to the second TMT joint with marked surrounding soft tissue   edema compatible with cellulitis. Extensive Charcot arthropathy and   osteomyelitis within the midfoot that has progressed compared to the   prior study as detailed above. HPI:  56 y/o M with h/o HTN, ETOH use d/o, SHIELA, DVT and R foot OM s/p debridement and foot surgery x3 (2/2018) c/b MSSA bacteremia with infectious pulmonary emboli/masses and R foot nec fasc s/p skin graft (5/2018) now presents for recurrent R foot drainage concerning for acute on chronic OM. He initially presented to Wound care clinic on 6/28 and found to have purulent drainage at the dorsum of R foot. He received PO keflex x10d and wound cx grew +MSSA. MRI showed sinus tract from 2nd TMT to ulcer of dorsum with surrounding soft tissue edema and worsening midfoot OM. He was referred to ID (Dr Cabello) for further evaluation, and planned to received PICC for IV abx. He was recommended to go to the hospital for PICC as insurance approval was taking too long.  Currently, he reports no pain in R foot. He reports a "pus-like drainage" without blood. He started been using crutches and maintaining weight off the foot since seeing Dr Cabello on 7/2. He denies recent fevers, chills, n/v, chest pain, dysuria, abd pain.   Of note, He previously completed 6wks cefazolin and cipro on 4/12/2018 for OM and underwent skin graft in 5/2018 for nec fasc of R foot.     in the ED, VS T98, Hr 93, /79, RR20 spO2 95RA. He was started on cefazolin 1gq8. Blood cultures and wounds cultures were drawn.     PAST MEDICAL & SURGICAL HISTORY:  SHIELA (obstructive sleep apnea)  DVT (deep venous thrombosis): s/p right leg injury, 02/2018  CTA suggestive of &quot;possible PE&quot;, TTE negative  Unspecified open wound, right foot, sequela  Sciatica of right side  Essential hypertension  Status post appendectomy  H/O foot surgery: right foot x 3, 2018      Allergies  No Known Allergies        ANTIMICROBIALS:  ceFAZolin   IVPB 2000 every 8 hours  ceFAZolin   IVPB        OTHER MEDS: MEDICATIONS  (STANDING):  enoxaparin Injectable 40 daily  losartan 50 daily      SOCIAL HISTORY:  [X ] etoh - social [ ] tobacco [ X] former smoker - 14py, quit 10y ago [ ] IVDU    FAMILY HISTORY:  Family history of cirrhosis of liver (Father): Father  Family history of prostate cancer (Father): Father  Family history of hypertension (Mother): Mother      REVIEW OF SYSTEMS  [  ] ROS unobtainable because:    [  ] All other systems negative except as noted below:	    Constitutional:  [ ] fever [ ] chills  [ ] weight loss  [ X] weakness  Skin:  [ ] rash [ ] phlebitis	  Eyes: [ ] icterus [ ] pain  [ ] discharge	  ENMT: [ ] sore throat  [ ] thrush [ ] ulcers [ ] exudates  Respiratory: [ ] dyspnea [ ] hemoptysis [ ] cough [ ] sputum	  Cardiovascular:  [ ] chest pain [ ] palpitations [ ] edema	  Gastrointestinal:  [ ] nausea [ ] vomiting [ ] diarrhea [ ] constipation [ ] pain	  Genitourinary:  [ ] dysuria [ ] frequency [ ] hematuria [ ] discharge [ ] flank pain  [ ] incontinence  Musculoskeletal:  [ ] myalgias [ ] arthralgias [ ] arthritis  [ ] back pain  Neurological:  [ ] headache [ ] seizures  [ ] confusion/altered mental status  Psychiatric:  [ ] anxiety [ ] depression	  Hematology/Lymphatics:  [ ] lymphadenopathy  Endocrine:  [ ] adrenal [ ] thyroid  Allergic/Immunologic:	 [ ] transplant [ ] seasonal    Vital Signs Last 24 Hrs  T(F): 97.6 (07-11-18 @ 05:31), Max: 98.3 (07-10-18 @ 21:00)    Vital Signs Last 24 Hrs  HR: 63 (07-11-18 @ 05:31) (63 - 93)  BP: 116/74 (07-11-18 @ 05:31) (116/74 - 130/82)  RR: 20 (07-11-18 @ 05:31)  SpO2: 98% (07-11-18 @ 05:31) (95% - 98%)  Wt(kg): --    PHYSICAL EXAM:  General: non-toxic  HEAD/EYES: anicteric, PERRL  ENT:  supple  Cardiovascular:   S1, S2  Respiratory:  clear bilaterally  GI:  soft, non-tender, normal bowel sounds  :  no CVA tenderness   Musculoskeletal:  no synovitis  Neurologic:  grossly non-focal  Skin:  R dorsal midfoot with ulceration 0.5in diameter, without drainage, +erythema  Lymph: no lymphadenopathy  Psychiatric:  appropriate affect  Vascular:  no phlebitis                                16.5   6.3   )-----------( 275      ( 10 Jul 2018 20:24 )             47.2       07-11    142  |  102  |  15  ----------------------------<  105<H>  4.6   |  28  |  1.05    Ca    9.8      11 Jul 2018 07:55    TPro  8.4<H>  /  Alb  4.8  /  TBili  0.4  /  DBili  x   /  AST  41<H>  /  ALT  110<H>  /  AlkPhos  93  07-10          MICROBIOLOGY:  Blood cx on O/p   7/6 - NG x2     Wound Cx:        Verified:06/30/2018 17:05       Moderate Staphylococcus aureus       Few Corynebacterium species         ***Susceptibility***                                 Staphylococcus                                 aureus                                   MDIL      MINT                                 ________  ______       Ampicillin/Sulbactam      <=8/4     S       Cefazolin                 <=4       S       Clindamycin               0.5       S       Erythromycin              0.5       S       Gentamicin(1)             <=1       S       Oxacillin                 0.5       S       Penicillin                >8        R       Rifampin(1)               <=1       S       Trimethoprim/Sulfa        <=0.5/9   S       Tetracycline              <=1       S       Vancomycin                2         S    v            RADIOLOGY:   MR Foot w/wo IV Cont, Right (07.03.18 @ 17:27)   FINDINGS:  There is dorsal ulceration with a tract of peripheral enhancing fluid   that tracks from the region of dorsal ulceration to the second TMT joint.   There is marked subjacent soft tissue edema and heterogeneous enhancement   without a discretely encapsulated fluid collection to suggest an abscess.    Evaluation of the bone marrow demonstrates diffuse low signal marrow   within the second, third, fourth and fifth metatarsals with sparing of   only the metatarsal heads. There is low T1 signal within the cuneiforms   and the navicular bone and in the distal aspect of the cuboid. At each of   these affected bones there is prominent bone marrow edema and   enhancement. There is erosive change at each of these bones with   progressive disorganization and dislocation consistent with Charcot   arthropathy with superimposed osteomyelitis. The overall appearance is   worse than was seen on the prior study.    There is mild tibiotalar arthrosis with a moderate-sized enhancing joint   effusion compatible with synovitis. There is no osteomyelitis noted at   the tibiotalar joint.     There is susceptibility artifact in the lateral hindfoot likely related   to the staple seen on prior radiographs.    IMPRESSION:Ulceration at the dorsum of the midfoot with a sinus track   extending to the second TMT joint with marked surrounding soft tissue   edema compatible with cellulitis. Extensive Charcot arthropathy and   osteomyelitis within the midfoot that has progressed compared to the   prior study as detailed above.    < end of copied text > HPI:  56 y/o M with h/o HTN, ETOH use d/o, SHIELA, DVT and R foot OM s/p debridement and foot surgery x3 (2/2018) c/b MSSA bacteremia with infectious pulmonary emboli/masses and R foot nec fasc s/p skin graft (5/2018) now presents for recurrent R foot drainage concerning for acute on chronic OM. He initially presented to Wound care clinic on 6/28 and found to have purulent drainage at the dorsum of R foot. He received PO keflex x10d and wound cx grew +MSSA. MRI showed sinus tract from 2nd TMT to ulcer of dorsum with surrounding soft tissue edema and worsening midfoot OM. He was referred to ID (Dr Cabello) for further evaluation, and planned to received PICC for IV abx. He was recommended to go to the hospital for PICC as insurance approval was taking too long.  Currently, he reports no pain in R foot. He reports a "pus-like drainage" without blood. He started been using crutches and maintaining weight off the foot since seeing Dr Cabello on 7/2. He denies recent fevers, chills, n/v, chest pain, dysuria, abd pain.   Of note, He previously completed 6wks cefazolin and cipro on 4/12/2018 for OM and underwent skin graft in 5/2018 for nec fasc of R foot.     in the ED, VS T98, Hr 93, /79, RR20 spO2 95RA. He was started on cefazolin 1gq8. Blood cultures and wounds cultures were drawn.     PAST MEDICAL & SURGICAL HISTORY:  SHIELA (obstructive sleep apnea)  DVT (deep venous thrombosis): s/p right leg injury, 02/2018  CTA suggestive of &quot;possible PE&quot;, TTE negative  Unspecified open wound, right foot, sequela  Sciatica of right side  Essential hypertension  Status post appendectomy  H/O foot surgery: right foot x 3, 2018      Allergies  No Known Allergies        ANTIMICROBIALS:  ceFAZolin   IVPB 2000 every 8 hours  ceFAZolin   IVPB        OTHER MEDS: MEDICATIONS  (STANDING):  enoxaparin Injectable 40 daily  losartan 50 daily      SOCIAL HISTORY:  , lives with his wife, [X ] etoh - social [ ] tobacco [ X] former smoker - 14py, quit 10y ago [ ] IVDU    FAMILY HISTORY:  Family history of cirrhosis of liver (Father): Father  Family history of prostate cancer (Father): Father  Family history of hypertension (Mother): Mother      REVIEW OF SYSTEMS  [  ] ROS unobtainable because:    [  ] All other systems negative except as noted below:	    Constitutional:  [ ] fever [ ] chills  [ ] weight loss  [ X] weakness  Skin:  [ ] rash [ ] phlebitis	  Eyes: [ ] icterus [ ] pain  [ ] discharge	  ENMT: [ ] sore throat  [ ] thrush [ ] ulcers [ ] exudates  Respiratory: [ ] dyspnea [ ] hemoptysis [ ] cough [ ] sputum	  Cardiovascular:  [ ] chest pain [ ] palpitations [ ] edema	  Gastrointestinal:  [ ] nausea [ ] vomiting [ ] diarrhea [ ] constipation [ ] pain	  Genitourinary:  [ ] dysuria [ ] frequency [ ] hematuria [ ] discharge [ ] flank pain  [ ] incontinence  Musculoskeletal: + draining wound on foot [ ] myalgias [ ] arthralgias [ ] arthritis  [ ] back pain  Neurological:  [ ] headache [ ] seizures  [ ] confusion/altered mental status  Psychiatric:  [ ] anxiety [ ] depression	  Hematology/Lymphatics:  [ ] lymphadenopathy  Endocrine:  [ ] adrenal [ ] thyroid  Allergic/Immunologic:	 [ ] transplant [ ] seasonal    Vital Signs Last 24 Hrs  T(F): 97.6 (07-11-18 @ 05:31), Max: 98.3 (07-10-18 @ 21:00)    Vital Signs Last 24 Hrs  HR: 63 (07-11-18 @ 05:31) (63 - 93)  BP: 116/74 (07-11-18 @ 05:31) (116/74 - 130/82)  RR: 20 (07-11-18 @ 05:31)  SpO2: 98% (07-11-18 @ 05:31) (95% - 98%)  Wt(kg): --    PHYSICAL EXAM:  General: non-toxic  HEAD/EYES: anicteric, PERRL  ENT:  supple  Cardiovascular:   S1, S2  Respiratory:  clear bilaterally  GI:  soft, non-tender, normal bowel sounds  :  no CVA tenderness   Musculoskeletal:  no synovitis  Neurologic:  grossly non-focal  Skin:  R dorsal midfoot with ulceration 0.5in diameter, without drainage, +erythema  Lymph: no lymphadenopathy  Psychiatric:  appropriate affect  Vascular:  no phlebitis                                16.5   6.3   )-----------( 275      ( 10 Jul 2018 20:24 )             47.2       07-11    142  |  102  |  15  ----------------------------<  105<H>  4.6   |  28  |  1.05    Ca    9.8      11 Jul 2018 07:55    TPro  8.4<H>  /  Alb  4.8  /  TBili  0.4  /  DBili  x   /  AST  41<H>  /  ALT  110<H>  /  AlkPhos  93  07-10          MICROBIOLOGY:  Blood cx on O/p   7/6 - NG x2     Wound Cx:        Verified:06/30/2018 17:05       Moderate Staphylococcus aureus       Few Corynebacterium species         ***Susceptibility***                                 Staphylococcus                                 aureus                                   MDIL      MINT                                 ________  ______       Ampicillin/Sulbactam      <=8/4     S       Cefazolin                 <=4       S       Clindamycin               0.5       S       Erythromycin              0.5       S       Gentamicin(1)             <=1       S       Oxacillin                 0.5       S       Penicillin                >8        R       Rifampin(1)               <=1       S       Trimethoprim/Sulfa        <=0.5/9   S       Tetracycline              <=1       S       Vancomycin                2         S    v            RADIOLOGY:   MR Foot w/wo IV Cont, Right (07.03.18 @ 17:27)   FINDINGS:  There is dorsal ulceration with a tract of peripheral enhancing fluid   that tracks from the region of dorsal ulceration to the second TMT joint.   There is marked subjacent soft tissue edema and heterogeneous enhancement   without a discretely encapsulated fluid collection to suggest an abscess.    Evaluation of the bone marrow demonstrates diffuse low signal marrow   within the second, third, fourth and fifth metatarsals with sparing of   only the metatarsal heads. There is low T1 signal within the cuneiforms   and the navicular bone and in the distal aspect of the cuboid. At each of   these affected bones there is prominent bone marrow edema and   enhancement. There is erosive change at each of these bones with   progressive disorganization and dislocation consistent with Charcot   arthropathy with superimposed osteomyelitis. The overall appearance is   worse than was seen on the prior study.    There is mild tibiotalar arthrosis with a moderate-sized enhancing joint   effusion compatible with synovitis. There is no osteomyelitis noted at   the tibiotalar joint.     There is susceptibility artifact in the lateral hindfoot likely related   to the staple seen on prior radiographs.    IMPRESSION:Ulceration at the dorsum of the midfoot with a sinus track   extending to the second TMT joint with marked surrounding soft tissue   edema compatible with cellulitis. Extensive Charcot arthropathy and   osteomyelitis within the midfoot that has progressed compared to the   prior study as detailed above.    < end of copied text >

## 2018-07-11 NOTE — DISCHARGE NOTE ADULT - HOME CARE AGENCY
[facility] for visiting RN for wound care [phone]  Formerly [facility] for IV antibiotics [phone]

## 2018-07-11 NOTE — DISCHARGE NOTE ADULT - PATIENT PORTAL LINK FT
You can access the [facility] Patient Portal, offered by [facility], by registering with the following website: [email or web address]

## 2018-07-11 NOTE — DISCHARGE NOTE ADULT - CARE PLAN
Principal Discharge DX:	Osteomyelitis of foot  Secondary Diagnosis:	Essential hypertension  Secondary Diagnosis:	SHIELA (obstructive sleep apnea) Principal Discharge DX:	Osteomyelitis of foot  Goal:	complete resolution  Assessment and plan of treatment:	Antibiotics via PICC line as instructed per infectious disease  Follow up with Dr. Cabello - infectious diseases in 6 - 7 weeks prior to completion of antibiotics  Wound Care: Please dress open right foot wound with silvercel or acqucel silver (ag) followed by 4x4 gauze, dede or kerlix, and ACE bandage  Weight bearing as tolerated  Please follow up with Dr. Westbrook at the wound care center call [phone] for appointment within 1 week  Secondary Diagnosis:	Essential hypertension  Assessment and plan of treatment:	Take medications for your blood pressure as recommended.  Eat a heart healthy diet that is low in saturated fats and salt, and includes whole grains, fruits, vegetables and lean protein   Exercise regularly (consult with your physician or cardiologist first); maintain a heart healthy weight.   If you smoke - quit (A resource to help you stop smoking is the [facility] – phone number [phone].). Continue to follow with your primary physician or cardiologist.   Seek medical help for dizziness, Lightheadedness, Blurry vision, Headache, Chest pain, Shortness of breath  Follow up with your medical doctor to establish long term blood pressure treatment goals.  Secondary Diagnosis:	SHIELA (obstructive sleep apnea)  Assessment and plan of treatment:	Continue CPAP Principal Discharge DX:	Other acute osteomyelitis of right foot  Goal:	complete resolution  Assessment and plan of treatment:	Antibiotics via PICC line as instructed per infectious disease  Follow up with Dr. Cabello - infectious diseases in 6 - 7 weeks prior to completion of antibiotics  Ancef IV UNTIL 9/8/18  Wound Care: Please dress open right foot wound with silvercel or acqucel silver (ag) followed by 4x4 gauze, dede or kerlix, and ACE bandage  Weight bearing as tolerated  Please follow up with Dr. Westbrook at the wound care center call [phone] for appointment within 1 week  Secondary Diagnosis:	Essential hypertension  Assessment and plan of treatment:	Take medications for your blood pressure as recommended.  Eat a heart healthy diet that is low in saturated fats and salt, and includes whole grains, fruits, vegetables and lean protein   Exercise regularly (consult with your physician or cardiologist first); maintain a heart healthy weight.   If you smoke - quit (A resource to help you stop smoking is the [facility] – phone number [phone].). Continue to follow with your primary physician or cardiologist.   Seek medical help for dizziness, Lightheadedness, Blurry vision, Headache, Chest pain, Shortness of breath  Follow up with your medical doctor to establish long term blood pressure treatment goals.  Secondary Diagnosis:	SHIELA (obstructive sleep apnea)  Assessment and plan of treatment:	Continue CPAP Principal Discharge DX:	Other acute osteomyelitis of right foot  Goal:	complete resolution  Assessment and plan of treatment:	Antibiotics via PICC line as instructed per infectious disease  Follow up with Dr. Cabello - infectious diseases in 6 - 7 weeks prior to completion of antibiotics  Vanco IV UNTIL 9/6/18  Wound Care: Please dress open right foot wound with silvercel or acqucel silver (ag) followed by 4x4 gauze, dede or kerlix, and ACE bandage  Weight bearing as tolerated  Please follow up with Dr. Westbrook at the wound care center call [phone] for appointment within 1 week  Secondary Diagnosis:	Essential hypertension  Assessment and plan of treatment:	Take medications for your blood pressure as recommended.  Eat a heart healthy diet that is low in saturated fats and salt, and includes whole grains, fruits, vegetables and lean protein   Exercise regularly (consult with your physician or cardiologist first); maintain a heart healthy weight.   If you smoke - quit (A resource to help you stop smoking is the [facility] – phone number [phone].). Continue to follow with your primary physician or cardiologist.   Seek medical help for dizziness, Lightheadedness, Blurry vision, Headache, Chest pain, Shortness of breath  Follow up with your medical doctor to establish long term blood pressure treatment goals.  Secondary Diagnosis:	SHIELA (obstructive sleep apnea)  Assessment and plan of treatment:	Continue CPAP

## 2018-07-11 NOTE — DISCHARGE NOTE ADULT - CARE PROVIDERS DIRECT ADDRESSES
,[email or web address]

## 2018-07-11 NOTE — DISCHARGE NOTE ADULT - HOSPITAL COURSE
58 y/o M with h/o HTN, ETOH use d/o, SHIELA, DVT and R foot OM s/p debridement and foot surgery x3 (2/2018) c/b MSSA bacteremia with infectious pulmonary emboli/masses and R foot nec fasc s/p skin graft (5/2018) now presents for recurrent R foot drainage concerning for acute on chronic OM. He initially presented to Wound care clinic on 6/28 and found to have purulent drainage at the dorsum of R foot. He received PO keflex x10d and wound cx grew +MSSA. MRI showed sinus tract from 2nd TMT to ulcer of dorsum with surrounding soft tissue edema and worsening midfoot OM. He was referred to ID (Dr Cabello) for further evaluation, and planned to received PICC for IV abx. He was recommended to go to the hospital for PICC as insurance approval was taking too long.  PICC place on 7/12, cefazolin for 8 weeks to hope for eradication of MSSA infection but it is very likely to recur if no surgical interventions will be performed 56 y/o M with h/o HTN, ETOH use d/o, SHIELA, DVT and R foot OM s/p debridement and foot surgery x3 (2/2018) c/b MSSA bacteremia with infectious pulmonary emboli/masses and R foot nec fasc s/p skin graft (5/2018) now presents for recurrent R foot drainage concerning for acute on chronic OM. He initially presented to Wound care clinic on 6/28 and found to have purulent drainage at the dorsum of R foot. He received PO keflex x10d and wound cx grew +MSSA. MRI showed sinus tract from 2nd TMT to ulcer of dorsum with surrounding soft tissue edema and worsening midfoot OM. He was referred to ID (Dr Cabello) for further evaluation, and planned to received PICC for IV abx. He was recommended to go to the hospital for PICC as insurance approval was taking too long.  PICC place on 7/12, cefazolin for 8 weeks to hope for eradication of MSSA infection but it is very likely to recur if no surgical interventions will be performed  Please follow up with Dr. Westbrook at the wound care center call [phone] for appointment within 1 week  Follow up with Dr. Cabello - infectious diseases in 6 - 7 weeks prior to completion of antibiotics 56 y/o M with h/o HTN, ETOH use d/o, SHIELA, DVT and R foot OM s/p debridement and foot surgery x3 (2/2018) c/b MSSA bacteremia with infectious pulmonary emboli/masses and R foot nec fasc s/p skin graft (5/2018) now presents for recurrent R foot drainage concerning for acute on chronic OM. He initially presented to Wound care clinic on 6/28 and found to have purulent drainage at the dorsum of R foot. He received PO keflex x10d and wound cx grew +MSSA. MRI showed sinus tract from 2nd TMT to ulcer of dorsum with surrounding soft tissue edema and worsening midfoot OM. He was referred to ID (Dr Cabello) for further evaluation, and planned to received PICC for IV abx. He was recommended to go to the hospital for PICC as insurance approval was taking too long.  PICC place on 7/12, Vanco for 8 weeks to hope for eradication of MSSA infection but it is very likely to recur if no surgical interventions will be performed.  MRSA found in foot wound so abx changed to Vanco on 7/13/18  Please follow up with Dr. Westbrook at the wound care center call [phone] for appointment within 1 week  Follow up with Dr. Cabello - infectious diseases in 6 - 7 weeks prior to completion of antibiotics

## 2018-07-11 NOTE — PROGRESS NOTE ADULT - SUBJECTIVE AND OBJECTIVE BOX
Patient is a 57y old  Male who presents with a chief complaint of acute on chronic OM (11 Jul 2018 10:26)       INTERVAL HPI/OVERNIGHT EVENTS:  Patient seen and evaluated at bedside.  Pt is resting comfortable in NAD. Denies N/V/F/C.     Allergies    No Known Allergies    Intolerances        Vital Signs Last 24 Hrs  T(C): 36.4 (11 Jul 2018 05:31), Max: 36.8 (10 Jul 2018 21:00)  T(F): 97.6 (11 Jul 2018 05:31), Max: 98.3 (10 Jul 2018 21:00)  HR: 63 (11 Jul 2018 05:31) (63 - 93)  BP: 116/74 (11 Jul 2018 05:31) (116/74 - 130/82)  BP(mean): --  RR: 20 (11 Jul 2018 05:31) (20 - 20)  SpO2: 98% (11 Jul 2018 05:31) (95% - 98%)    LABS:                        14.7   4.96  )-----------( 225      ( 11 Jul 2018 09:40 )             43.4     07-11    142  |  102  |  15  ----------------------------<  105<H>  4.6   |  28  |  1.05    Ca    9.8      11 Jul 2018 07:55    TPro  8.4<H>  /  Alb  4.8  /  TBili  0.4  /  DBili  x   /  AST  41<H>  /  ALT  110<H>  /  AlkPhos  93  07-10    PT/INR - ( 11 Jul 2018 09:40 )   PT: 12.6 sec;   INR: 1.11 ratio         PTT - ( 11 Jul 2018 09:40 )  PTT:35.3 sec    CAPILLARY BLOOD GLUCOSE          Lower Extremity Physical Exam:  Vascular: 2/4 DP/PT b/l, CFT <3 s to toes, b/l feet nl warmth  Neuro: Epicritic sensation absent to left foot  Msk: right foot s/p multiple I&Ds  Skin: R foot dorsal foot s/p multiple debridements with synthetic grafts, and split thickness skin grafts, now with dorsal medial border of skin graft w/opening, no purulent drainage, no surrounding erythema, [facility]    RADIOLOGY & ADDITIONAL TESTS:

## 2018-07-11 NOTE — DISCHARGE NOTE ADULT - PLAN OF CARE
complete resolution Antibiotics via PICC line as instructed per infectious disease  Follow up with Dr. Cabello - infectious diseases in 6 - 7 weeks prior to completion of antibiotics  Wound Care: Please dress open right foot wound with silvercel or acqucel silver (ag) followed by 4x4 gauze, dede or kerlix, and ACE bandage  Weight bearing as tolerated  Please follow up with Dr. Westbrook at the wound care center call [phone] for appointment within 1 week Take medications for your blood pressure as recommended.  Eat a heart healthy diet that is low in saturated fats and salt, and includes whole grains, fruits, vegetables and lean protein   Exercise regularly (consult with your physician or cardiologist first); maintain a heart healthy weight.   If you smoke - quit (A resource to help you stop smoking is the [facility] – phone number [phone].). Continue to follow with your primary physician or cardiologist.   Seek medical help for dizziness, Lightheadedness, Blurry vision, Headache, Chest pain, Shortness of breath  Follow up with your medical doctor to establish long term blood pressure treatment goals. Continue CPAP Antibiotics via PICC line as instructed per infectious disease  Follow up with Dr. Cabello - infectious diseases in 6 - 7 weeks prior to completion of antibiotics  Ancef IV UNTIL 9/8/18  Wound Care: Please dress open right foot wound with silvercel or acqucel silver (ag) followed by 4x4 gauze, dede or kerlix, and ACE bandage  Weight bearing as tolerated  Please follow up with Dr. Westbrook at the wound care center call [phone] for appointment within 1 week Antibiotics via PICC line as instructed per infectious disease  Follow up with Dr. Cabello - infectious diseases in 6 - 7 weeks prior to completion of antibiotics  Vanco IV UNTIL 9/6/18  Wound Care: Please dress open right foot wound with silvercel or acqucel silver (ag) followed by 4x4 gauze, dede or kerlix, and ACE bandage  Weight bearing as tolerated  Please follow up with Dr. Westbrook at the wound care center call [phone] for appointment within 1 week

## 2018-07-11 NOTE — CONSULT NOTE ADULT - SUBJECTIVE AND OBJECTIVE BOX
HISTORY OF PRESENT ILLNESS:  58 y/o M with h/o HTN,  historically normal LV function with no known h.o CAD/MI, SHIELA, DVT (not on full AC) and osteomyelitis of R foot s/p debridement and foot surgery x3 in February and March 2018, complicated by MSSA/pseudomal sepsis with pulmonary infectious emboli/masses, with an unremarkable KELVIN at that time,  s/p skin graft May 2018 now presented with drainage from R dorsum foot/skin graft wound for 10 days to Podiatry (Dr. Westbrook) and ID (Dr. Cabello), evaluated with X ray initially, concerning for recurrent OM.  Outpt MRI revealed acute on chronic OM with cellulitis and sinus tract, treated with PO Keflex for 1wk w/o improvement, being sent in for IV Abx.  Currently c/o mild pain of his R foot, has been using a R foot/distal leg boot, and NWB RLE with crutches since. Wound Cx +, Blood Cx 7/6/18 negative. Pt denies anginal chest pain, dyspnea, palpitations, syncope or near syncope.     PAST MEDICAL & SURGICAL HISTORY:  SHIELA (obstructive sleep apnea)  DVT (deep venous thrombosis): s/p right leg injury, 02/2018  CTA suggestive of &quot;possible PE&quot;, TTE negative  Unspecified open wound, right foot, sequela  Sciatica of right side  Essential hypertension  Status post appendectomy  H/O foot surgery: right foot x 3, 2018        MEDICATIONS  (STANDING):  ceFAZolin   IVPB 2000 milliGRAM(s) IV Intermittent every 8 hours  ceFAZolin   IVPB      enoxaparin Injectable 40 milliGRAM(s) SubCutaneous daily  losartan 50 milliGRAM(s) Oral daily      Allergies  No Known Allergies      FAMILY HISTORY:  Family history of cirrhosis of liver (Father): Father  Family history of prostate cancer (Father): Father  Family history of hypertension (Mother): Mother  Non-contributary for premature coronary disease or sudden cardiac death    SOCIAL HISTORY:    [x ] Non-smoker  [ ] Smoker  [x ] Alcohol      REVIEW OF SYSTEMS:  [ ]chest pain  [  ]shortness of breath  [  ]palpitations  [  ]syncope  [ ]near syncope [ ]upper extremity weakness   [ ] lower extremity weakness  [  ]diplopia  [  ]altered mental status [x] r foot pain  [  ]fevers  [ ]chills [ ]nausea  [ ]vomitting  [  ]dysphagia    [ ]abdominal pain  [ ]melena  [ ]BRBPR    [  ]epistaxis  [  ]rash    [ ]lower extremity edema        [x ] All others negative	  [ ] Unable to obtain    PHYSICAL EXAM:  T(C): 36.9 (07-11-18 @ 11:58), Max: 36.9 (07-11-18 @ 11:58)  HR: 66 (07-11-18 @ 11:58) (63 - 93)  BP: 135/71 (07-11-18 @ 11:58) (116/74 - 135/71)  RR: 19 (07-11-18 @ 11:58) (19 - 20)  SpO2: 97% (07-11-18 @ 11:58) (95% - 98%)  Wt(kg): --    Appearance: Normal	  HEENT:   Normal oral mucosa, PERRL, EOMI	  Lymphatic: No lymphadenopathy , no edema  Cardiovascular: Normal S1 S2, No JVD, No murmurs   Respiratory: Lungs clear to auscultation, normal effort 	  Gastrointestinal:  Soft, Non-tender, + BS	  Skin: No rashes, No ecchymoses, No cyanosis, warm to touch  DSD at R foot  Musculoskeletal: Normal range of motion, normal strength  Psychiatry:  Mood & affect appropriate      TELEMETRY: 	n/a      ECG:  	 NSR no acute ischemia    Echo: < from: KELVIN w/TTE (w/3D Echo) (03.08.18 @ 07:21) >  Mitral Valve: Mitral annular calcification, otherwise  normal mitral valve.  Aortic Valve/Aorta: Thickened trileaflet aortic valve with  normal opening. Mild aortic regurgitation.  Mild atheroma noted in aortic arch/descending aorta.  Left Atrium: No left atrial or left atrial appendage  thrombus.  Left Ventricle: Normal left ventricular systolic function.  Normal left ventricular internal dimensions and wall  thicknesses.  Right Heart: Normal right atrium. Normal right ventricular  size and function. Normal tricuspid valve. Normal pulmonic  valve.  Pericardium/Pleura: Normal pericardium with no pericardial  effusion.  Hemodynamic: Contrastinjection demonstrates no evidence of  a patent foramen ovale.    < end of copied text >     NST: reportedly normal from outside cardiologist > 2 years ago    Cath: n/a  	  	  LABS:	 	                          14.7   4.96  )-----------( 225      ( 11 Jul 2018 09:40 )             43.4     07-11    142  |  102  |  15  ----------------------------<  105<H>  4.6   |  28  |  1.05    Ca    9.8      11 Jul 2018 07:55    TPro  8.4<H>  /  Alb  4.8  /  TBili  0.4  /  DBili  x   /  AST  41<H>  /  ALT  110<H>  /  AlkPhos  93  07-10        ASSESSMENT/PLAN: 58 y/o M with h/o HTN,  historically normal LV function with no known h.o CAD/MI, SHIELA, DVT (not on full AC) and osteomyelitis of R foot s/p debridement and foot surgery x3 in February and March 2018, complicated by MSSA/pseudomal sepsis with pulmonary infectious emboli/masses, with an unremarkable KELVIN at that time,  s/p skin graft May 2018 now presented with drainage from R dorsum foot/skin graft wound for 10 days to Podiatry (Dr. Westbrook) and ID (Dr. Cabello), evaluated with X ray initially, concerning for recurrent OM.  Outpt MRI revealed acute on chronic OM with cellulitis and sinus tract, treated with PO Keflex for 1wk w/o improvement, being sent in for IV Abx.  Currently c/o mild pain of his R foot, has been using a R foot/distal leg boot, and NWB RLE with crutches since. Wound Cx +, Blood Cx 7/6/18 negative. Pt denies anginal chest pain, dyspnea, palpitations, syncope or near syncope.       -- blood cultures at this time are negative  -- ID noted - c/w IV abx  -- HTN well controlled- c/w Losartan  -- HD stable with no evidence CHF chronically   -- outpt f/u with Dr Aranda for cardio upon dc

## 2018-07-11 NOTE — PROGRESS NOTE ADULT - SUBJECTIVE AND OBJECTIVE BOX
Patient is a 57y old  Male who presents with a chief complaint of acute on chronic OM (11 Jul 2018 10:26)      SUBJECTIVE / OVERNIGHT EVENTS:   Feels better.  Denies CP/SOB/Palpitation/HA.    MEDICATIONS  (STANDING):  ceFAZolin   IVPB 2000 milliGRAM(s) IV Intermittent every 8 hours  ceFAZolin   IVPB      enoxaparin Injectable 40 milliGRAM(s) SubCutaneous daily  losartan 50 milliGRAM(s) Oral daily    MEDICATIONS  (PRN):        CAPILLARY BLOOD GLUCOSE        I&O's Summary    11 Jul 2018 07:01  -  11 Jul 2018 16:38  --------------------------------------------------------  IN: 540 mL / OUT: 300 mL / NET: 240 mL        PHYSICAL EXAM:  GENERAL: NAD, well-developed  HEAD:  Atraumatic, Normocephalic  NECK: Supple, No JVD  CHEST/LUNG: Clear to auscultation bilaterally; No wheezing.  HEART: Regular rate and rhythm; No murmurs, rubs, or gallops  ABDOMEN: Soft, Nontender, Nondistended; Bowel sounds present  EXTREMITIES:   No clubbing, cyanosis, or edema  NEUROLOGY: AAO X 3  SKIN: No rashes    LABS:                        14.7   4.96  )-----------( 225      ( 11 Jul 2018 09:40 )             43.4     07-11    142  |  102  |  15  ----------------------------<  105<H>  4.6   |  28  |  1.05    Ca    9.8      11 Jul 2018 07:55    TPro  8.4<H>  /  Alb  4.8  /  TBili  0.4  /  DBili  x   /  AST  41<H>  /  ALT  110<H>  /  AlkPhos  93  07-10    PT/INR - ( 11 Jul 2018 09:40 )   PT: 12.6 sec;   INR: 1.11 ratio         PTT - ( 11 Jul 2018 09:40 )  PTT:35.3 sec        CAPILLARY BLOOD GLUCOSE                    RADIOLOGY & ADDITIONAL TESTS:    Imaging Personally Reviewed:    Consultant(s) Notes Reviewed:      Care Discussed with Consultants/Other Providers:

## 2018-07-11 NOTE — DISCHARGE NOTE ADULT - MEDICATION SUMMARY - MEDICATIONS TO TAKE
I will START or STAY ON the medications listed below when I get home from the hospital:    CBC, CMP Q Week  -- Send results to Dr. Cabello - Infectious diseases  -- Indication: For On IV Antibiotics    losartan 50 mg oral tablet  -- 1 tab(s) by mouth once a day, am  -- Indication: For blood pressure control    ceFAZolin 2 g/50 mL-D5% intravenous solution  -- 2 gram(s) intravenous every 8 hours   x 8 weeks (through September 4, 2018)  ICD M86.671  ID MD - Dr. July Cabello ph: [phone]                                      Fax: [phone]  -- Indication: For Osteomyelitis I will START or STAY ON the medications listed below when I get home from the hospital:    CBC, CMP and Vanco Trough every Week  -- Send results to Dr. Cabello - Infectious Disease  ID MD - Dr. July Cabello ph: [phone]                                      Fax: [phone]    -- Indication: For OSteomyelitis    losartan 50 mg oral tablet  -- 1 tab(s) by mouth once a day, am  -- Indication: For blood pressure control    vancomycin 1.25 g/250 mL-NaCl 0.9% intravenous solution  -- 250 milliliter(s) intravenous every 12 hours   through September 6, 2018  ICD M86.671  ID MD - Dr. July Cabello ph: [phone]                                      Fax: [phone]  -- Indication: For Osteomyelitis      -- Indication: For OSteomyelitis I will START or STAY ON the medications listed below when I get home from the hospital:    CBC, CMP and Vanco Trough every Week  -- Send results to Dr. Cabello - Infectious Disease  ID MD - Dr. July Cabello ph: [phone]                                      Fax: [phone]    -- Indication: For OSteomyelitis    losartan 50 mg oral tablet  -- 1 tab(s) by mouth once a day, am  -- Indication: For blood pressure control    vancomycin 1.25 g/250 mL-NaCl 0.9% intravenous solution  -- 250 milliliter(s) intravenous every 12 hours   through September 6, 2018  ICD M86.671  ID MD - Dr. July Cabello ph: [phone]                                      Fax: [phone]  -- Indication: For Osteomyelitis      -- Indication: For OSteomyelitis

## 2018-07-11 NOTE — DISCHARGE NOTE ADULT - ADDITIONAL INSTRUCTIONS
Wound Care: Please dress open right foot wound with silvercel or acqucel silver (ag) followed by 4x4 gauze, dede or kerlix, and ACE bandage  Weight bearing as tolerated  Please follow up with Dr. Westbrook at the wound care center call [phone] for appointment within 1 week  Antibiotics via PICC line as instructed per infectious disease Wound Care: Please dress open right foot wound with silvercel or acqucel silver (ag) followed by 4x4 gauze, dede or kerlix, and ACE bandage  Weight bearing as tolerated  Please follow up with Dr. Westbrook at the wound care center call [phone] for appointment within 1 week  Antibiotics via PICC line as instructed per infectious disease  Follow up with Dr. Cabello - infectious diseases in 6 - 7 weeks prior to completion of antibiotics Antibiotics via PICC line as instructed per infectious disease - PICC placed on 7/12/18  Follow up with Dr. Cabello - infectious diseases in 6 - 7 weeks prior to completion of antibiotics (UNTIL 9/8/18)  CBC and CMP weekly - next 7/18 and FAX results to Dr. July Cabello ph: [phone], Fax: [phone]  Wound Care: Please dress open right foot wound with silvercel or acqucel silver (ag) followed by 4x4 gauze, dede or kerlix, and ACE bandage  Weight bearing as tolerated  Please follow up with Dr. Westbrook at the wound care center call [phone] for appointment within 1 week Antibiotics via PICC line as instructed per infectious disease - PICC placed on 7/12/18  Follow up with Dr. Cabello - infectious diseases in 6 - 7 weeks prior to completion of antibiotics (UNTIL 9/6/18)  CBC, CMP, and Vanco trough weekly - next 7/18 and FAX results to Dr. July Cabello ph: [phone], Fax: [phone]  Wound Care: Please dress open right foot wound with silvercel or acqucel silver (ag) followed by 4x4 gauze, dede or kerlix, and ACE bandage  Weight bearing as tolerated  Please follow up with Dr. Westbrook at the wound care center call [phone] for appointment within 1 week

## 2018-07-11 NOTE — CONSULT NOTE ADULT - ATTENDING COMMENTS
Agree with above assessment and plan as outlined above.    - f/u cx  - no clinical CHF    Blair Benjamin MD, FACC

## 2018-07-11 NOTE — DISCHARGE NOTE ADULT - MEDICATION SUMMARY - MEDICATIONS TO STOP TAKING
I will STOP taking the medications listed below when I get home from the hospital:    Keflex 500 mg oral capsule  -- 1 cap(s) by mouth 4 times a day (with meals and at bedtime)

## 2018-07-11 NOTE — PROGRESS NOTE ADULT - ASSESSMENT
56 yo M s/p multiple debridements 2/2 to nec fasc  - Pt seen and examined  - Dorsal foot wound, with no signs of active infection, however wound re-opened and is probing to bone  - Plan for PICC line per ID, Dr. Cabello's recommendations  - No acute podiatric intervention at this time  - Deep culture taken, foot prepped with betadine, 17cc 1% lidocaine injected using sterile technique ankle block, using sterile technique 18 gauge needle aspirate of 2nd tarsometatarsal space & plantar, no purulence appreciated  - Wound dressed w/ dry sterile dressing, will continue aquacel on the floor

## 2018-07-11 NOTE — DISCHARGE NOTE ADULT - CARE PROVIDER_API CALL
July Cabello), Infectious Disease; Internal Medicine  [address] [place] [address]  Phone: [phone]  Fax: [phone] July Cabello), Infectious Disease; Internal Medicine  [address] [place] [id number]  Phone: [phone]  Fax: [phone]    Kulwant Westbrook), Podiatric Medicine and Surgery  [address] [place] [address]  Phone: [phone]  Fax: [phone]

## 2018-07-11 NOTE — PROGRESS NOTE ADULT - ASSESSMENT
· Assessment	  58 y/o M with h/o HTN, SHIELA, RLE DVT and osteomyelitis of R foot complicated by MSSA/pseudomal sepsis with pulmonary infectious emboli/masses, s/p 3 foot sx and skin graft Feb-May 2018, now p/w drainage from R dorsum wound refractory to oral Abx, admitted for recurrent OM/cellulitis with abscess and sinus tract requiring IV Abx.       Problem/Plan - 1:  ·  Problem: Subacute osteomyelitis of right foot.  Plan: recent MRI with worsening of chronic OM with sinus tract/abscess, refractory to oral antibiotics  - will treat with IV Ancef  - PICC line   - Podiatry eval noted. S/p Deep tissue culture.      Problem/Plan - 2:  ·  Problem: Essential hypertension.  Plan: - cw Losartan.      Problem/Plan - 3:  ·  Problem: DVT (deep venous thrombosis).  Plan: Hx RLE DVT due to immobility s/p AC  - DVT prophyl.      Problem/Plan - 4:  ·  Problem: SHIELA (obstructive sleep apnea).  Plan: - CPAP qhs prn.     Dw family.

## 2018-07-12 ENCOUNTER — APPOINTMENT (OUTPATIENT)
Dept: WOUND CARE | Facility: CLINIC | Age: 58
End: 2018-07-12

## 2018-07-12 PROCEDURE — 71045 X-RAY EXAM CHEST 1 VIEW: CPT | Mod: 26

## 2018-07-12 PROCEDURE — 99233 SBSQ HOSP IP/OBS HIGH 50: CPT | Mod: GC

## 2018-07-12 RX ORDER — CEFAZOLIN SODIUM 1 G
2 VIAL (EA) INJECTION
Qty: 336 | Refills: 0 | OUTPATIENT
Start: 2018-07-12 | End: 2018-09-05

## 2018-07-12 RX ORDER — CEPHALEXIN 500 MG
1 CAPSULE ORAL
Qty: 0 | Refills: 0 | COMMUNITY

## 2018-07-12 RX ADMIN — Medication 100 MILLIGRAM(S): at 21:41

## 2018-07-12 RX ADMIN — Medication 100 MILLIGRAM(S): at 06:02

## 2018-07-12 RX ADMIN — ENOXAPARIN SODIUM 40 MILLIGRAM(S): 100 INJECTION SUBCUTANEOUS at 14:32

## 2018-07-12 RX ADMIN — Medication 100 MILLIGRAM(S): at 14:28

## 2018-07-12 RX ADMIN — LOSARTAN POTASSIUM 50 MILLIGRAM(S): 100 TABLET, FILM COATED ORAL at 06:02

## 2018-07-12 NOTE — PROGRESS NOTE ADULT - SUBJECTIVE AND OBJECTIVE BOX
Follow Up:  foot abscess and osteo with MSSA    Interval History: pt stable and afebrile, the I&D yesterday has staph aureus    ROS:      All other systems negative    Constitutional: no fever, no chills  Head: no trauma  Eyes: no vision changes, no eye pain  ENT:  no sore throat, no rhinorrhea  Cardiovascular:  no chest pain, no palpitation  Respiratory:  no SOB, no cough  GI:  no abd pain, no vomiting, no diarrhea  urinary: no dysuria, no hematuria, no flank pain  musculoskeletal:  controlled pain at the open site  skin:  no rash  neurology:  no headache, no seizure, no change in mental status  psych: no anxiety, no depression         Allergies  No Known Allergies        ANTIMICROBIALS:  ceFAZolin   IVPB 2000 every 8 hours  ceFAZolin   IVPB        OTHER MEDS:  enoxaparin Injectable 40 milliGRAM(s) SubCutaneous daily  losartan 50 milliGRAM(s) Oral daily      Vital Signs Last 24 Hrs  T(C): 36.7 (12 Jul 2018 05:52), Max: 37 (11 Jul 2018 20:59)  T(F): 98.1 (12 Jul 2018 05:52), Max: 98.6 (11 Jul 2018 20:59)  HR: 63 (12 Jul 2018 05:52) (63 - 66)  BP: 117/76 (12 Jul 2018 05:52) (115/57 - 135/71)  BP(mean): --  RR: 18 (12 Jul 2018 05:52) (18 - 19)  SpO2: 97% (12 Jul 2018 05:52) (97% - 98%)    PHYSICAL EXAM:  General: non-toxic  HEAD/EYES: anicteric, PERRL  ENT:  supple  Cardiovascular:   S1, S2  Respiratory:  clear bilaterally  GI:  soft, non-tender, normal bowel sounds  :  no CVA tenderness   Musculoskeletal:  no synovitis  Neurologic:  grossly non-focal  Skin:  R dorsal midfoot opening which was opened by podiatry yesterday and aspirated  Lymph: no lymphadenopathy  Psychiatric:  appropriate affect  Vascular:  no phlebitis                            14.7   4.96  )-----------( 225      ( 11 Jul 2018 09:40 )             43.4       07-11    142  |  102  |  15  ----------------------------<  105<H>  4.6   |  28  |  1.05    Ca    9.8      11 Jul 2018 07:55    TPro  8.4<H>  /  Alb  4.8  /  TBili  0.4  /  DBili  x   /  AST  41<H>  /  ALT  110<H>  /  AlkPhos  93  07-10          MICROBIOLOGY:  v  .Abscess right foot wound  07-11-18   Few Staphylococcus aureus  --  --      .Blood Blood-Venous  07-10-18   No growth to date.  --  --                RADIOLOGY:    < from: MR Foot w/wo IV Cont, Right (07.03.18 @ 17:27) >    IMPRESSION:Ulceration at the dorsum of the midfoot with a sinus track   extending to the second TMT joint with marked surrounding soft tissue   edema compatible with cellulitis. Extensive Charcot arthropathy and   osteomyelitis within the midfoot that has progressed compared to the   prior study as detailed above.

## 2018-07-12 NOTE — PROGRESS NOTE ADULT - SUBJECTIVE AND OBJECTIVE BOX
Patient is a 57y old  Male who presents with a chief complaint of acute on chronic OM (11 Jul 2018 10:26)      SUBJECTIVE / OVERNIGHT EVENTS:   Feels better.  Denies CP/SOB/Palpitation/HA.    MEDICATIONS  (STANDING):  ceFAZolin   IVPB 2000 milliGRAM(s) IV Intermittent every 8 hours  ceFAZolin   IVPB      enoxaparin Injectable 40 milliGRAM(s) SubCutaneous daily  losartan 50 milliGRAM(s) Oral daily    MEDICATIONS  (PRN):        CAPILLARY BLOOD GLUCOSE        I&O's Summary    11 Jul 2018 07:01  -  12 Jul 2018 07:00  --------------------------------------------------------  IN: 900 mL / OUT: 900 mL / NET: 0 mL    12 Jul 2018 07:01  -  12 Jul 2018 21:21  --------------------------------------------------------  IN: 1500 mL / OUT: 0 mL / NET: 1500 mL        PHYSICAL EXAM:  GENERAL: NAD, well-developed  HEAD:  Atraumatic, Normocephalic  NECK: Supple, No JVD  CHEST/LUNG: Clear to auscultation bilaterally; No wheezing.  HEART: Regular rate and rhythm; No murmurs, rubs, or gallops  ABDOMEN: Soft, Nontender, Nondistended; Bowel sounds present  EXTREMITIES:   No clubbing, cyanosis, or edema  NEUROLOGY: AAO X 3  SKIN: No rashes    LABS:                        14.7   4.96  )-----------( 225      ( 11 Jul 2018 09:40 )             43.4     07-11    142  |  102  |  15  ----------------------------<  105<H>  4.6   |  28  |  1.05    Ca    9.8      11 Jul 2018 07:55      PT/INR - ( 11 Jul 2018 09:40 )   PT: 12.6 sec;   INR: 1.11 ratio         PTT - ( 11 Jul 2018 09:40 )  PTT:35.3 sec        CAPILLARY BLOOD GLUCOSE        07-11 @ 16:54  Culture-urine --  Culture results   No growth to date.  method type --  Organism --  Organism Identification --  Specimen source Skin deep cx of right foot wound  07-11 @ 00:23  Culture-urine --  Culture results   Few Staphylococcus aureus  method type --  Organism --  Organism Identification --  Specimen source .Abscess right foot wound  07-10 @ 23:01  Culture-urine --  Culture results   No growth to date.  method type --  Organism --  Organism Identification --  Specimen source .Blood Blood-Venous           07-11 @ 16:54  Culture blood --  Culture results   No growth to date.  Gram stain --  Gram stain blood --  Method type --  Organism --  Organism identification --  Specimen source Skin deep cx of right foot wound   07-11 @ 00:23  Culture blood --  Culture results   Few Staphylococcus aureus  Gram stain --  Gram stain blood --  Method type --  Organism --  Organism identification --  Specimen source .Abscess right foot wound   07-10 @ 23:01  Culture blood --  Culture results   No growth to date.  Gram stain --  Gram stain blood --  Method type --  Organism --  Organism identification --  Specimen source .Blood Blood-Venous      RADIOLOGY & ADDITIONAL TESTS:    Imaging Personally Reviewed:    Consultant(s) Notes Reviewed:      Care Discussed with Consultants/Other Providers:

## 2018-07-12 NOTE — PROGRESS NOTE ADULT - ASSESSMENT
· Assessment	  56 y/o M with h/o HTN, SHIELA, RLE DVT and osteomyelitis of R foot complicated by MSSA/pseudomal sepsis with pulmonary infectious emboli/masses, s/p 3 foot sx and skin graft Feb-May 2018, now p/w drainage from R dorsum wound refractory to oral Abx, admitted for recurrent OM/cellulitis with abscess and sinus tract requiring IV Abx.       Problem/Plan - 1:  ·  Problem: Subacute osteomyelitis of right foot.  Plan: recent MRI with worsening of chronic OM with sinus tract/abscess, refractory to oral antibiotics  -  IV Ancef  -S/p PICC line   - Podiatry f/up noted. S/p Deep tissue culture.      Problem/Plan - 2:  ·  Problem: Essential hypertension.  Plan: - cw Losartan.      Problem/Plan - 4:  ·  Problem: SHIELA (obstructive sleep apnea).  Plan: - CPAP qhs prn.     Dw family.

## 2018-07-12 NOTE — PROGRESS NOTE ADULT - SUBJECTIVE AND OBJECTIVE BOX
SUBJECTIVE: Patient with no anginal chest pain or shortness of breath  ROS otherwise negative       MEDICATIONS  (STANDING):  ceFAZolin   IVPB 2000 milliGRAM(s) IV Intermittent every 8 hours  ceFAZolin   IVPB      enoxaparin Injectable 40 milliGRAM(s) SubCutaneous daily  losartan 50 milliGRAM(s) Oral daily    MEDICATIONS  (PRN):      LABS:                        14.7   4.96  )-----------( 225      ( 11 Jul 2018 09:40 )             43.4       07-11    142  |  102  |  15  ----------------------------<  105<H>  4.6   |  28  |  1.05    Ca    9.8      11 Jul 2018 07:55    TPro  8.4<H>  /  Alb  4.8  /  TBili  0.4  /  DBili  x   /  AST  41<H>  /  ALT  110<H>  /  AlkPhos  93  07-10        PHYSICAL EXAM:  Vital Signs Last 24 Hrs  T(C): 36.7 (12 Jul 2018 05:52), Max: 37 (11 Jul 2018 20:59)  T(F): 98.1 (12 Jul 2018 05:52), Max: 98.6 (11 Jul 2018 20:59)  HR: 63 (12 Jul 2018 05:52) (63 - 66)  BP: 117/76 (12 Jul 2018 05:52) (115/57 - 135/71)  BP(mean): --  RR: 18 (12 Jul 2018 05:52) (18 - 19)  SpO2: 97% (12 Jul 2018 05:52) (97% - 98%)    HEENT: Normal Oral mucosa, PERRL, EOMI  Lymphatic: No obvious lymphadenopathy, No edema  Cardiovascular: Normal S1S2, No JVD, 1/6 KITTY, Peripheral pulses palpable 2+ B/L  Respiratory: Lungs clear to auscultation, normal effort  Gastrointestinal: Abdomen soft, ND, NT, +BS  Skin: Warm, dry, intact. No cyanosis, No rash.  Musculoskeletal: DSD at right foot   Psychiatric: Appropriate Mood and Affect      DIAGNOSTIC DATA  TELEMETRY: n/a     RADIOLOGY:  < from: MR Foot w/wo IV Cont, Right (07.03.18 @ 17:27) >  IMPRESSION:Ulceration at the dorsum of the midfoot with a sinus track   extending to the second TMT joint with marked surrounding soft tissue   edema compatible with cellulitis. Extensive Charcot arthropathy and   osteomyelitis within the midfoot that has progressed compared to the   prior study as detailed above.    < end of copied text >        KELVIN w/TTE (w/3D Echo) (03.08.18 @ 07:21) >  Mitral Valve: Mitral annular calcification, otherwise  normal mitral valve.  Aortic Valve/Aorta: Thickened trileaflet aortic valve with  normal opening. Mild aortic regurgitation.  Mild atheroma noted in aortic arch/descending aorta.  Left Atrium: No left atrial or left atrial appendage  thrombus.  Left Ventricle: Normal left ventricular systolic function.  Normal left ventricular internal dimensions and wall  thicknesses.  Right Heart: Normal right atrium. Normal right ventricular  size and function. Normal tricuspid valve. Normal pulmonic  valve.  Pericardium/Pleura: Normal pericardium with no pericardial  effusion.  Hemodynamic: Contrastinjection demonstrates no evidence of  a patent foramen ovale.    < end of copied text >     NST: reportedly normal from outside cardiologist > 2 years ago    Cath: n/a  	  	    ASSESSMENT AND PLAN:  56 y/o M with h/o HTN,  historically normal LV function with no known h.o CAD/MI, SHIELA, DVT (not on full AC) and osteomyelitis of R foot s/p debridement and foot surgery x3 in February and March 2018, complicated by MSSA/pseudomal sepsis with pulmonary infectious emboli/masses, with an unremarkable KELVIN at that time,  s/p skin graft May 2018 now presented with drainage from R dorsum foot/skin graft wound for 10 days to Podiatry (Dr. Westbrook) and ID (Dr. Cabello), evaluated with X ray initially, concerning for recurrent OM.  Outpt MRI revealed acute on chronic OM with cellulitis and sinus tract, treated with PO Keflex for 1wk w/o improvement, being sent in for IV Abx.  Currently c/o mild pain of his R foot, has been using a R foot/distal leg boot, and NWB RLE with crutches since. Wound Cx +, Blood Cx 7/6/18 negative. Pt denies anginal chest pain, dyspnea, palpitations, syncope or near syncope.       -- blood cultures at this time are negative  -- wound cultures appear to show few staph aureus - f/u ID recs   -- abx per ID - eventual PICC  -- Podiatry noted - no acute surgical intervention planned at this time- IV abx  -- HTN well controlled- c/w Losartan  -- HD stable with no evidence CHF chronically   -- outpt f/u with Dr Aranda for cardio upon dc

## 2018-07-12 NOTE — PROGRESS NOTE ADULT - ATTENDING COMMENTS
Patient seen and examined, agree with above assessment and plan as transcribed above.    - for PICC  - Abx per ID    Blair Benjamin MD, FACC

## 2018-07-12 NOTE — PROGRESS NOTE ADULT - ASSESSMENT
58 yo M s/p multiple debridements 2/2 to nec fasc  - Pt seen and examined  - Dorsal foot wound, with no signs of active infection, however wound re-opened and is probing to bone  - Plan for PICC line per ID, Dr. Cabello's recommendations  - No acute podiatric intervention at this time  - Culture data currently growing staph aureus unsensitized at this time  - Discharge paperwork updated with podiatry information, pt stable for discharge following PICC placement and ID recs  - Wound dressed w/ dry sterile dressing, will continue aquacel on the floor  - d/w attending

## 2018-07-12 NOTE — PROGRESS NOTE ADULT - SUBJECTIVE AND OBJECTIVE BOX
Patient is a 57y old  Male who presents with a chief complaint of acute on chronic OM (11 Jul 2018 10:26)       INTERVAL HPI/OVERNIGHT EVENTS:  Patient seen and evaluated at bedside.  Pt is resting comfortable in NAD. Denies N/V/F/C.     Allergies    No Known Allergies    Intolerances        Vital Signs Last 24 Hrs  T(C): 36.7 (12 Jul 2018 05:52), Max: 37 (11 Jul 2018 20:59)  T(F): 98.1 (12 Jul 2018 05:52), Max: 98.6 (11 Jul 2018 20:59)  HR: 63 (12 Jul 2018 05:52) (63 - 66)  BP: 117/76 (12 Jul 2018 05:52) (115/57 - 135/71)  BP(mean): --  RR: 18 (12 Jul 2018 05:52) (18 - 19)  SpO2: 97% (12 Jul 2018 05:52) (97% - 98%)    LABS:                        14.7   4.96  )-----------( 225      ( 11 Jul 2018 09:40 )             43.4     07-11    142  |  102  |  15  ----------------------------<  105<H>  4.6   |  28  |  1.05    Ca    9.8      11 Jul 2018 07:55    TPro  8.4<H>  /  Alb  4.8  /  TBili  0.4  /  DBili  x   /  AST  41<H>  /  ALT  110<H>  /  AlkPhos  93  07-10    PT/INR - ( 11 Jul 2018 09:40 )   PT: 12.6 sec;   INR: 1.11 ratio         PTT - ( 11 Jul 2018 09:40 )  PTT:35.3 sec    CAPILLARY BLOOD GLUCOSE          Lower Extremity Physical Exam:  Vascular: 2/4 DP/PT b/l, CFT <3 s to toes, b/l feet nl warmth  Neuro: Epicritic sensation absent to left foot  Msk: right foot s/p multiple I&Ds  Skin: R foot dorsal foot s/p multiple debridements with synthetic grafts, and split thickness skin grafts, now with dorsal medial border of skin graft w/opening, no purulent drainage, no surrounding erythema, wound probes to bone    RADIOLOGY & ADDITIONAL TESTS:

## 2018-07-13 VITALS
OXYGEN SATURATION: 96 % | TEMPERATURE: 98 F | DIASTOLIC BLOOD PRESSURE: 78 MMHG | RESPIRATION RATE: 18 BRPM | HEART RATE: 73 BPM | SYSTOLIC BLOOD PRESSURE: 118 MMHG

## 2018-07-13 LAB
-  COAGULASE NEGATIVE STAPHYLOCOCCUS: SIGNIFICANT CHANGE UP
CULTURE RESULTS: SIGNIFICANT CHANGE UP
GRAM STN FLD: SIGNIFICANT CHANGE UP
METHOD TYPE: SIGNIFICANT CHANGE UP
SPECIMEN SOURCE: SIGNIFICANT CHANGE UP

## 2018-07-13 PROCEDURE — 99233 SBSQ HOSP IP/OBS HIGH 50: CPT | Mod: GC

## 2018-07-13 PROCEDURE — 71045 X-RAY EXAM CHEST 1 VIEW: CPT | Mod: 26

## 2018-07-13 RX ORDER — ALTEPLASE 100 MG
2 KIT INTRAVENOUS ONCE
Qty: 0 | Refills: 0 | Status: COMPLETED | OUTPATIENT
Start: 2018-07-13 | End: 2018-07-13

## 2018-07-13 RX ORDER — VANCOMYCIN HCL 1 G
250 VIAL (EA) INTRAVENOUS
Qty: 27500 | Refills: 0 | OUTPATIENT
Start: 2018-07-13 | End: 2018-09-05

## 2018-07-13 RX ORDER — VANCOMYCIN HCL 1 G
1250 VIAL (EA) INTRAVENOUS EVERY 12 HOURS
Qty: 0 | Refills: 0 | Status: DISCONTINUED | OUTPATIENT
Start: 2018-07-13 | End: 2018-07-14

## 2018-07-13 RX ADMIN — Medication 166.67 MILLIGRAM(S): at 13:16

## 2018-07-13 RX ADMIN — ENOXAPARIN SODIUM 40 MILLIGRAM(S): 100 INJECTION SUBCUTANEOUS at 13:16

## 2018-07-13 RX ADMIN — Medication 166.67 MILLIGRAM(S): at 23:04

## 2018-07-13 RX ADMIN — Medication 100 MILLIGRAM(S): at 06:20

## 2018-07-13 RX ADMIN — LOSARTAN POTASSIUM 50 MILLIGRAM(S): 100 TABLET, FILM COATED ORAL at 06:23

## 2018-07-13 RX ADMIN — ALTEPLASE 2 MILLIGRAM(S): KIT at 16:40

## 2018-07-13 NOTE — CHART NOTE - NSCHARTNOTEFT_GEN_A_CORE
PICC line found by RN to have difficulty flushing.  PICC RN called and flushed catheter with cath darling and pulled PICC out 1cm.  PICC line now flushing smoothly, as per PICC RN.  Repeat chest Xray done to reconfirm placement.  CXray results discussed with Dr. Pa Hicks (radiology resident) who states that the PICC line is in place with the tip in the SVC.  He also entered this preliminary report in the computer.  Discussed the preliminary read with Dr. Cates, who states that it is okay for the patient to receive his 11pm dose of Vancomycin through the PICC line and then be discharged afterwards.    Request from Dr. Cates to facilitate discharge.  Medication reconciliation reviewed, revised, and resolved with Dr. Cates, who has medically cleared patient for discharge with follow-up as advised.  Please refer to discharge note for detailed hospital course.

## 2018-07-13 NOTE — PROGRESS NOTE ADULT - ASSESSMENT
· Assessment	  56 y/o M with h/o HTN, SHIELA, RLE DVT and osteomyelitis of R foot complicated by MSSA/pseudomal sepsis with pulmonary infectious emboli/masses, s/p 3 foot sx and skin graft Feb-May 2018, now p/w drainage from R dorsum wound refractory to oral Abx, admitted for recurrent OM/cellulitis with abscess and sinus tract requiring IV Abx.       Problem/Plan - 1:  ·  Problem: Subacute osteomyelitis of right foot. MRSA.  Plan:   -  IV Vanco  -S/p PICC line       Problem/Plan - 2:  ·  Problem: Essential hypertension.  Plan: - cw Losartan.      Problem/Plan - 4:  ·  Problem: SHIELA (obstructive sleep apnea).  Plan: - CPAP qhs prn.

## 2018-07-13 NOTE — PROGRESS NOTE ADULT - SUBJECTIVE AND OBJECTIVE BOX
CARDIOLOGY ATTENDING    no palpitations, no syncope, no angina    ceFAZolin   IVPB 2000 milliGRAM(s) IV Intermittent every 8 hours  ceFAZolin   IVPB      enoxaparin Injectable 40 milliGRAM(s) SubCutaneous daily  losartan 50 milliGRAM(s) Oral daily                            14.7   4.96  )-----------( 225      ( 11 Jul 2018 09:40 )             43.4           T(C): 36.6 (07-13-18 @ 05:56), Max: 37.1 (07-12-18 @ 12:26)  HR: 65 (07-13-18 @ 05:56) (65 - 72)  BP: 122/75 (07-13-18 @ 05:56) (122/75 - 135/87)  RR: 18 (07-13-18 @ 05:56) (18 - 18)  SpO2: 99% (07-13-18 @ 05:56) (96% - 99%)  Wt(kg): --    no JVD  RRR, no murmurs  CTAB  soft nt/nd  no c/c/e    ASSESSMENT AND PLAN:  58 y/o M with h/o HTN,  historically normal LV function with no known h.o CAD/MI, SHIELA, DVT (not on full AC) and osteomyelitis of R foot s/p debridement and foot surgery x3 in February and March 2018, complicated by MSSA/pseudomal sepsis with pulmonary infectious emboli/masses, with an unremarkable KELVIN at that time,  s/p skin graft May 2018 now presented with drainage from R dorsum foot/skin graft wound for 10 days to Podiatry (Dr. Westbrook) and ID (Dr. Cabello), evaluated with X ray initially, concerning for recurrent OM.  Outpt MRI revealed acute on chronic OM with cellulitis and sinus tract, treated with PO Keflex for 1wk w/o improvement, being sent in for IV Abx.  Currently c/o mild pain of his R foot, has been using a R foot/distal leg boot, and NWB RLE with crutches since. Wound Cx +, Blood Cx 7/6/18 negative. Pt denies anginal chest pain, dyspnea, palpitations, syncope or near syncope.       -- blood cultures at this time are negative  -- wound cultures appear to show few staph aureus - f/u ID recs   -- abx per ID  -- HTN well controlled- c/w Losartan  -- HD stable with no evidence CHF chronically   -- outpt f/u with Dr Aranda for cardio upon dc  -- no further inpatient cardiac workup needed

## 2018-07-13 NOTE — PROGRESS NOTE ADULT - ASSESSMENT
58 yo M s/p multiple debridements 2/2 to nec fasc  - Pt seen and examined  - Dorsal foot wound, with no signs of active infection, however wound re-opened and is probing to bone  - Grew back MRSA & MSSA, positive blood culture 1 tube  - No acute podiatric intervention at this time  - Culture data currently growing staph aureus unsensitized at this time  - Discharge paperwork updated with podiatry information, pt stable for discharge following PICC placement and final ID recs  - Wound dressed w/ dry sterile dressing, will continue aquacel on the floor  - d/w attending

## 2018-07-13 NOTE — PROGRESS NOTE ADULT - SUBJECTIVE AND OBJECTIVE BOX
Follow Up:  foot osteomyelitis and abscess    Interval History: pt stable and afebrile, the abscess cx came back with MSSA and MRSA    ROS:      All other systems negative    Constitutional: no fever, no chills  Head: no trauma  Eyes: no vision changes, no eye pain  ENT:  no sore throat, no rhinorrhea  Cardiovascular:  no chest pain, no palpitation  Respiratory:  no SOB, no cough  GI:  no abd pain, no vomiting, no diarrhea  urinary: no dysuria, no hematuria, no flank pain  musculoskeletal:  foot wound with controlled pain  skin:  no rash  neurology:  no headache, no seizure, no change in mental status  psych: no anxiety, no depression         Allergies  No Known Allergies        ANTIMICROBIALS:  vancomycin  IVPB 1250 every 12 hours      OTHER MEDS:  enoxaparin Injectable 40 milliGRAM(s) SubCutaneous daily  losartan 50 milliGRAM(s) Oral daily      Vital Signs Last 24 Hrs  T(C): 36.6 (13 Jul 2018 05:56), Max: 37.1 (12 Jul 2018 12:26)  T(F): 97.8 (13 Jul 2018 05:56), Max: 98.7 (12 Jul 2018 12:26)  HR: 65 (13 Jul 2018 05:56) (65 - 72)  BP: 122/75 (13 Jul 2018 05:56) (122/75 - 135/87)  BP(mean): --  RR: 18 (13 Jul 2018 05:56) (18 - 18)  SpO2: 99% (13 Jul 2018 05:56) (96% - 99%)      PHYSICAL EXAM:  General: non-toxic  HEAD/EYES: anicteric, PERRL  ENT:  supple  Cardiovascular:   S1, S2  Respiratory:  clear bilaterally  GI:  soft, non-tender, normal bowel sounds  :  no CVA tenderness   Musculoskeletal:  no synovitis  Neurologic:  grossly non-focal  Skin:  R dorsal midfoot opening which was opened by podiatry yesterday and aspirated  Lymph: no lymphadenopathy  Psychiatric:  appropriate affect  Vascular:  no phlebitis                MICROBIOLOGY:  v  Skin deep cx of right foot wound  07-11-18   No growth to date.  --  --      .Abscess right foot wound  07-11-18   Few Staphylococcus aureus  Few Methicillin resistant Staphylococcus aureus  --  Staphylococcus aureus  Methicillin resistant Staphylococcus aureus      .Blood Blood-Venous  07-10-18   Growth in aerobic bottle: Gram Positive Cocci in Clusters, coag neg staph              RADIOLOGY:    < from: Xray Chest 1 View- PORTABLE-Routine (07.12.18 @ 13:42) >    IMPRESSION: Right PICC line with tip in the SVC.       < from: MR Foot w/wo IV Cont, Right (07.03.18 @ 17:27) >  IMPRESSION:Ulceration at the dorsum of the midfoot with a sinus track   extending to the second TMT joint with marked surrounding soft tissue   edema compatible with cellulitis. Extensive Charcot arthropathy and   osteomyelitis within the midfoot that has progressed compared to the   prior study as detailed above.

## 2018-07-13 NOTE — PROVIDER CONTACT NOTE (CRITICAL VALUE NOTIFICATION) - ASSESSMENT
Pt a&ox4, VSS, denies any respiratory or cardiac distress. Resting comfortably. Pt on IV Ancef every 8 hours.

## 2018-07-13 NOTE — PROGRESS NOTE ADULT - SUBJECTIVE AND OBJECTIVE BOX
Patient is a 57y old  Male who presents with a chief complaint of acute on chronic OM (11 Jul 2018 10:26)       INTERVAL HPI/OVERNIGHT EVENTS:  Patient seen and evaluated at bedside.  Pt is resting comfortable in NAD. Denies N/V/F/C.     Allergies    No Known Allergies    Intolerances        Vital Signs Last 24 Hrs  T(C): 36.6 (13 Jul 2018 05:56), Max: 37.1 (12 Jul 2018 12:26)  T(F): 97.8 (13 Jul 2018 05:56), Max: 98.7 (12 Jul 2018 12:26)  HR: 65 (13 Jul 2018 05:56) (65 - 72)  BP: 122/75 (13 Jul 2018 05:56) (122/75 - 135/87)  BP(mean): --  RR: 18 (13 Jul 2018 05:56) (18 - 18)  SpO2: 99% (13 Jul 2018 05:56) (96% - 99%)    LABS:              CAPILLARY BLOOD GLUCOSE          Lower Extremity Physical Exam:  Vascular: 2/4 DP/PT b/l, CFT <3 s to toes, b/l feet nl warmth  Neuro: Epicritic sensation absent to left foot  Msk: right foot s/p multiple I&Ds  Skin: R foot dorsal foot s/p multiple debridements with synthetic grafts, and split thickness skin grafts, now with dorsal medial border of skin graft w/opening, no purulent drainage, no surrounding erythema, wound probes to bone    RADIOLOGY & ADDITIONAL TESTS:

## 2018-07-13 NOTE — PROGRESS NOTE ADULT - ASSESSMENT
56 y/o M with  HTN, ETOH abuse, SHIELA, DVT and OM of R foot s/p debridement and foot surgery x3 (2/2018) c/b MSSA bacteremia with pulmonary infectious emboli/masses, and nec fasc, treated with 6 weeks of cefazolin s/p skin graft (5/2018) started to have draining wound in dorsum of the foot with surrounding cellulitis, was given keflex with resolved cellulitis but the MRI showed ostoe of mid foot with sinus tract extending from second TMT joint with surrounding soft tissue edema and cellulitis    #R foot purulent drainage with sinus tract from osteo of med foot, abscess and cellulitis after an MSSA bacteremia, nec fascitis of foot and cefazolin treatment 2/2018  wound cx outpatient again MSSA, the I&D cx here with MSSA and MRSA blood cx out patient negative, here 1/4 coag neg staph which is a contaminant , s/p PICC  I discussed with podiatry that ideally a surgical intervention with resection of infected bone and tissue is a curative measure but as per podiatry that will not be possible at this time and they tried a deep aspiration at this time and antibiotic management      * DC cefazolin   * switch to vanco 1250 q 12  * f/u with podiatry  * will c/w vanco for 8 weeks to hope for eradication of infection but it is very likely to recur if no surgical interventions will be performed  * will need weekly CBC, CMP and vanco trough after DC but first lab work should be done in 3 days after DC  * f/u with ID clinic in 6-7 weeks

## 2018-07-13 NOTE — PROGRESS NOTE ADULT - SUBJECTIVE AND OBJECTIVE BOX
Patient is a 57y old  Male who presents with a chief complaint of acute on chronic OM (11 Jul 2018 10:26)      SUBJECTIVE / OVERNIGHT EVENTS:  Tissue culture: MRSA  Denies CP/SOB/Palpitation/HA.    MEDICATIONS  (STANDING):  enoxaparin Injectable 40 milliGRAM(s) SubCutaneous daily  losartan 50 milliGRAM(s) Oral daily  vancomycin  IVPB 1250 milliGRAM(s) IV Intermittent every 12 hours    MEDICATIONS  (PRN):        CAPILLARY BLOOD GLUCOSE        I&O's Summary    12 Jul 2018 07:01  -  13 Jul 2018 07:00  --------------------------------------------------------  IN: 1500 mL / OUT: 1100 mL / NET: 400 mL    13 Jul 2018 07:01  -  13 Jul 2018 18:38  --------------------------------------------------------  IN: 1310 mL / OUT: 0 mL / NET: 1310 mL        PHYSICAL EXAM:  GENERAL: NAD, well-developed  HEAD:  Atraumatic, Normocephalic  NECK: Supple, No JVD  CHEST/LUNG: Clear to auscultation bilaterally; No wheezing.  HEART: Regular rate and rhythm; No murmurs, rubs, or gallops  ABDOMEN: Soft, Nontender, Nondistended; Bowel sounds present  EXTREMITIES:   No clubbing, cyanosis, or edema  NEUROLOGY: AAO X 3  SKIN: No rashes    LABS:                  CAPILLARY BLOOD GLUCOSE        07-11 @ 16:54  Culture-urine --  Culture results   No growth at 48 hours  method type --  Organism --  Organism Identification --  Specimen source Skin deep cx of right foot wound  07-11 @ 00:23  Culture-urine --  Culture results   Few Staphylococcus aureus  Few Methicillin resistant Staphylococcus aureus  method type PETRA  Organism Staphylococcus aureus  Organism Identification Staphylococcus aureus  Methicillin resistant Staphylococcus aureus  Specimen source .Abscess right foot wound  07-10 @ 23:01  Culture-urine --  Culture results   Growth in aerobic bottle: Gram Positive Cocci in Clusters  "Due to technical problems, Proteus sp. will Not be reported as part of  the BCID panel until further notice"  ***Blood Panel PCR results on this specimen are available  approximately 3 hours after the Gram stain result.***  Gram stain, PCR, and/or culture results may not always  correspond due to difference in methodologies.  ************************************************************  This PCR assay was performed using [employer].  The following targets are tested for: Enterococcus,  vancomycin resistant enterococci, Listeria monocytogenes,  coagulase negative staphylococci, S. aureus,  methicillin resistant S. aureus, Streptococcus agalactiae  (Group B), S. pneumoniae, S.pyogenes (Group A),  Acinetobacter baumannii, Enterobacter cloacae, E. coli,  Klebsiella oxytoca, K. pneumoniae, Proteus sp.,  Serratia marcescens, Haemophilus influenzae,  Neisseria meningitidis, Pseudomonas aeruginosa, Candida  albicans, C. glabrata, C krusei, C parapsilosis,  C. tropicalis and the KPC resistance gene.  method type PCR  Organism Blood Culture PCR  Organism Identification Blood Culture PCR  Specimen source .Blood Blood-Venous           07-11 @ 16:54  Culture blood --  Culture results   No growth at 48 hours  Gram stain --  Gram stain blood --  Method type --  Organism --  Organism identification --  Specimen source Skin deep cx of right foot wound   07-11 @ 00:23  Culture blood --  Culture results   Few Staphylococcus aureus  Few Methicillin resistant Staphylococcus aureus  Gram stain --  Gram stain blood --  Method type PETRA  Organism Staphylococcus aureus  Organism identification Staphylococcus aureus  Methicillin resistant Staphylococcus aureus  Specimen source .Abscess right foot wound   07-10 @ 23:01  Culture blood --  Culture results   Growth in aerobic bottle: Gram Positive Cocci in Clusters  "Due to technical problems, Proteus sp. will Not be reported as part of  the BCID panel until further notice"  ***Blood Panel PCR results on this specimen are available  approximately 3 hours after the Gram stain result.***  Gram stain, PCR, and/or culture results may not always  correspond due to difference in methodologies.  ************************************************************  This PCR assay was performed using [employer].  The following targets are tested for: Enterococcus,  vancomycin resistant enterococci, Listeria monocytogenes,  coagulase negative staphylococci, S. aureus,  methicillin resistant S. aureus, Streptococcus agalactiae  (Group B), S. pneumoniae, S.pyogenes (Group A),  Acinetobacter baumannii, Enterobacter cloacae, E. coli,  Klebsiella oxytoca, K. pneumoniae, Proteus sp.,  Serratia marcescens, Haemophilus influenzae,  Neisseria meningitidis, Pseudomonas aeruginosa, Candida  albicans, C. glabrata, C krusei, C parapsilosis,  C. tropicalis and the KPC resistance gene.  Gram stain   Growth in aerobic bottle: Gram Positive Cocci in Clusters  Gram stain blood --  Method type PCR  Organism Blood Culture PCR  Organism identification Blood Culture PCR  Specimen source .Blood Blood-Venous      RADIOLOGY & ADDITIONAL TESTS:    Imaging Personally Reviewed:    Consultant(s) Notes Reviewed:      Care Discussed with Consultants/Other Providers:

## 2018-07-13 NOTE — PROVIDER CONTACT NOTE (CRITICAL VALUE NOTIFICATION) - SITUATION
Positive growth in aerobic bottle gram positive cocci in clusters. Abscess culture positive for few staph aureus and few MRSA.

## 2018-07-14 LAB
CULTURE RESULTS: SIGNIFICANT CHANGE UP
ORGANISM # SPEC MICROSCOPIC CNT: SIGNIFICANT CHANGE UP
ORGANISM # SPEC MICROSCOPIC CNT: SIGNIFICANT CHANGE UP
SPECIMEN SOURCE: SIGNIFICANT CHANGE UP

## 2018-07-14 NOTE — PROGRESS NOTE ADULT - SUBJECTIVE AND OBJECTIVE BOX
SUBJECTIVE: Patient with no anginal chest pain or shortness of breath  ROS otherwise negative     enoxaparin Injectable 40 milliGRAM(s) SubCutaneous daily  losartan 50 milliGRAM(s) Oral daily  vancomycin  IVPB 1250 milliGRAM(s) IV Intermittent every 12 hours          Hemoglobin: 14.7 g/dL (07-11 @ 09:40)  Hemoglobin: 16.5 g/dL (07-10 @ 20:24)            Creatinine Trend: 1.05<--, 0.93<--    COAGS:           T(C): 36.9 (07-13-18 @ 21:19), Max: 36.9 (07-13-18 @ 21:19)  HR: 73 (07-13-18 @ 21:19) (65 - 73)  BP: 118/78 (07-13-18 @ 21:19) (118/78 - 122/75)  RR: 18 (07-13-18 @ 21:19) (18 - 18)  SpO2: 96% (07-13-18 @ 21:19) (96% - 99%)  Wt(kg): --    I&O's Summary    12 Jul 2018 07:01  -  13 Jul 2018 07:00  --------------------------------------------------------  IN: 1500 mL / OUT: 1100 mL / NET: 400 mL    13 Jul 2018 07:01  -  14 Jul 2018 03:59  --------------------------------------------------------  IN: 1310 mL / OUT: 0 mL / NET: 1310 mL          HEENT: Normal Oral mucosa, PERRL, EOMI  Lymphatic: No obvious lymphadenopathy, No edema  Cardiovascular: Normal S1S2, No JVD, 1/6 KITTY, Peripheral pulses palpable 2+ B/L  Respiratory: Lungs clear to auscultation, normal effort  Gastrointestinal: Abdomen soft, ND, NT, +BS  Skin: Warm, dry, intact. No cyanosis, No rash.  Musculoskeletal: DSD at right foot   Psychiatric: Appropriate Mood and Affect      DIAGNOSTIC DATA  TELEMETRY: n/a     RADIOLOGY:  < from: MR Foot w/wo IV Cont, Right (07.03.18 @ 17:27) >  IMPRESSION:Ulceration at the dorsum of the midfoot with a sinus track   extending to the second TMT joint with marked surrounding soft tissue   edema compatible with cellulitis. Extensive Charcot arthropathy and   osteomyelitis within the midfoot that has progressed compared to the   prior study as detailed above.    < end of copied text >        KELVIN w/TTE (w/3D Echo) (03.08.18 @ 07:21) >  Mitral Valve: Mitral annular calcification, otherwise  normal mitral valve.  Aortic Valve/Aorta: Thickened trileaflet aortic valve with  normal opening. Mild aortic regurgitation.  Mild atheroma noted in aortic arch/descending aorta.  Left Atrium: No left atrial or left atrial appendage  thrombus.  Left Ventricle: Normal left ventricular systolic function.  Normal left ventricular internal dimensions and wall  thicknesses.  Right Heart: Normal right atrium. Normal right ventricular  size and function. Normal tricuspid valve. Normal pulmonic  valve.  Pericardium/Pleura: Normal pericardium with no pericardial  effusion.  Hemodynamic: Contrastinjection demonstrates no evidence of  a patent foramen ovale.    < end of copied text >     NST: reportedly normal from outside cardiologist > 2 years ago    Cath: n/a  	  	    ASSESSMENT AND PLAN:  58 y/o M with h/o HTN,  historically normal LV function with no known h.o CAD/MI, SHIELA, DVT (not on full AC) and osteomyelitis of R foot s/p debridement and foot surgery x3 in February and March 2018, complicated by MSSA/pseudomal sepsis with pulmonary infectious emboli/masses, with an unremarkable KELVIN at that time,  s/p skin graft May 2018 now presented with drainage from R dorsum foot/skin graft wound for 10 days to Podiatry (Dr. Westbrook) and ID (Dr. Cabello), evaluated with X ray initially, concerning for recurrent OM.  Outpt MRI revealed acute on chronic OM with cellulitis and sinus tract, treated with PO Keflex for 1wk w/o improvement, being sent in for IV Abx.  Currently c/o mild pain of his R foot, has been using a R foot/distal leg boot, and NWB RLE with crutches since. Wound Cx +, Blood Cx 7/6/18 negative. Pt denies anginal chest pain, dyspnea, palpitations, syncope or near syncope.       -- blood cultures at this time are negative  -- wound cultures appear to show few staph aureus - f/u ID recs   -- abx per ID   -- Podiatry noted - no acute surgical intervention planned at this time- IV abx  -- HTN well controlled- c/w Losartan  -- HD stable with no evidence CHF chronically   -- outpt f/u with Dr Aranda for cardio upon dc

## 2018-07-16 LAB
CULTURE RESULTS: SIGNIFICANT CHANGE UP
SPECIMEN SOURCE: SIGNIFICANT CHANGE UP

## 2018-07-17 PROBLEM — I82.409 ACUTE EMBOLISM AND THROMBOSIS OF UNSPECIFIED DEEP VEINS OF UNSPECIFIED LOWER EXTREMITY: Chronic | Status: ACTIVE | Noted: 2018-05-10

## 2018-07-17 LAB
BACTERIA BLD CULT: NORMAL
BACTERIA BLD CULT: NORMAL

## 2018-07-19 ENCOUNTER — APPOINTMENT (OUTPATIENT)
Dept: WOUND CARE | Facility: CLINIC | Age: 58
End: 2018-07-19
Payer: OTHER MISCELLANEOUS

## 2018-07-19 PROBLEM — G47.33 OBSTRUCTIVE SLEEP APNEA (ADULT) (PEDIATRIC): Chronic | Status: ACTIVE | Noted: 2018-05-10

## 2018-07-19 PROBLEM — S91.301S: Chronic | Status: ACTIVE | Noted: 2018-05-10

## 2018-07-19 PROBLEM — I10 ESSENTIAL (PRIMARY) HYPERTENSION: Chronic | Status: ACTIVE | Noted: 2018-02-22

## 2018-07-19 PROBLEM — M54.31 SCIATICA, RIGHT SIDE: Chronic | Status: ACTIVE | Noted: 2018-02-22

## 2018-07-19 PROCEDURE — 11042 DBRDMT SUBQ TIS 1ST 20SQCM/<: CPT | Mod: 58

## 2018-08-02 ENCOUNTER — APPOINTMENT (OUTPATIENT)
Dept: WOUND CARE | Facility: CLINIC | Age: 58
End: 2018-08-02
Payer: OTHER MISCELLANEOUS

## 2018-08-02 PROCEDURE — 99213 OFFICE O/P EST LOW 20 MIN: CPT

## 2018-08-06 ENCOUNTER — FORM ENCOUNTER (OUTPATIENT)
Age: 58
End: 2018-08-06

## 2018-08-07 ENCOUNTER — OUTPATIENT (OUTPATIENT)
Dept: OUTPATIENT SERVICES | Facility: HOSPITAL | Age: 58
LOS: 1 days | End: 2018-08-07
Payer: COMMERCIAL

## 2018-08-07 ENCOUNTER — APPOINTMENT (OUTPATIENT)
Dept: RADIOLOGY | Facility: CLINIC | Age: 58
End: 2018-08-07

## 2018-08-07 DIAGNOSIS — Z98.890 OTHER SPECIFIED POSTPROCEDURAL STATES: Chronic | ICD-10-CM

## 2018-08-07 DIAGNOSIS — Z00.8 ENCOUNTER FOR OTHER GENERAL EXAMINATION: ICD-10-CM

## 2018-08-07 DIAGNOSIS — Z90.49 ACQUIRED ABSENCE OF OTHER SPECIFIED PARTS OF DIGESTIVE TRACT: Chronic | ICD-10-CM

## 2018-08-07 PROCEDURE — 73630 X-RAY EXAM OF FOOT: CPT | Mod: 26,RT

## 2018-08-07 PROCEDURE — 73630 X-RAY EXAM OF FOOT: CPT

## 2018-08-10 NOTE — PRE-OP CHECKLIST - AS BP NONINV SITE
I will START or STAY ON the medications listed below when I get home from the hospital:    Prenatal Multivitamins with Folic Acid 1 mg oral tablet  -- 1 tab(s) by mouth once a day  -- Indication: For TERM  PREGNANCY/CONTRACTIONS
right upper arm
right upper arm

## 2018-08-13 ENCOUNTER — APPOINTMENT (OUTPATIENT)
Dept: INFECTIOUS DISEASE | Facility: CLINIC | Age: 58
End: 2018-08-13
Payer: COMMERCIAL

## 2018-08-13 VITALS
TEMPERATURE: 97.6 F | SYSTOLIC BLOOD PRESSURE: 116 MMHG | HEIGHT: 64 IN | BODY MASS INDEX: 40.12 KG/M2 | WEIGHT: 235 LBS | HEART RATE: 77 BPM | RESPIRATION RATE: 18 BRPM | OXYGEN SATURATION: 97 % | DIASTOLIC BLOOD PRESSURE: 75 MMHG

## 2018-08-13 DIAGNOSIS — M86.60 OTHER CHRONIC OSTEOMYELITIS, UNSPECIFIED SITE: ICD-10-CM

## 2018-08-13 PROCEDURE — 99214 OFFICE O/P EST MOD 30 MIN: CPT

## 2018-08-13 RX ORDER — CEPHALEXIN 500 MG/1
500 CAPSULE ORAL 4 TIMES DAILY
Qty: 20 | Refills: 0 | Status: DISCONTINUED | COMMUNITY
Start: 2018-06-28 | End: 2018-08-13

## 2018-08-16 ENCOUNTER — APPOINTMENT (OUTPATIENT)
Dept: WOUND CARE | Facility: CLINIC | Age: 58
End: 2018-08-16
Payer: OTHER MISCELLANEOUS

## 2018-08-16 PROCEDURE — 99213 OFFICE O/P EST LOW 20 MIN: CPT

## 2018-08-30 ENCOUNTER — APPOINTMENT (OUTPATIENT)
Dept: WOUND CARE | Facility: CLINIC | Age: 58
End: 2018-08-30
Payer: OTHER MISCELLANEOUS

## 2018-08-30 VITALS — HEART RATE: 81 BPM | DIASTOLIC BLOOD PRESSURE: 85 MMHG | TEMPERATURE: 97.9 F | SYSTOLIC BLOOD PRESSURE: 128 MMHG

## 2018-08-30 PROCEDURE — 99213 OFFICE O/P EST LOW 20 MIN: CPT

## 2018-09-10 ENCOUNTER — APPOINTMENT (OUTPATIENT)
Dept: INFECTIOUS DISEASE | Facility: CLINIC | Age: 58
End: 2018-09-10

## 2018-09-13 ENCOUNTER — APPOINTMENT (OUTPATIENT)
Dept: WOUND CARE | Facility: CLINIC | Age: 58
End: 2018-09-13
Payer: OTHER MISCELLANEOUS

## 2018-09-13 PROCEDURE — 99213 OFFICE O/P EST LOW 20 MIN: CPT

## 2018-09-14 PROCEDURE — 85610 PROTHROMBIN TIME: CPT

## 2018-09-14 PROCEDURE — 80048 BASIC METABOLIC PNL TOTAL CA: CPT

## 2018-09-14 PROCEDURE — 82947 ASSAY GLUCOSE BLOOD QUANT: CPT

## 2018-09-14 PROCEDURE — 96374 THER/PROPH/DIAG INJ IV PUSH: CPT

## 2018-09-14 PROCEDURE — 84132 ASSAY OF SERUM POTASSIUM: CPT

## 2018-09-14 PROCEDURE — 71045 X-RAY EXAM CHEST 1 VIEW: CPT

## 2018-09-14 PROCEDURE — 85014 HEMATOCRIT: CPT

## 2018-09-14 PROCEDURE — 82435 ASSAY OF BLOOD CHLORIDE: CPT

## 2018-09-14 PROCEDURE — 82330 ASSAY OF CALCIUM: CPT

## 2018-09-14 PROCEDURE — 87150 DNA/RNA AMPLIFIED PROBE: CPT

## 2018-09-14 PROCEDURE — 87186 SC STD MICRODIL/AGAR DIL: CPT

## 2018-09-14 PROCEDURE — 83605 ASSAY OF LACTIC ACID: CPT

## 2018-09-14 PROCEDURE — C1751: CPT

## 2018-09-14 PROCEDURE — 36569 INSJ PICC 5 YR+ W/O IMAGING: CPT

## 2018-09-14 PROCEDURE — 84295 ASSAY OF SERUM SODIUM: CPT

## 2018-09-14 PROCEDURE — 99285 EMERGENCY DEPT VISIT HI MDM: CPT | Mod: 25

## 2018-09-14 PROCEDURE — 93005 ELECTROCARDIOGRAM TRACING: CPT

## 2018-09-14 PROCEDURE — 85730 THROMBOPLASTIN TIME PARTIAL: CPT

## 2018-09-14 PROCEDURE — 87205 SMEAR GRAM STAIN: CPT

## 2018-09-14 PROCEDURE — 82803 BLOOD GASES ANY COMBINATION: CPT

## 2018-09-14 PROCEDURE — 87040 BLOOD CULTURE FOR BACTERIA: CPT

## 2018-09-14 PROCEDURE — 80053 COMPREHEN METABOLIC PANEL: CPT

## 2018-09-14 PROCEDURE — 85027 COMPLETE CBC AUTOMATED: CPT

## 2018-09-14 PROCEDURE — 87070 CULTURE OTHR SPECIMN AEROBIC: CPT

## 2018-10-04 ENCOUNTER — APPOINTMENT (OUTPATIENT)
Dept: WOUND CARE | Facility: CLINIC | Age: 58
End: 2018-10-04
Payer: COMMERCIAL

## 2018-10-04 DIAGNOSIS — M86.679 OTHER CHRONIC OSTEOMYELITIS, UNSPECIFIED ANKLE AND FOOT: ICD-10-CM

## 2018-10-04 PROCEDURE — 99213 OFFICE O/P EST LOW 20 MIN: CPT

## 2018-10-12 ENCOUNTER — APPOINTMENT (OUTPATIENT)
Dept: RADIOLOGY | Facility: CLINIC | Age: 58
End: 2018-10-12

## 2018-10-12 ENCOUNTER — OUTPATIENT (OUTPATIENT)
Dept: OUTPATIENT SERVICES | Facility: HOSPITAL | Age: 58
LOS: 1 days | End: 2018-10-12
Payer: COMMERCIAL

## 2018-10-12 DIAGNOSIS — Z98.890 OTHER SPECIFIED POSTPROCEDURAL STATES: Chronic | ICD-10-CM

## 2018-10-12 DIAGNOSIS — Z90.49 ACQUIRED ABSENCE OF OTHER SPECIFIED PARTS OF DIGESTIVE TRACT: Chronic | ICD-10-CM

## 2018-10-12 DIAGNOSIS — Z00.8 ENCOUNTER FOR OTHER GENERAL EXAMINATION: ICD-10-CM

## 2018-10-12 PROCEDURE — 73630 X-RAY EXAM OF FOOT: CPT

## 2018-10-12 PROCEDURE — 73630 X-RAY EXAM OF FOOT: CPT | Mod: 26,RT

## 2018-10-18 ENCOUNTER — APPOINTMENT (OUTPATIENT)
Dept: WOUND CARE | Facility: CLINIC | Age: 58
End: 2018-10-18
Payer: OTHER MISCELLANEOUS

## 2018-10-18 PROCEDURE — 99213 OFFICE O/P EST LOW 20 MIN: CPT

## 2018-11-29 ENCOUNTER — APPOINTMENT (OUTPATIENT)
Dept: WOUND CARE | Facility: CLINIC | Age: 58
End: 2018-11-29
Payer: OTHER MISCELLANEOUS

## 2018-11-29 PROCEDURE — 99213 OFFICE O/P EST LOW 20 MIN: CPT
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
## 2019-01-03 ENCOUNTER — APPOINTMENT (OUTPATIENT)
Dept: WOUND CARE | Facility: CLINIC | Age: 59
End: 2019-01-03
Payer: OTHER MISCELLANEOUS

## 2019-01-03 VITALS — SYSTOLIC BLOOD PRESSURE: 141 MMHG | DIASTOLIC BLOOD PRESSURE: 86 MMHG | TEMPERATURE: 98 F | HEART RATE: 86 BPM

## 2019-01-03 PROCEDURE — 99213 OFFICE O/P EST LOW 20 MIN: CPT

## 2019-01-03 RX ORDER — CLOTRIMAZOLE AND BETAMETHASONE DIPROPIONATE 10; .5 MG/G; MG/G
1-0.05 CREAM TOPICAL TWICE DAILY
Qty: 1 | Refills: 0 | Status: ACTIVE | COMMUNITY
Start: 2019-01-03 | End: 1900-01-01

## 2019-01-04 ENCOUNTER — APPOINTMENT (OUTPATIENT)
Dept: INFECTIOUS DISEASE | Facility: CLINIC | Age: 59
End: 2019-01-04
Payer: COMMERCIAL

## 2019-01-04 VITALS
HEART RATE: 85 BPM | DIASTOLIC BLOOD PRESSURE: 75 MMHG | HEIGHT: 64 IN | TEMPERATURE: 97.2 F | OXYGEN SATURATION: 97 % | SYSTOLIC BLOOD PRESSURE: 129 MMHG | WEIGHT: 250 LBS | BODY MASS INDEX: 42.68 KG/M2 | RESPIRATION RATE: 17 BRPM

## 2019-01-04 DIAGNOSIS — Z86.14 PERSONAL HISTORY OF METHICILLIN RESISTANT STAPHYLOCOCCUS AUREUS INFECTION: ICD-10-CM

## 2019-01-04 DIAGNOSIS — Z86.19 PERSONAL HISTORY OF OTHER INFECTIOUS AND PARASITIC DISEASES: ICD-10-CM

## 2019-01-04 DIAGNOSIS — L03.115 CELLULITIS OF RIGHT LOWER LIMB: ICD-10-CM

## 2019-01-04 DIAGNOSIS — S91.301S UNSPECIFIED OPEN WOUND, RIGHT FOOT, SEQUELA: ICD-10-CM

## 2019-01-04 DIAGNOSIS — Z78.9 OTHER SPECIFIED HEALTH STATUS: ICD-10-CM

## 2019-01-04 DIAGNOSIS — S96.991S UNSPECIFIED OPEN WOUND, RIGHT FOOT, SEQUELA: ICD-10-CM

## 2019-01-04 DIAGNOSIS — A49.01 METHICILLIN SUSCEPTIBLE STAPHYLOCOCCUS AUREUS INFECTION, UNSPECIFIED SITE: ICD-10-CM

## 2019-01-04 LAB — BACTERIA SPEC CULT: ABNORMAL

## 2019-01-04 PROCEDURE — 99213 OFFICE O/P EST LOW 20 MIN: CPT

## 2019-01-04 RX ORDER — CICLOPIROX 80 MG/ML
8 SOLUTION TOPICAL
Qty: 1 | Refills: 3 | Status: DISCONTINUED | COMMUNITY
Start: 2018-11-29 | End: 2019-01-04

## 2019-01-04 NOTE — DATA REVIEWED
[FreeTextEntry1] : MRI 7/3/18 - ulceration at the dorsum of the midfoot with a sinus track extending to the second TMT joint with marked surrounding soft tissue edema compatible with cellulitis.  Extensive charcot arthropathy and osteomyeliti s within the midfoot that has progressed compared to the prior study\par \par MR Tib/Fib w/wo IV Cont, Right (02.28.18 @ 23:11) \par 1.  Soft tissue wound over the dorsolateral aspect of the foot. 2 residual abscesses are seen tracking from the site of the wound, one posterior, and one along the plantar surface of the foot.\par 2.  Diffuse osteomyelitis of the midfoot involving the metatarsals, cuneiforms, cuboid, and navicular.\par 3.  Myositis of the deep posterior compartment musculature of the leg.\par 4.  Nonenhancing edema is seen tracking along the superficial and deep fascial layers of the posterior compartment of the leg. Given other findings, necrotizing fasciitis cannot be excluded.\par \par

## 2019-01-04 NOTE — PHYSICAL EXAM
[General Appearance - In No Acute Distress] : in no acute distress [Sclera] : the sclera and conjunctiva were normal [Oropharynx] : the oropharynx was normal with no thrush [Neck Appearance] : the appearance of the neck was normal [Auscultation Breath Sounds / Voice Sounds] : lungs were clear to auscultation bilaterally [Heart Sounds] : normal S1 and S2 [Bowel Sounds] : normal bowel sounds [No Palpable Adenopathy] : no palpable adenopathy [Cranial Nerves] : cranial nerves 2-12 were intact [Affect] : the affect was normal [FreeTextEntry1] : fungal rash R foot medial ankle and dorsal foot; no drainage; no warmth; no tenderness

## 2019-01-04 NOTE — HISTORY OF PRESENT ILLNESS
[FreeTextEntry1] : 58M hx MSSA bacteremia and OM of the R foot in February 2018 s/p ancef.  At that time, culture from OR also grew PA and ciprofloxacin was added.  At that time, MRI foot with OM/abscess, R leg DVT and CTA suggestive of possible septic pulmonary emboli though TTE negative.  His foot was debrided by podiatry. \par \par May 2018, he underwent skin graft by plastic surgery.  By June 2018, he had cellulitis of the R medial foot and purulent drainage from a scab at the dorsum of the foot.  Cultures grew both MRSA and MSSA and he was treated with 6 weeks of vancomycin.  An MRI was obtain that showed a sinus track from 2nd TMT to ulcer of the dorsum and osteomyelitis.  There was concern for chronic osteomyelitis, however, surgical resection would have left his foot too unstable.  BKA at that time was out of the question.\par \par Here today - was doing okay - developed tinea rash of the foot medial ankle and was prescribed ciclopirox topically in November and there was some improvement.  New area developed dorsal foot on both sides of old, heeled sinus tract.  Then, on 12/30 changed to mupirocin.  Did not get worse, maybe a little better. Then saw podiatry on 1/3/19 and topical therapy was changed to clotrimazole-betamethasone.   It was felt that is sweating underneath the boot and irritating the skin.\par \par Using boot for about 2-3 months.  No fever/chills.  No n/v/d.   No dysuria.  No CP/SOB.  Rest of ROS is negative\par \par \par

## 2019-01-04 NOTE — CONSULT LETTER
[Dear  ___] : Dear  [unfilled], [Courtesy Letter:] : I had the pleasure of seeing your patient, [unfilled], in my office today. [DrPatti  ___] : Dr. WINCHESTER [FreeTextEntry2] : Carlos Miranda MD

## 2019-01-04 NOTE — ASSESSMENT
[FreeTextEntry1] : 58M with HTN, history of R foot infection and MSSA bacteremia February 2018. MRI with OM (OR cx MSSA/PA) treated with 6 weeks IV antibiotics and debridement of the soft tissues (no bone resected).  Then with purulent secretions with probable chronic osteomyelitis, sinus tract and MRSA July 2018 - completed 6 weeks IV vancomycin (cx MRSA/MSSA).  Now with fungal rash.  I agree with combination antifungal and antiinflammatory given extent of inflammation, however, he can likely transition to antifungal alone in a week.  Thankfully, no evidence of relapse of underlying OM.  At this time, there is no role for antibiotics.  Clotrimazole between the toes as well.  f/u if this does not improve.\par

## 2019-01-17 ENCOUNTER — APPOINTMENT (OUTPATIENT)
Dept: WOUND CARE | Facility: CLINIC | Age: 59
End: 2019-01-17
Payer: OTHER MISCELLANEOUS

## 2019-01-17 ENCOUNTER — FORM ENCOUNTER (OUTPATIENT)
Age: 59
End: 2019-01-17

## 2019-01-17 VITALS — TEMPERATURE: 98.1 F | SYSTOLIC BLOOD PRESSURE: 124 MMHG | HEART RATE: 65 BPM | DIASTOLIC BLOOD PRESSURE: 82 MMHG

## 2019-01-17 PROCEDURE — 99213 OFFICE O/P EST LOW 20 MIN: CPT

## 2019-01-18 ENCOUNTER — APPOINTMENT (OUTPATIENT)
Dept: RADIOLOGY | Facility: CLINIC | Age: 59
End: 2019-01-18

## 2019-01-18 ENCOUNTER — OUTPATIENT (OUTPATIENT)
Dept: OUTPATIENT SERVICES | Facility: HOSPITAL | Age: 59
LOS: 1 days | End: 2019-01-18
Payer: COMMERCIAL

## 2019-01-18 DIAGNOSIS — Z90.49 ACQUIRED ABSENCE OF OTHER SPECIFIED PARTS OF DIGESTIVE TRACT: Chronic | ICD-10-CM

## 2019-01-18 DIAGNOSIS — Z00.8 ENCOUNTER FOR OTHER GENERAL EXAMINATION: ICD-10-CM

## 2019-01-18 DIAGNOSIS — Z98.890 OTHER SPECIFIED POSTPROCEDURAL STATES: Chronic | ICD-10-CM

## 2019-01-18 PROCEDURE — 73630 X-RAY EXAM OF FOOT: CPT

## 2019-01-18 PROCEDURE — 73630 X-RAY EXAM OF FOOT: CPT | Mod: 26,RT

## 2019-01-24 ENCOUNTER — APPOINTMENT (OUTPATIENT)
Dept: WOUND CARE | Facility: CLINIC | Age: 59
End: 2019-01-24
Payer: OTHER MISCELLANEOUS

## 2019-01-24 VITALS — DIASTOLIC BLOOD PRESSURE: 81 MMHG | SYSTOLIC BLOOD PRESSURE: 125 MMHG | HEART RATE: 75 BPM | TEMPERATURE: 98.3 F

## 2019-01-24 DIAGNOSIS — I87.2 VENOUS INSUFFICIENCY (CHRONIC) (PERIPHERAL): ICD-10-CM

## 2019-01-24 PROCEDURE — 99213 OFFICE O/P EST LOW 20 MIN: CPT

## 2019-01-24 RX ORDER — CLOTRIMAZOLE 10 MG/G
1 CREAM TOPICAL TWICE DAILY
Qty: 1 | Refills: 3 | Status: ACTIVE | COMMUNITY
Start: 2019-01-04 | End: 1900-01-01

## 2019-01-24 NOTE — PHYSICAL EXAM
[1+] : left 1+ [de-identified] : AAOx3 [de-identified] : no pain at the right foot [de-identified] : dependent rubor of right foot, no cellulitis [de-identified] : Skin intact/closed/healed. decreased edema and erythema. no open lesions, no oozing, no fluctuance, no clinical signs of infection.  [FreeTextEntry2] : 0 [de-identified] : healed [FreeTextEntry1] : Epicritic sensation diminshed, but not absent

## 2019-01-24 NOTE — ASSESSMENT
[FreeTextEntry1] : 58 yo M s/p multiple I&Ds and STSG as treatment for nec fasc, now HEALED, STSG on 5/21 incorporated, now w/ charcot right foot and tinea pedis/dermatitis\par - Pt examined and evaluated \par - R dorsal foot ulcer healed/closed, no clinical signs of infection\par - C/w topical steroid and anti-fungal cream \par - Continue bone stimulator twice a day\par - Continue CROW at all times\par - Pt to ambulate in CROW or CAM + crutch or knee scooter\par - Pt will eventually need custom shoes w/ inserts once bone consolidates. Goal at this time is to transition from CROW to custom shoe in 2019.\par - RTC in 1 weeks, then new x-rays to be done by Feb 1st

## 2019-01-24 NOTE — PHYSICAL EXAM
[1+] : left 1+ [de-identified] : AAOx3 [de-identified] : no pain at the right foot [de-identified] : dependent rubor of right foot, no cellulitis [de-identified] : Skin intact/closed/healed. decreased edema and erythema. no open lesions, no oozing, no fluctuance, no clinical signs of infection.  [FreeTextEntry2] : 0 [de-identified] : healed [FreeTextEntry1] : Epicritic sensation diminshed, but not absent

## 2019-01-24 NOTE — HISTORY OF PRESENT ILLNESS
[FreeTextEntry1] : [facility] s/p multiple I&D with graft placements secondary to nec fasc infection. Pt had multiple I&D's with stravix application and VAC. At [facility] in February, pt also had STSG on 5/21 w/ Dr. Cabello. Pt had a PICC line placed in July for concern for osteomyelitis of right foot. Pt has now been closed for about 3 months, and has been using the bone stimulator twice a day, recently stopped for a few days. Pt says last week he noticed 2 areas on the foot where pus was coming out, Dr. Westbrook prescribed Bactrim and Mupirocin. Pt also complaining of an itchy red rash on the foot and lower leg that started 2 months ago, has been using ciclopirox without improvement, says its nowhere else on his body. Pt states that his foot sweats a lot in the CROW boot, which may be the cause of the rash. Denies any recent N/V/F/C/SOB.

## 2019-01-24 NOTE — ASSESSMENT
[FreeTextEntry1] : 56 yo M s/p multiple I&Ds and STSG as treatment for nec fasc, now HEALED, STSG on 5/21 incorporated, now w/ charcot right foot and tinea pedis/dermatitis\par - Pt examined and evaluated \par - R dorsal foot ulcer healed/closed, no clinical signs of infection\par - Xrays reviewed with patient; all questions answered to patient's understanding. \par - D/c topical steroid but C/w anti-fungal cream \par - Continue bone stimulator twice a day\par - Continue CROW at all times\par - Pt to ambulate in CROW or CAM + crutch or knee scooter\par - Pt will eventually need custom shoes w/ inserts once bone consolidates. Goal at this time is to transition from CROW to custom shoe in 2019.\par - Toenails 1-5, b/l without incident \par - RTC 3 months

## 2019-01-24 NOTE — HISTORY OF PRESENT ILLNESS
[FreeTextEntry1] : [facility] s/p multiple I&D with graft placements secondary to nec fasc infection. Pt had multiple I&D's with stravix application and VAC. At [facility] in February, pt also had STSG on 5/21 w/ Dr. Cabello. Pt had a PICC line placed in July for concern for osteomyelitis of right foot. Pt has now been closed for about 3 months, and has been using the bone stimulator twice a day, recently stopped for a few days. Pt says last week he noticed 2 areas on the foot where pus was coming out, Dr. Westbrook prescribed Bactrim and Mupirocin. Pt also complaining of an itchy red rash on the foot and lower leg that started 2 months ago, has been using ciclopirox without improvement, says its nowhere else on his body. Pt states that his foot sweats a lot in the CROW boot, which may be the cause of the rash. Denies any recent N/V/F/C/SOB.

## 2019-04-19 ENCOUNTER — OUTPATIENT (OUTPATIENT)
Dept: OUTPATIENT SERVICES | Facility: HOSPITAL | Age: 59
LOS: 1 days | End: 2019-04-19
Payer: COMMERCIAL

## 2019-04-19 ENCOUNTER — APPOINTMENT (OUTPATIENT)
Dept: RADIOLOGY | Facility: CLINIC | Age: 59
End: 2019-04-19
Payer: OTHER MISCELLANEOUS

## 2019-04-19 ENCOUNTER — TRANSCRIPTION ENCOUNTER (OUTPATIENT)
Age: 59
End: 2019-04-19

## 2019-04-19 DIAGNOSIS — Z90.49 ACQUIRED ABSENCE OF OTHER SPECIFIED PARTS OF DIGESTIVE TRACT: Chronic | ICD-10-CM

## 2019-04-19 DIAGNOSIS — Z00.8 ENCOUNTER FOR OTHER GENERAL EXAMINATION: ICD-10-CM

## 2019-04-19 DIAGNOSIS — Z98.890 OTHER SPECIFIED POSTPROCEDURAL STATES: Chronic | ICD-10-CM

## 2019-04-19 PROCEDURE — 73630 X-RAY EXAM OF FOOT: CPT

## 2019-04-19 PROCEDURE — 73630 X-RAY EXAM OF FOOT: CPT | Mod: 26,RT

## 2019-04-25 ENCOUNTER — APPOINTMENT (OUTPATIENT)
Dept: WOUND CARE | Facility: CLINIC | Age: 59
End: 2019-04-25
Payer: OTHER MISCELLANEOUS

## 2019-04-25 PROCEDURE — 99213 OFFICE O/P EST LOW 20 MIN: CPT

## 2019-04-25 NOTE — ASSESSMENT
[FreeTextEntry1] : 56 yo M s/p multiple I&Ds and STSG as treatment for nec fasc, now HEALED, STSG on 5/21 incorporated, now w/ charcot right foot and tinea pedis/dermatitis\par - Pt examined and evaluated \par - R dorsal foot ulcer healed/closed, no clinical signs of infection\par - Xrays reviewed with patient; all questions answered to patient's understanding --> D/w patient findings of improvement/consolidation to charcot foot BUT not complete resolution \par - D/c bone stimulator \par - Pt has custom shoes w/ inserts and d/w patient the proper transition. Custom shoes and inserts is to be worn less than 1 hour at a time at home. If ambulation is to occur in public or for long time, CROW boot must be worn. \par - Folliculoitis resolved\par - Xray to be performed in 3 months. \par - RTC 3 weeks

## 2019-04-25 NOTE — PHYSICAL EXAM
[1+] : left 1+ [de-identified] : AAOx3 [de-identified] : no pain at the right foot [de-identified] : dependent rubor of right foot, no cellulitis [de-identified] : Skin intact/closed/healed. decreased edema and erythema. no open lesions, no oozing, no fluctuance, no clinical signs of infection.  [FreeTextEntry2] : 0 [de-identified] : healed [FreeTextEntry1] : Epicritic sensation diminshed, but not absent

## 2019-05-09 ENCOUNTER — APPOINTMENT (OUTPATIENT)
Dept: WOUND CARE | Facility: CLINIC | Age: 59
End: 2019-05-09
Payer: COMMERCIAL

## 2019-05-09 DIAGNOSIS — L97.403: ICD-10-CM

## 2019-05-09 PROCEDURE — 99213 OFFICE O/P EST LOW 20 MIN: CPT

## 2019-05-09 NOTE — ASSESSMENT
[FreeTextEntry1] : 56 yo M s/p multiple I&Ds and STSG as treatment for nec fasc, now HEALED, STSG on 5/21 incorporated, now w/ charcot right foot and tinea pedis/dermatitis\par - Pt examined and evaluated\par - R dorsal foot ulcer healed/closed, no clinical signs of infection\par - No signs of progression of charcot arthropathy, consolidating at this time\par - Xrays reviewed with patient; all questions answered to patient's understanding --> D/w patient findings of improvement/consolidation to charcot foot BUT not complete resolution\par - Pt has custom shoes w/ inserts and d/w patient the proper transition. Custom shoes and inserts is to be worn less than 1 hour at a time at home. If ambulation is to occur in public or for long time, CROW boot must be worn. \par - Xray to be performed in 6 wks\par - RTC 5 weeks

## 2019-05-09 NOTE — HISTORY OF PRESENT ILLNESS
[FreeTextEntry1] : Pt presents to wound care clinic in [place] boot w/ crutch s/p multiple I&D with graft placements secondary to nec fasc infection. Pt had multiple I&D's with stravix application and VAC. At [facility] in February, pt also had STSG on 5/21 w/ Dr. Cabello. Pt had a PICC line placed in July for concern for osteomyelitis of right foot. Pt has now been closed for about 3 months, and has been using the bone stimulator twice a day, recently stopped for a few days. Pt says last week he noticed 2 areas on the foot where pus was coming out, Dr. Westbrook prescribed Bactrim and Mupirocin. Pt also complaining of an itchy red rash on the foot and lower leg that started 2 months ago, has been using ciclopirox without improvement, says its nowhere else on his body. Pt states that his foot sweats a lot in the CROW boot, which may be the cause of the rash. Bout of folliculitis last month which has now resolved. Denies any recent N/V/F/C/SOB.\par \par Pt states he transitions from CROW to the new custom shoes/inserts started couple of weeks ago. Pt feels stronger now in his RLE. Pt

## 2019-05-09 NOTE — PHYSICAL EXAM
[1+] : left 1+ [de-identified] : AAOx3 [de-identified] : no pain at the right foot [de-identified] : dependent rubor of right foot, no cellulitis [de-identified] : Skin intact/closed/healed. decreased edema and erythema. no open lesions, no oozing, no fluctuance, no clinical signs of infection.  [FreeTextEntry2] : 0 [de-identified] : healed [FreeTextEntry1] : skin envelope intact, no open wounds, no areas of pressure, or preulcerative lesions noted. Right dorsal foot scarring consistent w/ STSG. Skin rash noted on R ankle and distal leg, red and scaly lesions, likely tinea 2/2 perspiration from CROW boot.

## 2019-06-13 ENCOUNTER — APPOINTMENT (OUTPATIENT)
Dept: WOUND CARE | Facility: CLINIC | Age: 59
End: 2019-06-13
Payer: OTHER MISCELLANEOUS

## 2019-06-13 VITALS — DIASTOLIC BLOOD PRESSURE: 85 MMHG | HEART RATE: 87 BPM | SYSTOLIC BLOOD PRESSURE: 127 MMHG | TEMPERATURE: 97.88 F

## 2019-06-13 DIAGNOSIS — L73.9 FOLLICULAR DISORDER, UNSPECIFIED: ICD-10-CM

## 2019-06-13 PROCEDURE — 99213 OFFICE O/P EST LOW 20 MIN: CPT

## 2019-06-13 RX ORDER — NYSTATIN AND TRIAMCINOLONE ACETONIDE 100000; 1 MG/G; MG/G
100000-0.1 CREAM TOPICAL TWICE DAILY
Qty: 1 | Refills: 2 | Status: ACTIVE | COMMUNITY
Start: 2019-06-13 | End: 1900-01-01

## 2019-06-13 NOTE — ASSESSMENT
[FreeTextEntry1] : 57 yo M s/p multiple I&Ds and STSG as treatment for nec fasc, now HEALED, STSG on 5/21/2018 incorporated, now w/ charcot right foot and tinea pedis/dermatitis\par - Pt examined and evaluated\par - R dorsal foot ulcer healed/closed, no clinical signs of infection\par - No signs of progression of charcot arthropathy, consolidating at this time\par - new R foot xrays ordered to be done before next visit\par - Rec transitioning to shoes add half hour every day \par - Rx Nystatin-Triamcinolone cream for right foot tinea pedis vs dermatitis \par - RTC 3 weeks

## 2019-06-13 NOTE — HISTORY OF PRESENT ILLNESS
[FreeTextEntry1] : Pt presents to wound care clinic in [facility] boot w/ crutch s/p multiple I&D with graft placements secondary to nec fasc infection. Pt had multiple I&D's with stravix application and VAC. At [facility] in February, pt also had STSG on 5/21 w/ Dr. Cabello. Pt had a PICC line placed in July 2018 for concern for osteomyelitis of right foot. Pt has now been closed for about 3 months, and has been using the bone stimulator twice a day, now stopped. Pt also complaining of an itchy red rash on the foot and lower leg that started months ago. Pt states that his foot sweats a lot in the CROW boot, which may be the cause of the rash. Denies any recent N/V/F/C/SOB.\par

## 2019-06-13 NOTE — PHYSICAL EXAM
[1+] : right 1+ [de-identified] : AAOx3 [de-identified] : dependent rubor of right foot, no cellulitis [de-identified] : no pain at the right foot [de-identified] : Skin intact/closed/healed. decreased edema and erythema. no open lesions, no oozing, no fluctuance, no clinical signs of infection.  [FreeTextEntry2] : 0 [de-identified] : healed [FreeTextEntry1] : skin envelope intact, no open wounds, no areas of pressure, or preulcerative lesions noted. Right dorsal foot scarring consistent w/ STSG. Skin rash noted on R ankle and distal leg, red and scaly lesions, likely tinea 2/2 perspiration from CROW boot.

## 2019-07-08 ENCOUNTER — OUTPATIENT (OUTPATIENT)
Dept: OUTPATIENT SERVICES | Facility: HOSPITAL | Age: 59
LOS: 1 days | End: 2019-07-08
Payer: COMMERCIAL

## 2019-07-08 ENCOUNTER — APPOINTMENT (OUTPATIENT)
Dept: RADIOLOGY | Facility: CLINIC | Age: 59
End: 2019-07-08
Payer: OTHER MISCELLANEOUS

## 2019-07-08 DIAGNOSIS — Z00.8 ENCOUNTER FOR OTHER GENERAL EXAMINATION: ICD-10-CM

## 2019-07-08 DIAGNOSIS — Z98.890 OTHER SPECIFIED POSTPROCEDURAL STATES: Chronic | ICD-10-CM

## 2019-07-08 DIAGNOSIS — Z90.49 ACQUIRED ABSENCE OF OTHER SPECIFIED PARTS OF DIGESTIVE TRACT: Chronic | ICD-10-CM

## 2019-07-08 PROCEDURE — 73630 X-RAY EXAM OF FOOT: CPT | Mod: 26,RT

## 2019-07-08 PROCEDURE — 73630 X-RAY EXAM OF FOOT: CPT

## 2019-07-11 ENCOUNTER — APPOINTMENT (OUTPATIENT)
Dept: WOUND CARE | Facility: CLINIC | Age: 59
End: 2019-07-11
Payer: COMMERCIAL

## 2019-07-11 DIAGNOSIS — B35.3 TINEA PEDIS: ICD-10-CM

## 2019-07-11 PROCEDURE — 99213 OFFICE O/P EST LOW 20 MIN: CPT

## 2019-07-11 NOTE — HISTORY OF PRESENT ILLNESS
[FreeTextEntry1] : Pt presents to wound care clinic in diabetic shoes s/p multiple I&D with graft placements secondary to nec fasc infection. Pt had multiple I&D's with stravix application and VAC. At [facility] in February 2018, pt also had STSG on 5/21/18 w/ Dr. Cabello. Pt had a PICC line placed in July 2018 for concern for osteomyelitis of right foot. Pt also complaining of an itchy red rash on the foot and lower leg that started months ago but now mostly resolved. Denies any recent N/V/F/C/SOB.\par

## 2019-07-11 NOTE — PHYSICAL EXAM
[1+] : left 1+ [de-identified] : AAOx3 [de-identified] : no pain at the right foot [de-identified] : dependent rubor of right foot, no cellulitis [de-identified] : Skin intact/closed/healed. decreased edema and erythema. no open lesions, no oozing, no fluctuance, no clinical signs of infection.  [FreeTextEntry2] : 0 [de-identified] : healed [FreeTextEntry1] : Epicritic sensation diminished, but not absent

## 2019-07-11 NOTE — ASSESSMENT
[FreeTextEntry1] : 57 yo M s/p multiple I&Ds and STSG as treatment for nec fasc, now HEALED, STSG on 5/21/2018 incorporated, now w/ charcot right foot and tinea pedis/dermatitis\par - Pt examined and evaluated\par - R dorsal foot ulcer healed/closed, no clinical signs of infection\par - No signs of progression of charcot arthropathy, consolidating at this time\par - new R foot xrays show no further progression of charcot breakdown, increased signs of consolidation \par - Continue Nystatin-Triamcinolone cream for right foot tinea pedis vs dermatitis \par - RTC 5 weeks

## 2019-08-15 ENCOUNTER — APPOINTMENT (OUTPATIENT)
Dept: WOUND CARE | Facility: CLINIC | Age: 59
End: 2019-08-15
Payer: COMMERCIAL

## 2019-08-15 VITALS
DIASTOLIC BLOOD PRESSURE: 82 MMHG | SYSTOLIC BLOOD PRESSURE: 148 MMHG | WEIGHT: 250 LBS | HEIGHT: 64 IN | BODY MASS INDEX: 42.68 KG/M2 | HEART RATE: 60 BPM

## 2019-08-15 DIAGNOSIS — L30.9 DERMATITIS, UNSPECIFIED: ICD-10-CM

## 2019-08-15 DIAGNOSIS — M14.60 CHARCOT'S JOINT, UNSPECIFIED SITE: ICD-10-CM

## 2019-08-15 PROCEDURE — 99213 OFFICE O/P EST LOW 20 MIN: CPT

## 2019-08-15 NOTE — PHYSICAL EXAM
[1+] : left 1+ [de-identified] : AAOx3 [de-identified] : no pain at the right foot [de-identified] : dependent rubor of right foot, no cellulitis [de-identified] : Skin intact/closed/healed. decreased edema and erythema. no open lesions, no oozing, no fluctuance, no clinical signs of infection.  [FreeTextEntry2] : 0 [de-identified] : healed [FreeTextEntry1] : Epicritic sensation diminished, but not absent

## 2019-08-15 NOTE — ASSESSMENT
[FreeTextEntry1] : 57 yo M s/p multiple I&Ds and STSG as treatment for nec fasc, now HEALED, STSG on 5/21/2018 incorporated, now w/ charcot right foot and tinea pedis/dermatitis\par - Pt examined and evaluated\par - R dorsal foot ulcer healed/closed, no clinical signs of infection\par - No signs of progression of charcot arthropathy, consolidating at this time\par - Continue Nystatin-Triamcinolone cream as needed for right foot tinea pedis vs dermatitis \par - discharged from wound care center\par - follow up at office for check up in 3 months

## 2019-08-15 NOTE — HISTORY OF PRESENT ILLNESS
[FreeTextEntry1] : Pt presents to wound care clinic in diabetic shoes s/p multiple I&D with graft placements secondary to nec fasc infection. Pt had multiple I&D's with stravix application and VAC. At [facility] in February 2018, pt also had STSG on 5/21/18 w/ Dr. Cabello. Pt had a PICC line placed in July 2018 for concern for osteomyelitis of right foot. Pt also complaining of an itchy red rash on the foot and lower leg that started several months ago but now mostly resolved. Denies any recent N/V/F/C/SOB.\par

## 2019-10-24 ENCOUNTER — MESSAGE (OUTPATIENT)
Age: 59
End: 2019-10-24

## 2020-01-01 ENCOUNTER — TRANSCRIPTION ENCOUNTER (OUTPATIENT)
Age: 60
End: 2020-01-01

## 2020-01-03 NOTE — PROGRESS NOTE ADULT - PROVIDER SPECIALTY LIST ADULT
Electrophysiology Pediatric Endocrinology Daily Progress Note    Tamra Jaimes MRN# [id number]   YOB: 2007 Age: 12 year 0 month old   Date of Admission: 9/30/2019  Date of Visit: 01/03/2020    We continue to follow this patient for management of T2DM .           Assessment and Plan:   1. Type 2 Diabetes Mellitus with hyperglycemia  2. Depression, suicidal attempt    Tamra is a 12 year 0 month old with Type 2 Diabetes, who continues to be admitted to the mental health unit for suicidal attempt via intentional insulin overdose and behavioral issues since 9/30. Her type 2 diabetes was previously managed by Liraglutide monotherapy, however, it was discontinued due to pancreatic enzyme elevation in a previous admission in September, and she was started on insulin therapy with basal/bolus regimen. Once her pancreatic enzymes returned to normal, Liraglutide was reintroduced and gradually increased, now up to 3.0 mg daily. Despite increasing doses of Liraglutide, she continued to require a substantial amount of insulin daily in the forms of Lantus and short acting insulin for high glucose correction. Tamra was started on Invokana on 12/3. Since then her blood sugars have started to slowly trend down, without hypoglycemia. Lantus has been weaned, and she is currently at 30 units once daily.  Will continue to closely watch her blood sugars.    After increasing victoza on 12/24, her pancreatic enzymes increased and now elevated. Has some symtpoms concerning for pancreatitis, however overall doing well. Will hold victoza over the weekend and repeat levels on Monday 1/6. If lipase levels are downtrending, we could consider restarting Victoza since she did not seem to have pancreas problems at the lower doses. However, I am uncomfortable with this approach and only considering it because of her suicide attempt using insulin and thus the strong desire to get her off of all insulin.    Although ultimate goal is to minimize insulin  requirement given her history of attempted insulin overdose, we may not be able to completely take her off insulin, especially if she is unable to continue with Victoza.      Recommendations:   1. Increase Invokana 300 mg daily before breakfast (previously on 200 mg)  - will repeat Cr and recalculate GFR 1/6  2. HOLD Victoza (Liraglutide) 2.4 mg once daily (previously 3.0).  -  Will plan to repeat amylase and lipase next week 1/6  3. Continue basal insulin (Lantus) 30 units daily  - Will likely need to increase in the next few days as victoza being held  4. Continue to check BG before meals, at bedtime, and 2 am. Please inform us if BG < 80.  5. Correct with Novolog using high insulin resistance scale (1:25>140, starting with 2 units).    Plan discussed with Tamra and her nurse. Patient seen and staffed with  endocrinology attending.     Thank you for allowing us to participate in Tamra's care. Please feel free to page us with any additional questions.      Jaida Guerrier,   Pediatric Endocrinology Fellow  [facility]  Pager: [phone]    Physician Attestation   I, Lori Soria MD, saw this patient with the resident and agree with the resident/fellow's findings and plan of care as documented in the note.  I personally reviewed all aspects of this visit.    Lori Soria MD  Date of Service (when I saw the patient): 1/3/20           Interval History:   Lipase level returned elevated at 224. Her victoza was decreased today to 2.4 mg.  Repeat lipase today was also elevated at 264. Tamra has had some generalized abdominal pain related to constipation but some epigastric pain, although not severe. She is otherwise tolerating her insulin, and Invokana without notable side effects. Most recent Cr was 0.7 with a GFR of 94. Over the last 24 hours, BG have ranged from 106-186 mg/dL.          Physical Exam:   Blood pressure (!) 123/94, pulse 99, temperature 98.2  F (36.8  C), temperature  "source Temporal, resp. rate 17, height 1.6 m (5' 2.99\"), weight (!) 106 kg (233 lb 11 oz), SpO2 94 %.    General: Alert, not in distress  Chest: Breathing comfortably  CVS: well perfused  Skin: Insulin injection sites in stomach are intact without lipohypertrophy         Medications:     Medications Prior to Admission   Medication Sig Dispense Refill Last Dose     guanFACINE (TENEX) 1 MG tablet Take 0.5 tablets (0.5 mg) by mouth At Bedtime 15 tablet 0 9/30/2019 at        hydrOXYzine (ATARAX) 25 MG tablet Take 50 mg by mouth daily (with dinner) :to increase from 1 tab or 25mg to 2 tabs or 50mg at dinner time. Target less anxiety and improved sleep.   9/30/2019 at       hydrOXYzine (ATARAX) 25 MG tablet Take 1 tablet (25 mg) by mouth every 8 hours as needed for anxiety 30 tablet 0 Past Week at Unknown time     insulin aspart (NOVOLOG PEN) 100 UNIT/ML pen Inject 14 units before every meal combined with dose as needed for high blood glucoses. Just correct for high blood glucoses before bed. 15 mL 0 9/30/2019 at       insulin glargine (LANTUS PEN) 100 UNIT/ML pen Inject 55 Units Subcutaneous every morning (before breakfast) 15 mL 0 9/30/2019 at [facility]     melatonin 3 MG tablet Take 12 mg by mouth daily (with dinner) :to increase from 10mg to 12mg at dinner time.   9/30/2019 at      norethin-eth estradiol-fe (GILDESS 24 FE) 1-20 MG-MCG(24) tablet Take 1 tablet by mouth daily   9/30/2019 at      sertraline (ZOLOFT) 100 MG tablet Take 2 tablets (200 mg) by mouth daily (Patient taking differently: Take 200 mg by mouth daily Mom to give after dinner instead of in am starting 9-25 as pt gets tired in morning when she takes it in a.m.) 60 tablet 0 9/30/2019 at      cholecalciferol (VITAMIN D-1000 MAX ST) 1000 units TABS Take 1,000 Units by mouth   More than a month at Unknown time     insulin pen needle (BD CHELY U/F) 32G X 4 MM miscellaneous Use 1 pen needles daily or as directed. 100 each 3 Taking     ONETOUCH DELICA " LANCETS 33G MISC 1 each daily 100 each 3 Taking     ONETOUCH VERIO IQ test strip Use to test blood sugar 1 times daily or as directed. 50 each 3 Taking      Current Facility-Administered Medications   Medication     alum & mag hydroxide-simethicone (MYLANTA ES/MAALOX  ES) suspension 30 mL     ARIPiprazole (ABILIFY) tablet 5 mg     calcium carbonate (TUMS) chewable tablet 500 mg     canagliflozin (INVOKANA) tablet 300 mg     cyanocobalamin (VITAMIN B-12) tablet 1,000 mcg     glucose gel 15-30 g    Or     dextrose 50 % injection 25-50 mL    Or     glucagon injection 1 mg     diphenhydrAMINE (BENADRYL) capsule 25 mg    Or     diphenhydrAMINE (BENADRYL) injection 25 mg     hydrOXYzine (ATARAX) tablet 100 mg     hydrOXYzine (ATARAX) tablet 25 mg     ibuprofen (ADVIL/MOTRIN) tablet 400 mg     insulin aspart (NovoLOG) inj (RAPID ACTING)     insulin aspart (NovoLOG) inj (RAPID ACTING)     insulin glargine (LANTUS PEN) injection 30 Units     lidocaine (LMX4) cream     liraglutide (VICTOZA) injection 2.4 mg     melatonin tablet 6 mg     norethindrone-ethinyl estradiol (MICROGESTIN 1/20) 1-20 MG-MCG per tablet 1 tablet     OLANZapine zydis (zyPREXA) ODT tab 5 mg    Or     OLANZapine (zyPREXA) injection 5 mg     ondansetron (ZOFRAN) tablet 4 mg     polyethylene glycol (MIRALAX/GLYCOLAX) Packet 17 g     propranolol (INDERAL) tablet 10 mg     sennosides (SENOKOT) tablet 1-2 tablet     sertraline (ZOLOFT) tablet 100 mg     sodium chloride (OCEAN) 0.65 % nasal spray 1 spray     traZODone (DESYREL) tablet 150 mg     Vitamin D3 (CHOLECALCIFEROL) 25 mcg (1000 units) tablet 50 mcg           Labs:     Recent Labs   Lab 01/03/20  1211 01/03/20  0818 01/02/20 2014 01/02/20  1738 01/02/20  1203 01/02/20  0900   * 129* 159* 106* 186* 156*     Amylase   Date Value Ref Range Status   12/30/2019 38 30 - 110 U/L Final   11/15/2019 32 30 - 110 U/L Final   11/07/2019 38 30 - 110 U/L Final   10/29/2019 30 30 - 110 U/L Final   10/22/2019 31  30 - 110 U/L Final   10/03/2019 39 30 - 110 U/L Final   09/03/2019 125 (H) 30 - 110 U/L Final   09/02/2019 528 (H) 30 - 110 U/L Final     Lipase   Date Value Ref Range Status   01/03/2020 264 (H) 0 - 194 U/L Final   12/30/2019 224 (H) 0 - 194 U/L Final   11/15/2019 146 0 - 194 U/L Final   11/07/2019 187 0 - 194 U/L Final   10/29/2019 101 0 - 194 U/L Final   10/22/2019 97 0 - 194 U/L Final   10/03/2019 102 0 - 194 U/L Final   09/03/2019 1,473 (H) 0 - 194 U/L Final     Creatinine   Date Value Ref Range Status   12/30/2019 0.70 0.39 - 0.73 mg/dL Final     Lab Results   Component Value Date    A1C 8.0 12/13/2019    A1C 8.2 09/02/2019    A1C 7.8 06/07/2019    A1C 5.7 02/15/2010

## 2020-09-10 NOTE — H&P ADULT - NSHPSOURCEINFORD_GEN_ALL_CORE
Patient Plan: Discussed spironolactone vs OCP vs isotretinoin with patient. She states that when she was on thyroid medication her skin was clear. She has an appointment this afternoon with endocrinologist. Initiate Treatment: Seysara 100mg QD with dinner\\nClindamylotion QD\\nOTC Pan Oxyl soap in the shower Detail Level: Simple

## 2021-02-04 DIAGNOSIS — Z01.818 ENCOUNTER FOR OTHER PREPROCEDURAL EXAMINATION: ICD-10-CM

## 2021-02-05 ENCOUNTER — APPOINTMENT (OUTPATIENT)
Dept: DISASTER EMERGENCY | Facility: CLINIC | Age: 61
End: 2021-02-05
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
## 2021-05-12 NOTE — H&P PST ADULT - NEGATIVE BREAST SYMPTOMS
Attending Note       The Resident's history was reviewed and patient interviewed.    The History is confirmed    Brief history: He states he's had some chronic R wrist pain to the lateral side that has worsened recently. He reports his pain is worse with movement, especially with his thumb.     The Resident's physical exam was reviewed and patient examined.    Physical confirmed    Brief Physical: Tenderness along lateral aspect of R wrist proximal to wrist crease, pain with medial movement of the hand, as well as flexion of the thumb.    The Assessment and plan were reviewed with the resident.      I confirm the clinical impression.    I have reviewed the residents care plan and agree with the planned approach.      I was physically present during the key portions of the patients History, Physical and Procedures.    Vitals  Vitals:    05/12/21 1026 05/12/21 1031 05/12/21 1100 05/12/21 1130   BP: (!) 154/106 135/85 117/74 116/75   BP Location: LUE - Left upper extremity      Patient Position: Sitting      Pulse: 85 82 83 72   Resp: 20 20 18 20   Temp: 98.1 °F (36.7 °C)      TempSrc: Oral      SpO2: 96% 95% 93% 93%   Weight: 99.5 kg      Height: 5' 11\" (1.803 m)          ED Course  11:42 AM Patient Recheck: I rechecked the pt. I updated the pt on the XR showing no fractures but some arthritis and tendonitis. We discussed the plan of care. I will prescribe a take-home Velcro splint. Pt may take anti-inflammatory medications. I recommended that the pt f/u with a Hand specialist and pt PCP. I advised the pt to return to the ED for any new or worsening sx. The pt understands and agrees with the plan. All questions answered.    [facility]  Critical Care time spent on this patient outside of billable procedures:  None    Clinical Impression  ED Diagnosis        Final diagnosis    Tendinitis of right wrist     Associated Orders          SERVICE TO HAND SURGERY           The patient was provided with a recommendation to follow up  with a primary care provider and obtain reassessment of his/her blood pressure within three months.    Follow Up:  Trista Grover MD  [address]  [address]  [place] [address]  [phone]      1-2 weeks if not improving    Ryan Crawford MD  [address]  [address]  [facility] [address]  [phone]      Hand Surgeon    Ryan Crawford MD  [address]  [address]  [facility] [address]  [phone]                Summary of your Discharge Medications      You have not been prescribed any medications.         Pt is discharged to home/self care in stable condition.       I have reviewed the information recorded by the scribe for accuracy and agree with its contents.    ____________________________________________________________________    Anthony Felipe acting as a scribe for Dr. Andrzej Gilbret  Dictation # [id number]  Scribe: Anthony Gilbert MD  05/12/21 1878     no breast tenderness L/no breast tenderness R

## 2022-05-13 NOTE — ED ADULT NURSE NOTE - GASTROINTESTINAL WDL
Abdomen soft, nontender, nondistended, bowel sounds present in all 4 quadrants. Bi-Rhombic Flap Text: The defect edges were debeveled with a #15 scalpel blade.  Given the location of the defect and the proximity to free margins a bi-rhombic flap was deemed most appropriate.  Using a sterile surgical marker, an appropriate rhombic flap was drawn incorporating the defect. The area thus outlined was incised deep to adipose tissue with a #15 scalpel blade.  The skin margins were undermined to an appropriate distance in all directions utilizing iris scissors.

## 2022-06-07 NOTE — DISCHARGE NOTE ADULT - PROVIDER TOKENS
FREE:[LAST:[Pietro],PHONE:[phone]],FAX:[(   )    -]],TOKEN:'[id number]' TOKEN:'[id number]',FREE:[LAST:[Pietro],PHONE:[phone]],FAX:[(   )    -]],TOKEN:'[id number]' decreased step length/decreased stride length TOKEN:'[id number]:'[id number]',FREE:[LAST:[Pietro],PHONE:[phone]],FAX:[(   )    -]],TOKEN:'[id number]'

## 2022-06-11 NOTE — ED PROVIDER NOTE - NS ED MD DISPO DISCHARGE
Called and informed the pt that we are still waiting on his MRI to come back and that we will call him asap with the report. I also informed the pt that his xray was negative. Pt confirmed understanding   
I do not have the MRI report back  X-ray was basically negative you can tell him  
PATIENT WOULD LIKE TO KNOW RESULTS OF MRI AND/OR XRAY DUE TO HIM STILL BEING IN LOTS OF PAIN  
Home

## 2022-06-26 NOTE — PROVIDER CONTACT NOTE (OTHER) - REASON
Elevated BP Major Shift Events:  Pt remains on vasopressin, dopamine and levophed (please see MAR for titration) pt went to OR for tooth extraction, changed gauze as needed with no active bleeding noted. Pt. Declined with increased work of breathing, labored, placed on HFNC. Needed higher pressers, MD in room and placed new A-line. Dewayne scores completed per order and MD notified of results (please see flow-sheet). Labs collected and sent per order. Pt remains on CRRT. Family at bedside.   Plan: continue to titrated pressers to keep MAP>65, dewayne scores per order and notifty MD of any concerns.   For vital signs and complete assessments, please see documentation flowsheets.

## 2022-08-30 NOTE — ED PROVIDER NOTE - PSYCHIATRIC NEGATIVE STATEMENT, MLM
Kate Georgia called to request a refill on her medication. Last office visit : 4/20/2022   Next office visit : 8/30/2022     Last UDS:   Amphetamine Screen, Urine   Date Value Ref Range Status   04/17/2018 Negative  Final     Barbiturate Screen, Urine   Date Value Ref Range Status   04/17/2018 Negative  Final     Benzodiazepine Screen, Urine   Date Value Ref Range Status   04/17/2018 Positive  Final     Cocaine Metabolite Screen, Urine   Date Value Ref Range Status   04/17/2018 Negative  Final     MDMA, Urine   Date Value Ref Range Status   04/17/2018 Negative  Final     Methamphetamine, Urine   Date Value Ref Range Status   04/17/2018 Negative  Final     Opiate Scrn, Ur   Date Value Ref Range Status   04/17/2018 Negative  Final     Oxycodone Screen, Ur   Date Value Ref Range Status   04/17/2018 Negative  Final     PCP Screen, Urine   Date Value Ref Range Status   04/17/2018 Negative  Final     Propoxyphene Screen, Urine   Date Value Ref Range Status   04/17/2018 Negative  Final     Tricyclic Antidepressants, Urine   Date Value Ref Range Status   04/17/2018 Negative  Final       Last Judd Marchi: 8/30/22  Medication Contract: 4/17/18   Last Fill: 5/12/22      Requested Prescriptions     Pending Prescriptions Disp Refills    ALPRAZolam (XANAX) 0.25 MG tablet [Pharmacy Med Name: ALPRAZolam 0.25 MG Oral Tablet] 90 tablet 0     Sig: TAKE 1 TABLET BY MOUTH NIGHTLY AS NEEDED FOR ANXIETY         Please approve or refuse this medication.    Barak Alcantara no known mental health issues.

## 2022-09-15 NOTE — ASU DISCHARGE PLAN (ADULT/PEDIATRIC). - MEDICATION SUMMARY - MEDICATIONS TO TAKE
Rivka Mtz is a 51 y.o. male  Chief Complaint   Patient presents with    Follow-up     non-small cell lung cancer     1. Have you been to the ER, urgent care clinic since your last visit? Hospitalized since your last visit? No    2. Have you seen or consulted any other health care providers outside of the [address] [place] [address] since your last visit? Include any pap smears or colon screening.  No I will START or STAY ON the medications listed below when I get home from the hospital:    MVI  -- 1 tab(s) by mouth once a day, last dose 5/14/18  -- Indication: For Home medication    Percocet 5/325 oral tablet  -- 1-2 tab(s) by mouth every 4-6 hours, As Needed -for severe pain MDD:8   -- Caution federal law prohibits the transfer of this drug to any person other  than the person for whom it was prescribed.  May cause drowsiness.  Alcohol may intensify this effect.  Use care when operating dangerous machinery.  This prescription cannot be refilled.  This product contains acetaminophen.  Do not use  with any other product containing acetaminophen to prevent possible liver damage.  Using more of this medication than prescribed may cause serious breathing problems.    -- Indication: For Pain    losartan 50 mg oral tablet  -- 1 tab(s) by mouth once a day, am  -- Indication: For Home medication    Regranex 0.01% topical gel  -- Apply on skin to affected area once a day  -- Indication: For Home medication

## 2022-09-22 NOTE — CONSULT NOTE ADULT - CONSULT REQUESTED BY NAME
Carlos Miranda
Dr Laureano
Dr. Carlos Miranda
Dr. Martino
Dr. Martino
Dr. Miranda
Primary Team
Naomy Martino
regular

## 2023-08-02 NOTE — ED PROVIDER NOTE - MUSCULOSKELETAL, MLM
pain over right lower back, QL, and pain.  Palpable pain over cuboid. Skin normal color for race, warm, dry and intact. No evidence of rash.

## 2023-08-07 NOTE — PATIENT PROFILE ADULT. - TOBACCO USE
Sent patient portal message that he needs an appointment. He has already been given 2 courtesy refills.    Former smoker

## 2023-09-29 NOTE — PROGRESS NOTE ADULT - PROVIDER SPECIALTY LIST ADULT
Cardiology Cartilage Graft Text: The defect edges were debeveled with a #15 scalpel blade.  Given the location of the defect, shape of the defect, the fact the defect involved a full thickness cartilage defect a cartilage graft was deemed most appropriate.  An appropriate donor site was identified, cleansed, and anesthetized. The cartilage graft was then harvested and transferred to the recipient site, oriented appropriately and then sutured into place.  The secondary defect was then repaired using a primary closure.

## 2023-12-13 NOTE — ED PROCEDURE NOTE - CPROC ED POST PROC CARE GUIDE1
Instructed patient/caregiver regarding signs and symptoms of infection./Keep the cast/splint/dressing clean and dry./Verbal/written post procedure instructions were given to patient/caregiver./Elevate the injured extremity as instructed./Instructed patient/caregiver to follow-up with primary care physician. full range of motion in all extremities

## 2024-11-23 NOTE — DIETITIAN INITIAL EVALUATION ADULT. - NS AS NUTRI INTERV COLLABORAT
11/23/2024      Dear Claudia,    Your health is very important to us. Our records show you are due for an appointment for:  Diabetes    According to our records, you are also due for the following lab work:    Hemoglobin A1c: this test measures your average blood glucose (sugar) level over the last three months (tells how well blood sugar is controlled).   Hemoglobin A1C   Date Value Ref Range Status   10/14/2024 12.3 (H) 4.5 - 5.6 % Final     Comment:       Diabetic Screening  Non Diabetic:             <5.7%  Increased Risk:           5.7-6.4%  Diagnostic For Diabetes:  >6.4%    Diabetic Control  A1C%       eAG mg/dL  6.0            126  6.5            140  7.0            154  7.5            169  8.0            183  8.5            197  9.0            212  9.5            226  10.0           240       NOTE: If you have not had this blood test performed within our clinic, your results will indicate no results found. If you have had this test done at another facility, please arrange to have a copy sent to our office.     If you are seeing a different Primary Care Provider, please call our office at [phone] so we can update your records. Otherwise, please call our office to have the above screenings scheduled, or schedule using the [employer] kunal.     Sincerely,       Stewart Jefferson MD   [facility] [address] [address]  [address] [place] [address]  Dept: [phone]  Dept Fax: [phone]          .       Severe malnutrition coded, BMI >40kg/m2 coded. Discussed with NP. Consider bowel regimen. Severe malnutrition coded, BMI >40kg/m2 coded. Discussed with NP.

## 2025-01-30 NOTE — 1. 4.0 ADULT PLAN OF CARE - VENOUS THROMBOEMBOLIC DISEASE: SIGNS AND SYMPTOMS OF POTENTIAL PROBLEMS ASSESSED, QM
Copied from CRM #[id number]. Topic: MW Messaging - MW Patient Request  >> Jan 30, 2025  1:09 PM Migel REES wrote:  Nghia Ribeiro called requesting to send a general message to clinician.   Verified issue is NOT regarding a symptom(s) requiring routine or emergent triage. Verified another message template type and CRM does not apply.    Selected 'Wrap Up CRM' and created new Telephone Encounter after clicking 'Convert to Clinical Call'. Selected appropriate Reason for Call.  Sent Pt message template and routed as routine priority per Clinician KB page to appropriate clinician pool. Readback full message.-- DO NOT REPLY / DO NOT REPLY ALL --  -- This inbox is not monitored. If this was sent to the wrong provider or department, reroute message to P ECO Reroute pool. --  -- Message is from Engagement Center Operations (ECO) --    General Patient Message: patient called will like a call back , want to know if she should have labs and CT done prior to seeing Urologist? Patient did not schedule appt yet , please advise    Caller Information       Contact Date/Time Type Contact Phone/Fax    01/30/2025 01:07 PM CST Phone (Incoming) Nghia Ribeiro [phone]            Alternative phone number: na    Can a detailed message be left? Yes - Voicemail   Patient has been advised the message will be addressed within 2-3 business days.                 all

## 2025-02-13 NOTE — PHYSICAL THERAPY INITIAL EVALUATION ADULT - NS ASR WT BEARING DETAIL RLE
nonweight-bearing Body Location Override (Optional - Billing Will Still Be Based On Selected Body Map Location If Applicable): left cheek Detail Level: Detailed Depth Of Biopsy: dermis Was A Bandage Applied: Yes Size Of Lesion In Cm: 0.6 X Size Of Lesion In Cm: 0 Biopsy Type: H and E Biopsy Method: Dermablade Anesthesia Type: 1% lidocaine without epinephrine and a 1:10 solution of 8.4% sodium bicarbonate Anesthesia Volume In Cc: 0.5 Hemostasis: Drysol Wound Care: Petrolatum Dressing: bandage Destruction After The Procedure: No Type Of Destruction Used: Curettage Curettage Text: The wound bed was treated with curettage after the biopsy was performed. Cryotherapy Text: The wound bed was treated with cryotherapy after the biopsy was performed. Electrodesiccation Text: The wound bed was treated with electrodesiccation after the biopsy was performed. Electrodesiccation And Curettage Text: The wound bed was treated with electrodesiccation and curettage after the biopsy was performed. Silver Nitrate Text: The wound bed was treated with silver nitrate after the biopsy was performed. Lab: [id number] Medical Necessity Information: It is in your best interest to select a reason for this procedure from the list below. All of these items fulfill various CMS LCD requirements except the new and changing color options. Consent: verbal consent obtained Post-Care Instructions: I reviewed with the patient in detail post-care instructions. Patient is to keep the biopsy site dry overnight, and then apply bacitracin twice daily until healed. Patient may apply hydrogen peroxide soaks to remove any crusting. Notification Instructions: Patient will be notified of biopsy results. However, patient instructed to call the office if not contacted within 2 weeks. Billing Type: Third-Party Bill Information: Selecting Yes will display possible errors in your note based on the variables you have selected. This validation is only offered as a suggestion for you. PLEASE NOTE THAT THE VALIDATION TEXT WILL BE REMOVED WHEN YOU FINALIZE YOUR NOTE. IF YOU WANT TO FAX A PRELIMINARY NOTE YOU WILL NEED TO TOGGLE THIS TO 'NO' IF YOU DO NOT WANT IT IN YOUR FAXED NOTE.

## 2025-06-10 NOTE — H&P ADULT - GASTROINTESTINAL DETAILS
Daniella from [facility] [place] is requesting last office notes of Dr. Gross.       Fax : [phone]   soft/nontender/no distention

## 2025-08-26 ENCOUNTER — APPOINTMENT (OUTPATIENT)
Dept: MRI IMAGING | Facility: CLINIC | Age: 65
End: 2025-08-26
Payer: MEDICARE

## 2025-08-26 ENCOUNTER — APPOINTMENT (OUTPATIENT)
Dept: RADIOLOGY | Facility: CLINIC | Age: 65
End: 2025-08-26
Payer: MEDICARE

## 2025-08-26 PROCEDURE — 73564 X-RAY EXAM KNEE 4 OR MORE: CPT | Mod: RT

## 2025-08-26 PROCEDURE — 73030 X-RAY EXAM OF SHOULDER: CPT | Mod: 50

## 2025-08-26 PROCEDURE — 72141 MRI NECK SPINE W/O DYE: CPT | Mod: TC

## 2025-08-27 ENCOUNTER — APPOINTMENT (OUTPATIENT)
Dept: MRI IMAGING | Facility: CLINIC | Age: 65
End: 2025-08-27
Payer: MEDICARE

## 2025-08-27 PROCEDURE — 72148 MRI LUMBAR SPINE W/O DYE: CPT
